# Patient Record
Sex: MALE | Race: WHITE
[De-identification: names, ages, dates, MRNs, and addresses within clinical notes are randomized per-mention and may not be internally consistent; named-entity substitution may affect disease eponyms.]

---

## 2017-01-10 ENCOUNTER — HOSPITAL ENCOUNTER (INPATIENT)
Dept: HOSPITAL 74 - JER | Age: 77
LOS: 2 days | Discharge: HOME | DRG: 392 | End: 2017-01-12
Payer: COMMERCIAL

## 2017-01-10 VITALS — BODY MASS INDEX: 28.3 KG/M2

## 2017-01-10 DIAGNOSIS — I25.10: ICD-10-CM

## 2017-01-10 DIAGNOSIS — N17.9: ICD-10-CM

## 2017-01-10 DIAGNOSIS — N40.0: ICD-10-CM

## 2017-01-10 DIAGNOSIS — Z95.1: ICD-10-CM

## 2017-01-10 DIAGNOSIS — K52.9: Primary | ICD-10-CM

## 2017-01-10 DIAGNOSIS — N18.4: ICD-10-CM

## 2017-01-10 DIAGNOSIS — I48.91: ICD-10-CM

## 2017-01-10 DIAGNOSIS — I13.0: ICD-10-CM

## 2017-01-10 DIAGNOSIS — Z95.810: ICD-10-CM

## 2017-01-10 DIAGNOSIS — I42.9: ICD-10-CM

## 2017-01-10 DIAGNOSIS — E78.5: ICD-10-CM

## 2017-01-10 DIAGNOSIS — K86.9: ICD-10-CM

## 2017-01-10 DIAGNOSIS — I50.22: ICD-10-CM

## 2017-01-10 DIAGNOSIS — I73.9: ICD-10-CM

## 2017-01-10 LAB
ALBUMIN SERPL-MCNC: 3.7 G/DL (ref 3.4–5)
ALP SERPL-CCNC: 148 U/L (ref 45–117)
ALT SERPL-CCNC: 15 U/L (ref 12–78)
ANION GAP SERPL CALC-SCNC: 7 MMOL/L (ref 8–16)
AST SERPL-CCNC: 19 U/L (ref 15–37)
BASOPHILS # BLD: 0.6 % (ref 0–2)
BILIRUB SERPL-MCNC: 0.7 MG/DL (ref 0.2–1)
CALCIUM SERPL-MCNC: 9.4 MG/DL (ref 8.5–10.1)
CO2 SERPL-SCNC: 37 MMOL/L (ref 21–32)
CREAT SERPL-MCNC: 2.6 MG/DL (ref 0.7–1.3)
DEPRECATED RDW RBC AUTO: 17.6 % (ref 11.9–15.9)
EOSINOPHIL # BLD: 1 % (ref 0–4.5)
GLUCOSE SERPL-MCNC: 123 MG/DL (ref 74–106)
INR BLD: 2.44 (ref 0.82–1.09)
MAGNESIUM SERPL-MCNC: 2.8 MG/DL (ref 1.8–2.4)
MCH RBC QN AUTO: 26.2 PG (ref 25.7–33.7)
MCHC RBC AUTO-ENTMCNC: 31.6 G/DL (ref 32–35.9)
MCV RBC: 82.9 FL (ref 80–96)
NEUTROPHILS # BLD: 76.8 % (ref 42.8–82.8)
PLATELET # BLD AUTO: 277 K/MM3 (ref 134–434)
PMV BLD: 8.8 FL (ref 7.5–11.1)
PROT SERPL-MCNC: 8.3 G/DL (ref 6.4–8.2)
PT PNL PPP: 27.3 SEC (ref 9.98–11.88)
TROPONIN I SERPL-MCNC: 0.03 NG/ML (ref 0–0.05)
WBC # BLD AUTO: 10.2 K/MM3 (ref 4–10)

## 2017-01-10 RX ADMIN — ATORVASTATIN CALCIUM SCH MG: 40 TABLET, FILM COATED ORAL at 21:51

## 2017-01-10 RX ADMIN — POTASSIUM CHLORIDE SCH MEQ: 1500 TABLET, EXTENDED RELEASE ORAL at 21:52

## 2017-01-10 RX ADMIN — DOCUSATE SODIUM SCH MG: 100 CAPSULE, LIQUID FILLED ORAL at 21:51

## 2017-01-10 RX ADMIN — MILRINONE LACTATE SCH MLS/HR: 200 INJECTION, SOLUTION INTRAVENOUS at 22:39

## 2017-01-10 RX ADMIN — CARVEDILOL SCH MG: 6.25 TABLET, FILM COATED ORAL at 21:52

## 2017-01-10 NOTE — PDOC
History of Present Illness





- General


History Source: Patient, Significant Other


Exam Limitations: No Limitations





- History of Present Illness


Initial Comments: 





01/10/17 14:59


The patient is a 76 year old male, with significant past medical history of IDDM

, HTN, COPD, Afib (on coumadin), CAD s/p CABG, PAD, end- stage CHF s/p ICD 

placement (on milrinone pump) , who presents today complaining of SOB, 

abdominal pain, nausea, and dizziness. The patient had 4 episodes of vomiting 

while having a workup in Dr. Rae office this morning. He reports abdominal 

pain that is constant. His last meal was breakfast and tea and felt normal 

until he arrived to the doctors office. The patients primary is out of office

, so his wife brought him to the emergency room. 





Denies fever, chills. 


Denies chest pain, SOB. 





Allergies: Shellfish


Surgical Hx: gallbladder, hernia


PCP- 


Cardiologist: Dr. Titus








<Ashlie Iniguez - Last Filed: 01/10/17 17:54>





- General


History Source: Patient


Exam Limitations: No Limitations





<Teodora Hammer - Last Filed: 01/11/17 12:11>





- General


Chief Complaint: Nausea/Vomiting


Stated Complaint: SOB, VOMITING (CARDIAC)


Time Seen by Provider: 01/10/17 13:24





Past History





<Ashlie Iniguez - Last Filed: 01/10/17 17:54>





- Past Medical History


Anemia: Yes


Asthma: No


Cancer: No


Cardiac Disorders: Yes (CHF, MI 2011,defibrilator placement,PACEMAKER)


CVA: No


COPD: Yes


CHF: Yes


Dementia: No


Diabetes: Yes


GI Disorders: No


 Disorders: Yes (bph)


HTN: Yes


Hypercholesterolemia: Yes


Liver Disease: No


Suicide Attempt (Hx): No


Seizures: No


Thyroid Disease: No





- Surgical History


Abdominal Surgery: Yes (HERNIA REPAIR 2003)


Appendectomy: No


Cardiac Surgery: Yes (2 OPEN HEART SURGERIES; TRACH PLACEMENT,PACEMAKER PLACED 7 /19/2013)


Cholecystectomy: Yes


Lung Surgery: No


Neurologic Surgery: No


Orthopedic Surgery: No





- Immunization History


Td Vaccination: Yes


TDAP Vaccination: Yes


Immunization Up to Date: Yes





- Psycho/Social/Smoking Cessation Hx


Anxiety: No


Suicidal Ideation: No


Smoking Status: No


Smoking History: Former smoker


Years of Tobacco Use: 40


Have you smoked in the past 12 months: No


Number of Cigarettes Smoked Daily: 40


If you are a former smoker, when did you quit?: 2011


Cigars Per Day: 0


Information on smoking cessation initiated: No


'Breaking Loose' booklet given: 06/11/12


Hx Alcohol Use: No


Drug/Substance Use Hx: No


Substance Use Type: None


Hx Substance Use Treatment: No





<HarmanTeodora - Last Filed: 01/11/17 12:11>





- Past Medical History


Allergies/Adverse Reactions: 


 Allergies











Allergy/AdvReac Type Severity Reaction Status Date / Time


 


No Known Drug Allergies Allergy   Verified 01/10/17 14:19


 


shellfish derived Allergy   Verified 01/10/17 14:19











Home Medications: 


Ambulatory Orders





Allopurinol [Zyloprim -] 100 mg PO DAILY 01/14/16 


Atorvastatin Ca [Lipitor] 40 mg PO HS 01/14/16 


Carvedilol 6.25 mg PO BID 01/14/16 


Docusate Sodium [Colace -] 100 mg PO BID 01/14/16 


Insulin NPL/Insulin Lispro [Humalog Mix 75-25 Vial] 0 unit SQ PRN 01/14/16 


Milrinone Lactate/D5w [Milrinone 0.2 mg/ml in D5w] 0 mg IV DAILY 01/14/16 


Ranitidine [Zantac -] 75 mg PO DAILY 01/14/16 


Torsemide 40 mg PO BID 01/14/16 


Albuterol 0.083% Nebulizer Sol [Ventolin 0.083% Nebulizer Soln -] 1 amp NEB Q6H 

PRN #120 amp 01/18/16 


Nebulizer [Compact Compressor Nebulizer] 1 each MC TID 08/19/16 


Acetaminophen [Tylenol .Regular Strength -] 650 mg PO Q6H PRN #0 tablet 08/27/ 16 


Ferrous Sulfate [Feosol] 325 mg PO Q48H  ud 08/27/16 


Potassium Chloride [K-Dur -] 20 meq PO TID #90 tablet.er 08/27/16 


Warfarin Na [Coumadin -] 4 mg PO DAILY@1800 01/10/17 











**Review of Systems





- Review of Systems


Able to Perform ROS?: Yes


Comments:: 





01/10/17 15:00


GENERAL/CONSTITUTIONAL: No: fever, chills, weakness, loss of appetite.


HEAD, EYES, EARS, NOSE AND THROAT: No: change in vision, ear pain, discharge, 

sore throat, throat swelling.


CARDIOVASCULAR: No: chest pain, lightheadedness, palpitations, syncope


RESPIRATORY: No: cough, shortness of breath, wheezing, hemoptysis, stridor.


GASTROINTESTINAL: Yes: nausea, vomiting, abdominal pain. No: abdominal cramping

, diarrhea, rectal bleeding, constipation. 


GENITOURINARY: No: dysuria, hematuria, frequency, urgency, flank pain.


MUSCULOSKELETAL: No: back pain, neck pain, joint pain, muscle swelling or pain


SKIN: No: lesions, pallor, rash or easy bruising.


NEUROLOGIC: No: headache, vertigo, paresthesias, weakness 


ENDOCRINE: No: unexplained weight gain or loss


HEMATOLOGIC/LYMPHATIC: No: anemia, easy bleeding, swelling nodes








<Ashlie Iniguez - Last Filed: 01/10/17 17:54>





*Physical Exam





- Vital Signs


 Last Vital Signs











Temp Pulse Resp BP Pulse Ox


 


    78   18   104/64   94 L


 


    01/10/17 13:15  01/10/17 13:15  01/10/17 13:15  01/10/17 13:15














- Physical Exam


Comments: 





01/10/17 15:00


GENERAL: Patient looks uncomfortable. Difficulty laying flat. The patient is in 

no acute distress.


HEAD: Normal with no signs of trauma.


EYES: PERRLA, EOMI, sclera anicteric, conjunctiva clear.


ENT: Ears normal, nares patent, oropharynx clear without exudates.  Moist 

mucous membranes.


NECK: Normal range of motion, supple without lymphadenopathy, JVD, or masses.


LUNGS: Breath sounds equal, clear to auscultation bilaterally.  No wheezes, and 

no crackles.


HEART:Regular rate and rhythm, normal S1 and S2 without murmur, rub or gallop.


ABDOMEN: +Multiple well healed scars on abdomen. Abdominal distention (

according to his wife). Epigastric and suprapubic tenderness on palpation. Soft

, normoactive bowel sounds.  No guarding, no rebound.


EXTREMITIES: Normal range of motion, no edema.  No clubbing or cyanosis. No 

erythema, or tenderness.


NEUROLOGICAL: Cranial nerves II through XII grossly intact.  Normal speech.  No 

focal  neurological deficits. 


MUSCULOSKELETAL:  Back nontender to palpation, no CVA tenderness


SKIN: Warm, Dry, normal turgor, no rashes or lesions noted.








<Ashlie Iniguez - Last Filed: 01/10/17 17:54>





- Vital Signs


 Last Vital Signs











Temp Pulse Resp BP Pulse Ox


 


    78   18   104/64   94 L


 


    01/10/17 13:15  01/10/17 13:15  01/10/17 13:15  01/10/17 13:15














<Teodora Hammer - Last Filed: 01/11/17 12:11>





**Heart Score/ECG Review


  ** #1


ECG reviewed & interpreted by me at: 15:09





01/10/17 15:09


V-paced at 75bpm





<Teodora Hammer - Last Filed: 01/11/17 12:11>





ED Treatment Course





- LABORATORY


CBC & Chemistry Diagram: 


 01/10/17 14:00





 01/10/17 14:00





- ADDITIONAL ORDERS


Additional order review: 


 Laboratory  Results











  01/10/17 01/10/17





  14:00 13:50


 


INR  2.44 H D 


 


Lipase   186








 











  01/10/17





  14:00


 


RBC  5.15


 


MCV  82.9


 


MCHC  31.6 L


 


RDW  17.6 H


 


MPV  8.8


 


Neutrophils %  76.8


 


Lymphocytes %  12.1


 


Monocytes %  9.5


 


Eosinophils %  1.0


 


Basophils %  0.6














- RADIOLOGY


Radiograph Interpretation: 


01/10/17 15:08


EXAM#:     TYPE/EXAM: RESULT: 


[id number] RAD/CHEST X-RAY PORTABLE* 


 


 


Chest pain. 


 Evaluate for infiltrates. 


 


 Status post cardiothoracic surgery, replacement of the cardiac valve. 


 Cardiomegaly. Uncoiled thoracic aorta. 


 The lung apices obscured by the soft tissues of the neck. 


 Left pacemaker with 2 intact  leads. Right vascular catheter in place. 


 No evidence of pneumothorax. 


 


 Vascular congestion. 


 Clinically correlate for congestive heart failure. 


 


 Impression. 


 


 


 Vascular congestion. Clinically correlate for CHF. 


 No evidence of pneumothorax. 


 


 Limited evaluation of the lung parenchyma in the left retrocardiac region.


 


 Reported By: Brannon Flores MD 


 01/10/17 9102 








EXAM#:     TYPE/EXAM: RESULT: 


[id number] CT/ABDOMEN   PELVIS CT W/O CONTR 


Epigastric and periumbilical pain 


 CT scan of the abdomen pelvis without oral and intravenous contrast 


 Coronal and sagittal reformatted images were obtained 


 Compared to prior CT scan of the chest dated 10/11/2013 


 


 There are bibasal atelectatic changes and interstitial thickening. Mild 

cardiomegaly is present. 


 A small hiatus hernia is present. The stomach is partially distended without 

thickening of its 


wall. 


 Evaluation of the liver, spleen and both adrenal glands appear unremarkable. 

Both kidneys are 


within normal limits in size with a partially exophytic cyst in midportion of 

the right kidney, 


posteriorly measuring 2.2 cm and mild deformity of the left renal lateral 

contour measuring 1.7 cm 


for which further evaluation with renal ultrasound is needed to rule out a 

cystic versus solid 


lesion. Gallbladder is not visualized. 


 The pancreas is within normal limits in size. There is a focal low-attenuation 

density in the 


pancreatic body measuring 9 mm for which further evaluation is needed. There is 

no evidence of 


small bowel obstruction or enlarged retroperitoneal lymph nodes. No free fluid 

or free air in the 


abdomen and pelvis. Normal stool burden in the colon without wall thickening 

areas moderate 


distention of the urinary bladder with a lobulated contour and suggestion of a 

large diverticulum 


on the right side measuring 11 x 7 cm and a smaller one on the left measuring 

4.3 cm. Normal size 


prostate gland with benign-appearing calcifications. Perirectal and pericecal 

fat is clear. The 


appendix is not definitely identified. Small calcified plaques in the abdominal 

aorta down through 


its bifurcation. 


 Grade 1 anterolisthesis of L5 over S1 with degenerative vacuum phenomena. 


 


 


 Impression: 


 Right renal cyst seen on prior renal ultrasound dated 3/7/2013. Deformity of 

the left renal 


lateral contour for which further evaluation with ultrasound is needed to 

assess for a cystic 


versus solid lesion. 


 9 mm low-attenuation density in the pancreatic body that was possibly 

partially included on prior 


CT scan of the chest dated 10/11/2013. Correlation with MRI of the pancreas is 

needed to assess for 


a cystic versus solid lesion. 


 Moderately distended urinary bladder with suggestion of large diverticula 


 Otherwise, no CT evidence of an acute process in the abdomen and pelvis. 


 


 Reported By: Sallie Astorga MD 


 01/10/17 4695 








- Medications


Given in the ED: 


ED Medications














Discontinued Medications














Generic Name Dose Route Start Last Admin





  Trade Name Freq  PRN Reason Stop Dose Admin


 


Aspirin  162 mg 01/10/17 13:33 01/10/17 14:27





  Asa -  PO 01/10/17 13:34  Not Given





  ONCE ONE   


 


Ondansetron HCl  4 mg 01/10/17 14:28 01/10/17 14:28





  Zofran Injection  IVPUSH 01/10/17 14:29  4 mg





  NOW ONE   Administration














<Ashlie Iniguez - Last Filed: 01/10/17 17:54>





- LABORATORY


CBC & Chemistry Diagram: 


 01/11/17 05:35





 01/11/17 05:35





- ADDITIONAL ORDERS


Additional order review: 


 











  01/10/17





  14:00


 


RBC  5.15


 


MCV  82.9


 


MCHC  31.6 L


 


RDW  17.6 H


 


MPV  8.8


 


Neutrophils %  76.8


 


Lymphocytes %  12.1


 


Monocytes %  9.5


 


Eosinophils %  1.0


 


Basophils %  0.6














- RADIOLOGY


Radiology Studies Ordered: 














 Category Date Time Status


 


 CHEST X-RAY PORTABLE* [RAD] Stat Radiology  01/10/17 13:34 Completed














- Medications


Given in the ED: 


ED Medications














Discontinued Medications














Generic Name Dose Route Start Last Admin





  Trade Name Freq  PRN Reason Stop Dose Admin


 


Aspirin  162 mg 01/10/17 13:33 01/10/17 14:27





  Asa -  PO 01/10/17 13:34  Not Given





  ONCE ONE   


 


Ondansetron HCl  4 mg 01/10/17 14:28 01/10/17 14:28





  Zofran Injection  IVPUSH 01/10/17 14:29  4 mg





  NOW ONE   Administration














<Teodora Hammer - Last Filed: 01/11/17 12:11>





Medical Decision Making





- Medical Decision Making


01/10/17 14:44


A portion of this note was documented by scribe services under my direction. I 

have reviewed the details of the note, within reason, and agree with the 

documentation with the following case summary and management plan written by me.


Nursing documentation reviewed and incorporated into medical decision making





This pt is a 77 yo M h/o IDDM, HTN, COPD, Afib (on coumadin), CAD s/p CABG, PAD

, end- stage CHF s/p ICD placement (on milrinone pump) , who presents today 

complaining of SOB, abdominal pain, nausea, and dizziness. 


Repeated episodes of vomiting and diarrhea


He reports abdominal pain that is constant, located in the middle of the 

abdomen. 


01/10/17 16:43


 Laboratory Tests











  08/29/16 08/29/16 01/10/17





  05:48 05:48 13:50


 


WBC  7.8  


 


Hgb  11.9  


 


Hct  37.3  


 


Plt Count  194  


 


INR   1.74 H 


 


Sodium   


 


Potassium   


 


Chloride   


 


Carbon Dioxide   


 


BUN   


 


Creatinine   


 


Random Glucose   


 


Creatine Kinase   


 


Troponin I   


 


Lipase    186














  01/10/17 01/10/17 01/10/17





  14:00 14:00 14:00


 


WBC  10.2 H D  


 


Hgb  13.5  D  


 


Hct  42.7  


 


Plt Count  277  D  


 


INR   2.44 H D 


 


Sodium    140


 


Potassium    3.2 L


 


Chloride    96 L


 


Carbon Dioxide    37 H


 


BUN    68 H D


 


Creatinine    2.6 H D


 


Random Glucose    123 H D


 


Creatine Kinase    57


 


Troponin I    0.03


 


Lipase   














01/10/17 16:55


Labs demonstrate renal insufficiency


CT pending


Will hold on aggressive hydration (As pt CXR demonstrates CHF)\








01/10/17 17:31


CT of the abdomen and pelvis:





01/10/17 17:34


Small hiatal hernia


Exophytic cyst in the midportion of the right kidney, mild deformity of the 

left kidney, patient is an ultrasound.


Patient is within normal limits.


Low attenuation density in the pancreatic body measuring 9 mm no evidence of 

SBO.


Normal stool burning bladder is lobulated suggestive of a large diverticulum.





Patient seen in the ER by Dr. Villafuerte.


Will admit to telemetry.








<Teodora Hammer - Last Filed: 01/11/17 12:11>





*DC/Admit/Observation/Transfer





- Attestations


Scribe Attestion: 





01/10/17 15:00





Documentation prepared by SEFERINO Tomlin, acting as medical scribe 

for Teodora Hammer MD.





<Ashlie Iniguez - Last Filed: 01/10/17 17:54>





- Discharge Dispostion


Admit: Yes





<Teodora Hammer - Last Filed: 01/11/17 12:11>


Diagnosis at time of Disposition: 


 Renal insufficiency





Vomiting


Qualifiers:


 Vomiting type: unspecified Vomiting Intractability: non-intractable Nausea 

presence: with nausea Qualified Code(s): R11.2 - Nausea with vomiting, 

unspecified





- Discharge Dispostion


Condition at time of disposition: Stable





- Referrals

## 2017-01-10 NOTE — HP
Admitting History and Physical





- Admission


Chief Complaint: nausea, vomiting





- Past Medical History


Cardiovascular: Yes: AFIB, CAD, CHF, HTN, Hyperlipdemia, MI, Pulmonary 

Hypertension, Other (AICD placed)


Pulmonary: Yes: COPD, O2 Dependent


Renal/: Yes: Renal Inusuff, BPH


Endocrine: Yes: Diabetes Mellitus





- Past Surgical History


Past Surgical History: Yes: AICD, CABG, Hernia Repair





- Smoking History


Smoking history: Former smoker


Have you smoked in the past 12 months: No


Aproximately how many cigarettes per day: 40


If you are a former smoker, when did you quit?: 2011





- Alcohol/Substance Use


Hx Alcohol Use: No





- Social History


ADL: Independent


History of Recent Travel: No





Home Medications





- Allergies


Allergies/Adverse Reactions: 


 Allergies











Allergy/AdvReac Type Severity Reaction Status Date / Time


 


No Known Drug Allergies Allergy   Verified 01/10/17 14:19


 


shellfish derived Allergy   Verified 01/10/17 14:19














- Home Medications


Home Medications: 


Ambulatory Orders





Allopurinol [Zyloprim -] 100 mg PO DAILY 01/14/16 


Atorvastatin Ca [Lipitor] 40 mg PO HS 01/14/16 


Carvedilol 6.25 mg PO BID 01/14/16 


Docusate Sodium [Colace -] 100 mg PO BID 01/14/16 


Insulin NPL/Insulin Lispro [Humalog Mix 75-25 Vial] 0 unit SQ PRN 01/14/16 


Milrinone Lactate/D5w [Milrinone 0.2 mg/ml in D5w] 0 mg IV DAILY 01/14/16 


Ranitidine [Zantac -] 75 mg PO DAILY 01/14/16 


Torsemide 40 mg PO BID 01/14/16 


Albuterol 0.083% Nebulizer Sol [Ventolin 0.083% Nebulizer Soln -] 1 amp NEB Q6H 

PRN #120 amp 01/18/16 


Nebulizer [Compact Compressor Nebulizer] 1 each MC TID 08/19/16 


Acetaminophen [Tylenol .Regular Strength -] 650 mg PO Q6H PRN #0 tablet 08/27/ 16 


Ferrous Sulfate [Feosol] 325 mg PO Q48H  ud 08/27/16 


Potassium Chloride [K-Dur -] 20 meq PO TID #90 tablet.er 08/27/16 


Warfarin Na [Coumadin -] 4 mg PO DAILY@1800 01/10/17 











Family Disease History





- Family Disease History


Family Disease History: Diabetes: Father, Heart Disease: Father





Physical Examination


Vital Signs: 


 Vital Signs











Temperature  97.9 F   01/10/17 19:17


 


Pulse Rate  75   01/10/17 19:17


 


Respiratory Rate  18   01/10/17 19:17


 


Blood Pressure  98/57   01/10/17 19:17


 


O2 Sat by Pulse Oximetry (%)  96   01/10/17 19:17

## 2017-01-10 NOTE — HP
Admitting History and Physical





- Admission


Chief Complaint: N/V, abd pain


History of Present Illness: 


77 yo male, h/o end-stage CHF, on milrinone pump, presents to hospital with 

nausea and vomiting which began earlier today.  Havign abdominal pain as well.  

Patient was feeling well this morning upon awakening.  No diarrhea recently.  

Went to routine appt at cardiologist's office, where e became ill, vomiting 

multiple times, where abd pain began.  Currently feeling a little better, no 

pain now.  Had CT scan, which did not show any acute abd process present.  no 

fevers noted.


History Source: Patient, Family Member, Medical Record


Limitations to Obtaining History: No Limitations





- Past Medical History


Cardiovascular: Yes: AFIB, CAD, CHF, HTN, Hyperlipdemia, MI, Pulmonary 

Hypertension, Other (AICD placed)


Pulmonary: Yes: COPD, O2 Dependent


Renal/: Yes: Renal Inusuff, BPH


Endocrine: Yes: Diabetes Mellitus





- Past Surgical History


Past Surgical History: Yes: AICD, CABG, Hernia Repair





- Smoking History


Smoking history: Former smoker


Have you smoked in the past 12 months: No


Aproximately how many cigarettes per day: 40


If you are a former smoker, when did you quit?: 2011





- Alcohol/Substance Use


Hx Alcohol Use: No





- Social History


ADL: Independent


History of Recent Travel: No





Home Medications





- Allergies


Allergies/Adverse Reactions: 


 Allergies











Allergy/AdvReac Type Severity Reaction Status Date / Time


 


No Known Drug Allergies Allergy   Verified 01/10/17 14:19


 


shellfish derived Allergy   Verified 01/10/17 14:19














- Home Medications


Home Medications: 


Ambulatory Orders





Allopurinol [Zyloprim -] 100 mg PO DAILY 01/14/16 


Atorvastatin Ca [Lipitor] 40 mg PO HS 01/14/16 


Carvedilol 6.25 mg PO BID 01/14/16 


Docusate Sodium [Colace -] 100 mg PO BID 01/14/16 


Insulin NPL/Insulin Lispro [Humalog Mix 75-25 Vial] 0 unit SQ PRN 01/14/16 


Milrinone Lactate/D5w [Milrinone 0.2 mg/ml in D5w] 0 mg IV DAILY 01/14/16 


Ranitidine [Zantac -] 75 mg PO DAILY 01/14/16 


Torsemide 40 mg PO BID 01/14/16 


Albuterol 0.083% Nebulizer Sol [Ventolin 0.083% Nebulizer Soln -] 1 amp NEB Q6H 

PRN #120 amp 01/18/16 


Nebulizer [Compact Compressor Nebulizer] 1 each MC TID 08/19/16 


Acetaminophen [Tylenol .Regular Strength -] 650 mg PO Q6H PRN #0 tablet 08/27/ 16 


Ferrous Sulfate [Feosol] 325 mg PO Q48H  ud 08/27/16 


Potassium Chloride [K-Dur -] 20 meq PO TID #90 tablet.er 08/27/16 


Warfarin Na [Coumadin -] 4 mg PO DAILY@1800 01/10/17 











Family Disease History





- Family Disease History


Family Disease History: Diabetes: Father, Heart Disease: Father





Review of Systems





- Review of Systems


Constitutional: denies: Fever, Loss of Appetite, Unintentional Wgt. Loss


Eyes: reports: No Symptoms


HENT: denies: Difficult Swallowing, Throat Pain


Neck: denies: Decreased ROM, Tenderness


Cardiovascular: reports: Shortness of Breath ((baseline CARTAGENA)).  denies: Chest 

Pain


Respiratory: denies: Cough, Wheezing


Gastrointestinal: denies: Constipation, Diarrhea


Genitourinary: denies: Burning, Discharge, Dysuria


Musculoskeletal: denies: Back Pain


Integumentary: reports: Other (recent cellulitis treatment of legs)





Physical Examination


Vital Signs: 


 Vital Signs











Temperature  96.6 F L  01/10/17 21:14


 


Pulse Rate  75   01/10/17 21:14


 


Respiratory Rate  20   01/10/17 21:14


 


Blood Pressure  109/59   01/10/17 21:14


 


O2 Sat by Pulse Oximetry (%)  96   01/10/17 19:17











Constitutional: Yes: Well Nourished, No Distress, Calm


Eyes: Yes: Conjunctiva Clear, EOM Intact, PERRL


HENT: Yes: Atraumatic, Normocephalic


Neck: Yes: Supple, Trachea Midline


Cardiovascular: Yes: Regular Rate and Rhythm, S1, S2


Respiratory: Yes: Regular, Diminished (at bases)


Gastrointestinal: Yes: Normal Bowel Sounds, Soft.  No: Tenderness


Edema: Yes


Edema: LLE: Trace, RLE: Trace


Neurological: Yes: Alert, Oriented


Labs: 


 Laboratory Tests











  01/10/17 01/10/17 01/10/17





  13:50 14:00 14:00


 


WBC   10.2 H D 


 


RBC   5.15 


 


Hgb   13.5  D 


 


Hct   42.7 


 


MCV   82.9 


 


MCHC   31.6 L 


 


RDW   17.6 H 


 


Plt Count   277  D 


 


MPV   8.8 


 


Neutrophils %   76.8 


 


Lymphocytes %   12.1 


 


Monocytes %   9.5 


 


Eosinophils %   1.0 


 


Basophils %   0.6 


 


INR    2.44 H D


 


Sodium   


 


Potassium   


 


Chloride   


 


Carbon Dioxide   


 


Anion Gap   


 


BUN   


 


Creatinine   


 


Creat Clearance w eGFR   


 


Random Glucose   


 


Calcium   


 


Magnesium   


 


Total Bilirubin   


 


AST   


 


ALT   


 


Alkaline Phosphatase   


 


Creatine Kinase   


 


Troponin I   


 


Total Protein   


 


Albumin   


 


Lipase  186  














  01/10/17





  14:00


 


WBC 


 


RBC 


 


Hgb 


 


Hct 


 


MCV 


 


MCHC 


 


RDW 


 


Plt Count 


 


MPV 


 


Neutrophils % 


 


Lymphocytes % 


 


Monocytes % 


 


Eosinophils % 


 


Basophils % 


 


INR 


 


Sodium  140


 


Potassium  3.2 L


 


Chloride  96 L


 


Carbon Dioxide  37 H


 


Anion Gap  7 L


 


BUN  68 H D


 


Creatinine  2.6 H D


 


Creat Clearance w eGFR  23.99


 


Random Glucose  123 H D


 


Calcium  9.4


 


Magnesium  2.8 H D


 


Total Bilirubin  0.7


 


AST  19  D


 


ALT  15


 


Alkaline Phosphatase  148 H D


 


Creatine Kinase  57


 


Troponin I  0.03


 


Total Protein  8.3 H


 


Albumin  3.7


 


Lipase 














Imaging





- Results


Chest X-ray: Report Reviewed (congestive changes)


Cat Scan: Report Reviewed (abd/pelv: no colitis or acute intestinal process 

noted -renal cysts seen, pancreatic density seen)





Problem List





- Problems


(1) Vomiting


Assessment/Plan: 


-gastoenteritis? -zofran prn, clear liquid diet (no abd pain now)


Code(s): R11.10 - VOMITING, UNSPECIFIED   Qualifiers: 


     Vomiting type: unspecified     Vomiting Intractability: non-intractable   

  Nausea presence: with nausea     Qualified Code(s): R11.2 - Nausea with 

vomiting, unspecified  





(2) Acute on chronic renal insufficiency


Assessment/Plan: 


-difficult to give IV fluids due to end-stage cardiomyopathy -will hold 

torsemide for a few doses, renal consult


Code(s): N28.9 - DISORDER OF KIDNEY AND URETER, UNSPECIFIED


N18.9 - CHRONIC KIDNEY DISEASE, UNSPECIFIED





(3) Atrial fibrillation


Assessment/Plan: 


-cont coumadin (INR therapeutic)


Code(s): I48.91 - UNSPECIFIED ATRIAL FIBRILLATION   Qualifiers: 


     Atrial fibrillation type: chronic     Qualified Code(s): I48.2 - Chronic 

atrial fibrillation  





(4) Cardiomyopathy


Assessment/Plan: 


-cont milrinone pump


Code(s): I42.9 - CARDIOMYOPATHY, UNSPECIFIED





(5) Chronic kidney disease (CKD) stage G3a/A3, moderately decreased glomerular 

filtration rate (GFR) between 45-59 mL/min/1.73 square meter and albuminuria 

creatinine ratio greater than 300 mg/g


Assessment/Plan: 


Usual Cr approx. 2.0


Code(s): N18.3 - CHRONIC KIDNEY DISEASE, STAGE 3 (MODERATE)

## 2017-01-10 NOTE — EKG
Test Reason : 

Blood Pressure : ***/*** mmHG

Vent. Rate : 075 BPM     Atrial Rate : 072 BPM

   P-R Int : 000 ms          QRS Dur : 190 ms

    QT Int : 528 ms       P-R-T Axes : [phone] degrees

   QTc Int : 589 ms

 

Ventricular-paced rhythm

ABNORMAL ECG

WHEN COMPARED WITH ECG OF 19-AUG-2016 13:51,

NO SIGNIFICANT CHANGE WAS FOUND

Confirmed by GRACIA TEAGUE MD ([id number]) on 1/10/2017 6:20:38 PM

 

Referred By:             Confirmed By:GRACIA TEAGUE MD

## 2017-01-11 LAB
ALBUMIN SERPL-MCNC: 3.4 G/DL (ref 3.4–5)
ALP SERPL-CCNC: 121 U/L (ref 45–117)
ALT SERPL-CCNC: 13 U/L (ref 12–78)
ANION GAP SERPL CALC-SCNC: 7 MMOL/L (ref 8–16)
AST SERPL-CCNC: 15 U/L (ref 15–37)
BASOPHILS # BLD: 0.5 % (ref 0–2)
BILIRUB SERPL-MCNC: 0.6 MG/DL (ref 0.2–1)
CALCIUM SERPL-MCNC: 8.9 MG/DL (ref 8.5–10.1)
CO2 SERPL-SCNC: 37 MMOL/L (ref 21–32)
CREAT SERPL-MCNC: 2.5 MG/DL (ref 0.7–1.3)
DEPRECATED RDW RBC AUTO: 17.2 % (ref 11.9–15.9)
EOSINOPHIL # BLD: 2 % (ref 0–4.5)
GLUCOSE SERPL-MCNC: 120 MG/DL (ref 74–106)
INR BLD: 2.81 (ref 0.82–1.09)
MCH RBC QN AUTO: 26.7 PG (ref 25.7–33.7)
MCHC RBC AUTO-ENTMCNC: 32.2 G/DL (ref 32–35.9)
MCV RBC: 82.9 FL (ref 80–96)
NEUTROPHILS # BLD: 67.4 % (ref 42.8–82.8)
PLATELET # BLD AUTO: 235 K/MM3 (ref 134–434)
PMV BLD: 9 FL (ref 7.5–11.1)
PROT SERPL-MCNC: 7.3 G/DL (ref 6.4–8.2)
PT PNL PPP: 31.6 SEC (ref 9.98–11.88)
WBC # BLD AUTO: 8 K/MM3 (ref 4–10)

## 2017-01-11 RX ADMIN — CARVEDILOL SCH MG: 6.25 TABLET, FILM COATED ORAL at 09:29

## 2017-01-11 RX ADMIN — DOCUSATE SODIUM SCH MG: 100 CAPSULE, LIQUID FILLED ORAL at 22:55

## 2017-01-11 RX ADMIN — CARVEDILOL SCH MG: 6.25 TABLET, FILM COATED ORAL at 22:55

## 2017-01-11 RX ADMIN — WARFARIN SCH MG: 2 TABLET ORAL at 17:12

## 2017-01-11 RX ADMIN — DOCUSATE SODIUM SCH MG: 100 CAPSULE, LIQUID FILLED ORAL at 09:29

## 2017-01-11 RX ADMIN — POTASSIUM CHLORIDE SCH MEQ: 1500 TABLET, EXTENDED RELEASE ORAL at 06:14

## 2017-01-11 RX ADMIN — MILRINONE LACTATE SCH MLS/HR: 200 INJECTION, SOLUTION INTRAVENOUS at 21:00

## 2017-01-11 RX ADMIN — ATORVASTATIN CALCIUM SCH MG: 40 TABLET, FILM COATED ORAL at 22:55

## 2017-01-11 RX ADMIN — TORSEMIDE SCH MG: 20 TABLET ORAL at 06:13

## 2017-01-11 RX ADMIN — POTASSIUM CHLORIDE SCH MEQ: 1500 TABLET, EXTENDED RELEASE ORAL at 13:59

## 2017-01-11 RX ADMIN — POTASSIUM CHLORIDE SCH MEQ: 1500 TABLET, EXTENDED RELEASE ORAL at 22:55

## 2017-01-11 RX ADMIN — TORSEMIDE SCH MG: 20 TABLET ORAL at 13:58

## 2017-01-11 RX ADMIN — MILRINONE LACTATE SCH MLS/HR: 200 INJECTION, SOLUTION INTRAVENOUS at 09:28

## 2017-01-11 RX ADMIN — RANITIDINE SCH MG: 150 TABLET ORAL at 09:29

## 2017-01-11 NOTE — PN
Progress Note, Physician


Chief Complaint: 


Mr Jerome says he is feeling better today. Nausea/vomiting and pain resolved. 

No cp or sob.





- Current Medication List


Current Medications: 


Active Medications





Acetaminophen (Tylenol -)  650 mg PO Q6H PRN


   PRN Reason: FEVER OR PAIN


Albuterol Sulfate (Ventolin 0.083% Nebulizer Soln -)  1 amp NEB Q6H PRN


   PRN Reason: SHORT OF BREATH/WHEEZING


Atorvastatin Calcium (Lipitor -)  40 mg PO HS [facility]


   Last Admin: 01/10/17 21:51 Dose:  40 mg


Carvedilol (Coreg -)  6.25 mg PO BID [facility]


   Last Admin: 01/11/17 09:29 Dose:  6.25 mg


Docusate Sodium (Colace -)  100 mg PO BID [facility]


   Last Admin: 01/11/17 09:29 Dose:  100 mg


Ferrous Sulfate (Feosol -)  325 mg PO Q48H [facility]


   Last Admin: 01/11/17 09:30 Dose:  325 mg


Milrinone Lactate/Dextrose (Milrinone 20mg/100ml Ivpb -)  100 mls @ 9.954 mls/

hr IVPB TITR JARROD; 0.35 MCG/KG/MIN


   PRN Reason: Protocol


   Last Admin: 01/11/17 09:28 Dose:  10.2 mls/hr


Ondansetron HCl (Zofran Injection)  4 mg IVPB Q6H PRN


   PRN Reason: NAUSEA


Potassium Chloride (K-Dur -)  20 meq PO TID [facility]


   Last Admin: 01/11/17 06:14 Dose:  20 meq


Ranitidine HCl (Zantac -)  75 mg PO DAILY [facility]


   Last Admin: 01/11/17 09:29 Dose:  75 mg


Torsemide (Demadex -)  40 mg PO BIDLASIX [facility]


   Last Admin: 01/11/17 06:13 Dose:  40 mg


Warfarin Sodium (Coumadin -)  4 mg PO DAILY@1800 [facility]











- Objective


Vital Signs: 


 Vital Signs











Temperature  97.0 F L  01/11/17 08:02


 


Pulse Rate  75   01/11/17 08:02


 


Respiratory Rate  20   01/11/17 08:02


 


Blood Pressure  110/58   01/11/17 08:02


 


O2 Sat by Pulse Oximetry (%)  96   01/10/17 21:00











Constitutional: Yes: Well Nourished, No Distress, Calm


Cardiovascular: Yes: Regular Rate and Rhythm.  No: Gallop, Murmur, Rub


Respiratory: Yes: Regular, CTA Bilaterally.  No: Rales, Rhonchi, Wheezes


Gastrointestinal: Yes: Normal Bowel Sounds, Soft.  No: Distention, Tenderness


Extremities: Yes: WNL


Edema: No


Labs: 


 CBC, BMP





 01/11/17 05:35 





 01/11/17 05:35 





 INR, PTT











INR  2.81  (0.82-1.09)  H  01/11/17  05:35    














Problem List





- Problems


(1) Gastroenteritis


Assessment/Plan: 


-resolved


-suspect viral or other non-specific pathology


-will advance diet and see if can tolerate


-possible discharge today or early tomorrow pending consultants recommendations


Code(s): K52.9 - NONINFECTIVE GASTROENTERITIS AND COLITIS, UNSPECIFIED





(2) CKD (chronic kidney disease)


Assessment/Plan: 


-nephrology consulted


-creatinine increased since last admission, but unsure of outpatient 

measurements


-will d/w nephrology


Code(s): N18.9 - CHRONIC KIDNEY DISEASE, UNSPECIFIED   Qualifiers: 


     Chronic kidney disease stage: unspecified stage     Qualified Code(s): 

N18.9 - Chronic kidney disease, unspecified  





(3) AICD (automatic cardioverter/defibrillator) present


Assessment/Plan: 


-not firing, no problems


Code(s): Z95.810 - PRESENCE OF AUTOMATIC (IMPLANTABLE) CARDIAC DEFIBRILLATOR





(4) CHF (congestive heart failure)


Assessment/Plan: 


-continue milrinone gtt


Code(s): I50.9 - HEART FAILURE, UNSPECIFIED   Qualifiers: 


     Congestive heart failure type: systolic     Congestive heart failure 

chronicity: chronic     Qualified Code(s): I50.22 - Chronic systolic (congestive

) heart failure  





(5) Renal mass


Assessment/Plan: 


-seen on CT scan


-will obtain renal ultrasound to further evaluate


Code(s): N28.89 - OTHER SPECIFIED DISORDERS OF KIDNEY AND URETER





(6) Pancreatic mass


Assessment/Plan: 


-seen on CT scan


-appears to been seen on previous CT scan of chest, but size not characterized


-unsure if growing


-cannot obtain MRCP secondary to AICD or CT scan with contrast


-since small, will set up outpatient GI consult for EUS


Code(s): K86.9 - DISEASE OF PANCREAS, UNSPECIFIED

## 2017-01-11 NOTE — CONSULT
Consult - text type





- Consultation


Consultation Note: 


Renal Consult for HARPREET on CKD





This is a 76 year old Gentleman with PMHx of End-stage CHF on Mirinone Gtt, CKD 

Stage 4 (baseline Cr 2-2.2) who presented with Abd pain/N/V from his 

cardiologists office and found to gastroenteritis with HARPREET with BUN/Cr of 68/

2.6. Pt states that he feels much better today. No further nausea or vomiting. 

He is followed by Dr. Titus and is on Torsemide BID at home. He currently 

denies any sob, chest pain, or LE swelling. BP stable. Reports good urine 

output at home w/o retention. No rash or recent abx use. No nsaid use. 





PMhx: As above


Allergies: NKDA


Family hx: NC


Social Hx: No T/A/D


ROS: as per HPI, all other pertinent ros negative


Home Meds:





 











 Medication  Instructions  Recorded


 


Allopurinol [Zyloprim -] 100 mg PO DAILY 01/14/16


 


Atorvastatin Ca [Lipitor] 40 mg PO HS 01/14/16


 


Carvedilol 6.25 mg PO BID 01/14/16


 


Docusate Sodium [Colace -] 100 mg PO BID 01/14/16


 


Insulin NPL/Insulin Lispro 0 unit SQ PRN 01/14/16





[Humalog Mix 75-25 Vial]  


 


Milrinone Lactate/D5w [Milrinone 0 mg IV DAILY 01/14/16





0.2 mg/ml in D5w]  


 


Ranitidine [Zantac -] 75 mg PO DAILY 01/14/16


 


Torsemide 40 mg PO BID 01/14/16


 


Albuterol 0.083% Nebulizer Sol 1 amp NEB Q6H PRN #120 amp 01/18/16





[Ventolin 0.083% Nebulizer Soln -]  


 


Nebulizer [Compact Compressor 1 each MC TID 08/19/16





Nebulizer]  


 


Acetaminophen [Tylenol .Regular 650 mg PO Q6H PRN #0 tablet 08/27/16





Strength -]  


 


Ferrous Sulfate [Feosol] 325 mg PO Q48H  ud 08/27/16


 


Potassium Chloride [K-Dur -] 20 meq PO TID #90 tablet.er 08/27/16


 


Warfarin Na [Coumadin -] 4 mg PO DAILY@1800 01/10/17








 Vital Signs











Temperature  97.0 F L  01/11/17 08:02


 


Pulse Rate  75   01/11/17 08:02


 


Respiratory Rate  20   01/11/17 08:02


 


Blood Pressure  110/58   01/11/17 08:02


 


O2 Sat by Pulse Oximetry (%)  96   01/10/17 21:00








 Intake & Output











 01/08/17 01/09/17 01/10/17 01/11/17





 23:59 23:59 23:59 23:59


 


Intake Total   240 130


 


Balance   240 130


 


Weight   208 lb 208 lb 9.6 oz








Gen: NAD awake and alert on NC


HEENT: NC/AT, Dry MM, No JVD


CVS: RRR, No M/R


Lungs: DEc BS at lung bases but no rales


Abd: soft NT/ND


Ext: no edema, clubbing or cyanosis


: No bladder distension


Neuro: No focal defects





 CBC, BMP





 01/11/17 05:35 





 01/11/17 05:35 





 Current Medications





Acetaminophen (Tylenol -)  650 mg PO Q6H PRN


   PRN Reason: FEVER OR PAIN


Albuterol Sulfate (Ventolin 0.083% Nebulizer Soln -)  1 amp NEB Q6H PRN


   PRN Reason: SHORT OF BREATH/WHEEZING


Atorvastatin Calcium (Lipitor -)  40 mg PO HS [facility]


   Last Admin: 01/10/17 21:51 Dose:  40 mg


Carvedilol (Coreg -)  6.25 mg PO BID [facility]


   Last Admin: 01/11/17 09:29 Dose:  6.25 mg


Docusate Sodium (Colace -)  100 mg PO BID [facility]


   Last Admin: 01/11/17 09:29 Dose:  100 mg


Ferrous Sulfate (Feosol -)  325 mg PO Q48H [facility]


   Last Admin: 01/11/17 09:30 Dose:  325 mg


Milrinone Lactate/Dextrose (Milrinone 20mg/100ml Ivpb -)  100 mls @ 9.954 mls/

hr IVPB TITR JARROD; 0.35 MCG/KG/MIN


   PRN Reason: Protocol


   Last Admin: 01/11/17 09:28 Dose:  10.2 mls/hr


Ondansetron HCl (Zofran Injection)  4 mg IVPB Q6H PRN


   PRN Reason: NAUSEA


Potassium Chloride (K-Dur -)  20 meq PO TID [facility]


   Last Admin: 01/11/17 06:14 Dose:  20 meq


Ranitidine HCl (Zantac -)  75 mg PO DAILY [facility]


   Last Admin: 01/11/17 09:29 Dose:  75 mg


Torsemide (Demadex -)  40 mg PO BIDLASIX [facility]


   Last Admin: 01/11/17 06:13 Dose:  40 mg


Warfarin Sodium (Coumadin -)  4 mg PO DAILY@1800 [facility]





A/P


76 year old Gentleman with PMHx of End-stage CHF on Mirinone Gtt, CKD Stage 4 (

baseline Cr 2-2.2) who presented with Abd pain/N/V from his cardiologists 

office and found to gastroenteritis with HARPREET with BUN/Cr of 68/2.6.





#Acute on Chronic Renal Insufficiency


Etiology likely mild intravascular depletion in setting of gastroenteritis and 

high dose diuretics (high BUN/cr ratio, Elevated serum Bicarb)


Renal function stable over the past 24 hours


Given history of end stage CHF would not downtitrate diuretics at this time 


Trend BUN/cr and electrolytes for now


if renal function worsens would have to consider decreasing diuretics as pt 

appears evolemic at this time





#Left Kidney Lesion seen on Ct of the Abd


Agree with US to eval cystic vs solid lesion 





#Gastroenteritis


continue supportive care





#End stage CHF


continue Milrinone and diuretics as per cardiology





Thank you 


Will follow





Michael East DO

## 2017-01-11 NOTE — CONSULT
Cardiology Consult (text)





- Consultation


Consultation Note: 


cc: n/v/abd pain





hpi:  76 year old gentleman with PMH of IDDM, COPD (O2 dependant) HTN, HLD, 

Afib (on coumadin), CAD s/p CABG 2013, end stage CHF with ICD placement (on 

milrinone pump), PVD, BPH here with n/v/abd pain.  Had been feeling well until 

yesterday when sitting in cardiologist's office for routine f/u and developed 

nausea and vomiting and abd pain.  Brought to ER, thought to have 

gastroenteritis.  Overnight sxs resolved and pt feels well now.  No cp, sob, 

palps, dizzy, loc, pnd, orthopnea, le edema.  Sees dr merchant for cardio.





Past Medical History/Surg hx: per hpi, additionally hernia repair


Soc hx: Former smoker, no etoh or illicits


fam hx: brother with pvd


ros: per hpi; no fever, cough, nasal congestion, rash, ha, vision changes, wt 

loss, gib, hematuria


meds:


 











 Medication  Instructions  Recorded


 


Allopurinol [Zyloprim -] 100 mg PO DAILY 01/14/16


 


Atorvastatin Ca [Lipitor] 40 mg PO HS 01/14/16


 


Carvedilol 6.25 mg PO BID 01/14/16


 


Docusate Sodium [Colace -] 100 mg PO BID 01/14/16


 


Insulin NPL/Insulin Lispro 0 unit SQ PRN 01/14/16





[Humalog Mix 75-25 Vial]  


 


Milrinone Lactate/D5w [Milrinone 0 mg IV DAILY 01/14/16





0.2 mg/ml in D5w]  


 


Ranitidine [Zantac -] 75 mg PO DAILY 01/14/16


 


Torsemide 40 mg PO BID 01/14/16


 


Albuterol 0.083% Nebulizer Sol 1 amp NEB Q6H PRN #120 amp 01/18/16





[Ventolin 0.083% Nebulizer Soln -]  


 


Nebulizer [Compact Compressor 1 each MC TID 08/19/16





Nebulizer]  


 


Acetaminophen [Tylenol .Regular 650 mg PO Q6H PRN #0 tablet 08/27/16





Strength -]  


 


Ferrous Sulfate [Feosol] 325 mg PO Q48H  ud 08/27/16


 


Potassium Chloride [K-Dur -] 20 meq PO TID #90 tablet.er 08/27/16


 


Warfarin Na [Coumadin -] 4 mg PO DAILY@1800 01/10/17











pe:


 Vital Signs











 Period  Temp  Pulse  Resp  BP Sys/Alonso  Pulse Ox


 


 Last 24 Hr  96 F-97.9 F  64-78  18-20  /57-64  94-96














NAD, calm 


JVD flat, neck supple


RRR nl s1, s2 no m/r/g.


cta bl, nl effort


+ bs soft nd, nt


no le e/c/c


aaox3


pos pt dp, no carotid bruits


no jaundice diaphoresis





 Laboratory Last Values











WBC  8.0 K/mm3 (4.0-10.0)   01/11/17  05:35    


 


RBC  4.67 M/mm3 (4.00-5.60)   01/11/17  05:35    


 


Hgb  12.5 GM/dL (11.7-16.9)   01/11/17  05:35    


 


Hct  38.8 % (35.4-49)   01/11/17  05:35    


 


MCV  82.9 fl (80-96)   01/11/17  05:35    


 


MCHC  32.2 g/dl (32.0-35.9)   01/11/17  05:35    


 


RDW  17.2 % (11.9-15.9)  H  01/11/17  05:35    


 


Plt Count  235 K/MM3 (134-434)   01/11/17  05:35    


 


MPV  9.0 fl (7.5-11.1)   01/11/17  05:35    


 


Neutrophils %  67.4 % (42.8-82.8)   01/11/17  05:35    


 


Lymphocytes %  19.1 % (8-40)  D 01/11/17  05:35    


 


Monocytes %  11.0 % (3.8-10.2)  H  01/11/17  05:35    


 


Eosinophils %  2.0 % (0-4.5)  D 01/11/17  05:35    


 


Basophils %  0.5 % (0-2.0)   01/11/17  05:35    


 


INR  2.81  (0.82-1.09)  H  01/11/17  05:35    


 


Sodium  140 mmol/L (136-145)   01/11/17  05:35    


 


Potassium  3.6 mmol/L (3.5-5.1)   01/11/17  05:35    


 


Chloride  96 mmol/L ()  L  01/11/17  05:35    


 


Carbon Dioxide  37 mmol/L (21-32)  H  01/11/17  05:35    


 


Anion Gap  7  (8-16)  L  01/11/17  05:35    


 


BUN  67 mg/dL (7-18)  H  01/11/17  05:35    


 


Creatinine  2.5 mg/dL (0.7-1.3)  H  01/11/17  05:35    


 


Creat Clearance w eGFR  25.10  (>60)   01/11/17  05:35    


 


Random Glucose  120 mg/dL ()  H  01/11/17  05:35    


 


Calcium  8.9 mg/dL (8.5-10.1)   01/11/17  05:35    


 


Magnesium  2.8 mg/dL (1.8-2.4)  H D 01/10/17  14:00    


 


Total Bilirubin  0.6 mg/dL (0.2-1.0)   01/11/17  05:35    


 


AST  15 U/L (15-37)  D 01/11/17  05:35    


 


ALT  13 U/L (12-78)   01/11/17  05:35    


 


Alkaline Phosphatase  121 U/L ()  H  01/11/17  05:35    


 


Creatine Kinase  57 IU/L ()   01/10/17  14:00    


 


Troponin I  0.03 ng/ml (0.00-0.05)   01/10/17  14:00    


 


Total Protein  7.3 g/dl (6.4-8.2)   01/11/17  05:35    


 


Albumin  3.4 g/dl (3.4-5.0)   01/11/17  05:35    


 


Lipase  186 U/L ()   01/10/17  13:50    











ecg 1/10/17: 





echo 10/2016: sev lve, global hk, sev dec lvef, mild rve, nl rv fcn, patty, mild-

mod mr, mv ring, tv ring, mild tr, mild pr, mild phtn, ivc mild dil





cxr: mild congestion (chronic finding)





tele: 








a/p:  76 year old gentleman with PMH of IDDM, COPD (O2 dependant) HTN, HLD, 

Afib (on coumadin), CAD s/p CABG 2013, end stage CHF with ICD placement (on 

milrinone pump), PVD, BPH here with n/v/abd pain.





n/v/abd pain:


-no signs of cardiac etiology, no signs acs


-possible viral gastroenteritis, sxs resolved now





chronic end stage systolic heart failure s/p ICD, on home milrinone infusion:


- dry wt approx 211 lbs, currently near this value


- vol status seems stable, cont home torsemide 40 po bid as doing


- cont milrinone at patient's home maintenance dose (on chronic infusion 

destination therapy)--0.35 mcg/kg/min.  


- cont home coreg


- recent office icd check with stable function





afib


- currently rate controlled on coreg


- cont coumadin dosing as doing (target INR 2-3)





PVD


- con't AC, statin.





CAD s/p CABG


- No anginal symptoms, no signs acs.  Con't home ac, atorvastatin, coreg





HTN: controlled on coreg





HL: con't statin. 





CKD


- slightly above baseline (baseline cr range here 1.9-2.2), possibly due to 

vomiting.  improved today, cont to monitor, avoid ivfs given end stage CHF.

## 2017-01-12 VITALS — SYSTOLIC BLOOD PRESSURE: 106 MMHG | DIASTOLIC BLOOD PRESSURE: 64 MMHG | HEART RATE: 73 BPM | TEMPERATURE: 98.2 F

## 2017-01-12 LAB
ANION GAP SERPL CALC-SCNC: 9 MMOL/L (ref 8–16)
BASOPHILS # BLD: 0.4 % (ref 0–2)
CALCIUM SERPL-MCNC: 8.9 MG/DL (ref 8.5–10.1)
CO2 SERPL-SCNC: 35 MMOL/L (ref 21–32)
CREAT SERPL-MCNC: 2.4 MG/DL (ref 0.7–1.3)
DEPRECATED RDW RBC AUTO: 17.7 % (ref 11.9–15.9)
EOSINOPHIL # BLD: 1.4 % (ref 0–4.5)
GLUCOSE SERPL-MCNC: 165 MG/DL (ref 74–106)
INR BLD: 3.48 (ref 0.82–1.09)
MAGNESIUM SERPL-MCNC: 2.7 MG/DL (ref 1.8–2.4)
MCH RBC QN AUTO: 26.7 PG (ref 25.7–33.7)
MCHC RBC AUTO-ENTMCNC: 32.2 G/DL (ref 32–35.9)
MCV RBC: 83 FL (ref 80–96)
NEUTROPHILS # BLD: 73 % (ref 42.8–82.8)
PHOSPHATE SERPL-MCNC: 2.9 MG/DL (ref 2.5–4.9)
PLATELET # BLD AUTO: 211 K/MM3 (ref 134–434)
PMV BLD: 8.3 FL (ref 7.5–11.1)
PT PNL PPP: 39.3 SEC (ref 9.98–11.88)
WBC # BLD AUTO: 8.3 K/MM3 (ref 4–10)

## 2017-01-12 RX ADMIN — RANITIDINE SCH MG: 150 TABLET ORAL at 10:22

## 2017-01-12 RX ADMIN — DOCUSATE SODIUM SCH MG: 100 CAPSULE, LIQUID FILLED ORAL at 21:28

## 2017-01-12 RX ADMIN — TORSEMIDE SCH MG: 20 TABLET ORAL at 05:54

## 2017-01-12 RX ADMIN — POTASSIUM CHLORIDE SCH MEQ: 1500 TABLET, EXTENDED RELEASE ORAL at 21:29

## 2017-01-12 RX ADMIN — TORSEMIDE SCH MG: 20 TABLET ORAL at 14:13

## 2017-01-12 RX ADMIN — POTASSIUM CHLORIDE SCH MEQ: 1500 TABLET, EXTENDED RELEASE ORAL at 14:13

## 2017-01-12 RX ADMIN — POTASSIUM CHLORIDE SCH MEQ: 1500 TABLET, EXTENDED RELEASE ORAL at 05:54

## 2017-01-12 RX ADMIN — DOCUSATE SODIUM SCH MG: 100 CAPSULE, LIQUID FILLED ORAL at 10:22

## 2017-01-12 RX ADMIN — MILRINONE LACTATE SCH MLS/HR: 200 INJECTION, SOLUTION INTRAVENOUS at 16:00

## 2017-01-12 RX ADMIN — ALBUTEROL SULFATE PRN AMP: 2.5 SOLUTION RESPIRATORY (INHALATION) at 10:25

## 2017-01-12 RX ADMIN — CARVEDILOL SCH MG: 6.25 TABLET, FILM COATED ORAL at 10:22

## 2017-01-12 RX ADMIN — MILRINONE LACTATE SCH: 200 INJECTION, SOLUTION INTRAVENOUS at 21:29

## 2017-01-12 RX ADMIN — WARFARIN SCH: 2 TABLET ORAL at 17:14

## 2017-01-12 RX ADMIN — CARVEDILOL SCH MG: 6.25 TABLET, FILM COATED ORAL at 21:28

## 2017-01-12 RX ADMIN — ATORVASTATIN CALCIUM SCH MG: 40 TABLET, FILM COATED ORAL at 21:28

## 2017-01-12 RX ADMIN — MILRINONE LACTATE SCH MLS/HR: 200 INJECTION, SOLUTION INTRAVENOUS at 05:55

## 2017-01-12 RX ADMIN — ALBUTEROL SULFATE PRN AMP: 2.5 SOLUTION RESPIRATORY (INHALATION) at 16:18

## 2017-01-12 NOTE — PN
Progress Note (short form)





- Note


Progress Note: 


s:  no cp sob, palps, dizzy, laying flat in bed comfortably





o:


 Vital Signs











 Period  Temp  Pulse  Resp  BP Sys/Alonso  Pulse Ox


 


 Last 24 Hr  97.5 F-98.0 F  75-76  18-22  108-122/60-71  94-95











NAD, calm 


JVD flat, neck supple


RRR nl s1, s2 no m/r/g.


cta bl, nl effort


+ bs soft nd, nt


no le e/c/c


aaox3


no jaundice diaphoresis





 


 Current Medications











Generic Name Dose Route Start Last Admin





  Trade Name Freq  PRN Reason Stop Dose Admin


 


Acetaminophen  650 mg 01/10/17 19:10  





  Tylenol -  PO   





  Q6H PRN   





  FEVER OR PAIN   


 


Albuterol Sulfate  1 amp 01/10/17 19:10  





  Ventolin 0.083% Nebulizer Soln -  NEB   





  Q6H PRN   





  SHORT OF BREATH/WHEEZING   


 


Atorvastatin Calcium  40 mg 01/10/17 22:00 01/11/17 22:55





  Lipitor -  PO   40 mg





  HS JARROD   Administration


 


Carvedilol  6.25 mg 01/10/17 22:00 01/11/17 22:55





  Coreg -  PO   6.25 mg





  BID JARROD   Administration


 


Docusate Sodium  100 mg 01/10/17 22:00 01/11/17 22:55





  Colace -  PO   100 mg





  BID JARROD   Administration


 


Ferrous Sulfate  325 mg 01/11/17 10:00 01/11/17 09:30





  Feosol -  PO   325 mg





  Q48H JARROD   Administration


 


Milrinone Lactate/Dextrose  100 mls @ 9.954 mls/hr 01/10/17 21:15 01/12/17 05:55





  Milrinone 20mg/100ml Ivpb -  IVPB   10.2 mls/hr





  TITR JARROD   Administration





  Protocol   





  0.35 MCG/KG/MIN   


 


Ondansetron HCl  4 mg 01/10/17 19:19  





  Zofran Injection  IVPB   





  Q6H PRN   





  NAUSEA   


 


Potassium Chloride  20 meq 01/10/17 22:00 01/12/17 05:54





  K-Dur -  PO   20 meq





  TID JARROD   Administration


 


Ranitidine HCl  75 mg 01/11/17 10:00 01/11/17 09:29





  Zantac -  PO   75 mg





  DAILY JARROD   Administration


 


Torsemide  40 mg 01/11/17 06:00 01/12/17 05:54





  Demadex -  PO   40 mg





  BIDLASIX JARROD   Administration


 


Warfarin Sodium  4 mg 01/11/17 18:00 01/11/17 17:12





  Coumadin -  PO   4 mg





  DAILY@1800 JARROD   Administration











 CBC, BMP





 01/12/17 05:35 





 01/12/17 05:35 








ecg 1/10/17: 





echo 10/2016: sev lve, global hk, sev dec lvef, mild rve, nl rv fcn, patty, mild-

mod mr, mv ring, tv ring, mild tr, mild pr, mild phtn, ivc mild dil





cxr: mild congestion (chronic finding)





tele: 








a/p:  76 year old gentleman with PMH of IDDM, COPD (O2 dependant) HTN, HLD, 

Afib (on coumadin), CAD s/p CABG 2013, end stage CHF with ICD placement (on 

milrinone pump), PVD, BPH here with n/v/abd pain.





n/v/abd pain:


-no signs of cardiac etiology, no signs acs


-possible viral gastroenteritis, sxs resolved now





chronic end stage systolic heart failure s/p ICD, on home milrinone infusion:


- dry wt approx 211 lbs, currently near this value


- vol status seems stable, cont home torsemide 40 po bid as doing


- cont milrinone at patient's home maintenance dose (on chronic infusion 

destination therapy)--0.35 mcg/kg/min.  


- cont home coreg


- recent office icd check with stable function





afib


- currently rate controlled on coreg


- cont coumadin dosing as doing (target INR 2-3)





PVD


- con't AC, statin.





CAD s/p CABG


- No anginal symptoms, no signs acs.  Con't home ac, atorvastatin, coreg





HTN: controlled on coreg





HL: con't statin. 





CKD


- slightly above baseline (baseline cr range here 1.9-2.2), possibly due to 

vomiting.  improved again today w/o any intervention, will likely return to 

baseline on own now that vomiting resolved.








cardiac wise remains stable/at baseline

## 2017-01-12 NOTE — PN
Progress Note (short form)





- Note


Progress Note: 


Renal Follow up





Pt seen and examined at the bedside


Pt denies any sob or chest pain


wife is concerned about JVD


on Milrinone gtt





 Vital Signs











Temperature  98.2 F   01/12/17 17:00


 


Pulse Rate  73   01/12/17 17:00


 


Respiratory Rate  18   01/12/17 17:00


 


Blood Pressure  106/64   01/12/17 17:00


 


O2 Sat by Pulse Oximetry (%)  94 L  01/12/17 09:00








 Intake & Output











 01/09/17 01/10/17 01/11/17 01/12/17





 23:59 23:59 23:59 23:59


 


Intake Total  240 590 892.4


 


Output Total   400 1151


 


Balance  240 190 -258.6


 


Weight  208 lb 208 lb 9.6 oz 213 lb








Gen: NAD awake and alert on NC


HEENT: + JVD


CVS: RRR, No M/R


Lungs: DEc BS at lung bases but no rales


Abd: soft NT/ND


Ext: no edema, clubbing or cyanosis








 


 CBC, BMP





 01/12/17 05:35 





 01/12/17 05:35 








 Current Medications





Acetaminophen (Tylenol -)  650 mg PO Q6H PRN


   PRN Reason: FEVER OR PAIN


Albuterol Sulfate (Ventolin 0.083% Nebulizer Soln -)  1 amp NEB Q6H PRN


   PRN Reason: SHORT OF BREATH/WHEEZING


   Last Admin: 01/12/17 16:18 Dose:  1 amp


Atorvastatin Calcium (Lipitor -)  40 mg PO HS [facility]


   Last Admin: 01/11/17 22:55 Dose:  40 mg


Carvedilol (Coreg -)  6.25 mg PO BID JARROD


   Last Admin: 01/12/17 10:22 Dose:  6.25 mg


Docusate Sodium (Colace -)  100 mg PO BID JARROD


   Last Admin: 01/12/17 10:22 Dose:  100 mg


Ferrous Sulfate (Feosol -)  325 mg PO Q48H JARROD


   Last Admin: 01/11/17 09:30 Dose:  325 mg


Milrinone Lactate/Dextrose (Milrinone 20mg/100ml Ivpb -)  100 mls @ 9.954 mls/

hr IVPB TITR JARROD; 0.35 MCG/KG/MIN


   PRN Reason: Protocol


   Last Admin: 01/12/17 16:00 Dose:  10.2 mls/hr


Ondansetron HCl (Zofran Injection)  4 mg IVPB Q6H PRN


   PRN Reason: NAUSEA


Potassium Chloride (K-Dur -)  20 meq PO TID [facility]


   Last Admin: 01/12/17 14:13 Dose:  20 meq


Ranitidine HCl (Zantac -)  75 mg PO DAILY [facility]


   Last Admin: 01/12/17 10:22 Dose:  75 mg


Torsemide (Demadex -)  40 mg PO BIDLASIX [facility]


   Last Admin: 01/12/17 14:13 Dose:  40 mg


Warfarin Sodium (Coumadin -)  4 mg PO DAILY@1800 [facility]


   Last Admin: 01/12/17 17:14 Dose:  Not Given











A/P


76 year old Gentleman with PMHx of End-stage CHF on Mirinone Gtt, CKD Stage 4 (

baseline Cr 2-2.2) who presented with Abd pain/N/V from his cardiologists 

office and found to gastroenteritis with HARPREET with BUN/Cr of 68/2.6.





#Acute on Chronic Renal Insufficiency


Renal function improving with diuresis


Agree with change to PO toresemide 40mg BID


Will need to monitor renal function as an outpatient





#Left Kidney Lesion seen on Ct of the Abd


Renal US showed simple B/L renal cysts





#Gastroenteritis


continue supportive care





#End stage CHF


continue Milrinone and diuretics as per cardiology








Michael East DO

## 2017-03-30 ENCOUNTER — HOSPITAL ENCOUNTER (INPATIENT)
Dept: HOSPITAL 74 - JER | Age: 77
LOS: 6 days | Discharge: HOME | DRG: 189 | End: 2017-04-05
Attending: NURSE PRACTITIONER | Admitting: INTERNAL MEDICINE
Payer: COMMERCIAL

## 2017-03-30 VITALS — BODY MASS INDEX: 28 KG/M2

## 2017-03-30 DIAGNOSIS — Z95.1: ICD-10-CM

## 2017-03-30 DIAGNOSIS — I13.0: ICD-10-CM

## 2017-03-30 DIAGNOSIS — I25.2: ICD-10-CM

## 2017-03-30 DIAGNOSIS — E11.65: ICD-10-CM

## 2017-03-30 DIAGNOSIS — J20.9: ICD-10-CM

## 2017-03-30 DIAGNOSIS — E87.1: ICD-10-CM

## 2017-03-30 DIAGNOSIS — Z79.01: ICD-10-CM

## 2017-03-30 DIAGNOSIS — I48.2: ICD-10-CM

## 2017-03-30 DIAGNOSIS — Z99.81: ICD-10-CM

## 2017-03-30 DIAGNOSIS — E11.22: ICD-10-CM

## 2017-03-30 DIAGNOSIS — J44.0: ICD-10-CM

## 2017-03-30 DIAGNOSIS — I73.9: ICD-10-CM

## 2017-03-30 DIAGNOSIS — N18.4: ICD-10-CM

## 2017-03-30 DIAGNOSIS — Z95.810: ICD-10-CM

## 2017-03-30 DIAGNOSIS — J96.20: Primary | ICD-10-CM

## 2017-03-30 DIAGNOSIS — I25.10: ICD-10-CM

## 2017-03-30 DIAGNOSIS — E87.6: ICD-10-CM

## 2017-03-30 DIAGNOSIS — I42.9: ICD-10-CM

## 2017-03-30 DIAGNOSIS — Z79.4: ICD-10-CM

## 2017-03-30 DIAGNOSIS — N40.0: ICD-10-CM

## 2017-03-30 DIAGNOSIS — I50.22: ICD-10-CM

## 2017-03-30 LAB
ALBUMIN SERPL-MCNC: 3.5 G/DL (ref 3.4–5)
ALP SERPL-CCNC: 143 U/L (ref 45–117)
ALT SERPL-CCNC: 25 U/L (ref 12–78)
ANION GAP SERPL CALC-SCNC: 7 MMOL/L (ref 8–16)
APPEARANCE UR: CLEAR
AST SERPL-CCNC: 23 U/L (ref 15–37)
BASOPHILS # BLD: 0.3 % (ref 0–2)
BILIRUB SERPL-MCNC: 0.8 MG/DL (ref 0.2–1)
BILIRUB UR STRIP.AUTO-MCNC: NEGATIVE MG/DL
CALCIUM SERPL-MCNC: 9.1 MG/DL (ref 8.5–10.1)
CO2 SERPL-SCNC: 43 MMOL/L (ref 21–32)
COLOR UR: (no result)
CREAT SERPL-MCNC: 2.4 MG/DL (ref 0.7–1.3)
DEPRECATED RDW RBC AUTO: 18.1 % (ref 11.9–15.9)
EOSINOPHIL # BLD: 1.7 % (ref 0–4.5)
GLUCOSE SERPL-MCNC: 148 MG/DL (ref 74–106)
HYALINE CASTS URNS QL MICRO: 17 /LPF
INR BLD: 2.8 (ref 0.82–1.09)
KETONES UR QL STRIP: NEGATIVE
LEUKOCYTE ESTERASE UR QL STRIP.AUTO: (no result)
MCH RBC QN AUTO: 26.2 PG (ref 25.7–33.7)
MCHC RBC AUTO-ENTMCNC: 31.8 G/DL (ref 32–35.9)
MCV RBC: 82.5 FL (ref 80–96)
NEUTROPHILS # BLD: 75.4 % (ref 42.8–82.8)
NITRITE UR QL STRIP: NEGATIVE
PH UR: 5 [PH] (ref 5–8)
PLATELET # BLD AUTO: 205 K/MM3 (ref 134–434)
PMV BLD: 8.9 FL (ref 7.5–11.1)
PROT SERPL-MCNC: 7.9 G/DL (ref 6.4–8.2)
PROT UR QL STRIP: NEGATIVE
PROT UR QL STRIP: NEGATIVE
PT PNL PPP: 31.4 SEC (ref 9.98–11.88)
RBC # BLD AUTO: 6 /HPF (ref 0–3)
RBC # UR STRIP: (no result) /UL
SP GR UR: 1.01 (ref 1–1.03)
TROPONIN I SERPL-MCNC: 0.04 NG/ML (ref 0–0.05)
UROBILINOGEN UR STRIP-MCNC: NEGATIVE E.U./DL (ref 0.2–1)
WBC # BLD AUTO: 8.6 K/MM3 (ref 4–10)
WBC # UR AUTO: 21 /HPF (ref 3–5)

## 2017-03-30 PROCEDURE — G0378 HOSPITAL OBSERVATION PER HR: HCPCS

## 2017-03-30 RX ADMIN — INSULIN ASPART SCH UNITS: 100 INJECTION, SOLUTION INTRAVENOUS; SUBCUTANEOUS at 17:30

## 2017-03-30 RX ADMIN — TORSEMIDE SCH MG: 20 TABLET ORAL at 21:02

## 2017-03-30 RX ADMIN — POTASSIUM CHLORIDE SCH: 1500 TABLET, EXTENDED RELEASE ORAL at 23:57

## 2017-03-30 RX ADMIN — IPRATROPIUM BROMIDE AND ALBUTEROL SULFATE SCH AMP: .5; 3 SOLUTION RESPIRATORY (INHALATION) at 19:30

## 2017-03-30 RX ADMIN — INSULIN ASPART SCH: 100 INJECTION, SOLUTION INTRAVENOUS; SUBCUTANEOUS at 23:57

## 2017-03-30 NOTE — PDOC
History of Present Illness





- General


History Source: Patient, Spouse


Exam Limitations: No Limitations





- History of Present Illness


Initial Comments: 


17 14:45


The patient is a 76 year old male, with significant past medical history of IDDM

, HTN, COPD( on O2 2L for 24 hours), Afib (on coumadin), CAD s/p CABG, PAD, end

- stage CHF s/p ICD placement (on milrinone pump for 3 years), and MI () 

who presents today accompanied by wife with complaint of increased cough. 

Patient states that the cough started 1 month ago, the PMD prescribed Z pack 

that provided no relief, then PMD prescribed amoxicillin and he completed last 

dose today. Patient reports bringing up yellow-whitish phlegm during cough. He 

reports no relief with Robitussin. He notes that his regular pulse ox is 92-94 

at rest and in high 80s during exercise that is bsaeline. Pt denies any fever/

chils, chest pain, new CARTAGENA (has some at [place], but not worse recently), leg 

edema, orthopnea.  No recent travel.  


He denies any leg swelling.


As per wife, she reports pneumonia 3 weeks ago.


PCP - Dr. Flower 


Cardiologist - Dr. Merchant





<Ivis Bellamy - Last Filed: 17 16:37>





<Flash Barrientos - Last Filed: 17 11:21>





- General


Chief Complaint: Shortness of Breath


Stated Complaint: COUGH, SOB


Time Seen by Provider: 17 12:59





Past History





<Ivis Bellamy - Last Filed: 17 16:37>





- Past Medical History


Anemia: Yes


Asthma: No


Cancer: No


Cardiac Disorders: Yes (CHF, MI ,defibrilator placement,PACEMAKER)


CVA: No


COPD: Yes


CHF: Yes


Dementia: No


Diabetes: Yes


GI Disorders: No


 Disorders: Yes (bph)


HTN: Yes


Hypercholesterolemia: Yes


Liver Disease: No


Suicide Attempt (Hx): No


Seizures: No


Thyroid Disease: No





- Surgical History


Abdominal Surgery: Yes (HERNIA REPAIR )


Appendectomy: No


Cardiac Surgery: Yes (2 OPEN HEART SURGERIES; TRACH PLACEMENT,PACEMAKER PLACED )


Cholecystectomy: Yes


Lung Surgery: No


Neurologic Surgery: No


Orthopedic Surgery: No





- Immunization History


Td Vaccination: Yes


TDAP Vaccination: Yes


Immunization Up to Date: Yes





- Psycho/Social/Smoking Cessation Hx


Anxiety: No


Suicidal Ideation: No


Smoking Status: No


Smoking History: Never smoked


Years of Tobacco Use: 40


Have you smoked in the past 12 months: No


Number of Cigarettes Smoked Daily: 40


If you are a former smoker, when did you quit?: 


Cigars Per Day: 0


Information on smoking cessation initiated: No


'Breaking Loose' booklet given: 12


Hx Alcohol Use: No


Drug/Substance Use Hx: No


Substance Use Type: None


Hx Substance Use Treatment: No





<Flash Barrientos - Last Filed: 17 11:21>





- Past Medical History


Allergies/Adverse Reactions: 


 Allergies











Allergy/AdvReac Type Severity Reaction Status Date / Time


 


No Known Drug Allergies Allergy   Verified 17 12:46


 


shellfish derived Allergy   Verified 17 12:46











Home Medications: 


Ambulatory Orders





Allopurinol [Zyloprim -] 100 mg PO DAILY 16 


Atorvastatin Ca [Lipitor] 40 mg PO HS 16 


Carvedilol 6.25 mg PO BID 16 


Docusate Sodium [Colace -] 100 mg PO BID 16 


Insulin NPL/Insulin Lispro [Humalog Mix 75-25 Vial] 0 unit SQ PRN 16 


Milrinone Lactate/D5w [Milrinone 0.2 mg/ml in D5w] 0 mg IV DAILY 16 


Ranitidine [Zantac -] 75 mg PO DAILY 16 


Torsemide 40 mg PO BID 16 


Albuterol 0.083% Nebulizer Sol [Ventolin 0.083% Nebulizer Soln -] 1 amp NEB Q6H 

PRN #120 amp 16 


Ferrous Sulfate [Feosol] 325 mg PO Q48H  ud 16 


Potassium Chloride [K-Dur -] 20 meq PO TID #90 tablet.er 16 


Warfarin Na [Coumadin -] 4 mg PO DAILY@1800 01/10/17 


Bupropion HCl [Wellbutrin -] 75 mg DAILY 17 


Ascorbic Acid [Vitamin C -] 1,000 mg PO DAILY 17 


Cholecalciferol (Vitamin D3) [Vitamin D3 -] 1,000 unit PO DAILY 17 


Clopidogrel Bisulfate [Plavix -] 75 mg PO DAILY 17 


Multivitamin [Poly-Vitamin] 1 each PO DAILY 17 











Respiratory Specific PMHX





- Complaint Specific PMHX


Angina: Yes


Pneumonia: Yes





<Flash Barrientos - Last Filed: 17 11:21>





**Review of Systems





- Review of Systems


Able to Perform ROS?: Yes


Comments:: 


17 14:45


CONSTITUTIONAL:


No reported: Fever, Chills, Diaphoresis, Generalized Weakness, Malaise, Loss of 

Appetite


HEENT:


No reported: Rhinorrhea, Nasal Congestion, Throat Pain, Throat Swelling, 

Difficulty Swallowing, Mouth Swelling, Ear Pain, Eye Pain, Visual Changes


CARDIOVASCULAR:


No reported: Chest Pain, Syncope, Palpitations, Irregular Heart Rate, 

Lightheadedness, Peripheral Edema


RESPIRATORY:


Reported: cough, SOB


No reported:SOB with Exertion, Orthopnea, Wheezing, Stridor, Hemoptysis


GASTROINTESTINAL:


No reported: Abdominal pain, Abdominal Distension, Nausea, Vomiting, Diarrhea, 

Constipation, Melena, Hematochezia


GENITOURINARY:


No reported: Dysuria, Frequency, Urgency, Hesitancy, Flank Pain, Genital Pain


MUSCULOSKELETAL:


No reported: Myalgia, Arthralgia, Joint Swelling, Back pain, Neck Pain


SKIN:


No reported: Rash, Itching, Pallor


HEMEATOLOGIC/IMMUNOLOGIC:


No reported: Easy Bleeding, Easy Bruising, Lymphadenopathy, Frequent infections


ENDOCRINE:


No reported: Unexplained Weight Gain, Unexplained Weight Loss, Heat Intolerance

, Cold Intolerance


NEUROLOGIC:


No reported: Headache, Focal Weakness, Paresthesias, Vertigo, Lightheadedness, 

Unsteady Gait, Seizure, Mental Status Changes, Incontinence


PSYCHIATRIC:


No reported: Anxiety, Depression 








<Ivis Bellamy - Last Filed: 17 16:37>





*Physical Exam





- Vital Signs


 Last Vital Signs











Temp Pulse Resp BP Pulse Ox


 


 97.4 F L  78   18   113/70   92 L


 


 17 12:43  17 12:43  17 12:43  17 12:43  17 12:43














- Physical Exam


Comments: 


17 14:47


GENERAL: The patient is awake, alert, and fully oriented, Nontoxic - in no 

acute distress.


HEAD: Normocephalic, atraumatic.


EYES: extraocular movements intact, sclera anicteric, conjunctiva clear.


ENT: + NC in place. Normal voice, Moist mucous membranes.


NECK: Normal range of motion, supple, no jvd


CHEST: +Port in the right chest clean, dry, intact


LUNGS: Breath sounds equal, clear to auscultation bilaterally. No wheezes, no 

rhonchi, no rales.


HEART: Regular rate and rhythm, without murmur, rub or gallop.


ABDOMEN: Soft, nontender, normoactive bowel sounds. No guarding, no rebound.No 

CVA tenderness


EXTREMITIES: Normal range of motion, no edema. No clubbing or cyanosis. No cords

, erythema, or tenderness.


NEUROLOGICAL: No facial asymmetry, Normal speech, moving all 4extremities 

spontaneously and symmetrically


PSYCH: Normal mood, normal affect.


SKIN: Warm, Dry, normal turgor,





<Ivis Bellamy - Last Filed: 17 16:37>





- Vital Signs


 Last Vital Signs











Temp Pulse Resp BP Pulse Ox


 


 97.4 F L  78   18   113/70   92 L


 


 17 12:43  17 12:43  17 12:43  17 12:43  17 12:43














<Flash Barrientos - Last Filed: 17 11:21>





**Heart Score/ECG Review





- ECG Impressions


Comment:: 





17 15:06


Twelve-lead EKG was performed and reviewed by me. 


Ventricular paced rhythm


Rate of 76


no signs of mi per sgarbossa criteria





<Flash Barrientos - Last Filed: 17 11:21>





ED Treatment Course





- LABORATORY


CBC & Chemistry Diagram: 


 17 13:52





 17 13:52





- ADDITIONAL ORDERS


Additional order review: 


 











  17





  13:52


 


RBC  4.87


 


MCV  82.5


 


MCHC  31.8 L


 


RDW  18.1 H


 


MPV  8.9


 


Neutrophils %  75.4


 


Lymphocytes %  12.4


 


Monocytes %  10.2


 


Eosinophils %  1.7


 


Basophils %  0.3














<Ivis Bellamy - Last Filed: 17 16:37>





- LABORATORY


CBC & Chemistry Diagram: 


 17 06:07





 17 06:07





<Flash Barrientos - Last Filed: 17 11:21>





Medical Decision Making





- Medical Decision Making


17 16:00


Case was discussed with Dr. Reynoso covering for Dr. Merchant. 





17 16:33


Case was discussed with Dr. Flower, who noted that [facility] admits for their 

group for the next 2 weeks. Microblog was sent to the Admitting [facility] 

doctor. Case was discussed with Dr. Joseph.











<Ivis Bellamy - Last Filed: 17 16:37>





- Medical Decision Making


17 15:39


76y M hx of severe chf on milrinone gtt, presents with persistent cough x 1 

month, trialed 2 abx without improvement, no associated with fever/chills, cxr 

was relatively clear, pts o2 noted for mild hypoxia on his baseline o2.  


differential includes viral syndrome vs chf exacerbation vs pna





xray c/w congestion


pts labs noted for eelvated bnp, bun/cr at [facility] llikely need change in meds formanagement of chf due to severe chf, and due 

to severity of sypmtoms, will observe the patient in telemetry.


will notify dr. merchant and dr. morel regardin observation








17 16:07








17 16:07


case dw dr. clark. agreed with observatio shanon telemetry


will hold diuresis for now as pt otherwise stable and in no distress








17 16:13


case alexei flower requests observation admission under hospitalist service.


 dw dr. joseph


agree with observation 





Case discussed in detail with admitting physician including history, physical 

exam and ancillary studies.


Admitting physician has assumed care for the patient, will follow all pending 

diagnostics and will complete the evaluation and treatment. 








<Flash Barrientos - Last Filed: 17 11:21>





*DC/Admit/Observation/Transfer





- Attestations


Scribe Attestion: 





17 14:48





Documentation prepared by SEFERINO Fuentes, acting as medical scribe for 

Flash Barrientos MD, MD/DO.





<Ivis Bellamy - Last Filed: 17 16:37>





- Discharge Dispostion


Admit: Yes





<Flash Barrientos - Last Filed: 17 11:21>


Diagnosis at time of Disposition: 


CHF (congestive heart failure)


Qualifiers:


 Congestive heart failure type: combined Congestive heart failure chronicity: 

unspecified congestive heart failure chronicity Qualified Code(s): I50.40 - 

Unspecified combined systolic (congestive) and diastolic (congestive) heart 

failure





- Referrals

## 2017-03-30 NOTE — EKG
Test Reason : 

Blood Pressure : ***/*** mmHG

Vent. Rate : 076 BPM     Atrial Rate : 054 BPM

   P-R Int : 000 ms          QRS Dur : 218 ms

    QT Int : 524 ms       P-R-T Axes : 000 222 045 degrees

   QTc Int : 589 ms

 

Ventricular-paced rhythm

Biventricular pacemaker detected

ABNORMAL ECG

WHEN COMPARED WITH ECG OF 10-TANYA-2017 14:12,

NO SIGNIFICANT CHANGE WAS FOUND

Confirmed by KARINA ENCARNACION MD ([id number]) on 3/30/2017 3:33:45 PM

 

Referred By:             Confirmed By:KARINA ENCARNACION MD

## 2017-03-30 NOTE — HP
PCP: Sathya Flower





CHIEF COMPLAINT: Cough





HISTORY OF PRESENT ILLNESS


This is a 76-year-old man who comes to the ER today complaining of a cough. He 

has had a cough and chest congestion for several weeks. He has been producing 

yellow sputum. He has been treated with Zithromax, followed by Amoxicillin 

without improvement. He has been taking Robitussin without relief. He denies 

fever, chills, chest pain, palpitations, leg edema. He has a history of COPD 

and end-stage CHF. He has shortness of breath with exertion at baseline. He 

uses oxygen at home at 2 LPM continuously.





PAST MEDICAL HISTORY


COPD


Chronic hypoxic respiratory failure


Chronic systolic heart failure, end-stage


Atrial fibrillation


CAD, MI


HTN


Hyperlipidemia


PAD


Type 2 diabetes mellitus


Stage 4 CKD


BPH





PAST SURGICAL HISTORY


CABG


AICD


Hernia repair


Cholecystectomy





Allergies


No Known Drug Allergies Allergy (Verified 03/30/17 12:46)


shellfish derived Allergy (Verified 03/30/17 12:46)


 CRAB ONLY 





HOME MEDICATIONS


 3





 Medication  Instructions  Recorded


 


Allopurinol [Zyloprim -] 100 mg PO DAILY 01/14/16


 


Atorvastatin Ca [Lipitor] 40 mg PO HS 01/14/16


 


Carvedilol 6.25 mg PO BID 01/14/16


 


Docusate Sodium [Colace -] 100 mg PO BID 01/14/16


 


Insulin NPL/Insulin Lispro 0 unit SQ PRN 01/14/16





[Humalog Mix 75-25 Vial]  


 


Milrinone Lactate/D5w [Milrinone 0 mg IV DAILY 01/14/16





0.2 mg/ml in D5w]  


 


Ranitidine [Zantac -] 75 mg PO DAILY 01/14/16


 


Torsemide 40 mg PO BID 01/14/16


 


Albuterol 0.083% Nebulizer Sol 1 amp NEB Q6H PRN #120 amp 01/18/16





[Ventolin 0.083% Nebulizer Soln -]  


 


Ferrous Sulfate [Feosol] 325 mg PO Q48H  ud 08/27/16


 


Potassium Chloride [K-Dur -] 20 meq PO TID #90 tablet.er 08/27/16


 


Warfarin Na [Coumadin -] 4 mg PO DAILY@1800 01/10/17


 


Bupropion HCl [Wellbutrin -] 75 mg DAILY 01/12/17


 


Ascorbic Acid [Vitamin C -] 1,000 mg PO DAILY 03/30/17


 


Cholecalciferol (Vitamin D3) 1,000 unit PO DAILY 03/30/17





[Vitamin D3 -]  


 


Clopidogrel Bisulfate [Plavix -] 75 mg PO DAILY 03/30/17


 


Multivitamin [Poly-Vitamin] 1 each PO DAILY 03/30/17








Social History:


Smoking: Quit smoking 2011


Alcohol: Denies


Drugs: Denies





Recent Travel: No





Family History: Non-contributory





REVIEW OF SYSTEMS


CONSTITUTIONAL: Absent: fever, chills, diaphoresis, generalized weakness, 

malaise, loss of appetite, weight change


HEENT: Absent:  rhinorrhea, nasal congestion, throat pain, throat swelling, 

difficulty swallowing, mouth swelling, ear pain, eye pain, visual changes


CARDIOVASCULAR: Absent: chest pain, syncope, palpitations, lightheadedness, 

peripheral edema


RESPIRATORY: Present: cough, shortness of breath, dyspnea with exertion.  Absent

: orthopnea, wheezing, stridor, hemoptysis


GASTROINTESTINAL: Absent: abdominal pain, abdominal distension, nausea, vomiting

, diarrhea, constipation, melena, hematochezia


GENITOURINARY: Absent: dysuria, frequency, urgency, hesitancy, hematuria, flank 

pain


MUSCULOSKELETAL: Absent: myalgia, arthralgia, joint swelling, back pain, neck 

pain


SKIN: Absent: rash, itching, pallor


HEMATOLOGIC/IMMUNOLOGIC: Absent: easy bleeding, easy bruising, lymphadenopathy, 

frequent infections


ENDOCRINE: Absent: unexplained weight gain, unexplained weight loss, heat 

intolerance, cold intolerance


NEUROLOGIC: Absent: headache, focal weakness, paresthesias, dizziness, unsteady 

gait, seizure, mental status changes, bladder or bowel incontinence


PSYCHIATRIC: Absent: anxiety, depression, suicidal or homicidal ideation, 

hallucinations.








PHYSICAL EXAMINATION


 Vital Signs











 Period  Temp  Pulse  Resp  BP Sys/Alonso  Pulse Ox


 


 Last 24 Hr  97.4 F-97.6 F  76-78  18-18  101-113/60-70  92-97








GENERAL: Awake, alert, and fully oriented, in no acute distress.


HEAD: Normal with no signs of trauma.


EYES: Pupils equal, round and reactive to light, extraocular movements intact, 

sclerae anicteric, conjunctivae clear.


EARS, NOSE, THROAT: Ears normal, nares patent, oropharynx clear without 

exudates. Moist mucous membranes.


NECK: Normal range of motion, supple without lymphadenopathy, JVD, or masses.


LUNGS: Breath sounds equal, clear to auscultation bilaterally. No wheezes, and 

no crackles. No accessory muscle use.


HEART: Regular rate and rhythm, normal S1 and S2 without murmur, rub or gallop.


ABDOMEN: Soft, nontender, not distended, normoactive bowel sounds, no guarding, 

no rebound, no masses. No hepatomegaly or splenomegaly. 


MUSCULOSKELETAL: Normal range of motion at all joints. No bony deformities or 

tenderness. No CVA tenderness.


UPPER EXTREMITIES: 2+ pulses, warm, well-perfused. No cyanosis. No clubbing. No 

peripheral edema.


LOWER EXTREMITIES: 2+ pulses, warm, well-perfused. No calf tenderness. Trace 

edema. 


NEUROLOGICAL: Cranial nerves II-XII intact. Normal speech. Gait not observed.


PSYCHIATRIC: Cooperative. Good eye contact. Appropriate mood and affect.


SKIN: Warm, dry, normal turgor, no rashes or lesions noted, normal capillary 

refill. 





 Laboratory Tests











  03/30/17 03/30/17 03/30/17





  13:52 13:52 15:00


 


WBC  8.6  


 


RBC  4.87  


 


Hgb  12.8  


 


Hct  40.1  


 


MCV  82.5  


 


MCHC  31.8 L  


 


RDW  18.1 H  


 


Plt Count  205  


 


MPV  8.9  


 


Neutrophils %  75.4  


 


Lymphocytes %  12.4  


 


Monocytes %  10.2  


 


Eosinophils %  1.7  


 


Basophils %  0.3  


 


Sodium   139 


 


Potassium   3.1 L D 


 


Chloride   89 L 


 


Carbon Dioxide   43 H D 


 


Anion Gap   7 L 


 


BUN   60 H 


 


Creatinine   2.4 H 


 


Creat Clearance w eGFR   26.31 


 


Random Glucose   148 H 


 


Calcium   9.1 


 


Total Bilirubin   0.8  D 


 


AST   23  D 


 


ALT   25  D 


 


Alkaline Phosphatase   143 H 


 


Creatine Kinase   58 


 


Troponin I   0.04  D 


 


B-Natriuretic Peptide   3380.17 H 


 


Total Protein   7.9 


 


Albumin   3.5 


 


Urine Color    Ltyellow


 


Urine Appearance    Clear


 


Urine pH    5.0  D


 


Ur Specific Gravity    1.009


 


Urine Protein    Negative


 


Urine Glucose (UA)    Negative


 


Urine Ketones    Negative


 


Urine Blood    1+ H


 


Urine Nitrite    Negative


 


Urine Bilirubin    Negative


 


Urine Urobilinogen    Negative


 


Ur Leukocyte Esterase    3+ H


 


Urine RBC    6


 


Urine WBC    21


 


Ur Epithelial Cells    Rare


 


Hyaline Casts    17








EKG: Ventricular paced, rate 76 





ASSESSMENT/PLAN:


This is a 76-year-old man with a history of COPD, chronic hypoxic respiratory 

failure, end-stage chronic systolic heart failure, atrial fibrillation, CAD, MI

, HTN, hyperlipidemia, PAD, type 2 DM, stage 4 CKD, BPH who presented to the ER 

complaining of a cough for several weeks that has not responded to antibiotics 

and Robitussin. He was found to have potassium 3.1, BUN 60, creatinine 2.4, BNP 

3380. 





1. Hypokalemia


   - Replete potassium





2. Cough, possibly secondary to bronchitis


   - Completed courses of Zithromax and Amoxicillin


   - Start Mucinex





3. COPD


   - Stable


   - DuoNeb as needed





4. Chronic systolic heart failure, end-stage


   - Stable


   - Continue Coreg, Torsemide, Milrinone


   - Has AICD





5. Chronic hypoxic respiratory failure


   - Continue oxygen to maintain saturation > 90%





6. Permanent atrial fibrillation


   - Continue Coreg, Coumadin





7. CAD, history of MI


   - Continue Coreg, Plavix, Lipitor





8. HTN


   - Continue Coreg





9. Hyperlipidemia


   - Continue Lipitor





10. PAD





11. Type 2 diabetes mellitus


   - Fingersticks with Novolog sliding scale





12. Stage 4 CKD


   - Stable





13. BPH

## 2017-03-31 LAB
ANION GAP SERPL CALC-SCNC: 8 MMOL/L (ref 8–16)
BASOPHILS # BLD: 0.8 % (ref 0–2)
CALCIUM SERPL-MCNC: 8.9 MG/DL (ref 8.5–10.1)
CO2 SERPL-SCNC: 37 MMOL/L (ref 21–32)
CREAT SERPL-MCNC: 2.3 MG/DL (ref 0.7–1.3)
DEPRECATED RDW RBC AUTO: 18.1 % (ref 11.9–15.9)
EOSINOPHIL # BLD: 1.9 % (ref 0–4.5)
GLUCOSE SERPL-MCNC: 136 MG/DL (ref 74–106)
INR BLD: 2.82 (ref 0.82–1.09)
MCH RBC QN AUTO: 26.4 PG (ref 25.7–33.7)
MCHC RBC AUTO-ENTMCNC: 32.2 G/DL (ref 32–35.9)
MCV RBC: 81.9 FL (ref 80–96)
NEUTROPHILS # BLD: 68.1 % (ref 42.8–82.8)
PLATELET # BLD AUTO: 190 K/MM3 (ref 134–434)
PMV BLD: 8.6 FL (ref 7.5–11.1)
PT PNL PPP: 31.7 SEC (ref 9.98–11.88)
WBC # BLD AUTO: 7.9 K/MM3 (ref 4–10)

## 2017-03-31 RX ADMIN — DOCUSATE SODIUM SCH MG: 100 CAPSULE, LIQUID FILLED ORAL at 22:04

## 2017-03-31 RX ADMIN — CHOLECALCIFEROL TAB 10 MCG (400 UNIT) SCH UNIT: 10 TAB at 11:38

## 2017-03-31 RX ADMIN — MILRINONE LACTATE SCH MLS/HR: 200 INJECTION, SOLUTION INTRAVENOUS at 18:14

## 2017-03-31 RX ADMIN — POTASSIUM CHLORIDE SCH MEQ: 1500 TABLET, EXTENDED RELEASE ORAL at 06:09

## 2017-03-31 RX ADMIN — CARVEDILOL SCH MG: 6.25 TABLET, FILM COATED ORAL at 22:04

## 2017-03-31 RX ADMIN — TORSEMIDE SCH MG: 20 TABLET ORAL at 14:21

## 2017-03-31 RX ADMIN — ALLOPURINOL SCH MG: 100 TABLET ORAL at 11:38

## 2017-03-31 RX ADMIN — TORSEMIDE SCH MG: 20 TABLET ORAL at 06:08

## 2017-03-31 RX ADMIN — TIOTROPIUM BROMIDE SCH PUFF: 18 CAPSULE ORAL; RESPIRATORY (INHALATION) at 17:49

## 2017-03-31 RX ADMIN — BUDESONIDE AND FORMOTEROL FUMARATE DIHYDRATE SCH PUFF: 80; 4.5 AEROSOL RESPIRATORY (INHALATION) at 22:08

## 2017-03-31 RX ADMIN — INSULIN ASPART SCH UNITS: 100 INJECTION, SOLUTION INTRAVENOUS; SUBCUTANEOUS at 11:47

## 2017-03-31 RX ADMIN — DOCUSATE SODIUM SCH MG: 100 CAPSULE, LIQUID FILLED ORAL at 11:37

## 2017-03-31 RX ADMIN — FERROUS SULFATE TAB EC 324 MG (65 MG FE EQUIVALENT) SCH MG: 324 (65 FE) TABLET DELAYED RESPONSE at 11:37

## 2017-03-31 RX ADMIN — CARVEDILOL SCH MG: 6.25 TABLET, FILM COATED ORAL at 11:38

## 2017-03-31 RX ADMIN — POTASSIUM CHLORIDE SCH MEQ: 1500 TABLET, EXTENDED RELEASE ORAL at 22:04

## 2017-03-31 RX ADMIN — MULTIVITAMIN TABLET SCH TAB: TABLET at 11:37

## 2017-03-31 RX ADMIN — METHYLPREDNISOLONE SODIUM SUCCINATE SCH MG: 40 INJECTION, POWDER, FOR SOLUTION INTRAMUSCULAR; INTRAVENOUS at 17:49

## 2017-03-31 RX ADMIN — INSULIN ASPART SCH: 100 INJECTION, SOLUTION INTRAVENOUS; SUBCUTANEOUS at 16:35

## 2017-03-31 RX ADMIN — POTASSIUM CHLORIDE SCH MEQ: 1500 TABLET, EXTENDED RELEASE ORAL at 14:21

## 2017-03-31 RX ADMIN — MILRINONE LACTATE SCH MLS/HR: 200 INJECTION, SOLUTION INTRAVENOUS at 08:11

## 2017-03-31 RX ADMIN — GUAIFENESIN SCH MG: 600 TABLET, EXTENDED RELEASE ORAL at 00:04

## 2017-03-31 RX ADMIN — GUAIFENESIN SCH MG: 600 TABLET, EXTENDED RELEASE ORAL at 22:04

## 2017-03-31 RX ADMIN — CARVEDILOL SCH MG: 6.25 TABLET, FILM COATED ORAL at 00:04

## 2017-03-31 RX ADMIN — ATORVASTATIN CALCIUM SCH MG: 40 TABLET, FILM COATED ORAL at 22:04

## 2017-03-31 RX ADMIN — INSULIN ASPART SCH UNITS: 100 INJECTION, SOLUTION INTRAVENOUS; SUBCUTANEOUS at 22:05

## 2017-03-31 RX ADMIN — ATORVASTATIN CALCIUM SCH MG: 40 TABLET, FILM COATED ORAL at 00:04

## 2017-03-31 RX ADMIN — GUAIFENESIN SCH MG: 600 TABLET, EXTENDED RELEASE ORAL at 11:38

## 2017-03-31 RX ADMIN — IPRATROPIUM BROMIDE AND ALBUTEROL SULFATE SCH AMP: .5; 3 SOLUTION RESPIRATORY (INHALATION) at 11:30

## 2017-03-31 RX ADMIN — METHYLPREDNISOLONE SODIUM SUCCINATE SCH MG: 40 INJECTION, POWDER, FOR SOLUTION INTRAMUSCULAR; INTRAVENOUS at 22:05

## 2017-03-31 RX ADMIN — RANITIDINE SCH MG: 150 TABLET ORAL at 11:37

## 2017-03-31 RX ADMIN — MILRINONE LACTATE SCH MLS/HR: 200 INJECTION, SOLUTION INTRAVENOUS at 00:35

## 2017-03-31 RX ADMIN — INSULIN ASPART SCH: 100 INJECTION, SOLUTION INTRAVENOUS; SUBCUTANEOUS at 06:04

## 2017-03-31 RX ADMIN — OXYCODONE HYDROCHLORIDE AND ACETAMINOPHEN SCH MG: 500 TABLET ORAL at 11:37

## 2017-03-31 RX ADMIN — CLOPIDOGREL BISULFATE SCH MG: 75 TABLET, FILM COATED ORAL at 11:37

## 2017-03-31 RX ADMIN — DOCUSATE SODIUM SCH MG: 100 CAPSULE, LIQUID FILLED ORAL at 00:04

## 2017-03-31 NOTE — PN
Progress Note (short form)





- Note


Progress Note: 


PULMONARY





CONSULTATION DICTATED 3/31/17





IMP CHRONIC HYPOXEMIC ,HYPERCAPNEIC RESPIRATORY FAILURE


      COPD END STAGE O2 DEPENDENT


      BRONCHITIS


      CHF S/P ICD ON MILRINONE PUMP


      ASHD S/P MI,CABG


      CKD


      AFIB


      PVD


      BPH








PLAN  INHALED BRONCHODILATORS


         SHORT COURSE OF STEROIDS


         NASAL O2


         DIURETICS


         STRICT I+OS


         MONITOR LYTES,RENAL FUNCTION


         F/U CHEST X-RAY


      


    DR EASTMAN





Problem List





- Problems


(1) CHF (congestive heart failure)


Code(s): I50.9 - HEART FAILURE, UNSPECIFIED   Qualifiers: 


     Congestive heart failure type: combined     Congestive heart failure 

chronicity: unspecified congestive heart failure chronicity     Qualified Code(s

): I50.40 - Unspecified combined systolic (congestive) and diastolic (congestive

) heart failure  





(2) AICD (automatic cardioverter/defibrillator) present


Code(s): Z95.810 - PRESENCE OF AUTOMATIC (IMPLANTABLE) CARDIAC DEFIBRILLATOR





(3) Acute and chronic respiratory failure (acute-on-chronic)


Code(s): J96.20 - ACUTE AND CHR RESP FAILURE, UNSP W HYPOXIA OR HYPERCAPNIA   





(4) Acute on chronic renal insufficiency


Code(s): N28.9 - DISORDER OF KIDNEY AND URETER, UNSPECIFIED


N18.9 - CHRONIC KIDNEY DISEASE, UNSPECIFIED





(5) Atrial fibrillation


Code(s): I48.91 - UNSPECIFIED ATRIAL FIBRILLATION   Qualifiers: 


     Atrial fibrillation type: chronic     Qualified Code(s): I48.2 - Chronic 

atrial fibrillation  





(6) CAD (coronary artery disease)


Code(s): I25.10 - ATHSCL HEART DISEASE OF NATIVE CORONARY ARTERY W/O ANG PCTRS 

  





(7) CKD (chronic kidney disease)


Code(s): N18.9 - CHRONIC KIDNEY DISEASE, UNSPECIFIED   Qualifiers: 


     Chronic kidney disease stage: unspecified stage     Qualified Code(s): 

N18.9 - Chronic kidney disease, unspecified  





(8) COPD (chronic obstructive pulmonary disease)


Code(s): J44.9 - CHRONIC OBSTRUCTIVE PULMONARY DISEASE, UNSPECIFIED   Qualifiers

: 


     COPD type: unspecified COPD     Qualified Code(s): J44.9 - Chronic 

obstructive pulmonary disease, unspecified  





(9) Cardiomyopathy


Code(s): I42.9 - CARDIOMYOPATHY, UNSPECIFIED   





(10) Diabetes mellitus, insulin dependent (IDDM), uncontrolled


Code(s): E10.65 - TYPE 1 DIABETES MELLITUS WITH HYPERGLYCEMIA   





(11) Dyspnea on exertion


Code(s): R06.09 - OTHER FORMS OF DYSPNEA

## 2017-03-31 NOTE — CON.CARD
Cardiology Consult (text)





- Consultation


Consultation Note: 


cc: productive cough





hpi:  76 year old gentleman with PMH of IDDM, COPD (O2 dependant) HTN, HLD, 

Afib (on coumadin), CAD s/p CABG 2013, end stage CHF with ICD placement (on 

milrinone pump), PVD, BPH here with productive cough.  Pt reports past few 

weeks has had productive cough with yellow sputum.  He finished course of abx 

as outpt this week but sxs persisted so came to ER.  No fevers.  No cp, palps, 

dizzy, loc, pnd, orthopnea, le edema.  Baseline chronic wright unchanged.  Sees dr merchant for cardio.





Past Medical History/Surg hx: per hpi, additionally hernia repair


Soc hx: Former smoker, no etoh or illicits


fam hx: brother with pvd


ros: per hpi; +nasal congestion, no rash, ha, vision changes, wt loss, gib, 

hematuria


meds:


 





 Home Medications











 Medication  Instructions  Recorded


 


Allopurinol [Zyloprim -] 100 mg PO DAILY 01/14/16


 


Atorvastatin Ca [Lipitor] 40 mg PO HS 01/14/16


 


Carvedilol 6.25 mg PO BID 01/14/16


 


Docusate Sodium [Colace -] 100 mg PO BID 01/14/16


 


Insulin NPL/Insulin Lispro 0 unit SQ PRN 01/14/16





[Humalog Mix 75-25 Vial]  


 


Milrinone Lactate/D5w [Milrinone 0 mg IV DAILY 01/14/16





0.2 mg/ml in D5w]  


 


Ranitidine [Zantac -] 75 mg PO DAILY 01/14/16


 


Torsemide 40 mg PO BID 01/14/16


 


Albuterol 0.083% Nebulizer Sol 1 amp NEB Q6H PRN #120 amp 01/18/16





[Ventolin 0.083% Nebulizer Soln -]  


 


Ferrous Sulfate [Feosol] 325 mg PO Q48H  ud 08/27/16


 


Potassium Chloride [K-Dur -] 20 meq PO TID #90 tablet.er 08/27/16


 


Warfarin Na [Coumadin -] 4 mg PO DAILY@1800 01/10/17


 


Bupropion HCl [Wellbutrin -] 75 mg DAILY 01/12/17


 


Ascorbic Acid [Vitamin C -] 1,000 mg PO DAILY 03/30/17


 


Cholecalciferol (Vitamin D3) 1,000 unit PO DAILY 03/30/17





[Vitamin D3 -]  


 


Clopidogrel Bisulfate [Plavix -] 75 mg PO DAILY 03/30/17


 


Multivitamin [Poly-Vitamin] 1 each PO DAILY 03/30/17














pe:


 


 Vital Signs











 Period  Temp  Pulse  Resp  BP Sys/Alonso  Pulse Ox


 


 Last 24 Hr  97.3 F-97.8 F  64-78  18-20  101-118/59-72  92-98








NAD, calm 


JVD flat, neck supple


RRR nl s1, s2 no m/r/g.


cta bl, nl effort


+ bs soft nd, nt


no le e/c/c


aaox3


pos pt dp, no carotid bruits


no jaundice diaphoresis





 


 Laboratory Last Values











WBC  7.9 K/mm3 (4.0-10.0)   03/31/17  06:07    


 


RBC  4.73 M/mm3 (4.00-5.60)   03/31/17  06:07    


 


Hgb  12.5 GM/dL (11.7-16.9)   03/31/17  06:07    


 


Hct  38.8 % (35.4-49)   03/31/17  06:07    


 


MCV  81.9 fl (80-96)   03/31/17  06:07    


 


MCHC  32.2 g/dl (32.0-35.9)   03/31/17  06:07    


 


RDW  18.1 % (11.9-15.9)  H  03/31/17  06:07    


 


Plt Count  190 K/MM3 (134-434)   03/31/17  06:07    


 


MPV  8.6 fl (7.5-11.1)   03/31/17  06:07    


 


Neutrophils %  68.1 % (42.8-82.8)   03/31/17  06:07    


 


Lymphocytes %  18.6 % (8-40)  D 03/31/17  06:07    


 


Monocytes %  10.6 % (3.8-10.2)  H  03/31/17  06:07    


 


Eosinophils %  1.9 % (0-4.5)   03/31/17  06:07    


 


Basophils %  0.8 % (0-2.0)   03/31/17  06:07    


 


INR  2.80  (0.82-1.09)  H  03/30/17  20:40    


 


Sodium  137 mmol/L (136-145)   03/31/17  06:07    


 


Potassium  3.0 mmol/L (3.5-5.1)  L  03/31/17  06:07    


 


Chloride  92 mmol/L ()  L  03/31/17  06:07    


 


Carbon Dioxide  37 mmol/L (21-32)  H  03/31/17  06:07    


 


Anion Gap  8  (8-16)   03/31/17  06:07    


 


BUN  64 mg/dL (7-18)  H  03/31/17  06:07    


 


Creatinine  2.3 mg/dL (0.7-1.3)  H  03/31/17  06:07    


 


Creat Clearance w eGFR  26.31  (>60)   03/30/17  13:52    


 


POC Glucometer  125 UNITS (())   03/31/17  05:30    


 


Random Glucose  136 mg/dL ()  H  03/31/17  06:07    


 


Calcium  8.9 mg/dL (8.5-10.1)   03/31/17  06:07    


 


Total Bilirubin  0.8 mg/dL (0.2-1.0)  D 03/30/17  13:52    


 


AST  23 U/L (15-37)  D 03/30/17  13:52    


 


ALT  25 U/L (12-78)  D 03/30/17  13:52    


 


Alkaline Phosphatase  143 U/L ()  H  03/30/17  13:52    


 


Creatine Kinase  58 IU/L ()   03/30/17  13:52    


 


Troponin I  0.04 ng/ml (0.00-0.05)  D 03/30/17  13:52    


 


B-Natriuretic Peptide  3380.17 pg/ml (5-450)  H  03/30/17  13:52    


 


Total Protein  7.9 g/dl (6.4-8.2)   03/30/17  13:52    


 


Albumin  3.5 g/dl (3.4-5.0)   03/30/17  13:52    


 


Urine Color  Ltyellow   03/30/17  15:00    


 


Urine Appearance  Clear   03/30/17  15:00    


 


Urine pH  5.0  (5.0-8.0)  D 03/30/17  15:00    


 


Ur Specific Gravity  1.009  (1.001-1.035)   03/30/17  15:00    


 


Urine Protein  Negative  (NEGATIVE)   03/30/17  15:00    


 


Urine Glucose (UA)  Negative  (NEGATIVE)   03/30/17  15:00    


 


Urine Ketones  Negative  (NEGATIVE)   03/30/17  15:00    


 


Urine Blood  1+  (NEGATIVE)  H  03/30/17  15:00    


 


Urine Nitrite  Negative  (NEGATIVE)   03/30/17  15:00    


 


Urine Bilirubin  Negative  (NEGATIVE)   03/30/17  15:00    


 


Urine Urobilinogen  Negative E.U./dl (0.2-1.0)   03/30/17  15:00    


 


Ur Leukocyte Esterase  3+  (NEGATIVE)  H  03/30/17  15:00    


 


Urine RBC  6 /hpf (0-3)   03/30/17  15:00    


 


Urine WBC  21 /hpf (3-5)   03/30/17  15:00    


 


Ur Epithelial Cells  Rare /hpf (FEW)   03/30/17  15:00    


 


Hyaline Casts  17 /lpf  03/30/17  15:00    














ecg 3/30/17: 





echo 10/2016: sev lve, global hk, sev dec lvef, mild rve, nl rv fcn, patty, mild-

mod mr, mv ring, tv ring, mild tr, mild pr, mild phtn, ivc mild dil





cxr: mild congestion (chronic finding)





tele: 








a/p:  76 year old gentleman with PMH of IDDM, COPD (O2 dependant) HTN, HLD, 

Afib (on coumadin), CAD s/p CABG 2013, end stage CHF with ICD placement (on 

milrinone pump), PVD, BPH here with productive cough.





cough:


-no signs vol overload, pulm edema


-cxr with chronic findings, bnp at baseline, wt below baseline


-possible due to URI, plans per pmd





chronic end stage systolic heart failure s/p ICD, on home milrinone infusion:


- dry wt approx 211 lbs, currently near this value (below)


- cxr with chronic findings, bnp at baseline


- vol status seems stable, cont home torsemide 40 po bid as doing


- cont milrinone at patient's home maintenance dose (on chronic infusion 

destination therapy)--0.35 mcg/kg/min.  


- cont home coreg


- recent office icd check with stable function





afib


- currently rate controlled on coreg


- cont coumadin per INR





PVD


- con't AC, statin.





CAD s/p CABG


- No anginal symptoms, no signs acs.  Con't home ac, atorvastatin, coreg





HTN: controlled on coreg





HLD: con't statin. 





CKD


- close to baseline








cardiac wise stable for dc

## 2017-03-31 NOTE — CONS
DATE OF CONSULTATION:  03/31/2017

 

DATE OF DICTATION:  03/31/2017

 

REFERRING PHYSICIAN:  Daren Joseph M.D. 

 

HISTORY OF PRESENT ILLNESS:  The patient is a 76-year-old  male known to me from

previous hospitalization, lost to followup.

 

PAST MEDICAL HISTORY:  Advanced COPD with chronic hypoxemic respiratory failure,

chronic congestive heart failure end stage, status post AICD on milrinone pump,

atrial fibrillation, hyperlipidemia, hypertension, insulin-dependent diabetes, 
ASHD

status post CABG in 2013, BPH, peripheral vascular disease, admitted to [facility] on March 30 with complaint of 2-week history of increasing

shortness of breath, cough productive of yellow sputum.  Patient states the past

couple weeks, he says he started developing cough productive of yellow sputum.  
Cough

is increased at night.  States he was placed on antibiotic therapy but says this

persisted, at which time he presented to the emergency room.  On admission, he 
was

noted on chest x-ray to have increasing pulmonary vascular congestion.  He was

admitted to the floor.  He was initially started on some Lasix.  He also started

inhaled bronchodilators.  Mild pulmonary congestion.  He was continued on his 
home

torsemide.  Patient states his shortness of breath has been worsening over the 
past

couple of weeks with increasing dyspnea on exertion.  He denies hemoptysis, 
denies

any chest pains or palpitations, denies any nausea, vomiting, diaphoreses, 
denies any

recent travel, denies any DVT or PE in the past.  He has a history of tobacco 
use.  

 

PAST MEDICAL HISTORY:  Past medical history again includes insulin-dependent 
diabetes

mellitus, COPD O2 dependent, hyperlipidemia, hypertension, atrial fibrillation 
on

Coumadin, ASHD status post CABG, CHF on milrinone pump, status post ICD 
placement,

BPH, peripheral vascular disease.

 

SOCIAL HISTORY:  Retired.  History of tobacco use, approximately 2 packs per 
day for

many years, quit in 2011.  No occupational exposures.  

 

CURRENT MEDICATIONS:  Include Zofran, Tylenol, Coumadin, Wellbutrin, Zyloprim,

robitussin DM, DuoNeb, Coreg, milrinone, Colace, Mucinex, Zantac, Lipitor, 
Feosol,

Demadex, Plavix, K-Dur, vitamin C and vitamin D3.

 

REVIEW OF SYSTEMS:  Positive orthopnea.  Positive dyspnea.  Positive cough.  
Positive

chest congestion.  Positive wheezing.  No chest pain.  No palpitations.  No GI

complaints.  No abdominal pain.  No  lower extremity edema.  

 

PHYSICAL EXAMINATION: 

General:  The patient is a well-developed, well-nourished male, awake, alert, 
in no

acute distress.  

Vital signs:  He is currently afebrile.  Blood pressure is 113/84, respiratory 
rate

is 18, O2 saturation is 98% on 2 L.  

HEENT:  Head is normocephalic, atraumatic.  

Neck:  Supple.  

Heart:  Irregular.  Normal S1, S2.  

Chest:  Bilateral expiratory wheezes.  

Abdomen:  Soft.  Bowel sounds positive.  

Extremities:  No cyanosis, edema. 

 

LABORATORY:  WBC 7.9, hemoglobin 12.5, hematocrit 38.8 with a platelet count of

190,000, INR is 2.80.  No blood gases.  BUN 64, creatinine 2.3, potassium 3.0.  
BNP

3380.  Chest x-ray reveals cardiomegaly, mild pulmonary vascular congestion.  

 

IMPRESSION:

Chronic hypoxemic respiratory failure secondary to multiple factors. 

1.  Probably mild decompensated chronic obstructive pulmonary disease/acute

bronchitis.   

2.  End-stage congestive heart failure, mild decompensation status post 
implantable

cardioverter defibrillator, currently on milrinone drip.

3.  Insulin-dependent diabetes mellitus.

4.  Atrial fibrillation. 

5.  Atherosclerotic heart disease  status post coronary artery bypass graft.

6.  Peripheral vascular disease and acute-on-chronic renal failure and kidney

disease.  

 

PLAN:  Short course of steroids, inhaled bronchodilators, supplemental O2, 
obtain

followup chest x-ray, continue with diuretics.  Continue to monitor blood 
sugars. 

Will follow closely, monitor renal function.  

 

 

ESTHER EASTMAN M.D.

 

RHIANNON/[id number]

DD: 03/31/2017 14:53

DT: 03/31/2017 17:14

Job #:  [id number]

MTDD

## 2017-03-31 NOTE — PN
Physical Exam: 


SUBJECTIVE: Patient seen and examined. Loose cough. 








OBJECTIVE:





 Vital Signs











 Period  Temp  Pulse  Resp  BP Sys/Alonso  Pulse Ox


 


 Last 24 Hr  97.3 F-97.8 F  64-76  20-20  107-118/59-72  94-98











GENERAL: The patient is awake, alert, and fully oriented, in no acute distress.


HEAD: Normal with no signs of trauma.


EYES: PERRL, extraocular movements intact, sclera anicteric, conjunctiva clear. 

No ptosis. 


LUNGS: Crackles FCI up on the right  


HEART: Regular rate and rhythm, S1, S2 without murmur, rub or gallop.


ABDOMEN: Soft, nontender, nondistended, normoactive bowel sounds, no guarding, 

no rebound


EXTREMITIES: 2+ pulses, warm, well-perfused, no edema. 


NEUROLOGICAL: Cranial nerves II through XII grossly intact. Normal speech, gait 

not observed.











 Laboratory Results - last 24 hr











  03/30/17 03/30/17 03/31/17





  20:40 23:55 05:30


 


WBC   


 


RBC   


 


Hgb   


 


Hct   


 


MCV   


 


MCHC   


 


RDW   


 


Plt Count   


 


MPV   


 


Neutrophils %   


 


Lymphocytes %   


 


Monocytes %   


 


Eosinophils %   


 


Basophils %   


 


INR  2.80 H  


 


Sodium   


 


Potassium   


 


Chloride   


 


Carbon Dioxide   


 


Anion Gap   


 


BUN   


 


Creatinine   


 


POC Glucometer   157  125


 


Random Glucose   


 


Calcium   














  03/31/17 03/31/17 03/31/17





  06:07 06:07 11:43


 


WBC  7.9  


 


RBC  4.73  


 


Hgb  12.5  


 


Hct  38.8  


 


MCV  81.9  


 


MCHC  32.2  


 


RDW  18.1 H  


 


Plt Count  190  


 


MPV  8.6  


 


Neutrophils %  68.1  


 


Lymphocytes %  18.6  D  


 


Monocytes %  10.6 H  


 


Eosinophils %  1.9  


 


Basophils %  0.8  


 


INR   


 


Sodium   137 


 


Potassium   3.0 L 


 


Chloride   92 L 


 


Carbon Dioxide   37 H 


 


Anion Gap   8 


 


BUN   64 H 


 


Creatinine   2.3 H 


 


POC Glucometer    168


 


Random Glucose   136 H 


 


Calcium   8.9 








                  Active Medications











Generic Name Dose Route Start Last Admin





  Trade Name Freq  PRN Reason Stop Dose Admin


 


Acetaminophen  650 mg 03/30/17 17:44  





  Tylenol -  PO   





  Q4H PRN   





  FEVER OR PAIN   


 


Albuterol/Ipratropium  1 amp 03/30/17 18:00 03/31/17 11:30





  Duoneb -  NEB   1 amp





  QIDR JARROD   Administration


 


Allopurinol  100 mg 03/31/17 10:00 03/31/17 11:38





  Zyloprim -  PO   100 mg





  DAILY JARROD   Administration


 


Ascorbic Acid  1,000 mg 03/31/17 10:00 03/31/17 11:37





  Vitamin C -  PO   1,000 mg





  DAILY JARROD   Administration


 


Atorvastatin Calcium  40 mg 03/30/17 22:00 03/31/17 00:04





  Lipitor -  PO   40 mg





  HS JARROD   Administration


 


Bupropion HCl  75 mg 03/31/17 10:00 03/31/17 11:38





  Wellbutrin -  PO   75 mg





  DAILY JARROD   Administration


 


Carvedilol  6.25 mg 03/30/17 22:00 03/31/17 11:38





  Coreg -  PO   6.25 mg





  BID JARROD   Administration


 


Cholecalciferol  1,000 unit 03/31/17 10:00 03/31/17 11:38





  Vitamin D3 -  PO   1,000 unit





  DAILY JARROD   Administration


 


Clopidogrel Bisulfate  75 mg 03/31/17 10:00 03/31/17 11:37





  Plavix -  PO   75 mg





  DAILY JARROD   Administration


 


Docusate Sodium  100 mg 03/30/17 22:00 03/31/17 11:37





  Colace -  PO   100 mg





  BID JARROD   Administration


 


Ferrous Sulfate  325 mg 03/31/17 10:00 03/31/17 11:37





  Feosol -  PO   325 mg





  Q2D JARROD   Administration


 


Guaifenesin  600 mg 03/30/17 22:00 03/31/17 11:38





  Mucinex -  PO   600 mg





  BID JARROD   Administration


 


Guaifenesin  10 ml 03/31/17 13:10  





  Robitussin Dm -  PO   





  Q6H PRN   





  COUGH   


 


Milrinone Lactate/Dextrose  100 mls @ 9.859 mls/hr 03/31/17 00:24 03/31/17 08:11





  Milrinone 20mg/100ml Ivpb -  IVPB   9.859 mls/hr





  TITR JARROD   Administration





  Protocol   





  0.35 MCG/KG/MIN   


 


Insulin Aspart  1 vial 03/30/17 22:00 03/31/17 11:47





  Novolog Vial Sliding Scale -  SQ   2 units





  ACHS JARROD   Administration





  Protocol   


 


Multivitamins/Minerals/Vitamin C  1 tab 03/31/17 10:00 03/31/17 11:37





  Tab-A-Vit -  PO   1 tab





  DAILY JARROD   Administration


 


Ondansetron HCl  4 mg 03/30/17 17:44  





  Zofran Injection  IVPB   





  Q6H PRN   





  NAUSEA   


 


Potassium Chloride  20 meq 03/30/17 22:00 03/31/17 06:09





  K-Dur -  PO   20 meq





  TID JARROD   Administration


 


Ranitidine HCl  75 mg 03/31/17 10:00 03/31/17 11:37





  Zantac -  PO   75 mg





  DAILY JARROD   Administration


 


Torsemide  40 mg 03/30/17 20:00 03/31/17 06:08





  Demadex -  PO   40 mg





  BIDLASIX JARROD   Administration


 


Warfarin Sodium  4 mg 03/31/17 18:00  





  Coumadin -  PO   





  DAILY@1800 [facility]   











ASSESSMENT/PLAN:


76-year-old man with a history of COPD, chronic hypoxic respiratory failure, end

-stage chronic systolic heart failure, atrial fibrillation, CAD, MI, HTN, 

hyperlipidemia, PAD, type 2 DM, stage 4 CKD, and BPH, admitted for cough.





1. Hypokalemia, chronic


   - Replete potassium PO 40mg x 3 doses





2. Cough, possibly secondary to bronchitis


   --no fever, no leukocytosis


   - Completed courses of Zithromax and Amoxicillin as outpatient


   - IV steroids started by pulmonary


   - Robitussin





3. COPD 


   - DuoNeb as needed





4. Chronic systolic heart failure, end-stage


   - Stable; CXR shows mild pulmonary venous congestion


   - Continue Coreg, Torsemide, Milrinone


   - Has AICD





5. Chronic hypoxic respiratory failure


   - Continue oxygen to maintain saturation > 90%





6. Permanent atrial fibrillation


   - Continue Coreg, Coumadin (INR therapeutic 2.8)





7. CAD, history of MI


   - Continue Coreg, Plavix, Lipitor





8. HTN


   - Continue Coreg





9. Hyperlipidemia


   - Continue Lipitor





10. PAD





11. Type 2 diabetes mellitus


   - Fingersticks with Novolog sliding scale





12. Stage 4 CKD


   - Stable





13. BPH








Visit type





- Emergency Visit


Emergency Visit: Yes


ED Registration Date: 03/31/17


Care time: The patient presented to the Emergency Department on the above date 

and was hospitalized for further evaluation of their emergent condition.





- New Patient


This patient is new to me today: Yes


Date on this admission: 03/31/17





- Critical Care


Critical Care patient: No

## 2017-04-01 LAB
ALBUMIN SERPL-MCNC: 3.6 G/DL (ref 3.4–5)
ALP SERPL-CCNC: 145 U/L (ref 45–117)
ALT SERPL-CCNC: 22 U/L (ref 12–78)
ANION GAP SERPL CALC-SCNC: 13 MMOL/L (ref 8–16)
AST SERPL-CCNC: 20 U/L (ref 15–37)
BASOPHILS # BLD: 0.1 % (ref 0–2)
BILIRUB SERPL-MCNC: 0.7 MG/DL (ref 0.2–1)
CALCIUM SERPL-MCNC: 8.9 MG/DL (ref 8.5–10.1)
CO2 SERPL-SCNC: 32 MMOL/L (ref 21–32)
CREAT SERPL-MCNC: 2.4 MG/DL (ref 0.7–1.3)
DEPRECATED RDW RBC AUTO: 17.7 % (ref 11.9–15.9)
EOSINOPHIL # BLD: 0 % (ref 0–4.5)
GLUCOSE SERPL-MCNC: 336 MG/DL (ref 74–106)
INR BLD: 3.12 (ref 0.82–1.09)
MAGNESIUM SERPL-MCNC: 2.8 MG/DL (ref 1.8–2.4)
MCH RBC QN AUTO: 26.6 PG (ref 25.7–33.7)
MCHC RBC AUTO-ENTMCNC: 32.7 G/DL (ref 32–35.9)
MCV RBC: 81.6 FL (ref 80–96)
NEUTROPHILS # BLD: 87.9 % (ref 42.8–82.8)
PLATELET # BLD AUTO: 188 K/MM3 (ref 134–434)
PMV BLD: 9 FL (ref 7.5–11.1)
PROT SERPL-MCNC: 8.1 G/DL (ref 6.4–8.2)
PT PNL PPP: 35.1 SEC (ref 9.98–11.88)
WBC # BLD AUTO: 4.4 K/MM3 (ref 4–10)

## 2017-04-01 RX ADMIN — TORSEMIDE SCH MG: 20 TABLET ORAL at 14:33

## 2017-04-01 RX ADMIN — TORSEMIDE SCH MG: 20 TABLET ORAL at 06:37

## 2017-04-01 RX ADMIN — CLOPIDOGREL BISULFATE SCH MG: 75 TABLET, FILM COATED ORAL at 09:00

## 2017-04-01 RX ADMIN — METHYLPREDNISOLONE SODIUM SUCCINATE SCH MG: 40 INJECTION, POWDER, FOR SOLUTION INTRAMUSCULAR; INTRAVENOUS at 03:50

## 2017-04-01 RX ADMIN — WARFARIN SCH: 2 TABLET ORAL at 18:36

## 2017-04-01 RX ADMIN — ALLOPURINOL SCH MG: 100 TABLET ORAL at 09:00

## 2017-04-01 RX ADMIN — CARVEDILOL SCH MG: 6.25 TABLET, FILM COATED ORAL at 09:01

## 2017-04-01 RX ADMIN — MILRINONE LACTATE SCH MLS/HR: 200 INJECTION, SOLUTION INTRAVENOUS at 14:39

## 2017-04-01 RX ADMIN — GUAIFENESIN SCH MG: 600 TABLET, EXTENDED RELEASE ORAL at 21:29

## 2017-04-01 RX ADMIN — INSULIN ASPART SCH UNITS: 100 INJECTION, SOLUTION INTRAVENOUS; SUBCUTANEOUS at 06:37

## 2017-04-01 RX ADMIN — GUAIFENESIN SCH MG: 600 TABLET, EXTENDED RELEASE ORAL at 09:01

## 2017-04-01 RX ADMIN — RANITIDINE SCH MG: 150 TABLET ORAL at 09:01

## 2017-04-01 RX ADMIN — POTASSIUM CHLORIDE SCH MEQ: 1500 TABLET, EXTENDED RELEASE ORAL at 03:51

## 2017-04-01 RX ADMIN — BUDESONIDE AND FORMOTEROL FUMARATE DIHYDRATE SCH PUFF: 80; 4.5 AEROSOL RESPIRATORY (INHALATION) at 09:01

## 2017-04-01 RX ADMIN — INSULIN ASPART SCH UNITS: 100 INJECTION, SOLUTION INTRAVENOUS; SUBCUTANEOUS at 16:42

## 2017-04-01 RX ADMIN — CARVEDILOL SCH MG: 6.25 TABLET, FILM COATED ORAL at 21:29

## 2017-04-01 RX ADMIN — DOCUSATE SODIUM SCH MG: 100 CAPSULE, LIQUID FILLED ORAL at 09:01

## 2017-04-01 RX ADMIN — ATORVASTATIN CALCIUM SCH MG: 40 TABLET, FILM COATED ORAL at 21:29

## 2017-04-01 RX ADMIN — DOCUSATE SODIUM SCH MG: 100 CAPSULE, LIQUID FILLED ORAL at 21:29

## 2017-04-01 RX ADMIN — MILRINONE LACTATE SCH MLS/HR: 200 INJECTION, SOLUTION INTRAVENOUS at 03:51

## 2017-04-01 RX ADMIN — INSULIN ASPART SCH UNITS: 100 INJECTION, SOLUTION INTRAVENOUS; SUBCUTANEOUS at 11:20

## 2017-04-01 RX ADMIN — OXYCODONE HYDROCHLORIDE AND ACETAMINOPHEN SCH MG: 500 TABLET ORAL at 09:01

## 2017-04-01 RX ADMIN — TIOTROPIUM BROMIDE SCH PUFF: 18 CAPSULE ORAL; RESPIRATORY (INHALATION) at 09:01

## 2017-04-01 RX ADMIN — INSULIN ASPART SCH UNITS: 100 INJECTION, SOLUTION INTRAVENOUS; SUBCUTANEOUS at 22:23

## 2017-04-01 RX ADMIN — GUAIFENESIN AND DEXTROMETHORPHAN PRN ML: 100; 10 SYRUP ORAL at 22:23

## 2017-04-01 RX ADMIN — METHYLPREDNISOLONE SODIUM SUCCINATE SCH MG: 40 INJECTION, POWDER, FOR SOLUTION INTRAMUSCULAR; INTRAVENOUS at 21:28

## 2017-04-01 RX ADMIN — POTASSIUM CHLORIDE SCH MEQ: 1500 TABLET, EXTENDED RELEASE ORAL at 06:37

## 2017-04-01 RX ADMIN — MULTIVITAMIN TABLET SCH TAB: TABLET at 09:00

## 2017-04-01 RX ADMIN — CHOLECALCIFEROL TAB 10 MCG (400 UNIT) SCH UNIT: 10 TAB at 11:19

## 2017-04-01 RX ADMIN — METHYLPREDNISOLONE SODIUM SUCCINATE SCH MG: 40 INJECTION, POWDER, FOR SOLUTION INTRAMUSCULAR; INTRAVENOUS at 14:33

## 2017-04-01 RX ADMIN — BUDESONIDE AND FORMOTEROL FUMARATE DIHYDRATE SCH PUFF: 80; 4.5 AEROSOL RESPIRATORY (INHALATION) at 22:23

## 2017-04-01 RX ADMIN — METHYLPREDNISOLONE SODIUM SUCCINATE SCH MG: 40 INJECTION, POWDER, FOR SOLUTION INTRAMUSCULAR; INTRAVENOUS at 09:01

## 2017-04-01 NOTE — PN
Progress Note (short form)





- Note


Progress Note: 


s: feeling better, less cough/sob, no cp palps dizzy le edema





o:


 


 Vital Signs











 Period  Temp  Pulse  Resp  BP Sys/Alonso  Pulse Ox


 


 Last 24 Hr  97.3 F-98.5 F  74-76  18-20  103-129/55-84  95-96








NAD, calm 


JVD flat, neck supple


RRR nl s1, s2 no m/r/g.


cta bl, nl effort


no le e/c/c


aaox3


no jaundice diaphoresis





 


 


 Current Medications











Generic Name Dose Route Start Last Admin





  Trade Name Freq  PRN Reason Stop Dose Admin


 


Acetaminophen  650 mg 03/30/17 17:44  





  Tylenol -  PO   





  Q4H PRN   





  FEVER OR PAIN   


 


Albuterol Sulfate  1 amp 03/31/17 15:06  





  Ventolin 0.083% Nebulizer Soln -  NEB   





  Q4H PRN   





  SHORT OF BREATH/WHEEZING   


 


Allopurinol  100 mg 03/31/17 10:00 04/01/17 09:00





  Zyloprim -  PO   100 mg





  DAILY JARROD   Administration


 


Ascorbic Acid  1,000 mg 03/31/17 10:00 04/01/17 09:01





  Vitamin C -  PO   1,000 mg





  DAILY JARROD   Administration


 


Atorvastatin Calcium  40 mg 03/30/17 22:00 03/31/17 22:04





  Lipitor -  PO   40 mg





  HS JARROD   Administration


 


Budesonide/Formoterol Fumarate  2 puff 03/31/17 22:00 04/01/17 09:01





  Symbicort 80/4.5mcg -  IH   2 puff





  BID JARROD   Administration


 


Bupropion HCl  75 mg 03/31/17 10:00 04/01/17 09:01





  Wellbutrin -  PO   75 mg





  DAILY JARROD   Administration


 


Carvedilol  6.25 mg 03/30/17 22:00 04/01/17 09:01





  Coreg -  PO   6.25 mg





  BID JARROD   Administration


 


Cholecalciferol  1,000 unit 03/31/17 10:00 03/31/17 11:38





  Vitamin D3 -  PO   1,000 unit





  DAILY JARROD   Administration


 


Clopidogrel Bisulfate  75 mg 03/31/17 10:00 04/01/17 09:00





  Plavix -  PO   75 mg





  DAILY JARROD   Administration


 


Docusate Sodium  100 mg 03/30/17 22:00 04/01/17 09:01





  Colace -  PO   100 mg





  BID JARROD   Administration


 


Ferrous Sulfate  325 mg 03/31/17 10:00 03/31/17 11:37





  Feosol -  PO   325 mg





  Q2D JARROD   Administration


 


Guaifenesin  600 mg 03/30/17 22:00 04/01/17 09:01





  Mucinex -  PO   600 mg





  BID JARROD   Administration


 


Guaifenesin  10 ml 03/31/17 13:10  





  Robitussin Dm -  PO   





  Q6H PRN   





  COUGH   


 


Milrinone Lactate/Dextrose  100 mls @ 9.859 mls/hr 03/31/17 00:24 04/01/17 03:51





  Milrinone 20mg/100ml Ivpb -  IVPB   9.859 mls/hr





  TITR JARROD   Administration





  Protocol   





  0.35 MCG/KG/MIN   


 


Insulin Aspart  1 vial 03/30/17 22:00 04/01/17 06:37





  Novolog Vial Sliding Scale -  SQ   8 units





  ACHS JARROD   Administration





  Protocol   


 


Methylprednisolone Sodium Succinate  40 mg 03/31/17 15:15 04/01/17 09:01





  Solu-Medrol -  IVPB   40 mg





  Q6H-IV JARROD   Administration


 


Multivitamins/Minerals/Vitamin C  1 tab 03/31/17 10:00 04/01/17 09:00





  Tab-A-Vit -  PO   1 tab





  DAILY JARROD   Administration


 


Ondansetron HCl  4 mg 03/30/17 17:44  





  Zofran Injection  IVPB   





  Q6H PRN   





  NAUSEA   


 


Ranitidine HCl  75 mg 03/31/17 10:00 04/01/17 09:01





  Zantac -  PO   75 mg





  DAILY JARROD   Administration


 


Tiotropium Bromide  1 puff 03/31/17 15:15 04/01/17 09:01





  Spiriva -  IH   1 puff





  DAILY JARROD   Administration


 


Torsemide  40 mg 03/30/17 20:00 04/01/17 06:37





  Demadex -  PO   40 mg





  BIDLASIX JARROD   Administration


 


Warfarin Sodium  4 mg 03/31/17 18:00 03/31/17 22:04





  Coumadin -  PO   4 mg





  DAILY@1800 JARROD   Administration











 CBC, BMP





 04/01/17 06:07 





 04/01/17 06:07 











ecg 3/30/17: 





echo 10/2016: sev lve, global hk, sev dec lvef, mild rve, nl rv fcn, patty, mild-

mod mr, mv ring, tv ring, mild tr, mild pr, mild phtn, ivc mild dil





cxr: mild congestion (chronic finding)





tele: 








a/p:  76 year old gentleman with PMH of IDDM, COPD (O2 dependant) HTN, HLD, 

Afib (on coumadin), CAD s/p CABG 2013, end stage CHF with ICD placement (on 

milrinone pump), PVD, BPH here with productive cough.





cough:


-no signs vol overload, pulm edema


-cxr with chronic findings, bnp at baseline, wt at baseline


-likely due to URI/copd, sxs improving with resp treatment, plans per pmd/pulm





chronic end stage systolic heart failure s/p ICD, on home milrinone infusion:


- dry wt approx 211 lbs, currently near this value (below)


- cxr with chronic findings, bnp at baseline


- vol status seems stable, cont home torsemide 40 po bid as doing


- cont milrinone at patient's home maintenance dose (on chronic infusion 

destination therapy)--0.35 mcg/kg/min.  


- cont home coreg


- recent office icd check with stable function





afib


- currently rate controlled on coreg


- cont coumadin per INR





PVD


- con't AC, statin.





CAD s/p CABG


- No anginal symptoms, no signs acs.  Con't home ac, atorvastatin, coreg





HTN: controlled on coreg





HLD: con't statin. 





CKD


- close to baseline

## 2017-04-01 NOTE — PN
Progress Note (short form)





- Note


Progress Note: 


Patient with a past history of end stage systolic heart failure onMilrinone IV, 

CABG, HTN, Hyperlipidemia, Renal failure, DM on insulin and renal failure is 

sitting up for the first time and feels better.On coumadin; INR noted. 





No chest pain


SOB on movement 


Improved pedal edema





On Exam:


 Vital Signs











 Period  Temp  Pulse  Resp  BP Sys/Alonso  Pulse Ox


 


 Last 24 Hr  97.3 F-98.5 F  75-76  18-20  103-129/55-74  95-96











Alert


Chest: some rhonchi and decreased breath sounds both bases


cor:Bradycardic


Abd; distended; non tender


Ext: 1+ edema





IMP:


End Stage Systolic Heart Failure


Renal Failure


ASHD S/P CABG


Hypertension


DM on Insulin not controlled





Plan:


Followup lab


encourage OOB


Continue IV Milrinone

## 2017-04-01 NOTE — PN
Progress Note (short form)





- Note


Progress Note: 


OOB to chair. 


No acute events overnight. 


Dry cough.  No CP. 





 Intake & Output











 03/29/17 03/30/17 03/31/17 04/01/17





 23:59 23:59 23:59 23:59


 


Intake Total   60 200


 


Output Total   850 450


 


Balance   -790 -250


 


Weight  207 lb 205 lb 12.8 oz 208 lb 6.4 oz








 Last Vital Signs











Temp Pulse Resp BP Pulse Ox


 


 97.7 F   76   18   116/67   96 


 


 04/01/17 14:36  04/01/17 14:36  04/01/17 14:36  04/01/17 14:36  04/01/17 10:00








Active Medications





Acetaminophen (Tylenol -)  650 mg PO Q4H PRN


   PRN Reason: FEVER OR PAIN


Albuterol Sulfate (Ventolin 0.083% Nebulizer Soln -)  1 amp NEB Q4H PRN


   PRN Reason: SHORT OF BREATH/WHEEZING


Allopurinol (Zyloprim -)  100 mg PO DAILY [facility]


   Last Admin: 04/01/17 09:00 Dose:  100 mg


Ascorbic Acid (Vitamin C -)  1,000 mg PO DAILY [facility]


   Last Admin: 04/01/17 09:01 Dose:  1,000 mg


Atorvastatin Calcium (Lipitor -)  40 mg PO HS [facility]


   Last Admin: 03/31/17 22:04 Dose:  40 mg


Budesonide/Formoterol Fumarate (Symbicort 80/4.5mcg -)  2 puff IH BID [facility]


   Last Admin: 04/01/17 09:01 Dose:  2 puff


Bupropion HCl (Wellbutrin -)  75 mg PO DAILY [facility]


   Last Admin: 04/01/17 09:01 Dose:  75 mg


Carvedilol (Coreg -)  6.25 mg PO BID [facility]


   Last Admin: 04/01/17 09:01 Dose:  6.25 mg


Cholecalciferol (Vitamin D3 -)  1,000 unit PO DAILY [facility]


   Last Admin: 04/01/17 11:19 Dose:  1,000 unit


Clopidogrel Bisulfate (Plavix -)  75 mg PO DAILY [facility]


   Last Admin: 04/01/17 09:00 Dose:  75 mg


Docusate Sodium (Colace -)  100 mg PO BID [facility]


   Last Admin: 04/01/17 09:01 Dose:  100 mg


Ferrous Sulfate (Feosol -)  325 mg PO Q2D [facility]


   Last Admin: 03/31/17 11:37 Dose:  325 mg


Guaifenesin (Mucinex -)  600 mg PO BID [facility]


   Last Admin: 04/01/17 09:01 Dose:  600 mg


Guaifenesin (Robitussin Dm -)  10 ml PO Q6H PRN


   PRN Reason: COUGH


Milrinone Lactate/Dextrose (Milrinone 20mg/100ml Ivpb -)  100 mls @ 9.859 mls/

hr IVPB TITR [facility]; 0.35 MCG/KG/MIN


   PRN Reason: Protocol


   Last Admin: 04/01/17 03:51 Dose:  9.859 mls/hr


Insulin Aspart (Novolog Vial Sliding Scale -)  1 vial SQ ACHS [facility]


   PRN Reason: Protocol


   Last Admin: 04/01/17 11:20 Dose:  8 units


Methylprednisolone Sodium Succinate (Solu-Medrol -)  40 mg IVPB Q6H-IV [facility]


   Last Admin: 04/01/17 14:33 Dose:  40 mg


Multivitamins/Minerals/Vitamin C (Tab-A-Vit -)  1 tab PO DAILY [facility]


   Last Admin: 04/01/17 09:00 Dose:  1 tab


Ondansetron HCl (Zofran Injection)  4 mg IVPB Q6H PRN


   PRN Reason: NAUSEA


Ranitidine HCl (Zantac -)  75 mg PO DAILY [facility]


   Last Admin: 04/01/17 09:01 Dose:  75 mg


Tiotropium Bromide (Spiriva -)  1 puff IH DAILY [facility]


   Last Admin: 04/01/17 09:01 Dose:  1 puff


Torsemide (Demadex -)  40 mg PO BIDLASIX [facility]


   Last Admin: 04/01/17 14:33 Dose:  40 mg


Warfarin Sodium (Coumadin -)  4 mg PO DAILY@1800 [facility]


   Last Admin: 03/31/17 22:04 Dose:  4 mg











GENERAL: Awake, NAD 


HEAD: Normal with no signs of trauma.


EYES: sclera anicteric, conjunctiva clear.  


LUNGS: Bibasilar rales R > L  


HEART: Regular rate and rhythm, S1, S2 without murmur, rub or gallop.


ABDOMEN: Soft, nontender, nondistended, normoactive bowel sounds, no guarding, 

no rebound


EXTREMITIES: 2+ pulses, warm, well-perfused, no edema. 


NEUROLOGICAL: Non-focal 


 Laboratory Results - last 24 hr











  03/31/17 03/31/17 03/31/17





  15:42 18:05 22:03


 


WBC   


 


RBC   


 


Hgb   


 


Hct   


 


MCV   


 


MCHC   


 


RDW   


 


Plt Count   


 


MPV   


 


Neutrophils %   


 


Lymphocytes %   


 


Monocytes %   


 


Eosinophils %   


 


Basophils %   


 


INR   2.82 H 


 


Sodium   


 


Potassium   


 


Chloride   


 


Carbon Dioxide   


 


Anion Gap   


 


BUN   


 


Creatinine   


 


Creat Clearance w eGFR   


 


POC Glucometer  90   327


 


Random Glucose   


 


Calcium   


 


Magnesium   


 


Total Bilirubin   


 


AST   


 


ALT   


 


Alkaline Phosphatase   


 


Total Protein   


 


Albumin   














  04/01/17 04/01/17 04/01/17





  05:29 06:07 06:07


 


WBC   4.4  D 


 


RBC   4.73 


 


Hgb   12.6 


 


Hct   38.6 


 


MCV   81.6 


 


MCHC   32.7 


 


RDW   17.7 H 


 


Plt Count   188 


 


MPV   9.0 


 


Neutrophils %   87.9 H D 


 


Lymphocytes %   11.0  D 


 


Monocytes %   1.0 L D 


 


Eosinophils %   0.0  D 


 


Basophils %   0.1 


 


INR   


 


Sodium    134 L


 


Potassium    4.0  D


 


Chloride    89 L


 


Carbon Dioxide    32


 


Anion Gap    13


 


BUN    72 H


 


Creatinine    2.4 H


 


Creat Clearance w eGFR    26.31


 


POC Glucometer  317  


 


Random Glucose    336 H* D


 


Calcium    8.9


 


Magnesium    2.8 H


 


Total Bilirubin    0.7


 


AST    20


 


ALT    22


 


Alkaline Phosphatase    145 H


 


Total Protein    8.1


 


Albumin    3.6














  04/01/17 04/01/17





  10:00 11:18


 


WBC  


 


RBC  


 


Hgb  


 


Hct  


 


MCV  


 


MCHC  


 


RDW  


 


Plt Count  


 


MPV  


 


Neutrophils %  


 


Lymphocytes %  


 


Monocytes %  


 


Eosinophils %  


 


Basophils %  


 


INR  3.12 H 


 


Sodium  


 


Potassium  


 


Chloride  


 


Carbon Dioxide  


 


Anion Gap  


 


BUN  


 


Creatinine  


 


Creat Clearance w eGFR  


 


POC Glucometer   332


 


Random Glucose  


 


Calcium  


 


Magnesium  


 


Total Bilirubin  


 


AST  


 


ALT  


 


Alkaline Phosphatase  


 


Total Protein  


 


Albumin  











 Problem List 





- Problems


(1) CHF (congestive heart failure)


Code(s): I50.9 - HEART FAILURE, UNSPECIFIED   Qualifiers: 


     Congestive heart failure type: combined     Congestive heart failure 

chronicity: unspecified congestive heart failure chronicity     Qualified Code(s

): I50.40 - Unspecified combined systolic (congestive) and diastolic (congestive

) heart failure  





(2) AICD (automatic cardioverter/defibrillator) present


Code(s): Z95.810 - PRESENCE OF AUTOMATIC (IMPLANTABLE) CARDIAC DEFIBRILLATOR





(3) Acute and chronic respiratory failure (acute-on-chronic)


Code(s): J96.20 - ACUTE AND CHR RESP FAILURE, UNSP W HYPOXIA OR HYPERCAPNIA   





(4) Acute on chronic renal insufficiency


Code(s): N28.9 - DISORDER OF KIDNEY AND URETER, UNSPECIFIED


N18.9 - CHRONIC KIDNEY DISEASE, UNSPECIFIED





(5) Atrial fibrillation


Code(s): I48.91 - UNSPECIFIED ATRIAL FIBRILLATION   Qualifiers: 


     Atrial fibrillation type: chronic     Qualified Code(s): I48.2 - Chronic 

atrial fibrillation  





(6) CAD (coronary artery disease)


Code(s): I25.10 - ATHSCL HEART DISEASE OF NATIVE CORONARY ARTERY W/O ANG PCTRS 

  





(7) CKD (chronic kidney disease)


Code(s): N18.9 - CHRONIC KIDNEY DISEASE, UNSPECIFIED   Qualifiers: 


     Chronic kidney disease stage: unspecified stage     Qualified Code(s): 

N18.9 - Chronic kidney disease, unspecified  





(8) COPD (chronic obstructive pulmonary disease)


Code(s): J44.9 - CHRONIC OBSTRUCTIVE PULMONARY DISEASE, UNSPECIFIED   Qualifiers

: 


     COPD type: unspecified COPD     Qualified Code(s): J44.9 - Chronic 

obstructive pulmonary disease, unspecified  





(9) Cardiomyopathy


Code(s): I42.9 - CARDIOMYOPATHY, UNSPECIFIED   





(10) Diabetes mellitus, insulin dependent (IDDM), uncontrolled


Code(s): E10.65 - TYPE 1 DIABETES MELLITUS WITH HYPERGLYCEMIA   





(11) Dyspnea on exertion


Code(s): R06.09 - OTHER FORMS OF DYSPNEA








IMP CHRONIC HYPOXEMIC ,HYPERCAPNEIC RESPIRATORY FAILURE


      COPD END STAGE O2 DEPENDENT


      BRONCHITIS


      CHF S/P ICD ON MILRINONE PUMP


      ASHD S/P MI,CABG


      CKD


      AFIB


      PVD


      BPH








PLAN   BD TX 


         SHORT COURSE OF STEROIDS


         NASAL O2


         DIURETICS


         STRICT I+OS


         MONITOR LYTES,RENAL FUNCTION


         


Dr Sheppard

## 2017-04-02 LAB
ANION GAP SERPL CALC-SCNC: 11 MMOL/L (ref 8–16)
CALCIUM SERPL-MCNC: 8.5 MG/DL (ref 8.5–10.1)
CO2 SERPL-SCNC: 31 MMOL/L (ref 21–32)
CREAT SERPL-MCNC: 2.3 MG/DL (ref 0.7–1.3)
GLUCOSE SERPL-MCNC: 356 MG/DL (ref 74–106)
INR BLD: 3.99 (ref 0.82–1.09)
MAGNESIUM SERPL-MCNC: 2.7 MG/DL (ref 1.8–2.4)
PT PNL PPP: 45.1 SEC (ref 9.98–11.88)

## 2017-04-02 RX ADMIN — CARVEDILOL SCH MG: 6.25 TABLET, FILM COATED ORAL at 10:44

## 2017-04-02 RX ADMIN — GUAIFENESIN AND DEXTROMETHORPHAN PRN ML: 100; 10 SYRUP ORAL at 21:41

## 2017-04-02 RX ADMIN — WARFARIN SCH: 2 TABLET ORAL at 17:36

## 2017-04-02 RX ADMIN — INSULIN ASPART SCH UNITS: 100 INJECTION, SOLUTION INTRAVENOUS; SUBCUTANEOUS at 12:09

## 2017-04-02 RX ADMIN — ALBUTEROL SULFATE PRN AMP: 2.5 SOLUTION RESPIRATORY (INHALATION) at 23:30

## 2017-04-02 RX ADMIN — TIOTROPIUM BROMIDE SCH PUFF: 18 CAPSULE ORAL; RESPIRATORY (INHALATION) at 10:45

## 2017-04-02 RX ADMIN — CLOPIDOGREL BISULFATE SCH MG: 75 TABLET, FILM COATED ORAL at 10:44

## 2017-04-02 RX ADMIN — METHYLPREDNISOLONE SODIUM SUCCINATE SCH MG: 40 INJECTION, POWDER, FOR SOLUTION INTRAMUSCULAR; INTRAVENOUS at 16:44

## 2017-04-02 RX ADMIN — METHYLPREDNISOLONE SODIUM SUCCINATE SCH MG: 40 INJECTION, POWDER, FOR SOLUTION INTRAMUSCULAR; INTRAVENOUS at 21:40

## 2017-04-02 RX ADMIN — TORSEMIDE SCH MG: 20 TABLET ORAL at 06:26

## 2017-04-02 RX ADMIN — BUDESONIDE AND FORMOTEROL FUMARATE DIHYDRATE SCH PUFF: 80; 4.5 AEROSOL RESPIRATORY (INHALATION) at 10:46

## 2017-04-02 RX ADMIN — GUAIFENESIN SCH MG: 600 TABLET, EXTENDED RELEASE ORAL at 10:44

## 2017-04-02 RX ADMIN — ATORVASTATIN CALCIUM SCH MG: 40 TABLET, FILM COATED ORAL at 21:40

## 2017-04-02 RX ADMIN — CARVEDILOL SCH MG: 6.25 TABLET, FILM COATED ORAL at 21:40

## 2017-04-02 RX ADMIN — FERROUS SULFATE TAB EC 324 MG (65 MG FE EQUIVALENT) SCH MG: 324 (65 FE) TABLET DELAYED RESPONSE at 10:44

## 2017-04-02 RX ADMIN — INSULIN ASPART SCH UNITS: 100 INJECTION, SOLUTION INTRAVENOUS; SUBCUTANEOUS at 21:40

## 2017-04-02 RX ADMIN — GUAIFENESIN SCH MG: 600 TABLET, EXTENDED RELEASE ORAL at 21:41

## 2017-04-02 RX ADMIN — ALLOPURINOL SCH MG: 100 TABLET ORAL at 10:44

## 2017-04-02 RX ADMIN — DOCUSATE SODIUM SCH MG: 100 CAPSULE, LIQUID FILLED ORAL at 10:45

## 2017-04-02 RX ADMIN — MULTIVITAMIN TABLET SCH TAB: TABLET at 10:44

## 2017-04-02 RX ADMIN — INSULIN ASPART SCH UNITS: 100 INJECTION, SOLUTION INTRAVENOUS; SUBCUTANEOUS at 17:35

## 2017-04-02 RX ADMIN — CHOLECALCIFEROL TAB 10 MCG (400 UNIT) SCH UNIT: 10 TAB at 10:46

## 2017-04-02 RX ADMIN — MILRINONE LACTATE SCH MLS/HR: 200 INJECTION, SOLUTION INTRAVENOUS at 21:41

## 2017-04-02 RX ADMIN — MILRINONE LACTATE SCH MLS/HR: 200 INJECTION, SOLUTION INTRAVENOUS at 00:20

## 2017-04-02 RX ADMIN — METHYLPREDNISOLONE SODIUM SUCCINATE SCH MG: 40 INJECTION, POWDER, FOR SOLUTION INTRAMUSCULAR; INTRAVENOUS at 02:50

## 2017-04-02 RX ADMIN — INSULIN ASPART SCH UNITS: 100 INJECTION, SOLUTION INTRAVENOUS; SUBCUTANEOUS at 06:26

## 2017-04-02 RX ADMIN — BUDESONIDE AND FORMOTEROL FUMARATE DIHYDRATE SCH PUFF: 80; 4.5 AEROSOL RESPIRATORY (INHALATION) at 21:41

## 2017-04-02 RX ADMIN — OXYCODONE HYDROCHLORIDE AND ACETAMINOPHEN SCH MG: 500 TABLET ORAL at 10:44

## 2017-04-02 RX ADMIN — RANITIDINE SCH MG: 150 TABLET ORAL at 11:16

## 2017-04-02 RX ADMIN — METHYLPREDNISOLONE SODIUM SUCCINATE SCH MG: 40 INJECTION, POWDER, FOR SOLUTION INTRAMUSCULAR; INTRAVENOUS at 09:45

## 2017-04-02 RX ADMIN — DOCUSATE SODIUM SCH MG: 100 CAPSULE, LIQUID FILLED ORAL at 21:40

## 2017-04-02 NOTE — PN
Progress Note (short form)





- Note


Progress Note: 


CC: sob





s:   less cough/sob,  but now with weight gain, abdominal distension, worsened 

LE edema.  no cp, palps, dizziness. 





o:


 


  


 Current Medications





Acetaminophen (Tylenol -)  650 mg PO Q4H PRN


   PRN Reason: FEVER OR PAIN


Albuterol Sulfate (Ventolin 0.083% Nebulizer Soln -)  1 amp NEB Q4H PRN


   PRN Reason: SHORT OF BREATH/WHEEZING


Allopurinol (Zyloprim -)  100 mg PO DAILY [facility]


   Last Admin: 04/02/17 10:44 Dose:  100 mg


Ascorbic Acid (Vitamin C -)  1,000 mg PO DAILY [facility]


   Last Admin: 04/02/17 10:44 Dose:  1,000 mg


Atorvastatin Calcium (Lipitor -)  40 mg PO HS [facility]


   Last Admin: 04/01/17 21:29 Dose:  40 mg


Budesonide/Formoterol Fumarate (Symbicort 80/4.5mcg -)  2 puff IH BID [facility]


   Last Admin: 04/02/17 10:46 Dose:  2 puff


Bupropion HCl (Wellbutrin -)  75 mg PO DAILY [facility]


   Last Admin: 04/02/17 10:44 Dose:  75 mg


Carvedilol (Coreg -)  6.25 mg PO BID [facility]


   Last Admin: 04/02/17 10:44 Dose:  6.25 mg


Cholecalciferol (Vitamin D3 -)  1,000 unit PO DAILY [facility]


   Last Admin: 04/02/17 10:46 Dose:  1,000 unit


Clopidogrel Bisulfate (Plavix -)  75 mg PO DAILY [facility]


   Last Admin: 04/02/17 10:44 Dose:  75 mg


Docusate Sodium (Colace -)  100 mg PO BID [facility]


   Last Admin: 04/02/17 10:45 Dose:  100 mg


Ferrous Sulfate (Feosol -)  325 mg PO Q2D [facility]


   Last Admin: 04/02/17 10:44 Dose:  325 mg


Guaifenesin (Mucinex -)  600 mg PO BID [facility]


   Last Admin: 04/02/17 10:44 Dose:  600 mg


Guaifenesin (Robitussin Dm -)  10 ml PO Q6H PRN


   PRN Reason: COUGH


   Last Admin: 04/01/17 22:23 Dose:  10 ml


Milrinone Lactate/Dextrose (Milrinone 20mg/100ml Ivpb -)  100 mls @ 9.859 mls/

hr IVPB TITR [facility]; 0.35 MCG/KG/MIN


   PRN Reason: Protocol


   Last Admin: 04/02/17 00:20 Dose:  9.859 mls/hr


Insulin Aspart (Novolog Vial Sliding Scale -)  1 vial SQ ACHS [facility]


   PRN Reason: Protocol


   Last Admin: 04/02/17 12:09 Dose:  15 units


Methylprednisolone Sodium Succinate (Solu-Medrol -)  40 mg IVPB Q6H-IV [facility]


   Last Admin: 04/02/17 09:45 Dose:  40 mg


Multivitamins/Minerals/Vitamin C (Tab-A-Vit -)  1 tab PO DAILY [facility]


   Last Admin: 04/02/17 10:44 Dose:  1 tab


Ondansetron HCl (Zofran Injection)  4 mg IVPB Q6H PRN


   PRN Reason: NAUSEA


Ranitidine HCl (Zantac -)  75 mg PO DAILY [facility]


   Last Admin: 04/02/17 11:16 Dose:  75 mg


Tiotropium Bromide (Spiriva -)  1 puff IH DAILY [facility]


   Last Admin: 04/02/17 10:45 Dose:  1 puff


Torsemide (Demadex -)  40 mg PO BIDLASIX [facility]


   Last Admin: 04/02/17 06:26 Dose:  40 mg


Warfarin Sodium (Coumadin -)  4 mg PO DAILY@1800 [facility]


   Last Admin: 04/01/17 18:36 Dose:  Not Given





 Vital Signs - 24 hr











  04/01/17 04/01/17 04/01/17





  14:36 17:00 21:00


 


Temperature 97.7 F 96.5 F L 


 


Pulse Rate 76 77 


 


Respiratory 18 18 20





Rate   


 


Blood Pressure 116/67 109/67 


 


O2 Sat by Pulse   96





Oximetry (%)   














  04/01/17 04/02/17 04/02/17





  22:00 02:12 06:00


 


Temperature 97.0 F L 98.6 F 97.2 F L


 


Pulse Rate 76 74 75


 


Respiratory 20 20 20





Rate   


 


Blood Pressure 115/70 113/69 113/70


 


O2 Sat by Pulse   





Oximetry (%)   








 Intake & Output











 03/31/17 04/01/17 04/02/17 04/03/17





 07:59 07:59 07:59 07:59


 


Intake Total 60 120 690 


 


Output Total 250 1050 800 


 


Balance -190 -930 -110 


 


Weight 205 lb 12.8 oz 208 lb 6.4 oz 212 lb 9.6 oz 











NAD, calm 


JVD up, neck supple


RRR nl s1, s2 no m/r/g.


cta bl, nl effort


1+ LE edema, no cyanosis or clubbing. 


aaox3


no jaundice diaphoresis





 


 


  


 CBC, BMP





 04/01/17 06:07 





 04/02/17 06:02 














ecg 3/30/17: 





echo 10/2016: sev lve, global hk, sev dec lvef, mild rve, nl rv fcn, patty, mild-

mod mr, mv ring, tv ring, mild tr, mild pr, mild phtn, ivc mild dil





cxr: mild congestion (chronic finding)





tele: 








a/p:  76 year old gentleman with PMH of IDDM, COPD (O2 dependant) HTN, HLD, 

Afib (on coumadin), CAD s/p CABG 2013, end stage CHF with ICD placement (on 

milrinone pump), PVD, BPH here with productive cough.





cough:


-no signs vol overload, pulm edema


-cxr with chronic findings, bnp at baseline, wt at baseline


-likely due to URI/copd, sxs improving with resp treatment, plans per pmd/pulm





chronic end stage systolic heart failure s/p ICD, on home milrinone infusion:


- dry wt approx 211 lbs, currently near this value (below)


- cxr with chronic findings, bnp at baseline


- cont milrinone at patient's home maintenance dose (on chronic infusion 

destination therapy)--0.35 mcg/kg/min.  


- cont home coreg


- recent office icd check with stable function


- 4/2: + weight gain with clinical signs/sx's of volume overload and worsened 

hyponatremia.  Lasix 80 mg IV x 1 now, monitor uop response.  Previously 

diuresed with 80 mg IV bid and intermittent metolazone.  Monitor renal 

function.  Daily weights, bmp, potassium repletion.  





afib


- currently rate controlled on coreg


- cont coumadin per INR





PVD


- con't AC, plavix statin.





CAD s/p CABG


- No anginal symptoms, no signs acs.  Con't home ac, plavix, atorvastatin, coreg





HTN: controlled on coreg





HLD: con't statin. 





CKD


- as above

## 2017-04-02 NOTE — PN
Progress Note (short form)





- Note


Progress Note: 


Reports increasing LE edema and weight gain. 


SOB a bit better. 


Some dry cough.  


No CP. 





 Intake & Output











 03/30/17 03/31/17 04/01/17 04/02/17





 23:59 23:59 23:59 23:59


 


Intake Total  60 680 130


 


Output Total  850 1250 


 


Balance  -790 -570 130


 


Weight 207 lb 205 lb 12.8 oz 208 lb 6.4 oz 212 lb 9.6 oz








 Last Vital Signs











Temp Pulse Resp BP Pulse Ox


 


 98.2 F   75   20   99/56   96 


 


 04/02/17 10:00  04/02/17 10:00  04/02/17 10:00  04/02/17 10:00  04/02/17 09:00








Active Medications





Acetaminophen (Tylenol -)  650 mg PO Q4H PRN


   PRN Reason: FEVER OR PAIN


Albuterol Sulfate (Ventolin 0.083% Nebulizer Soln -)  1 amp NEB Q4H PRN


   PRN Reason: SHORT OF BREATH/WHEEZING


Allopurinol (Zyloprim -)  100 mg PO DAILY [facility]


   Last Admin: 04/02/17 10:44 Dose:  100 mg


Ascorbic Acid (Vitamin C -)  1,000 mg PO DAILY [facility]


   Last Admin: 04/02/17 10:44 Dose:  1,000 mg


Atorvastatin Calcium (Lipitor -)  40 mg PO HS [facility]


   Last Admin: 04/01/17 21:29 Dose:  40 mg


Budesonide/Formoterol Fumarate (Symbicort 80/4.5mcg -)  2 puff IH BID [facility]


   Last Admin: 04/02/17 10:46 Dose:  2 puff


Bupropion HCl (Wellbutrin -)  75 mg PO DAILY [facility]


   Last Admin: 04/02/17 10:44 Dose:  75 mg


Carvedilol (Coreg -)  6.25 mg PO BID [facility]


   Last Admin: 04/02/17 10:44 Dose:  6.25 mg


Cholecalciferol (Vitamin D3 -)  1,000 unit PO DAILY [facility]


   Last Admin: 04/02/17 10:46 Dose:  1,000 unit


Clopidogrel Bisulfate (Plavix -)  75 mg PO DAILY [facility]


   Last Admin: 04/02/17 10:44 Dose:  75 mg


Docusate Sodium (Colace -)  100 mg PO BID [facility]


   Last Admin: 04/02/17 10:45 Dose:  100 mg


Ferrous Sulfate (Feosol -)  325 mg PO Q2D [facility]


   Last Admin: 04/02/17 10:44 Dose:  325 mg


Guaifenesin (Mucinex -)  600 mg PO BID [facility]


   Last Admin: 04/02/17 10:44 Dose:  600 mg


Guaifenesin (Robitussin Dm -)  10 ml PO Q6H PRN


   PRN Reason: COUGH


   Last Admin: 04/01/17 22:23 Dose:  10 ml


Milrinone Lactate/Dextrose (Milrinone 20mg/100ml Ivpb -)  100 mls @ 9.859 mls/

hr IVPB TITR [facility]; 0.35 MCG/KG/MIN


   PRN Reason: Protocol


   Last Admin: 04/02/17 00:20 Dose:  9.859 mls/hr


Insulin Aspart (Novolog Vial Sliding Scale -)  1 vial SQ ACHS [facility]


   PRN Reason: Protocol


   Last Admin: 04/02/17 12:09 Dose:  15 units


Methylprednisolone Sodium Succinate (Solu-Medrol -)  40 mg IVPB Q6H-IV [facility]


   Last Admin: 04/02/17 09:45 Dose:  40 mg


Multivitamins/Minerals/Vitamin C (Tab-A-Vit -)  1 tab PO DAILY [facility]


   Last Admin: 04/02/17 10:44 Dose:  1 tab


Ondansetron HCl (Zofran Injection)  4 mg IVPB Q6H PRN


   PRN Reason: NAUSEA


Ranitidine HCl (Zantac -)  75 mg PO DAILY [facility]


   Last Admin: 04/02/17 11:16 Dose:  75 mg


Tiotropium Bromide (Spiriva -)  1 puff IH DAILY [facility]


   Last Admin: 04/02/17 10:45 Dose:  1 puff


Warfarin Sodium (Coumadin -)  4 mg PO DAILY@1800 [facility]


   Last Admin: 04/01/17 18:36 Dose:  Not Given














GENERAL: Awake, NAD 


HEAD: Normal with no signs of trauma.


EYES: sclera anicteric, conjunctiva clear.  


LUNGS: Bibasilar rales R > L  


HEART: Regular rate and rhythm, S1, S2 without murmur, rub or gallop.


ABDOMEN: Soft, nontender, nondistended, normoactive bowel sounds, no guarding, 

no rebound


EXTREMITIES: 2+ pulses, warm, well-perfused, no edema. 


NEUROLOGICAL: Non-focal 


 


 Laboratory Results - last 24 hr











  04/01/17 04/02/17 04/02/17





  16:39 05:54 06:02


 


INR    3.99 H


 


Sodium   


 


Potassium   


 


Chloride   


 


Carbon Dioxide   


 


Anion Gap   


 


BUN   


 


Creatinine   


 


POC Glucometer  342  372 


 


Random Glucose   


 


Calcium   


 


Magnesium   














  04/02/17





  06:02


 


INR 


 


Sodium  131 L


 


Potassium  4.4


 


Chloride  89 L


 


Carbon Dioxide  31


 


Anion Gap  11


 


BUN  86 H


 


Creatinine  2.3 H


 


POC Glucometer 


 


Random Glucose  356 H*


 


Calcium  8.5


 


Magnesium  2.7 H














 Problem List 





- Problems


(1) CHF (congestive heart failure)


Code(s): I50.9 - HEART FAILURE, UNSPECIFIED   Qualifiers: 


     Congestive heart failure type: combined     Congestive heart failure 

chronicity: unspecified congestive heart failure chronicity     Qualified Code(s

): I50.40 - Unspecified combined systolic (congestive) and diastolic (congestive

) heart failure  





(2) AICD (automatic cardioverter/defibrillator) present


Code(s): Z95.810 - PRESENCE OF AUTOMATIC (IMPLANTABLE) CARDIAC DEFIBRILLATOR





(3) Acute and chronic respiratory failure (acute-on-chronic)


Code(s): J96.20 - ACUTE AND CHR RESP FAILURE, UNSP W HYPOXIA OR HYPERCAPNIA   





(4) Acute on chronic renal insufficiency


Code(s): N28.9 - DISORDER OF KIDNEY AND URETER, UNSPECIFIED


N18.9 - CHRONIC KIDNEY DISEASE, UNSPECIFIED





(5) Atrial fibrillation


Code(s): I48.91 - UNSPECIFIED ATRIAL FIBRILLATION   Qualifiers: 


     Atrial fibrillation type: chronic     Qualified Code(s): I48.2 - Chronic 

atrial fibrillation  





(6) CAD (coronary artery disease)


Code(s): I25.10 - ATHSCL HEART DISEASE OF NATIVE CORONARY ARTERY W/O ANG PCTRS 

  





(7) CKD (chronic kidney disease)


Code(s): N18.9 - CHRONIC KIDNEY DISEASE, UNSPECIFIED   Qualifiers: 


     Chronic kidney disease stage: unspecified stage     Qualified Code(s): 

N18.9 - Chronic kidney disease, unspecified  





(8) COPD (chronic obstructive pulmonary disease)


Code(s): J44.9 - CHRONIC OBSTRUCTIVE PULMONARY DISEASE, UNSPECIFIED   Qualifiers

: 


     COPD type: unspecified COPD     Qualified Code(s): J44.9 - Chronic 

obstructive pulmonary disease, unspecified  





(9) Cardiomyopathy


Code(s): I42.9 - CARDIOMYOPATHY, UNSPECIFIED   





(10) Diabetes mellitus, insulin dependent (IDDM), uncontrolled


Code(s): E10.65 - TYPE 1 DIABETES MELLITUS WITH HYPERGLYCEMIA   





(11) Dyspnea on exertion


Code(s): R06.09 - OTHER FORMS OF DYSPNEA








IMP CHRONIC HYPOXEMIC ,HYPERCAPNEIC RESPIRATORY FAILURE


      COPD END STAGE O2 DEPENDENT


      BRONCHITIS


      CHF S/P ICD ON MILRINONE PUMP


      ASHD S/P MI,CABG


      CKD


      AFIB


      PVD


      BPH








PLAN   DIURESIS AS TOLERATED 


   BD TX 


         SHORT COURSE OF STEROIDS


         NASAL O2


         I+OS


         MONITOR LYTES,RENAL FUNCTION


         





DR AUGUSTIN

## 2017-04-02 NOTE — PN
Progress Note, Physician


Chief Complaint: 


Insomnia; Not as SOB


History of Present Illness: 


Patient with CHF on IV Milrinone, COPD, Renal Failure and Atrial Fibrillation 

feels less SOB probably due to IV diuretics, IV steroid and inhalers. Not 

sleeping after 3:30Am each gaetano.


Tolerating diet.   Cardiology D/Rene IV Diuretics and F/U chemistry lab ordered.





- Current Medication List


Current Medications: 


Active Medications





Acetaminophen (Tylenol -)  650 mg PO Q4H PRN


   PRN Reason: FEVER OR PAIN


Albuterol Sulfate (Ventolin 0.083% Nebulizer Soln -)  1 amp NEB Q4H PRN


   PRN Reason: SHORT OF BREATH/WHEEZING


Allopurinol (Zyloprim -)  100 mg PO DAILY [facility]


   Last Admin: 04/02/17 10:44 Dose:  100 mg


Ascorbic Acid (Vitamin C -)  1,000 mg PO DAILY [facility]


   Last Admin: 04/02/17 10:44 Dose:  1,000 mg


Atorvastatin Calcium (Lipitor -)  40 mg PO HS [facility]


   Last Admin: 04/01/17 21:29 Dose:  40 mg


Budesonide/Formoterol Fumarate (Symbicort 80/4.5mcg -)  2 puff IH BID [facility]


   Last Admin: 04/02/17 10:46 Dose:  2 puff


Bupropion HCl (Wellbutrin -)  75 mg PO DAILY [facility]


   Last Admin: 04/02/17 10:44 Dose:  75 mg


Carvedilol (Coreg -)  6.25 mg PO BID [facility]


   Last Admin: 04/02/17 10:44 Dose:  6.25 mg


Cholecalciferol (Vitamin D3 -)  1,000 unit PO DAILY [facility]


   Last Admin: 04/02/17 10:46 Dose:  1,000 unit


Clopidogrel Bisulfate (Plavix -)  75 mg PO DAILY [facility]


   Last Admin: 04/02/17 10:44 Dose:  75 mg


Docusate Sodium (Colace -)  100 mg PO BID [facility]


   Last Admin: 04/02/17 10:45 Dose:  100 mg


Ferrous Sulfate (Feosol -)  325 mg PO Q2D [facility]


   Last Admin: 04/02/17 10:44 Dose:  325 mg


Guaifenesin (Mucinex -)  600 mg PO BID [facility]


   Last Admin: 04/02/17 10:44 Dose:  600 mg


Guaifenesin (Robitussin Dm -)  10 ml PO Q6H PRN


   PRN Reason: COUGH


   Last Admin: 04/01/17 22:23 Dose:  10 ml


Milrinone Lactate/Dextrose (Milrinone 20mg/100ml Ivpb -)  100 mls @ 9.859 mls/

hr IVPB TITR JARROD; 0.35 MCG/KG/MIN


   PRN Reason: Protocol


   Last Admin: 04/02/17 00:20 Dose:  9.859 mls/hr


Insulin Aspart (Novolog Vial Sliding Scale -)  1 vial SQ ACHS J[facility]


   PRN Reason: Protocol


   Last Admin: 04/02/17 12:09 Dose:  15 units


Methylprednisolone Sodium Succinate (Solu-Medrol -)  40 mg IVPB Q6H-IV [facility]


   Last Admin: 04/02/17 09:45 Dose:  40 mg


Multivitamins/Minerals/Vitamin C (Tab-A-Vit -)  1 tab PO DAILY [facility]


   Last Admin: 04/02/17 10:44 Dose:  1 tab


Ondansetron HCl (Zofran Injection)  4 mg IVPB Q6H PRN


   PRN Reason: NAUSEA


Ranitidine HCl (Zantac -)  75 mg PO DAILY [facility]


   Last Admin: 04/02/17 11:16 Dose:  75 mg


Tiotropium Bromide (Spiriva -)  1 puff IH DAILY [facility]


   Last Admin: 04/02/17 10:45 Dose:  1 puff


Warfarin Sodium (Coumadin -)  4 mg PO DAILY@1800 [facility]


   Last Admin: 04/01/17 18:36 Dose:  Not Given











- Objective


Vital Signs: 


 Vital Signs











Temperature  98.2 F   04/02/17 10:00


 


Pulse Rate  75   04/02/17 10:00


 


Respiratory Rate  20   04/02/17 10:00


 


Blood Pressure  99/56   04/02/17 10:00


 


O2 Sat by Pulse Oximetry (%)  96   04/02/17 09:00











Constitutional: Yes: Calm


Cardiovascular: Yes: Pulse Irregular


Respiratory: Yes: Diminished, Rhonchi (few at bases)


Gastrointestinal: Yes: Soft


Edema: No


Neurological: Yes: Alert, Oriented


Labs: 


 CBC, BMP





 04/01/17 06:07 





 04/02/17 06:02 





 INR, PTT











INR  3.99  (0.82-1.09)  H  04/02/17  06:02    














Problem List





- Problems


(1) CHF (congestive heart failure)


Assessment/Plan: 


OFF IV Diuretics but on IV Milrinone.


Code(s): I50.9 - HEART FAILURE, UNSPECIFIED   Qualifiers: 


     Congestive heart failure type: combined     Congestive heart failure 

chronicity: unspecified congestive heart failure chronicity     Qualified Code(s

): I50.40 - Unspecified combined systolic (congestive) and diastolic (congestive

) heart failure  





(2) AICD (automatic cardioverter/defibrillator) present


Assessment/Plan: 


In Place due to Cardiomyopathy


Code(s): Z95.810 - PRESENCE OF AUTOMATIC (IMPLANTABLE) CARDIAC DEFIBRILLATOR





(3) Acute and chronic respiratory failure (acute-on-chronic)


Assessment/Plan: 


On IV steroids and inhaler Rx.


Followed by Pulmonary MD


Code(s): J96.20 - ACUTE AND CHR RESP FAILURE, UNSP W HYPOXIA OR HYPERCAPNIA   





(4) Acute on chronic renal insufficiency


Assessment/Plan: 


Still markedly elevated; will follow now that Lasix D/Rene.


Code(s): N28.9 - DISORDER OF KIDNEY AND URETER, UNSPECIFIED


N18.9 - CHRONIC KIDNEY DISEASE, UNSPECIFIED





(5) Atrial fibrillation


Assessment/Plan: 


INR High again today; Coumadin being held.


Code(s): I48.91 - UNSPECIFIED ATRIAL FIBRILLATION   Qualifiers: 


     Atrial fibrillation type: chronic     Qualified Code(s): I48.2 - Chronic 

atrial fibrillation

## 2017-04-03 LAB
ANION GAP SERPL CALC-SCNC: 12 MMOL/L (ref 8–16)
CALCIUM SERPL-MCNC: 8.3 MG/DL (ref 8.5–10.1)
CO2 SERPL-SCNC: 30 MMOL/L (ref 21–32)
CREAT SERPL-MCNC: 2.2 MG/DL (ref 0.7–1.3)
GLUCOSE SERPL-MCNC: 360 MG/DL (ref 74–106)
INR BLD: 3.73 (ref 0.82–1.09)
PT PNL PPP: 42.1 SEC (ref 9.98–11.88)

## 2017-04-03 RX ADMIN — MILRINONE LACTATE SCH MLS/HR: 200 INJECTION, SOLUTION INTRAVENOUS at 22:20

## 2017-04-03 RX ADMIN — RANITIDINE SCH MG: 150 TABLET ORAL at 09:48

## 2017-04-03 RX ADMIN — INSULIN ASPART SCH UNITS: 100 INJECTION, SOLUTION INTRAVENOUS; SUBCUTANEOUS at 22:00

## 2017-04-03 RX ADMIN — METHYLPREDNISOLONE SODIUM SUCCINATE SCH MG: 40 INJECTION, POWDER, FOR SOLUTION INTRAMUSCULAR; INTRAVENOUS at 08:32

## 2017-04-03 RX ADMIN — INSULIN ASPART SCH UNITS: 100 INJECTION, SOLUTION INTRAVENOUS; SUBCUTANEOUS at 11:56

## 2017-04-03 RX ADMIN — BUDESONIDE AND FORMOTEROL FUMARATE DIHYDRATE SCH PUFF: 80; 4.5 AEROSOL RESPIRATORY (INHALATION) at 22:18

## 2017-04-03 RX ADMIN — VITAMIN D, TAB 1000IU (100/BT) SCH UNIT: 25 TAB at 09:47

## 2017-04-03 RX ADMIN — MILRINONE LACTATE SCH: 200 INJECTION, SOLUTION INTRAVENOUS at 06:20

## 2017-04-03 RX ADMIN — INSULIN ASPART SCH UNITS: 100 INJECTION, SOLUTION INTRAVENOUS; SUBCUTANEOUS at 06:27

## 2017-04-03 RX ADMIN — CARVEDILOL SCH MG: 6.25 TABLET, FILM COATED ORAL at 09:47

## 2017-04-03 RX ADMIN — OXYCODONE HYDROCHLORIDE AND ACETAMINOPHEN SCH MG: 500 TABLET ORAL at 09:46

## 2017-04-03 RX ADMIN — DOCUSATE SODIUM SCH MG: 100 CAPSULE, LIQUID FILLED ORAL at 09:46

## 2017-04-03 RX ADMIN — DOCUSATE SODIUM SCH MG: 100 CAPSULE, LIQUID FILLED ORAL at 22:10

## 2017-04-03 RX ADMIN — ATORVASTATIN CALCIUM SCH MG: 40 TABLET, FILM COATED ORAL at 22:10

## 2017-04-03 RX ADMIN — ALLOPURINOL SCH MG: 100 TABLET ORAL at 09:47

## 2017-04-03 RX ADMIN — GUAIFENESIN SCH MG: 600 TABLET, EXTENDED RELEASE ORAL at 22:10

## 2017-04-03 RX ADMIN — CARVEDILOL SCH MG: 6.25 TABLET, FILM COATED ORAL at 22:10

## 2017-04-03 RX ADMIN — BUDESONIDE AND FORMOTEROL FUMARATE DIHYDRATE SCH PUFF: 80; 4.5 AEROSOL RESPIRATORY (INHALATION) at 09:48

## 2017-04-03 RX ADMIN — MULTIVITAMIN TABLET SCH TAB: TABLET at 09:46

## 2017-04-03 RX ADMIN — CLOPIDOGREL BISULFATE SCH MG: 75 TABLET, FILM COATED ORAL at 09:47

## 2017-04-03 RX ADMIN — TIOTROPIUM BROMIDE SCH PUFF: 18 CAPSULE ORAL; RESPIRATORY (INHALATION) at 09:48

## 2017-04-03 RX ADMIN — WARFARIN SCH: 2 TABLET ORAL at 17:16

## 2017-04-03 RX ADMIN — METHYLPREDNISOLONE SODIUM SUCCINATE SCH MG: 40 INJECTION, POWDER, FOR SOLUTION INTRAMUSCULAR; INTRAVENOUS at 02:40

## 2017-04-03 RX ADMIN — INSULIN ASPART SCH UNITS: 100 INJECTION, SOLUTION INTRAVENOUS; SUBCUTANEOUS at 17:15

## 2017-04-03 RX ADMIN — METHYLPREDNISOLONE SODIUM SUCCINATE SCH MG: 40 INJECTION, POWDER, FOR SOLUTION INTRAMUSCULAR; INTRAVENOUS at 22:17

## 2017-04-03 RX ADMIN — FUROSEMIDE SCH MG: 10 INJECTION, SOLUTION INTRAVENOUS at 13:50

## 2017-04-03 RX ADMIN — GUAIFENESIN SCH MG: 600 TABLET, EXTENDED RELEASE ORAL at 09:47

## 2017-04-03 NOTE — PN
Physical Exam: 


SUBJECTIVE: Patient seen and examined sitting on edge of bed eating salad. Wife 

present. Feels well. No complaints. No SOB. Points out mild swelling BLE.








OBJECTIVE:





 Vital Signs











 Period  Temp  Pulse  Resp  BP Sys/Alonso  Pulse Ox


 


 Last 24 Hr  96.5 F-97.6 F  75-77  18-20  /61-73  93-95











GENERAL: The patient is awake, alert, and fully oriented, in no acute distress.


HEAD: Normal with no signs of trauma.


EYES: PERRL, extraocular movements intact, sclera anicteric, conjunctiva clear. 

No ptosis. 


LUNGS: Right basilar crackles.


HEART: Regular rate and rhythm, S1, S2 without murmur, rub or gallop.


ABDOMEN: Soft, nontender, nondistended, normoactive bowel sounds, no guarding, 

no rebound


EXTREMITIES: 2+ pulses, warm, well-perfused, 1+ edema bilaterally 


NEUROLOGICAL: Cranial nerves II through XII grossly intact. Normal speech, gait 

not observed.








 


 CBCD











WBC  4.4 K/mm3 (4.0-10.0)  D 04/01/17  06:07    


 


RBC  4.73 M/mm3 (4.00-5.60)   04/01/17  06:07    


 


Hgb  12.6 GM/dL (11.7-16.9)   04/01/17  06:07    


 


Hct  38.6 % (35.4-49)   04/01/17  06:07    


 


MCV  81.6 fl (80-96)   04/01/17  06:07    


 


MCHC  32.7 g/dl (32.0-35.9)   04/01/17  06:07    


 


RDW  17.7 % (11.9-15.9)  H  04/01/17  06:07    


 


Plt Count  188 K/MM3 (134-434)   04/01/17  06:07    


 


MPV  9.0 fl (7.5-11.1)   04/01/17  06:07    








 CMP











Sodium  133 mmol/L (136-145)  L  04/03/17  06:00    


 


Potassium  4.1 mmol/L (3.5-5.1)   04/03/17  06:00    


 


Chloride  91 mmol/L ()  L  04/03/17  06:00    


 


Carbon Dioxide  30 mmol/L (21-32)   04/03/17  06:00    


 


Anion Gap  12  (8-16)   04/03/17  06:00    


 


BUN  91 mg/dL (7-18)  H  04/03/17  06:00    


 


Creatinine  2.2 mg/dL (0.7-1.3)  H  04/03/17  06:00    


 


Creat Clearance w eGFR  26.31  (>60)   04/01/17  06:07    


 


Calcium  8.3 mg/dL (8.5-10.1)  L  04/03/17  06:00    


 


Total Bilirubin  0.7 mg/dL (0.2-1.0)   04/01/17  06:07    


 


AST  20 U/L (15-37)   04/01/17  06:07    


 


ALT  22 U/L (12-78)   04/01/17  06:07    


 


Alkaline Phosphatase  145 U/L ()  H  04/01/17  06:07    


 


Total Protein  8.1 g/dl (6.4-8.2)   04/01/17  06:07    


 


Albumin  3.6 g/dl (3.4-5.0)   04/01/17  06:07    











                  Active Medications











Generic Name Dose Route Start Last Admin





  Trade Name Freq  PRN Reason Stop Dose Admin


 


Acetaminophen  650 mg 03/30/17 17:44  





  Tylenol -  PO   





  Q4H PRN   





  FEVER OR PAIN   


 


Albuterol Sulfate  1 amp 03/31/17 15:06 04/02/17 23:30





  Ventolin 0.083% Nebulizer Soln -  NEB   1 amp





  Q4H PRN   Administration





  SHORT OF BREATH/WHEEZING   


 


Allopurinol  100 mg 03/31/17 10:00 04/03/17 09:47





  Zyloprim -  PO   100 mg





  DAILY JARROD   Administration


 


Ascorbic Acid  1,000 mg 03/31/17 10:00 04/03/17 09:46





  Vitamin C -  PO   1,000 mg





  DAILY JARROD   Administration


 


Atorvastatin Calcium  40 mg 03/30/17 22:00 04/02/17 21:40





  Lipitor -  PO   40 mg





  HS JARROD   Administration


 


Budesonide/Formoterol Fumarate  2 puff 03/31/17 22:00 04/03/17 09:48





  Symbicort 80/4.5mcg -  IH   2 puff





  BID JARROD   Administration


 


Bupropion HCl  75 mg 03/31/17 10:00 04/03/17 09:47





  Wellbutrin -  PO   75 mg





  DAILY JARROD   Administration


 


Carvedilol  6.25 mg 03/30/17 22:00 04/03/17 09:47





  Coreg -  PO   6.25 mg





  BID [facility]   Administration


 


Cholecalciferol  1,000 unit 04/02/17 22:06 04/03/17 09:47





  Vitamin D3 -  PO   1,000 unit





  DAILY JARROD   Administration


 


Clopidogrel Bisulfate  75 mg 03/31/17 10:00 04/03/17 09:47





  Plavix -  PO   75 mg





  DAILY [facility]   Administration


 


Docusate Sodium  100 mg 03/30/17 22:00 04/03/17 09:46





  Colace -  PO   100 mg





  BID [facility]   Administration


 


Ferrous Sulfate  325 mg 03/31/17 10:00 04/02/17 10:44





  Feosol -  PO   325 mg





  Q2D JARROD   Administration


 


Furosemide  80 mg 04/03/17 14:00 04/03/17 13:50





  Lasix Injection -  IVPUSH   80 mg





  BID@0600,1400 [facility]   Administration


 


Guaifenesin  600 mg 03/30/17 22:00 04/03/17 09:47





  Mucinex -  PO   600 mg





  BID [facility]   Administration


 


Guaifenesin  10 ml 03/31/17 13:10 04/02/17 21:41





  Robitussin Dm -  PO   10 ml





  Q6H PRN   Administration





  COUGH   


 


Milrinone Lactate/Dextrose  100 mls @ 9.859 mls/hr 03/31/17 00:24 04/03/17 06:20





  Milrinone 20mg/100ml Ivpb -  IVPB   Not Given





  TITR [facility]   





  Protocol   





  0.35 MCG/KG/MIN   


 


Insulin Aspart  1 vial 03/30/17 22:00 04/03/17 11:56





  Novolog Vial Sliding Scale -  SQ   10 units





  [facility]   Administration





  Protocol   


 


Methylprednisolone Sodium Succinate  20 mg 04/03/17 22:00  





  Solu-Medrol -  IVPB   





  BID [facility]   


 


Multivitamins/Minerals/Vitamin C  1 tab 03/31/17 10:00 04/03/17 09:46





  Tab-A-Vit -  PO   1 tab





  DAILY [facility]   Administration


 


Ondansetron HCl  4 mg 03/30/17 17:44  





  Zofran Injection  IVPB   





  Q6H PRN   





  NAUSEA   


 


Ranitidine HCl  75 mg 03/31/17 10:00 04/03/17 09:48





  Zantac -  PO   75 mg





  DAILY JARROD   Administration


 


Tiotropium Bromide  1 puff 03/31/17 15:15 04/03/17 09:48





  Spiriva -  IH   1 puff





  DAILY [facility]   Administration


 


Warfarin Sodium  4 mg 04/01/17 18:00 04/02/17 17:36





  Coumadin -  PO   Not Given





  DAILY@1800 [facility]   











ASSESSMENT/PLAN:


76-year-old man with a history of COPD, chronic hypoxic respiratory failure, end

-stage chronic systolic heart failure on home Milrinone, atrial fibrillation, 

CAD, MI, HTN, hyperlipidemia, PAD, type 2 DM, stage 4 CKD, and BPH, admitted 

for cough.





1. Hypokalemia, chronic


   - Replete potassium PO 40mg x 3 doses





2. Bronchitis


   --no fever, no leukocytosis


   - tapering IV steroids


   - convert to PO tomorrow for discharge planning


   


3. COPD 


   - Duonebs as needed





4. Chronic systolic heart failure, end-stage


   --signs of volume overload: mildly hyponatremic, b/l LE edema, weight up 3kg 

since admission 


   --continue Lasix IV 80mg BID


   --metazolone x 1 dose today


   --continue carvedilol, milrinone drip


      


5. Chronic hypoxic respiratory failure


   - Continue oxygen to maintain saturation > 90%





6. Permanent atrial fibrillation


   --rate controlled, continue carvedilol


   - INR supratherapeutic 3.73, hold coumadin





7. CAD, history of MI


   - Continue Coreg, Plavix, Lipitor





8. HTN


   - Continue Coreg





9. Hyperlipidemia


   - Continue Lipitor





10. PAD





11. Type 2 diabetes mellitus


   - Fingersticks with Novolog sliding scale





12. Stage 4 CKD


   - Cr 2.2, was 2.4 on admission, baseline 2.0





F/E/N


   Fluids: PO intake adequate


   Electrolytes: replete as indicated


   Nutrition: diabetic, low sodium





DVT prophylaxis: on coumadin, oob, ambulation





Dispo: ordered PT evaluation and pre/post testing for discharge planning. Full 

Code.





Visit type





- Emergency Visit


Emergency Visit: Yes


ED Registration Date: 03/31/17


Care time: The patient presented to the Emergency Department on the above date 

and was hospitalized for further evaluation of their emergent condition.





- New Patient


This patient is new to me today: No





- Critical Care


Critical Care patient: No

## 2017-04-03 NOTE — PN
Progress Note, Physician


Chief Complaint: 


chf


History of Present Illness: 


no sob;


cough resolved;


no cp


legs not swollen





ex cigs





- Current Medication List


Current Medications: 


Active Medications





Acetaminophen (Tylenol -)  650 mg PO Q4H PRN


   PRN Reason: FEVER OR PAIN


Albuterol Sulfate (Ventolin 0.083% Nebulizer Soln -)  1 amp NEB Q4H PRN


   PRN Reason: SHORT OF BREATH/WHEEZING


   Last Admin: 04/02/17 23:30 Dose:  1 amp


Allopurinol (Zyloprim -)  100 mg PO DAILY [facility]


   Last Admin: 04/02/17 10:44 Dose:  100 mg


Ascorbic Acid (Vitamin C -)  1,000 mg PO DAILY [facility]


   Last Admin: 04/02/17 10:44 Dose:  1,000 mg


Atorvastatin Calcium (Lipitor -)  40 mg PO HS [facility]


   Last Admin: 04/02/17 21:40 Dose:  40 mg


Budesonide/Formoterol Fumarate (Symbicort 80/4.5mcg -)  2 puff IH BID [facility]


   Last Admin: 04/02/17 21:41 Dose:  2 puff


Bupropion HCl (Wellbutrin -)  75 mg PO DAILY [facility]


   Last Admin: 04/02/17 10:44 Dose:  75 mg


Carvedilol (Coreg -)  6.25 mg PO BID [facility]


   Last Admin: 04/02/17 21:40 Dose:  6.25 mg


Cholecalciferol (Vitamin D3 -)  1,000 unit PO DAILY [facility]


Clopidogrel Bisulfate (Plavix -)  75 mg PO DAILY [facility]


   Last Admin: 04/02/17 10:44 Dose:  75 mg


Docusate Sodium (Colace -)  100 mg PO BID [facility]


   Last Admin: 04/02/17 21:40 Dose:  100 mg


Ferrous Sulfate (Feosol -)  325 mg PO Q2D [facility]


   Last Admin: 04/02/17 10:44 Dose:  325 mg


Guaifenesin (Mucinex -)  600 mg PO BID [facility]


   Last Admin: 04/02/17 21:41 Dose:  600 mg


Guaifenesin (Robitussin Dm -)  10 ml PO Q6H PRN


   PRN Reason: COUGH


   Last Admin: 04/02/17 21:41 Dose:  10 ml


Milrinone Lactate/Dextrose (Milrinone 20mg/100ml Ivpb -)  100 mls @ 9.859 mls/

hr IVPB TITR JARROD; 0.35 MCG/KG/MIN


   PRN Reason: Protocol


   Last Admin: 04/03/17 06:20 Dose:  Not Given


Insulin Aspart (Novolog Vial Sliding Scale -)  1 vial SQ ACHS JARROD


   PRN Reason: Protocol


   Last Admin: 04/03/17 06:27 Dose:  10 units


Methylprednisolone Sodium Succinate (Solu-Medrol -)  40 mg IVPB Q6H-IV [facility]


   Last Admin: 04/03/17 08:32 Dose:  40 mg


Multivitamins/Minerals/Vitamin C (Tab-A-Vit -)  1 tab PO DAILY [facility]


   Last Admin: 04/02/17 10:44 Dose:  1 tab


Ondansetron HCl (Zofran Injection)  4 mg IVPB Q6H PRN


   PRN Reason: NAUSEA


Ranitidine HCl (Zantac -)  75 mg PO DAILY [facility]


   Last Admin: 04/02/17 11:16 Dose:  75 mg


Tiotropium Bromide (Spiriva -)  1 puff IH DAILY [facility]


   Last Admin: 04/02/17 10:45 Dose:  1 puff


Warfarin Sodium (Coumadin -)  4 mg PO DAILY@1800 [facility]


   Last Admin: 04/02/17 17:36 Dose:  Not Given











- Objective


Vital Signs: 


 Vital Signs











Temperature  97.2 F L  04/03/17 02:36


 


Pulse Rate  77   04/03/17 02:36


 


Respiratory Rate  20   04/03/17 02:36


 


Blood Pressure  96/65   04/03/17 02:36


 


O2 Sat by Pulse Oximetry (%)  95   04/02/17 21:00











Constitutional: Yes: No Distress, Calm


Eyes: No: Sclera Icterus


HENT: No: Nasal Congestion


Cardiovascular: Yes: Regular Rate and Rhythm, S1, S2, Other (PMI non diplaced).

  No: Gallop, Murmur


Respiratory: Yes: CTA Bilaterally.  No: Accessory Muscle Use, Rales, Rhonchi, 

Wheezes


Gastrointestinal: Yes: Normal Bowel Sounds, Soft.  No: Tenderness


Musculoskeletal: Yes: Other (No kyphosis)


Extremities: No: Cold


Edema: No


Integumentary: No: Jaundice


Neurological: Yes: Alert, Oriented (x3)


Psychiatric: No: Agitated


Labs: 


 CBC, BMP





 04/01/17 06:07 





 04/03/17 06:00 





 INR, PTT











INR  3.73  (0.82-1.09)  H  04/03/17  06:00    














- ....Imaging


EKG: Other (tele: v-paced (? AF underlying); 11b NSVT)





Assessment/Plan


echo 10/2016: sev lve, global hk, sev dec lvef, mild rve, nl rv fcn, patty, mild-

mod mr, mv ring, tv ring, mild tr, mild pr, mild phtn, ivc mild dil











a/p:  76 year old gentleman with PMH of IDDM, COPD (O2 dependant) HTN, HLD, 

Afib (on coumadin), CAD s/p CABG 2013, end stage CHF with ICD placement (on 

milrinone pump), PVD, BPH here with productive cough.





cough:


-no signs vol overload, pulm edema


-cxr with chronic findings, bnp at baseline, wt at baseline


-likely due to URI/copd, sxs improving with resp treatment, plans per pmd/pulm





chronic end stage systolic heart failure s/p ICD, on home milrinone infusion:


- pt home dry weight is very well-defined, with stable range for years on 

milrinone, monitored closely by wife--dry wt approx 211 lbs


- on torsemide 40 bid at home, responds well to prn metolazone for weight 

increase


- wt stable here on admit, 205


- cxr with chronic findings, bnp at baseline


- 4/3: was not in chf when came in, now becoming slightly volume overloaded 

likely due to IV steroids


- received lasix 80 iv x 1 on 4/2 for incr wt, hyponatremia, worwsening renal 

fxn


- today's labs with Na improved, creat slightly improved (bun likely steroid 

effect)--start lasix 80 iv bid while pt on steroids


- low threshold to add prn metolazone if wt not responding (works well at home)


- d/w'd dr degroot--rapid steroid taper to start today


-daily wts; should go back home on prior torsemide 40 bid


- cont milrinone at patient's home maintenance dose (on chronic infusion 

destination therapy)--0.35 mcg/kg/min.  


- cont home coreg


- recent office icd check with stable function


- 4/2: + weight gain with clinical signs/sx's of volume overload and worsened 

hyponatremia.  Lasix 80 mg IV x 1 now, monitor uop response.  Previously 

diuresed with 80 mg IV bid and intermittent metolazone.  Monitor renal 

function.  Daily weights, bmp, potassium repletion.  





VTach:


-NSVT (11b) on tele


-known prior finding in pt, has ICD and has not had issues with inappr shocks


-K/mag good--replete prn (per usual targets)


-cont carvedilol as doing (low bp tendencies/dizzy limit outpt dose i believe)





afib


- currently rate controlled on coreg


- cont coumadin per INR





PVD


- con't AC, plavix statin.





CAD s/p CABG


- No anginal symptoms, no signs acs.  Con't home ac, plavix, atorvastatin, coreg





HTN: controlled on coreg





HLD: con't statin. 





CKD


- stable

## 2017-04-03 NOTE — PN
Progress Note, Physician


History of Present Illness: 


pulmonary





doing well ,oob-chair,-sob,-cough.+ wt gain





- Current Medication List


Current Medications: 


Active Medications





Acetaminophen (Tylenol -)  650 mg PO Q4H PRN


   PRN Reason: FEVER OR PAIN


Albuterol Sulfate (Ventolin 0.083% Nebulizer Soln -)  1 amp NEB Q4H PRN


   PRN Reason: SHORT OF BREATH/WHEEZING


   Last Admin: 04/02/17 23:30 Dose:  1 amp


Allopurinol (Zyloprim -)  100 mg PO DAILY [facility]


   Last Admin: 04/03/17 09:47 Dose:  100 mg


Ascorbic Acid (Vitamin C -)  1,000 mg PO DAILY [facility]


   Last Admin: 04/03/17 09:46 Dose:  1,000 mg


Atorvastatin Calcium (Lipitor -)  40 mg PO HS [facility]


   Last Admin: 04/02/17 21:40 Dose:  40 mg


Budesonide/Formoterol Fumarate (Symbicort 80/4.5mcg -)  2 puff IH BID [facility]


   Last Admin: 04/03/17 09:48 Dose:  2 puff


Bupropion HCl (Wellbutrin -)  75 mg PO DAILY [facility]


   Last Admin: 04/03/17 09:47 Dose:  75 mg


Carvedilol (Coreg -)  6.25 mg PO BID [facility]


   Last Admin: 04/03/17 09:47 Dose:  6.25 mg


Cholecalciferol (Vitamin D3 -)  1,000 unit PO DAILY [facility]


   Last Admin: 04/03/17 09:47 Dose:  1,000 unit


Clopidogrel Bisulfate (Plavix -)  75 mg PO DAILY [facility]


   Last Admin: 04/03/17 09:47 Dose:  75 mg


Docusate Sodium (Colace -)  100 mg PO BID [facility]


   Last Admin: 04/03/17 09:46 Dose:  100 mg


Ferrous Sulfate (Feosol -)  325 mg PO Q2D [facility]


   Last Admin: 04/02/17 10:44 Dose:  325 mg


Furosemide (Lasix Injection -)  40 mg IVPUSH BID@0600,1400 [facility]


Guaifenesin (Mucinex -)  600 mg PO BID [facility]


   Last Admin: 04/03/17 09:47 Dose:  600 mg


Guaifenesin (Robitussin Dm -)  10 ml PO Q6H PRN


   PRN Reason: COUGH


   Last Admin: 04/02/17 21:41 Dose:  10 ml


Milrinone Lactate/Dextrose (Milrinone 20mg/100ml Ivpb -)  100 mls @ 9.859 mls/

hr IVPB TITR JARROD; 0.35 MCG/KG/MIN


   PRN Reason: Protocol


   Last Admin: 04/03/17 06:20 Dose:  Not Given


Insulin Aspart (Novolog Vial Sliding Scale -)  1 vial SQ ACHS JARROD


   PRN Reason: Protocol


   Last Admin: 04/03/17 06:27 Dose:  10 units


Methylprednisolone Sodium Succinate (Solu-Medrol -)  40 mg IVPB Q6H-IV JARROD


   Last Admin: 04/03/17 08:32 Dose:  40 mg


Multivitamins/Minerals/Vitamin C (Tab-A-Vit -)  1 tab PO DAILY [facility]


   Last Admin: 04/03/17 09:46 Dose:  1 tab


Ondansetron HCl (Zofran Injection)  4 mg IVPB Q6H PRN


   PRN Reason: NAUSEA


Ranitidine HCl (Zantac -)  75 mg PO DAILY [facility]


   Last Admin: 04/03/17 09:48 Dose:  75 mg


Tiotropium Bromide (Spiriva -)  1 puff IH DAILY [facility]


   Last Admin: 04/03/17 09:48 Dose:  1 puff


Warfarin Sodium (Coumadin -)  4 mg PO DAILY@1800 [facility]


   Last Admin: 04/02/17 17:36 Dose:  Not Given











- Objective


Vital Signs: 


 Vital Signs











Temperature  97.6 F   04/03/17 09:00


 


Pulse Rate  75   04/03/17 09:00


 


Respiratory Rate  20   04/03/17 09:00


 


Blood Pressure  106/61   04/03/17 09:00


 


O2 Sat by Pulse Oximetry (%)  93 L  04/03/17 09:00











Constitutional: Yes: Well Nourished, Calm


Eyes: Yes: WNL


HENT: Yes: WNL


Neck: Yes: WNL


Cardiovascular: Yes: Pulse Irregular, S1, S2


Respiratory: Yes: Rales (few basilar crackles,-wheezes,-rhonchi)


Gastrointestinal: Yes: Normal Bowel Sounds, Soft


Extremities: Yes: WNL


Edema: Yes


Labs: 


 CBC, BMP





 04/01/17 06:07 





 04/03/17 06:00 





 INR, PTT











INR  3.73  (0.82-1.09)  H  04/03/17  06:00    














Problem List





- Problems


(1) CHF (congestive heart failure)


Code(s): I50.9 - HEART FAILURE, UNSPECIFIED   Qualifiers: 


     Congestive heart failure type: combined     Congestive heart failure 

chronicity: unspecified congestive heart failure chronicity     Qualified Code(s

): I50.40 - Unspecified combined systolic (congestive) and diastolic (congestive

) heart failure  





(2) AICD (automatic cardioverter/defibrillator) present


Code(s): Z95.810 - PRESENCE OF AUTOMATIC (IMPLANTABLE) CARDIAC DEFIBRILLATOR





(3) Acute and chronic respiratory failure (acute-on-chronic)


Code(s): J96.20 - ACUTE AND CHR RESP FAILURE, UNSP W HYPOXIA OR HYPERCAPNIA   





(4) Acute on chronic renal insufficiency


Code(s): N28.9 - DISORDER OF KIDNEY AND URETER, UNSPECIFIED


N18.9 - CHRONIC KIDNEY DISEASE, UNSPECIFIED





(5) Atrial fibrillation


Code(s): I48.91 - UNSPECIFIED ATRIAL FIBRILLATION   Qualifiers: 


     Atrial fibrillation type: chronic     Qualified Code(s): I48.2 - Chronic 

atrial fibrillation  





(6) CAD (coronary artery disease)


Code(s): I25.10 - ATHSCL HEART DISEASE OF NATIVE CORONARY ARTERY W/O ANG PCTRS 

  





(7) CKD (chronic kidney disease)


Code(s): N18.9 - CHRONIC KIDNEY DISEASE, UNSPECIFIED   Qualifiers: 


     Chronic kidney disease stage: unspecified stage     Qualified Code(s): 

N18.9 - Chronic kidney disease, unspecified  





(8) COPD (chronic obstructive pulmonary disease)


Code(s): J44.9 - CHRONIC OBSTRUCTIVE PULMONARY DISEASE, UNSPECIFIED   Qualifiers

: 


     COPD type: unspecified COPD     Qualified Code(s): J44.9 - Chronic 

obstructive pulmonary disease, unspecified  





(9) Cardiomyopathy


Code(s): I42.9 - CARDIOMYOPATHY, UNSPECIFIED   





(10) Diabetes mellitus, insulin dependent (IDDM), uncontrolled


Code(s): E10.65 - TYPE 1 DIABETES MELLITUS WITH HYPERGLYCEMIA   





(11) Dyspnea on exertion


Code(s): R06.09 - OTHER FORMS OF DYSPNEA








Assessment/Plan


IMP CHRONIC HYPOXEMIC ,HYPERCAPNEIC RESPIRATORY FAILURE improving


      COPD END STAGE O2 DEPENDENT


      BRONCHITIS


      CHF S/P ICD ON MILRINONE PUMP


      ASHD S/P MI,CABG


      CKD


      AFIB


      PVD


      BPH








PLAN  INHALED BRONCHODILATORS


         TAPER STEROIDS


         NASAL O2


         DIURETICS


         STRICT I+OS


         MONITOR LYTES,RENAL FUNCTION


         F/U CHEST X-RAY TODAY


      


    DR EASTMAN





 Problem List 





- Problems


(1) CHF (congestive heart failure)


Code(s): I50.9 - HEART FAILURE, UNSPECIFIED   Qualifiers: 


     Congestive heart failure type: combined     Congestive heart failure 

chronicity: unspecified congestive heart failure chronicity     Qualified Code(s

): I50.40 - Unspecified combined systolic (congestive) and diastolic (congestive

) heart failure  





(2) AICD (automatic cardioverter/defibrillator) present


Code(s): Z95.810 - PRESENCE OF AUTOMATIC (IMPLANTABLE) CARDIAC DEFIBRILLATOR





(3) Acute and chronic respiratory failure (acute-on-chronic)


Code(s): J96.20 - ACUTE AND CHR RESP FAILURE, UNSP W HYPOXIA OR HYPERCAPNIA   





(4) Acute on chronic renal insufficiency


Code(s): N28.9 - DISORDER OF KIDNEY AND URETER, UNSPECIFIED


N18.9 - CHRONIC KIDNEY DISEASE, UNSPECIFIED





(5) Atrial fibrillation


Code(s): I48.91 - UNSPECIFIED ATRIAL FIBRILLATION   Qualifiers: 


     Atrial fibrillation type: chronic     Qualified Code(s): I48.2 - Chronic 

atrial fibrillation  





(6) CAD (coronary artery disease)


Code(s): I25.10 - ATHSCL HEART DISEASE OF NATIVE CORONARY ARTERY W/O ANG PCTRS 

  





(7) CKD (chronic kidney disease)


Code(s): N18.9 - CHRONIC KIDNEY DISEASE, UNSPECIFIED   Qualifiers: 


     Chronic kidney disease stage: unspecified stage     Qualified Code(s): 

N18.9 - Chronic kidney disease, unspecified  





(8) COPD (chronic obstructive pulmonary disease)


Code(s): J44.9 - CHRONIC OBSTRUCTIVE PULMONARY DISEASE, UNSPECIFIED   Qualifiers

: 


     COPD type: unspecified COPD     Qualified Code(s): J44.9 - Chronic 

obstructive pulmonary disease, unspecified  





(9) Cardiomyopathy


Code(s): I42.9 - CARDIOMYOPATHY, UNSPECIFIED   





(10) Diabetes mellitus, insulin dependent (IDDM), uncontrolled


Code(s): E10.65 - TYPE 1 DIABETES MELLITUS WITH HYPERGLYCEMIA   





(11) Dyspnea on exertion


Code(s): R06.09 - OTHER FORMS OF DYSPNEA

## 2017-04-04 LAB
INR BLD: 2.7 (ref 0.82–1.09)
PT PNL PPP: 30.3 SEC (ref 9.98–11.88)

## 2017-04-04 RX ADMIN — DOCUSATE SODIUM SCH MG: 100 CAPSULE, LIQUID FILLED ORAL at 09:39

## 2017-04-04 RX ADMIN — ALBUTEROL SULFATE PRN AMP: 2.5 SOLUTION RESPIRATORY (INHALATION) at 22:10

## 2017-04-04 RX ADMIN — INSULIN ASPART SCH UNITS: 100 INJECTION, SOLUTION INTRAVENOUS; SUBCUTANEOUS at 11:10

## 2017-04-04 RX ADMIN — MILRINONE LACTATE SCH MLS/HR: 200 INJECTION, SOLUTION INTRAVENOUS at 17:32

## 2017-04-04 RX ADMIN — POTASSIUM CHLORIDE SCH MEQ: 1500 TABLET, EXTENDED RELEASE ORAL at 21:32

## 2017-04-04 RX ADMIN — BUDESONIDE AND FORMOTEROL FUMARATE DIHYDRATE SCH PUFF: 80; 4.5 AEROSOL RESPIRATORY (INHALATION) at 22:00

## 2017-04-04 RX ADMIN — GUAIFENESIN SCH MG: 600 TABLET, EXTENDED RELEASE ORAL at 09:39

## 2017-04-04 RX ADMIN — FUROSEMIDE SCH MG: 10 INJECTION, SOLUTION INTRAVENOUS at 06:20

## 2017-04-04 RX ADMIN — INSULIN ASPART SCH UNITS: 100 INJECTION, SOLUTION INTRAVENOUS; SUBCUTANEOUS at 06:17

## 2017-04-04 RX ADMIN — FERROUS SULFATE TAB EC 324 MG (65 MG FE EQUIVALENT) SCH MG: 324 (65 FE) TABLET DELAYED RESPONSE at 09:38

## 2017-04-04 RX ADMIN — RANITIDINE SCH MG: 150 TABLET ORAL at 09:39

## 2017-04-04 RX ADMIN — ATORVASTATIN CALCIUM SCH MG: 40 TABLET, FILM COATED ORAL at 21:32

## 2017-04-04 RX ADMIN — CARVEDILOL SCH MG: 6.25 TABLET, FILM COATED ORAL at 09:39

## 2017-04-04 RX ADMIN — OXYCODONE HYDROCHLORIDE AND ACETAMINOPHEN SCH MG: 500 TABLET ORAL at 09:39

## 2017-04-04 RX ADMIN — METHYLPREDNISOLONE SODIUM SUCCINATE SCH MG: 40 INJECTION, POWDER, FOR SOLUTION INTRAMUSCULAR; INTRAVENOUS at 21:34

## 2017-04-04 RX ADMIN — VITAMIN D, TAB 1000IU (100/BT) SCH UNIT: 25 TAB at 09:39

## 2017-04-04 RX ADMIN — ALLOPURINOL SCH MG: 100 TABLET ORAL at 09:39

## 2017-04-04 RX ADMIN — INSULIN ASPART SCH UNITS: 100 INJECTION, SOLUTION INTRAVENOUS; SUBCUTANEOUS at 22:04

## 2017-04-04 RX ADMIN — INSULIN ASPART SCH UNITS: 100 INJECTION, SOLUTION INTRAVENOUS; SUBCUTANEOUS at 17:24

## 2017-04-04 RX ADMIN — METHYLPREDNISOLONE SODIUM SUCCINATE SCH MG: 40 INJECTION, POWDER, FOR SOLUTION INTRAMUSCULAR; INTRAVENOUS at 09:40

## 2017-04-04 RX ADMIN — MULTIVITAMIN TABLET SCH TAB: TABLET at 09:39

## 2017-04-04 RX ADMIN — DOCUSATE SODIUM SCH MG: 100 CAPSULE, LIQUID FILLED ORAL at 21:32

## 2017-04-04 RX ADMIN — GUAIFENESIN SCH MG: 600 TABLET, EXTENDED RELEASE ORAL at 21:33

## 2017-04-04 RX ADMIN — POTASSIUM CHLORIDE SCH MEQ: 1500 TABLET, EXTENDED RELEASE ORAL at 10:50

## 2017-04-04 RX ADMIN — CLOPIDOGREL BISULFATE SCH MG: 75 TABLET, FILM COATED ORAL at 09:38

## 2017-04-04 RX ADMIN — FUROSEMIDE SCH MG: 10 INJECTION, SOLUTION INTRAVENOUS at 13:23

## 2017-04-04 RX ADMIN — CARVEDILOL SCH MG: 6.25 TABLET, FILM COATED ORAL at 21:32

## 2017-04-04 RX ADMIN — GUAIFENESIN AND DEXTROMETHORPHAN PRN ML: 100; 10 SYRUP ORAL at 01:59

## 2017-04-04 RX ADMIN — BUDESONIDE AND FORMOTEROL FUMARATE DIHYDRATE SCH PUFF: 80; 4.5 AEROSOL RESPIRATORY (INHALATION) at 09:40

## 2017-04-04 RX ADMIN — ALBUTEROL SULFATE PRN AMP: 2.5 SOLUTION RESPIRATORY (INHALATION) at 06:31

## 2017-04-04 RX ADMIN — MILRINONE LACTATE SCH MLS/HR: 200 INJECTION, SOLUTION INTRAVENOUS at 06:20

## 2017-04-04 RX ADMIN — ALBUTEROL SULFATE PRN AMP: 2.5 SOLUTION RESPIRATORY (INHALATION) at 10:45

## 2017-04-04 RX ADMIN — TIOTROPIUM BROMIDE SCH PUFF: 18 CAPSULE ORAL; RESPIRATORY (INHALATION) at 09:38

## 2017-04-04 NOTE — PN
Progress Note (short form)





- Note


Progress Note: 


Chief Complaint: 


chf


History of Present Illness: 


no sob;


cough improving


no cp


leg swelling improved.  minimal abdominal swelling. 





ex cigs











 Current Medications





Acetaminophen (Tylenol -)  650 mg PO Q4H PRN


   PRN Reason: FEVER OR PAIN


Albuterol Sulfate (Ventolin 0.083% Nebulizer Soln -)  1 amp NEB Q4H PRN


   PRN Reason: SHORT OF BREATH/WHEEZING


   Last Admin: 04/04/17 10:45 Dose:  1 amp


Allopurinol (Zyloprim -)  100 mg PO DAILY [facility]


   Last Admin: 04/04/17 09:39 Dose:  100 mg


Ascorbic Acid (Vitamin C -)  1,000 mg PO DAILY [facility]


   Last Admin: 04/04/17 09:39 Dose:  1,000 mg


Atorvastatin Calcium (Lipitor -)  40 mg PO HS [facility]


   Last Admin: 04/03/17 22:10 Dose:  40 mg


Budesonide/Formoterol Fumarate (Symbicort 80/4.5mcg -)  2 puff IH BID [facility]


   Last Admin: 04/04/17 09:40 Dose:  2 puff


Bupropion HCl (Wellbutrin -)  75 mg PO DAILY [facility]


   Last Admin: 04/04/17 09:39 Dose:  75 mg


Carvedilol (Coreg -)  6.25 mg PO BID [facility]


   Last Admin: 04/04/17 09:39 Dose:  6.25 mg


Cholecalciferol (Vitamin D3 -)  1,000 unit PO DAILY [facility]


   Last Admin: 04/04/17 09:39 Dose:  1,000 unit


Clopidogrel Bisulfate (Plavix -)  75 mg PO DAILY [facility]


   Last Admin: 04/04/17 09:38 Dose:  75 mg


Docusate Sodium (Colace -)  100 mg PO BID [facility]


   Last Admin: 04/04/17 09:39 Dose:  100 mg


Ferrous Sulfate (Feosol -)  325 mg PO Q2D [facility]


   Last Admin: 04/04/17 09:38 Dose:  325 mg


Furosemide (Lasix Injection -)  80 mg IVPUSH BID@0600,1400 [facility]


   Last Admin: 04/04/17 06:20 Dose:  80 mg


Guaifenesin (Mucinex -)  600 mg PO BID [facility]


   Last Admin: 04/04/17 09:39 Dose:  600 mg


Guaifenesin (Robitussin Dm -)  10 ml PO Q6H PRN


   PRN Reason: COUGH


   Last Admin: 04/04/17 01:59 Dose:  10 ml


Milrinone Lactate/Dextrose (Milrinone 20mg/100ml Ivpb -)  100 mls @ 9.859 mls/

hr IVPB TITR JARROD; 0.35 MCG/KG/MIN


   PRN Reason: Protocol


   Last Admin: 04/04/17 06:20 Dose:  9.859 mls/hr


Insulin Aspart (Novolog Vial Sliding Scale -)  1 vial SQ ACHS JARROD


   PRN Reason: Protocol


   Last Admin: 04/04/17 11:10 Dose:  10 units


Methylprednisolone Sodium Succinate (Solu-Medrol -)  20 mg IVPB BID [facility]


   Stop: 04/04/17 22:00


   Last Admin: 04/04/17 09:40 Dose:  20 mg


Metolazone (Zaroxolyn -)  5 mg PO ONCE ONE


   Stop: 04/04/17 13:31


   Last Admin: 04/04/17 12:56 Dose:  5 mg


Multivitamins/Minerals/Vitamin C (Tab-A-Vit -)  1 tab PO DAILY [facility]


   Last Admin: 04/04/17 09:39 Dose:  1 tab


Ondansetron HCl (Zofran Injection)  4 mg IVPB Q6H PRN


   PRN Reason: NAUSEA


Potassium Chloride (K-Dur -)  40 meq PO BID [facility]


   Last Admin: 04/04/17 10:50 Dose:  40 meq


Prednisone (Deltasone -)  30 mg PO DAILY [facility]


Ranitidine HCl (Zantac -)  75 mg PO DAILY [facility]


   Last Admin: 04/04/17 09:39 Dose:  75 mg


Tiotropium Bromide (Spiriva -)  1 puff IH DAILY [facility]


   Last Admin: 04/04/17 09:38 Dose:  1 puff


Warfarin Sodium (Coumadin -)  3 mg PO DAILY@1800 [facility]





 Vital Signs - 24 hr











  04/03/17 04/03/17 04/03/17





  13:48 18:00 20:44


 


Temperature 97.5 F L 97.5 F L 


 


Pulse Rate 76 76 


 


Respiratory 20 20 





Rate   


 


Blood Pressure 119/73 126/78 


 


O2 Sat by Pulse   96





Oximetry (%)   














  04/03/17 04/03/17 04/04/17





  22:00 23:00 06:00


 


Temperature 97.8 F  


 


Pulse Rate 75 75 75


 


Respiratory 20  20





Rate   


 


Blood Pressure 124/75  116/69


 


O2 Sat by Pulse  90 L 





Oximetry (%)   














  04/04/17 04/04/17 04/04/17





  09:00 10:00 10:45


 


Temperature  97.5 F L 


 


Pulse Rate  76 76


 


Respiratory  22 





Rate   


 


Blood Pressure  108/62 


 


O2 Sat by Pulse 90 L  95





Oximetry (%)   








 Intake & Output











 04/02/17 04/03/17 04/04/17 04/05/17





 07:59 07:59 07:59 07:59


 


Intake Total  


 


Output Total 1300 2050 2900 450


 


Balance -610 -1290 -1520 -450


 


Weight 212 lb 9.6 oz 213 lb 8 oz 213 lb 2 oz 











Constitutional: Yes: No Distress, Calm


Eyes: No: Sclera Icterus


HENT: No: Nasal Congestion


Cardiovascular: Yes: Regular Rate and Rhythm, S1, S2, Other (PMI non diplaced).

  No: Gallop, Murmur


Respiratory: Yes: CTA Bilaterally.  No: Accessory Muscle Use, Rales, Rhonchi, 

Wheezes


Gastrointestinal: Yes: Normal Bowel Sounds, Soft.  No: Tenderness


Musculoskeletal: Yes: Other (No kyphosis)


Extremities: No: Cold


Edema: trace


Integumentary: No: Jaundice


Neurological: Yes: Alert, Oriented (x3)


Psychiatric: No: Agitated


Labs: 


 


 no CBC, BMP today





  











- ....Imaging


EKG: Other (tele: v-paced (? AF underlying);  NSVT)





Assessment/Plan


echo 10/2016: sev lve, global hk, sev dec lvef, mild rve, nl rv fcn, patty, mild-

mod mr, mv ring, tv ring, mild tr, mild pr, mild phtn, ivc mild dil











a/p:  76 year old gentleman with PMH of IDDM, COPD (O2 dependant) HTN, HLD, 

Afib (on coumadin), CAD s/p CABG 2013, end stage CHF with ICD placement (on 

milrinone pump), PVD, BPH here with productive cough.





cough:


-no signs vol overload, pulm edema


-cxr with chronic findings, bnp at baseline, wt at baseline


-likely due to URI/copd, sxs improving with resp treatment, plans per pmd/pulm





chronic end stage systolic heart failure s/p ICD, on home milrinone infusion:


- pt home dry weight is very well-defined, with stable range for years on 

milrinone, monitored closely by wife--dry wt approx 211 lbs


- on torsemide 40 bid at home, responds well to prn metolazone for weight 

increase


- wt stable here on admit, 205


- cxr with chronic findings, bnp at baseline


- received lasix 80 iv x 1 on 4/2 for incr wt, hyponatremia, worwsening renal 

fxn


- today's labs with Na improved, creat slightly improved (bun likely steroid 

effect)--start lasix 80 iv bid while pt on steroids


- low threshold to add prn metolazone if wt not responding (works well at home)


- d/w'd dr degroot--rapid steroid taper to start today


-daily wts; should go back home on prior torsemide 40 bid


- cont milrinone at patient's home maintenance dose (on chronic infusion 

destination therapy)--0.35 mcg/kg/min.  


- cont home coreg


- recent office icd check with stable function


- 4/2: + weight gain with clinical signs/sx's of volume overload and worsened 

hyponatremia.  Lasix 80 mg IV x 1 now, monitor uop response.  Previously 

diuresed with 80 mg IV bid and intermittent metolazone.  


- 4/3: was not in chf when came in, now becoming slightly volume overloaded 

likely due to IV steroids


- 4/4: minimal change in weight, but symptomatically improving.  con't lasix 80 

mg IV BID.  will give one trial dose of metolazone today.  Monitor renal 

function/K on diuretics and standing potassium (bmp ordered for tomorrow)  

Daily weights, bmp, electrolyte repletion prn.  





VTach:


-NSVT (11b) on tele


-known prior finding in pt, has ICD and has not had issues with inappr shocks


-K/mag good--replete prn (per usual targets)


-cont carvedilol as doing (low bp tendencies/dizzy limit outpt dose i believe)





afib


- currently rate controlled on coreg


- cont coumadin per INR





PVD


- con't AC, plavix statin.





CAD s/p CABG


- No anginal symptoms, no signs acs.  Con't home ac, plavix, atorvastatin, coreg





HTN: controlled on coreg





HLD: con't statin. 





CKD


- stable

## 2017-04-04 NOTE — PN
Physical Exam: 


SUBJECTIVE: Patient seen and examined


 Pt reports feeling better, less SOB, intermittent productive cough with clear 

sputum, no other c/o.





OBJECTIVE:





 Vital Signs











 Period  Temp  Pulse  Resp  BP Sys/Alonso  Pulse Ox


 


 Last 24 Hr  97.5 F-97.8 F  75-76  20-20  116-126/69-78  90-96











GENERAL: The patient is awake, alert, and fully oriented, in no acute distress.


HEAD: Normal with no signs of trauma.


EYES: PERRL, extraocular movements intact, sclera anicteric, conjunctiva clear. 

No ptosis. 


ENT: Ears normal, nares patent, oropharynx clear without exudates, moist mucous 


membranes.


NECK: Trachea midline, full range of motion, supple. 


LUNGS: Breath sounds equal, clear to auscultation bilaterally, no wheezes, 

crackles - Rt lower lobe, no 


accessory muscle use. 


HEART: Regular rate and rhythm, S1, S2 without murmur, rub or gallop


ABDOMEN: Soft, nontender, nondistended, normoactive bowel sounds, no guarding, 

no 


rebound, no hepatosplenomegaly, no masses.


EXTREMITIES: 2+ pulses, warm, well-perfused, 1+ edema 


NEUROLOGICAL: Cranial nerves II through XII grossly intact. Normal speech, gait 

not 


observed.


PSYCH: Normal mood, normal affect.


SKIN: Warm, dry, normal turgor, no rashes or lesions noted














 Laboratory Results - last 24 hr











  04/01/17 04/02/17 04/02/17





  21:34 12:06 17:10


 


INR   


 


POC Glucometer  457  491  417














  04/03/17 04/03/17 04/03/17





  11:51 15:42 22:12


 


INR   


 


POC Glucometer  454  416  [id number]














  04/04/17 04/04/17





  05:35 05:44


 


INR  2.70 H 


 


POC Glucometer   250








Active Medications











Generic Name Dose Route Start Last Admin





  Trade Name Freq  PRN Reason Stop Dose Admin


 


Acetaminophen  650 mg 03/30/17 17:44  





  Tylenol -  PO   





  Q4H PRN   





  FEVER OR PAIN   


 


Albuterol Sulfate  1 amp 03/31/17 15:06 04/04/17 06:31





  Ventolin 0.083% Nebulizer Soln -  NEB   1 amp





  Q4H PRN   Administration





  SHORT OF BREATH/WHEEZING   


 


Allopurinol  100 mg 03/31/17 10:00 04/03/17 09:47





  Zyloprim -  PO   100 mg





  DAILY JARROD   Administration


 


Ascorbic Acid  1,000 mg 03/31/17 10:00 04/03/17 09:46





  Vitamin C -  PO   1,000 mg





  DAILY JARROD   Administration


 


Atorvastatin Calcium  40 mg 03/30/17 22:00 04/03/17 22:10





  Lipitor -  PO   40 mg





  HS JARROD   Administration


 


Budesonide/Formoterol Fumarate  2 puff 03/31/17 22:00 04/03/17 22:18





  Symbicort 80/4.5mcg -  IH   2 puff





  BID JARROD   Administration


 


Bupropion HCl  75 mg 03/31/17 10:00 04/03/17 09:47





  Wellbutrin -  PO   75 mg





  DAILY JARROD   Administration


 


Carvedilol  6.25 mg 03/30/17 22:00 04/03/17 22:10





  Coreg -  PO   6.25 mg





  BID JARROD   Administration


 


Cholecalciferol  1,000 unit 04/02/17 22:06 04/03/17 09:47





  Vitamin D3 -  PO   1,000 unit





  DAILY JARROD   Administration


 


Clopidogrel Bisulfate  75 mg 03/31/17 10:00 04/03/17 09:47





  Plavix -  PO   75 mg





  DAILY JARROD   Administration


 


Docusate Sodium  100 mg 03/30/17 22:00 04/03/17 22:10





  Colace -  PO   100 mg





  BID JARROD   Administration


 


Ferrous Sulfate  325 mg 03/31/17 10:00 04/02/17 10:44





  Feosol -  PO   325 mg





  Q2D JARROD   Administration


 


Furosemide  80 mg 04/03/17 14:00 04/04/17 06:20





  Lasix Injection -  IVPUSH   80 mg





  BID@0600,1400 JARROD   Administration


 


Guaifenesin  600 mg 03/30/17 22:00 04/03/17 22:10





  Mucinex -  PO   600 mg





  BID JARROD   Administration


 


Guaifenesin  10 ml 03/31/17 13:10 04/04/17 01:59





  Robitussin Dm -  PO   10 ml





  Q6H PRN   Administration





  COUGH   


 


Milrinone Lactate/Dextrose  100 mls @ 9.859 mls/hr 03/31/17 00:24 04/04/17 06:20





  Milrinone 20mg/100ml Ivpb -  IVPB   9.859 mls/hr





  TITR JARROD   Administration





  Protocol   





  0.35 MCG/KG/MIN   


 


Insulin Aspart  1 vial 03/30/17 22:00 04/04/17 06:17





  Novolog Vial Sliding Scale -  SQ   4 units





  [facility] JARROD   Administration





  Protocol   


 


Methylprednisolone Sodium Succinate  20 mg 04/03/17 22:00 04/03/17 22:17





  Solu-Medrol -  IVPB   20 mg





  BID JARROD   Administration


 


Multivitamins/Minerals/Vitamin C  1 tab 03/31/17 10:00 04/03/17 09:46





  Tab-A-Vit -  PO   1 tab





  DAILY JARROD   Administration


 


Ondansetron HCl  4 mg 03/30/17 17:44  





  Zofran Injection  IVPB   





  Q6H PRN   





  NAUSEA   


 


Ranitidine HCl  75 mg 03/31/17 10:00 04/03/17 09:48





  Zantac -  PO   75 mg





  DAILY JARROD   Administration


 


Tiotropium Bromide  1 puff 03/31/17 15:15 04/03/17 09:48





  Spiriva -  IH   1 puff





  DAILY JARROD   Administration


 


Warfarin Sodium  3 mg 04/04/17 18:00  





  Coumadin -  PO   





  DAILY@1800 [facility]   











ASSESSMENT/PLAN:


 This is a 76-year-old man with a history of COPD( O2 dependent at home), 

chronic hypoxic respiratory failure, end-stage chronic systolic heart failure 

on home Milrinone, atrial fibrillation, CAD, MI, HTN, hyperlipidemia, PAD, type 

2 DM, stage 4 CKD, and BPH, admitted for cough.





1. Hypokalemia, chronic


- Replete potassium- K4.1 today 





2. Bronchitis- improving 


-no fever, no leukocytosis


-tapering  off  IV steroids


-  starting Prednisone in am 


   


3. COPD - improving 


-tapering  off  IV steroids


-  starting Prednisone in am 


- Duonebs as needed


- continue on O2 to keep O2 sat >90%


- pulmonary following 





4. Chronic systolic heart failure, end-stage-


- will continue on IV lasix 80mg BID 


-continue carvedilol, milrinone drip


-signs of volume overload: mildly hyponatremic, b/l LE edema, weight up 3kg 

since admission 


- will monitor daily weight and I&O's 


- cardiology following


      


5. Chronic hypoxic respiratory failure- improving 


- Continue oxygen to maintain saturation > 90%





6. Permanent atrial fibrillation- HR remains controlled 


-continue carvedilol


- INR therapeutic, will restart on Coumadin at 3mg instead of 4mg 


- will f/u on INR in am 





7. CAD, history of MI


- Continue Coreg, Plavix, Lipitor





8. HTN- BP controlled 


- Continue Coreg





9. Hyperlipidemia


- Continue Lipitor





10. Type 2 diabetes mellitus- BS uncontrolled - likely due to steroid use 


- Fingersticks with Novolog sliding scale


- consistent carb diet 





11. Stage 4 CKD


   - Cr 2.2, was 2.4 on admission, baseline 2.0





F/E/N


   Fluids: PO intake adequate


   Electrolytes: replete as indicated


   Nutrition: diabetic, low sodium





DVT prophylaxis: on Coumadin, oob, ambulation





Dispo: PT evaluation pending ,and pre/post testing for discharge planning. O2 

sat 88% on RA 


Full Code.








Visit type





- Emergency Visit


Emergency Visit: No





- New Patient


This patient is new to me today: Yes


Date on this admission: 04/04/17





- Critical Care


Critical Care patient: No

## 2017-04-04 NOTE — PN
Progress Note, Physician


History of Present Illness: 


pulmonary





alert,oob-chair,-resp distress





- Current Medication List


Current Medications: 


Active Medications





Acetaminophen (Tylenol -)  650 mg PO Q4H PRN


   PRN Reason: FEVER OR PAIN


Albuterol Sulfate (Ventolin 0.083% Nebulizer Soln -)  1 amp NEB Q4H PRN


   PRN Reason: SHORT OF BREATH/WHEEZING


   Last Admin: 04/04/17 06:31 Dose:  1 amp


Allopurinol (Zyloprim -)  100 mg PO DAILY [facility]


   Last Admin: 04/04/17 09:39 Dose:  100 mg


Ascorbic Acid (Vitamin C -)  1,000 mg PO DAILY [facility]


   Last Admin: 04/04/17 09:39 Dose:  1,000 mg


Atorvastatin Calcium (Lipitor -)  40 mg PO HS [facility]


   Last Admin: 04/03/17 22:10 Dose:  40 mg


Budesonide/Formoterol Fumarate (Symbicort 80/4.5mcg -)  2 puff IH BID [facility]


   Last Admin: 04/04/17 09:40 Dose:  2 puff


Bupropion HCl (Wellbutrin -)  75 mg PO DAILY [facility]


   Last Admin: 04/04/17 09:39 Dose:  75 mg


Carvedilol (Coreg -)  6.25 mg PO BID [facility]


   Last Admin: 04/04/17 09:39 Dose:  6.25 mg


Cholecalciferol (Vitamin D3 -)  1,000 unit PO DAILY [facility]


   Last Admin: 04/04/17 09:39 Dose:  1,000 unit


Clopidogrel Bisulfate (Plavix -)  75 mg PO DAILY [facility]


   Last Admin: 04/04/17 09:38 Dose:  75 mg


Docusate Sodium (Colace -)  100 mg PO BID [facility]


   Last Admin: 04/04/17 09:39 Dose:  100 mg


Ferrous Sulfate (Feosol -)  325 mg PO Q2D [facility]


   Last Admin: 04/04/17 09:38 Dose:  325 mg


Furosemide (Lasix Injection -)  80 mg IVPUSH BID@0600,1400 [facility]


   Last Admin: 04/04/17 06:20 Dose:  80 mg


Guaifenesin (Mucinex -)  600 mg PO BID [facility]


   Last Admin: 04/04/17 09:39 Dose:  600 mg


Guaifenesin (Robitussin Dm -)  10 ml PO Q6H PRN


   PRN Reason: COUGH


   Last Admin: 04/04/17 01:59 Dose:  10 ml


Milrinone Lactate/Dextrose (Milrinone 20mg/100ml Ivpb -)  100 mls @ 9.859 mls/

hr IVPB TITR JARROD; 0.35 MCG/KG/MIN


   PRN Reason: Protocol


   Last Admin: 04/04/17 06:20 Dose:  9.859 mls/hr


Insulin Aspart (Novolog Vial Sliding Scale -)  1 vial SQ ACHS JARROD


   PRN Reason: Protocol


   Last Admin: 04/04/17 06:17 Dose:  4 units


Methylprednisolone Sodium Succinate (Solu-Medrol -)  20 mg IVPB BID [facility]


   Last Admin: 04/04/17 09:40 Dose:  20 mg


Metolazone (Zaroxolyn -)  5 mg PO ONCE ONE


   Stop: 04/04/17 13:31


Multivitamins/Minerals/Vitamin C (Tab-A-Vit -)  1 tab PO DAILY [facility]


   Last Admin: 04/04/17 09:39 Dose:  1 tab


Ondansetron HCl (Zofran Injection)  4 mg IVPB Q6H PRN


   PRN Reason: NAUSEA


Potassium Chloride (K-Dur -)  40 meq PO BID [facility]


   Last Admin: 04/04/17 10:50 Dose:  40 meq


Ranitidine HCl (Zantac -)  75 mg PO DAILY [facility]


   Last Admin: 04/04/17 09:39 Dose:  75 mg


Tiotropium Bromide (Spiriva -)  1 puff IH DAILY [facility]


   Last Admin: 04/04/17 09:38 Dose:  1 puff


Warfarin Sodium (Coumadin -)  3 mg PO DAILY@1800 [facility]











- Objective


Vital Signs: 


 Vital Signs











Temperature  97.8 F   04/03/17 22:00


 


Pulse Rate  75   04/04/17 06:00


 


Respiratory Rate  20   04/04/17 06:00


 


Blood Pressure  116/69   04/04/17 06:00


 


O2 Sat by Pulse Oximetry (%)  90 L  04/03/17 23:00











Constitutional: Yes: Well Nourished, Calm


Eyes: Yes: WNL


HENT: Yes: WNL


Neck: Yes: Supple


Cardiovascular: Yes: Pulse Irregular, S1, S2


Respiratory: Yes: Diminished, Wheezes (few crackles r base)


Gastrointestinal: Yes: Normal Bowel Sounds, Soft


Extremities: Yes: WNL


Edema: Yes


Labs: 


 CBC, BMP





 04/01/17 06:07 





 04/03/17 06:00 





 INR, PTT











INR  2.70  (0.82-1.09)  H  04/04/17  05:35    














- ....Imaging


Chest X-ray: Report Reviewed, Image Reviewed





Problem List





- Problems


(1) CHF (congestive heart failure)


Code(s): I50.9 - HEART FAILURE, UNSPECIFIED   Qualifiers: 


     Congestive heart failure type: combined     Congestive heart failure 

chronicity: unspecified congestive heart failure chronicity     Qualified Code(s

): I50.40 - Unspecified combined systolic (congestive) and diastolic (congestive

) heart failure  





(2) AICD (automatic cardioverter/defibrillator) present


Code(s): Z95.810 - PRESENCE OF AUTOMATIC (IMPLANTABLE) CARDIAC DEFIBRILLATOR





(3) Acute and chronic respiratory failure (acute-on-chronic)


Code(s): J96.20 - ACUTE AND CHR RESP FAILURE, UNSP W HYPOXIA OR HYPERCAPNIA   





(4) Acute on chronic renal insufficiency


Code(s): N28.9 - DISORDER OF KIDNEY AND URETER, UNSPECIFIED


N18.9 - CHRONIC KIDNEY DISEASE, UNSPECIFIED





(5) Atrial fibrillation


Code(s): I48.91 - UNSPECIFIED ATRIAL FIBRILLATION   Qualifiers: 


     Atrial fibrillation type: chronic     Qualified Code(s): I48.2 - Chronic 

atrial fibrillation  





(6) CAD (coronary artery disease)


Code(s): I25.10 - ATHSCL HEART DISEASE OF NATIVE CORONARY ARTERY W/O ANG PCTRS 

  





(7) CKD (chronic kidney disease)


Code(s): N18.9 - CHRONIC KIDNEY DISEASE, UNSPECIFIED   Qualifiers: 


     Chronic kidney disease stage: unspecified stage     Qualified Code(s): 

N18.9 - Chronic kidney disease, unspecified  





(8) COPD (chronic obstructive pulmonary disease)


Code(s): J44.9 - CHRONIC OBSTRUCTIVE PULMONARY DISEASE, UNSPECIFIED   Qualifiers

: 


     COPD type: unspecified COPD     Qualified Code(s): J44.9 - Chronic 

obstructive pulmonary disease, unspecified  





(9) Cardiomyopathy


Code(s): I42.9 - CARDIOMYOPATHY, UNSPECIFIED   





(10) Diabetes mellitus, insulin dependent (IDDM), uncontrolled


Code(s): E10.65 - TYPE 1 DIABETES MELLITUS WITH HYPERGLYCEMIA   





(11) Dyspnea on exertion


Code(s): R06.09 - OTHER FORMS OF DYSPNEA








Assessment/Plan


IMP CHRONIC HYPOXEMIC ,HYPERCAPNEIC RESPIRATORY FAILURE improving


      COPD END STAGE O2 DEPENDENT


      BRONCHITIS


      CHF S/P ICD ON MILRINONE PUMP


      ASHD S/P MI,CABG


      CKD


      AFIB


      PVD


      BPH








PLAN  INHALED BRONCHODILATORS


         STEROID TAPER


         NASAL O2


         DIURETICS


         STRICT I+OS


         MONITOR LYTES,RENAL FUNCTION


        


      


    DR EASTMAN





 Problem List 





- Problems


(1) CHF (congestive heart failure)


Code(s): I50.9 - HEART FAILURE, UNSPECIFIED   Qualifiers: 


     Congestive heart failure type: combined     Congestive heart failure 

chronicity: unspecified congestive heart failure chronicity     Qualified Code(s

): I50.40 - Unspecified combined systolic (congestive) and diastolic (congestive

) heart failure  





(2) AICD (automatic cardioverter/defibrillator) present


Code(s): Z95.810 - PRESENCE OF AUTOMATIC (IMPLANTABLE) CARDIAC DEFIBRILLATOR





(3) Acute and chronic respiratory failure (acute-on-chronic)


Code(s): J96.20 - ACUTE AND CHR RESP FAILURE, UNSP W HYPOXIA OR HYPERCAPNIA   





(4) Acute on chronic renal insufficiency


Code(s): N28.9 - DISORDER OF KIDNEY AND URETER, UNSPECIFIED


N18.9 - CHRONIC KIDNEY DISEASE, UNSPECIFIED





(5) Atrial fibrillation


Code(s): I48.91 - UNSPECIFIED ATRIAL FIBRILLATION   Qualifiers: 


     Atrial fibrillation type: chronic     Qualified Code(s): I48.2 - Chronic 

atrial fibrillation  





(6) CAD (coronary artery disease)


Code(s): I25.10 - ATHSCL HEART DISEASE OF NATIVE CORONARY ARTERY W/O ANG PCTRS 

  





(7) CKD (chronic kidney disease)


Code(s): N18.9 - CHRONIC KIDNEY DISEASE, UNSPECIFIED   Qualifiers: 


     Chronic kidney disease stage: unspecified stage     Qualified Code(s): 

N18.9 - Chronic kidney disease, unspecified  





(8) COPD (chronic obstructive pulmonary disease)


Code(s): J44.9 - CHRONIC OBSTRUCTIVE PULMONARY DISEASE, UNSPECIFIED   Qualifiers

: 


     COPD type: unspecified COPD     Qualified Code(s): J44.9 - Chronic 

obstructive pulmonary disease, unspecified  





(9) Cardiomyopathy


Code(s): I42.9 - CARDIOMYOPATHY, UNSPECIFIED   





(10) Diabetes mellitus, insulin dependent (IDDM), uncontrolled


Code(s): E10.65 - TYPE 1 DIABETES MELLITUS WITH HYPERGLYCEMIA   





(11) Dyspnea on exertion


Code(s): R06.09 - OTHER FORMS OF DYSPNEA

## 2017-04-05 VITALS — DIASTOLIC BLOOD PRESSURE: 62 MMHG | TEMPERATURE: 98 F | SYSTOLIC BLOOD PRESSURE: 108 MMHG

## 2017-04-05 VITALS — HEART RATE: 79 BPM

## 2017-04-05 LAB
ANION GAP SERPL CALC-SCNC: 12 MMOL/L (ref 8–16)
BASOPHILS # BLD: 0.1 % (ref 0–2)
CALCIUM SERPL-MCNC: 8.5 MG/DL (ref 8.5–10.1)
CO2 SERPL-SCNC: 34 MMOL/L (ref 21–32)
CREAT SERPL-MCNC: 2.1 MG/DL (ref 0.7–1.3)
DEPRECATED RDW RBC AUTO: 18.2 % (ref 11.9–15.9)
EOSINOPHIL # BLD: 0 % (ref 0–4.5)
GLUCOSE SERPL-MCNC: 349 MG/DL (ref 74–106)
INR BLD: 1.99 (ref 0.82–1.09)
MCH RBC QN AUTO: 26 PG (ref 25.7–33.7)
MCHC RBC AUTO-ENTMCNC: 32 G/DL (ref 32–35.9)
MCV RBC: 81.1 FL (ref 80–96)
NEUTROPHILS # BLD: 84.7 % (ref 42.8–82.8)
PLATELET # BLD AUTO: 185 K/MM3 (ref 134–434)
PMV BLD: 9 FL (ref 7.5–11.1)
PT PNL PPP: 22.2 SEC (ref 9.98–11.88)
WBC # BLD AUTO: 11.9 K/MM3 (ref 4–10)

## 2017-04-05 RX ADMIN — MILRINONE LACTATE SCH: 200 INJECTION, SOLUTION INTRAVENOUS at 00:30

## 2017-04-05 RX ADMIN — GUAIFENESIN SCH MG: 600 TABLET, EXTENDED RELEASE ORAL at 09:16

## 2017-04-05 RX ADMIN — ALLOPURINOL SCH MG: 100 TABLET ORAL at 09:17

## 2017-04-05 RX ADMIN — VITAMIN D, TAB 1000IU (100/BT) SCH UNIT: 25 TAB at 09:16

## 2017-04-05 RX ADMIN — CARVEDILOL SCH MG: 6.25 TABLET, FILM COATED ORAL at 09:16

## 2017-04-05 RX ADMIN — OXYCODONE HYDROCHLORIDE AND ACETAMINOPHEN SCH MG: 500 TABLET ORAL at 09:17

## 2017-04-05 RX ADMIN — CLOPIDOGREL BISULFATE SCH MG: 75 TABLET, FILM COATED ORAL at 09:16

## 2017-04-05 RX ADMIN — MULTIVITAMIN TABLET SCH TAB: TABLET at 09:16

## 2017-04-05 RX ADMIN — INSULIN ASPART SCH UNITS: 100 INJECTION, SOLUTION INTRAVENOUS; SUBCUTANEOUS at 06:35

## 2017-04-05 RX ADMIN — MILRINONE LACTATE SCH MLS/HR: 200 INJECTION, SOLUTION INTRAVENOUS at 09:15

## 2017-04-05 RX ADMIN — INSULIN ASPART SCH UNITS: 100 INJECTION, SOLUTION INTRAVENOUS; SUBCUTANEOUS at 11:50

## 2017-04-05 RX ADMIN — RANITIDINE SCH MG: 150 TABLET ORAL at 09:16

## 2017-04-05 RX ADMIN — POTASSIUM CHLORIDE SCH MEQ: 1500 TABLET, EXTENDED RELEASE ORAL at 09:17

## 2017-04-05 RX ADMIN — BUDESONIDE AND FORMOTEROL FUMARATE DIHYDRATE SCH PUFF: 80; 4.5 AEROSOL RESPIRATORY (INHALATION) at 09:20

## 2017-04-05 RX ADMIN — FUROSEMIDE SCH MG: 10 INJECTION, SOLUTION INTRAVENOUS at 06:34

## 2017-04-05 RX ADMIN — DOCUSATE SODIUM SCH MG: 100 CAPSULE, LIQUID FILLED ORAL at 09:21

## 2017-04-05 RX ADMIN — TIOTROPIUM BROMIDE SCH PUFF: 18 CAPSULE ORAL; RESPIRATORY (INHALATION) at 09:20

## 2017-04-05 NOTE — PN
Progress Note (short form)





- Note


Progress Note: 


s: feeling better today, says he is at his baseline and wants to go home now.  

no cp palps dizzy; le edema better; feels well walking in room/hallway.





o:


 


 Vital Signs











 Period  Temp  Pulse  Resp  BP Sys/Alonso  Pulse Ox


 


 Last 24 Hr  97.2 F-97.5 F  75-77  20-20  113-129/70-81  96











NAD, calm 


JVD flat, neck supple


RRR nl s1, s2 no m/r/g.


cta bl, nl effort


trace le edema bl, no c/c


aaox3


no jaundice diaphoresis





 


 


 


 Current Medications











Generic Name Dose Route Start Last Admin





  Trade Name Freq  PRN Reason Stop Dose Admin


 


Acetaminophen  650 mg 03/30/17 17:44 04/05/17 00:31





  Tylenol -  PO   650 mg





  Q4H PRN   Administration





  FEVER OR PAIN   


 


Albuterol Sulfate  1 amp 03/31/17 15:06 04/04/17 22:10





  Ventolin 0.083% Nebulizer Soln -  NEB   1 amp





  Q4H PRN   Administration





  SHORT OF BREATH/WHEEZING   


 


Allopurinol  100 mg 03/31/17 10:00 04/05/17 09:17





  Zyloprim -  PO   100 mg





  DAILY JARROD   Administration


 


Ascorbic Acid  1,000 mg 03/31/17 10:00 04/05/17 09:17





  Vitamin C -  PO   1,000 mg





  DAILY JARROD   Administration


 


Atorvastatin Calcium  40 mg 03/30/17 22:00 04/04/17 21:32





  Lipitor -  PO   40 mg





  HS JARROD   Administration


 


Budesonide/Formoterol Fumarate  2 puff 03/31/17 22:00 04/05/17 09:20





  Symbicort 80/4.5mcg -  IH   2 puff





  BID JARROD   Administration


 


Bupropion HCl  75 mg 03/31/17 10:00 04/05/17 09:17





  Wellbutrin -  PO   75 mg





  DAILY JARROD   Administration


 


Carvedilol  6.25 mg 03/30/17 22:00 04/05/17 09:16





  Coreg -  PO   6.25 mg





  BID JARROD   Administration


 


Cholecalciferol  1,000 unit 04/02/17 22:06 04/05/17 09:16





  Vitamin D3 -  PO   1,000 unit





  DAILY JARROD   Administration


 


Clopidogrel Bisulfate  75 mg 03/31/17 10:00 04/05/17 09:16





  Plavix -  PO   75 mg





  DAILY JARROD   Administration


 


Docusate Sodium  100 mg 03/30/17 22:00 04/05/17 09:21





  Colace -  PO   100 mg





  BID JARROD   Administration


 


Ferrous Sulfate  325 mg 03/31/17 10:00 04/04/17 09:38





  Feosol -  PO   325 mg





  Q2D JARROD   Administration


 


Guaifenesin  600 mg 03/30/17 22:00 04/05/17 09:16





  Mucinex -  PO   600 mg





  BID JARROD   Administration


 


Guaifenesin  10 ml 03/31/17 13:10 04/04/17 01:59





  Robitussin Dm -  PO   10 ml





  Q6H PRN   Administration





  COUGH   


 


Milrinone Lactate/Dextrose  100 mls @ 9.859 mls/hr 03/31/17 00:24 04/05/17 09:15





  Milrinone 20mg/100ml Ivpb -  IVPB   10 mls/hr





  TITR JARROD   Administration





  Protocol   





  0.35 MCG/KG/MIN   


 


Insulin Aspart  1 vial 03/30/17 22:00 04/05/17 06:35





  Novolog Vial Sliding Scale -  SQ   6 units





  ACHS JARROD   Administration





  Protocol   


 


Multivitamins/Minerals/Vitamin C  1 tab 03/31/17 10:00 04/05/17 09:16





  Tab-A-Vit -  PO   1 tab





  DAILY JARROD   Administration


 


Ondansetron HCl  4 mg 03/30/17 17:44  





  Zofran Injection  IVPB   





  Q6H PRN   





  NAUSEA   


 


Potassium Chloride  40 meq 04/04/17 10:00 04/05/17 09:17





  K-Dur -  PO   40 meq





  BID JARROD   Administration


 


Prednisone  30 mg 04/05/17 10:00 04/05/17 09:16





  Deltasone -  PO   30 mg





  DAILY JARROD   Administration


 


Ranitidine HCl  75 mg 03/31/17 10:00 04/05/17 09:16





  Zantac -  PO   75 mg





  DAILY JARROD   Administration


 


Tiotropium Bromide  1 puff 03/31/17 15:15 04/05/17 09:20





  Spiriva -  IH   1 puff





  DAILY JARROD   Administration


 


Torsemide  40 mg 04/05/17 14:00  





  Demadex -  PO   





  BIDLASIX JARROD   


 


Warfarin Sodium  3 mg 04/04/17 18:00 04/04/17 17:24





  Coumadin -  PO   3 mg





  DAILY@1800 JARROD   Administration











 CBC, BMP





 04/05/17 05:35 





 04/05/17 05:35 














ecg 3/30/17: 





echo 10/2016: sev lve, global hk, sev dec lvef, mild rve, nl rv fcn, patty, mild-

mod mr, mv ring, tv ring, mild tr, mild pr, mild phtn, ivc mild dil





cxr: mild congestion (chronic finding)





tele: 











a/p:  76 year old gentleman with PMH of IDDM, COPD (O2 dependant) HTN, HLD, 

Afib (on coumadin), CAD s/p CABG 2013, end stage CHF with ICD placement (on 

milrinone pump), PVD, BPH here with productive cough.





cough:


-no signs pulm edema


-cxr with chronic findings


-likely due to URI/copd, sxs improving with resp treatment, now on po steroids





chronic end stage systolic heart failure s/p ICD, on home milrinone infusion:


- pt home dry weight is very well-defined, with stable range for years on 

milrinone, monitored closely by wife--dry wt approx 211 lbs


- on torsemide 40 bid at home, responds well to prn metolazone for weight 

increase


- wt stable here on admit, 205


- cxr with chronic findings, bnp at baseline


- required iv steroids here and this led to some vol overload so got a few 

doses of iv lasix with metolazone and now vol status improved, wt at baseline, 

pt feeling well, asking to go home


- ok to change back to home torsemide 40 bid


- cont milrinone at patient's home maintenance dose (on chronic infusion 

destination therapy)--0.35 mcg/kg/min.  


- cont home coreg


- recent office icd check with stable function





VTach:


-NSVT (11b) on tele


-known prior finding in pt, has ICD and has not had issues with inappr shocks


-K/mag good--replete prn (per usual targets)


-cont carvedilol as doing (low bp tendencies/dizzy limit outpt dose i believe)





afib


- currently rate controlled on coreg


- cont coumadin per INR





PVD


- con't AC, plavix statin.





CAD s/p CABG


- No anginal symptoms, no signs acs.  Con't home ac, plavix, atorvastatin, coreg





HTN: controlled on coreg





HLD: con't statin. 





CKD


- stable





cardiac wise stable for dc

## 2017-05-02 NOTE — DS
Physical Exam: 


SUBJECTIVE: Patient seen and examined








OBJECTIVE:





PHYSICAL EXAM





GENERAL: The patient is awake, alert, and fully oriented, in no acute distress.


HEAD: Normal with no signs of trauma.


EYES: PERRL, extraocular movements intact, sclera anicteric, conjunctiva clear. 


ENT: Ears normal, nares patent, oropharynx clear without exudates, moist mucous 

membranes.


NECK: Trachea midline, full range of motion, supple. 


LUNGS: Breath sounds equal, clear to auscultation bilaterally, no wheezes, no 

crackles, no accessory muscle use. 


HEART: Regular rate and rhythm, S1, S2 without murmur, rub or gallop.


ABDOMEN: Soft, nontender, nondistended, normoactive bowel sounds, no guarding, 

no rebound, no hepatosplenomegaly, no masses.


EXTREMITIES: 2+ pulses, warm, well-perfused, no edema. 


NEUROLOGICAL: Cranial nerves II through XII grossly intact. Normal speech, gait 

not observed.


PSYCH: Normal mood, normal affect.


SKIN: Warm, dry, normal turgor, no rashes or lesions noted.





LABS





HOSPITAL COURSE:





Date of Admission:03/31/17





Date of Discharge: 05/02/17











Minutes to complete discharge: 35





Discharge Summary


Reason For Visit: CONGESTIVE HEART FAILURE


Current Active Problems





CHF (congestive heart failure) (Acute) 








Condition: Improved





- Instructions


Diet, Activity, Other Instructions: 


A prescription has been sent to [facility] for prednisone. Take this medication 

exactly as directed and be sure to finish all the medication.





Arrangements are being made to refill your prescription for milrinone.





Return to the emergency department for any new or worsening symptoms.


Referrals: 


Sathya Flower MD [Primary Care Provider] - 


Daniel Nayak MD [Staff Physician] - 


Disposition: HOME





- Home Medications


Comprehensive Discharge Medication List: 


Ambulatory Orders





Allopurinol [Zyloprim -] 100 mg PO DAILY 01/14/16 


Atorvastatin Ca [Lipitor] 40 mg PO HS 01/14/16 


Carvedilol 6.25 mg PO BID 01/14/16 


Docusate Sodium [Colace -] 100 mg PO BID 01/14/16 


Insulin NPL/Insulin Lispro [Humalog Mix 75-25 Vial] 0 unit SQ PRN 01/14/16 


Milrinone Lactate/D5w [Milrinone 0.2 mg/ml in D5w] 0 mg IV DAILY 01/14/16 


Ranitidine [Zantac -] 75 mg PO DAILY 01/14/16 


Torsemide 40 mg PO BID 01/14/16 


Albuterol 0.083% Nebulizer Sol [Ventolin 0.083% Nebulizer Soln -] 1 amp NEB Q6H 

PRN #120 amp 01/18/16 


Ferrous Sulfate [Feosol] 325 mg PO Q48H  ud 08/27/16 


Potassium Chloride [K-Dur -] 20 meq PO TID #90 tablet.er 08/27/16 


Warfarin Na [Coumadin -] 4 mg PO DAILY@1800 01/10/17 


Bupropion HCl [Wellbutrin -] 75 mg DAILY 01/12/17 


Ascorbic Acid [Vitamin C -] 1,000 mg PO DAILY 03/30/17 


Cholecalciferol (Vitamin D3) [Vitamin D -] 1,000 unit PO DAILY 03/30/17 


Clopidogrel Bisulfate [Plavix -] 75 mg PO DAILY 03/30/17 


Multivitamin [Poly-Vitamin] 1 each PO DAILY 03/30/17 


Prednisone 5 mg PO ASDIR #10 tablet 04/05/17 








This patient is new to me today: No


Emergency Visit: Yes


ED Registration Date: 03/31/17


Care time: The patient presented to the Emergency Department on the above date 

and was hospitalized for further evaluation of their emergent condition.


Critical Care patient: No





- Discharge Referral


Referred to [facility] No

## 2017-05-02 NOTE — DS
Physical Exam: 


SUBJECTIVE: Patient seen and examined at bedside.








OBJECTIVE:


 Vital Signs











Temperature  98 F   04/05/17 10:00


 


Pulse Rate  79   04/05/17 12:09


 


Respiratory Rate  20   04/05/17 10:00


 


Blood Pressure  108/62   04/05/17 10:00


 


O2 Sat by Pulse Oximetry (%)  97   04/05/17 12:09











PHYSICAL EXAM





GENERAL: The patient is awake, alert, and fully oriented, in no acute distress.


HEAD: Normal with no signs of trauma.


EYES: PERRL, extraocular movements intact, sclera anicteric, conjunctiva clear. 

No ptosis. 


LUNGS: Right basilar crackles.


HEART: Regular rate and rhythm, S1, S2 without murmur, rub or gallop.


ABDOMEN: Soft, nontender, nondistended, normoactive bowel sounds, no guarding, 

no rebound


EXTREMITIES: 2+ pulses, warm, well-perfused, 1+ edema bilaterally 


NEUROLOGICAL: Cranial nerves II through XII grossly intact. Normal speech, gait 

not observed.





LABS





 CBCD











WBC  11.9 K/mm3 (4.0-10.0)  H D 04/05/17  05:35    


 


RBC  4.69 M/mm3 (4.00-5.60)   04/05/17  05:35    


 


Hgb  12.2 GM/dL (11.7-16.9)   04/05/17  05:35    


 


Hct  38.0 % (35.4-49)   04/05/17  05:35    


 


MCV  81.1 fl (80-96)   04/05/17  05:35    


 


MCHC  32.0 g/dl (32.0-35.9)   04/05/17  05:35    


 


RDW  18.2 % (11.9-15.9)  H  04/05/17  05:35    


 


Plt Count  185 K/MM3 (134-434)   04/05/17  05:35    


 


MPV  9.0 fl (7.5-11.1)   04/05/17  05:35    








 CMP











Sodium  134 mmol/L (136-145)  L  04/05/17  05:35    


 


Potassium  3.8 mmol/L (3.5-5.1)   04/05/17  05:35    


 


Chloride  88 mmol/L ()  L  04/05/17  05:35    


 


Carbon Dioxide  34 mmol/L (21-32)  H  04/05/17  05:35    


 


Anion Gap  12  (8-16)   04/05/17  05:35    


 


BUN  99 mg/dL (7-18)  H  04/05/17  05:35    


 


Creatinine  2.1 mg/dL (0.7-1.3)  H  04/05/17  05:35    


 


Creat Clearance w eGFR  26.31  (>60)   04/01/17  06:07    


 


Calcium  8.5 mg/dL (8.5-10.1)   04/05/17  05:35    


 


Total Bilirubin  0.7 mg/dL (0.2-1.0)   04/01/17  06:07    


 


AST  20 U/L (15-37)   04/01/17  06:07    


 


ALT  22 U/L (12-78)   04/01/17  06:07    


 


Alkaline Phosphatase  145 U/L ()  H  04/01/17  06:07    


 


Total Protein  8.1 g/dl (6.4-8.2)   04/01/17  06:07    


 


Albumin  3.6 g/dl (3.4-5.0)   04/01/17  06:07    











HOSPITAL COURSE:





Date of Admission:03/31/17





Date of Discharge: 4/5/17








76-year-old man with a history of COPD, chronic hypoxic respiratory failure, end

-stage chronic systolic heart failure on home milrinone, atrial fibrillation, 

CAD, MI, HTN, hyperlipidemia, PAD, type 2 DM, stage 4 CKD, and BPH, admitted 

for cough.





1. Hypokalemia, chronic


   - Repleted as needed 





2. Bronchitis


   --no fever, no leukocytosis


   - tapering IV steroids


   - converted to PO upon discharge


   


3. COPD 


   - Duonebs as needed





4. Chronic systolic heart failure, end-stage


   --signs of volume overload: mildly hyponatremic, b/l LE edema, weight up 3kg 

since admission 


   --continued Lasix IV 80mg BID


   --continued carvedilol, milrinone drip


      


5. Chronic hypoxic respiratory failure


   - Continued oxygen to maintain saturation > 90%





6. Permanent atrial fibrillation


   --rate controlled, continued carvedilol


   - INR supratherapeutic 3.73, held coumadin





7. CAD, history of MI


   - Continued Coreg, Plavix, Lipitor





8. HTN


   - Continued Coreg





9. Hyperlipidemia


   - Continued Lipitor





10. PAD





11. Type 2 diabetes mellitus


   - Fingersticks with Novolog sliding scale





12. Stage 4 CKD


   - Cr 2.1, was 2.4 on admission, baseline 2.0

















Minutes to complete discharge: 35





Discharge Summary


Reason For Visit: CONGESTIVE HEART FAILURE


Current Active Problems





CHF (congestive heart failure) (Acute) 








Condition: Improved





- Instructions


Diet, Activity, Other Instructions: 


A prescription has been sent to [facility] for prednisone. Take this medication 

exactly as directed and be sure to finish all the medication.





Arrangements are being made to refill your prescription for milrinone.





Return to the emergency department for any new or worsening symptoms.


Referrals: 


Sathya Flower MD [Primary Care Provider] - 


Daniel Nayak MD [Staff Physician] - 


Disposition: HOME





- Home Medications


Comprehensive Discharge Medication List: 


Ambulatory Orders





Allopurinol [Zyloprim -] 100 mg PO DAILY 01/14/16 


Atorvastatin Ca [Lipitor] 40 mg PO HS 01/14/16 


Carvedilol 6.25 mg PO BID 01/14/16 


Docusate Sodium [Colace -] 100 mg PO BID 01/14/16 


Insulin NPL/Insulin Lispro [Humalog Mix 75-25 Vial] 0 unit SQ PRN 01/14/16 


Milrinone Lactate/D5w [Milrinone 0.2 mg/ml in D5w] 0 mg IV DAILY 01/14/16 


Ranitidine [Zantac -] 75 mg PO DAILY 01/14/16 


Torsemide 40 mg PO BID 01/14/16 


Albuterol 0.083% Nebulizer Sol [Ventolin 0.083% Nebulizer Soln -] 1 amp NEB Q6H 

PRN #120 amp 01/18/16 


Ferrous Sulfate [Feosol] 325 mg PO Q48H  ud 08/27/16 


Potassium Chloride [K-Dur -] 20 meq PO TID #90 tablet.er 08/27/16 


Warfarin Na [Coumadin -] 4 mg PO DAILY@1800 01/10/17 


Bupropion HCl [Wellbutrin -] 75 mg DAILY 01/12/17 


Ascorbic Acid [Vitamin C -] 1,000 mg PO DAILY 03/30/17 


Cholecalciferol (Vitamin D3) [Vitamin D -] 1,000 unit PO DAILY 03/30/17 


Clopidogrel Bisulfate [Plavix -] 75 mg PO DAILY 03/30/17 


Multivitamin [Poly-Vitamin] 1 each PO DAILY 03/30/17 


Prednisone 5 mg PO ASDIR #10 tablet 04/05/17 








This patient is new to me today: No


Emergency Visit: Yes


ED Registration Date: 03/31/17


Care time: The patient presented to the Emergency Department on the above date 

and was hospitalized for further evaluation of their emergent condition.


Critical Care patient: No





- Discharge Referral


Referred to [facility] No

## 2017-05-19 ENCOUNTER — HOSPITAL ENCOUNTER (INPATIENT)
Dept: HOSPITAL 74 - JER | Age: 77
LOS: 6 days | Discharge: HOME HEALTH | DRG: 291 | End: 2017-05-25
Attending: INTERNAL MEDICINE | Admitting: INTERNAL MEDICINE
Payer: COMMERCIAL

## 2017-05-19 VITALS — BODY MASS INDEX: 26.5 KG/M2

## 2017-05-19 DIAGNOSIS — N40.0: ICD-10-CM

## 2017-05-19 DIAGNOSIS — Z95.2: ICD-10-CM

## 2017-05-19 DIAGNOSIS — J44.1: ICD-10-CM

## 2017-05-19 DIAGNOSIS — I13.0: Primary | ICD-10-CM

## 2017-05-19 DIAGNOSIS — Z79.4: ICD-10-CM

## 2017-05-19 DIAGNOSIS — Z95.1: ICD-10-CM

## 2017-05-19 DIAGNOSIS — D64.9: ICD-10-CM

## 2017-05-19 DIAGNOSIS — J96.21: ICD-10-CM

## 2017-05-19 DIAGNOSIS — Z98.61: ICD-10-CM

## 2017-05-19 DIAGNOSIS — Z99.81: ICD-10-CM

## 2017-05-19 DIAGNOSIS — E11.22: ICD-10-CM

## 2017-05-19 DIAGNOSIS — N18.4: ICD-10-CM

## 2017-05-19 DIAGNOSIS — E11.65: ICD-10-CM

## 2017-05-19 DIAGNOSIS — I50.23: ICD-10-CM

## 2017-05-19 DIAGNOSIS — E78.00: ICD-10-CM

## 2017-05-19 DIAGNOSIS — I25.2: ICD-10-CM

## 2017-05-19 DIAGNOSIS — Z95.0: ICD-10-CM

## 2017-05-19 DIAGNOSIS — I25.10: ICD-10-CM

## 2017-05-19 DIAGNOSIS — I73.9: ICD-10-CM

## 2017-05-19 DIAGNOSIS — I27.2: ICD-10-CM

## 2017-05-19 LAB
ALBUMIN SERPL-MCNC: 3.3 G/DL (ref 3.4–5)
ALP SERPL-CCNC: 151 U/L (ref 45–117)
ALT SERPL-CCNC: 18 U/L (ref 12–78)
ANION GAP SERPL CALC-SCNC: 10 MMOL/L (ref 8–16)
APPEARANCE UR: CLEAR
AST SERPL-CCNC: 17 U/L (ref 15–37)
BASOPHILS # BLD: 0.5 % (ref 0–2)
BILIRUB SERPL-MCNC: 0.4 MG/DL (ref 0.2–1)
BILIRUB UR STRIP.AUTO-MCNC: NEGATIVE MG/DL
CALCIUM SERPL-MCNC: 8.7 MG/DL (ref 8.5–10.1)
CO2 SERPL-SCNC: 38 MMOL/L (ref 21–32)
COCKROFT - GAULT: 31.4
COLOR UR: (no result)
CREAT SERPL-MCNC: 2.5 MG/DL (ref 0.7–1.3)
DEPRECATED RDW RBC AUTO: 18.4 % (ref 11.9–15.9)
EOSINOPHIL # BLD: 1.6 % (ref 0–4.5)
GLUCOSE SERPL-MCNC: 56 MG/DL (ref 74–106)
HYALINE CASTS URNS QL MICRO: 9 /LPF
INR BLD: 2.44 (ref 0.82–1.09)
KETONES UR QL STRIP: NEGATIVE
LEUKOCYTE ESTERASE UR QL STRIP.AUTO: (no result)
MCH RBC QN AUTO: 25.6 PG (ref 25.7–33.7)
MCHC RBC AUTO-ENTMCNC: 31.4 G/DL (ref 32–35.9)
MCV RBC: 81.4 FL (ref 80–96)
MUCOUS THREADS URNS QL MICRO: (no result)
NEUTROPHILS # BLD: 71.8 % (ref 42.8–82.8)
NITRITE UR QL STRIP: NEGATIVE
PH UR: 6 [PH] (ref 5–8)
PLATELET # BLD AUTO: 206 K/MM3 (ref 134–434)
PMV BLD: 8.5 FL (ref 7.5–11.1)
PROT SERPL-MCNC: 7.3 G/DL (ref 6.4–8.2)
PROT UR QL STRIP: NEGATIVE
PROT UR QL STRIP: NEGATIVE
PT PNL PPP: 27.3 SEC (ref 9.98–11.88)
RBC # BLD AUTO: <1 /HPF (ref 0–3)
RBC # UR STRIP: NEGATIVE /UL
SP GR UR: <= 1.005 (ref 1–1.02)
TROPONIN I SERPL-MCNC: 0.04 NG/ML (ref 0–0.05)
UROBILINOGEN UR STRIP-MCNC: NEGATIVE E.U./DL (ref 0.2–1)
WBC # BLD AUTO: 10 K/MM3 (ref 4–10)
WBC # UR AUTO: 7 /HPF (ref 3–5)

## 2017-05-19 RX ADMIN — CARVEDILOL SCH MG: 6.25 TABLET, FILM COATED ORAL at 21:06

## 2017-05-19 RX ADMIN — TORSEMIDE SCH MG: 20 TABLET ORAL at 13:43

## 2017-05-19 RX ADMIN — FERROUS SULFATE TAB EC 324 MG (65 MG FE EQUIVALENT) SCH MG: 324 (65 FE) TABLET DELAYED RESPONSE at 12:50

## 2017-05-19 RX ADMIN — INSULIN ASPART SCH UNITS: 100 INJECTION, SOLUTION INTRAVENOUS; SUBCUTANEOUS at 12:50

## 2017-05-19 RX ADMIN — IPRATROPIUM BROMIDE AND ALBUTEROL SULFATE SCH AMP: .5; 3 SOLUTION RESPIRATORY (INHALATION) at 22:56

## 2017-05-19 RX ADMIN — INSULIN ASPART SCH UNITS: 100 INJECTION, SOLUTION INTRAVENOUS; SUBCUTANEOUS at 21:06

## 2017-05-19 RX ADMIN — ATORVASTATIN CALCIUM SCH MG: 40 TABLET, FILM COATED ORAL at 21:06

## 2017-05-19 RX ADMIN — INSULIN ASPART SCH: 100 INJECTION, SOLUTION INTRAVENOUS; SUBCUTANEOUS at 16:35

## 2017-05-19 RX ADMIN — AZITHROMYCIN DIHYDRATE SCH MLS/HR: 500 INJECTION, POWDER, LYOPHILIZED, FOR SOLUTION INTRAVENOUS at 17:17

## 2017-05-19 RX ADMIN — POTASSIUM CHLORIDE SCH MEQ: 1500 TABLET, EXTENDED RELEASE ORAL at 21:06

## 2017-05-19 RX ADMIN — POTASSIUM CHLORIDE SCH MEQ: 1500 TABLET, EXTENDED RELEASE ORAL at 13:43

## 2017-05-19 RX ADMIN — WARFARIN SCH MG: 2 TABLET ORAL at 17:18

## 2017-05-19 RX ADMIN — MILRINONE LACTATE SCH MLS/HR: 200 INJECTION, SOLUTION INTRAVENOUS at 16:35

## 2017-05-19 RX ADMIN — DOCUSATE SODIUM SCH MG: 100 CAPSULE, LIQUID FILLED ORAL at 21:08

## 2017-05-19 NOTE — EKG
Test Reason : 

Blood Pressure : ***/*** mmHG

Vent. Rate : 076 BPM     Atrial Rate : 076 BPM

   P-R Int : 000 ms          QRS Dur : 212 ms

    QT Int : 514 ms       P-R-T Axes : 000 228 031 degrees

   QTc Int : 578 ms

 

Ventricular-paced rhythm WITH OCCASIONAL PREMATURE VENTRICULAR

COMPLEXES

Biventricular pacemaker detected

ABNORMAL ECG

WHEN COMPARED WITH ECG OF 30-MAR-2017 14:36,

PREMATURE VENTRICULAR COMPLEXES ARE NOW PRESENT

Confirmed by BEREKET LANDON, RICCARDO ([id number]) on 5/19/2017 4:14:43 PM

 

Referred By:             Confirmed By:RICCARDO CUBA MD

## 2017-05-19 NOTE — CON.CARD
Cardiology Consult (text)





- Consultation


Consultation Note: 


cc: sob, cp, HA





hpi:  76 year old man with PMH of IDDM, COPD (O2 dependant) HTN, HLD, Afib (on 

coumadin), CAD s/p CABG 2013, end stage CHF with ICD placement (on milrinone 

pump), PVD, BPH here with sob, cp, HA.  Had been feeling well until last night 

when he developed HA, sob, and sharp central cp while relaxing.  His glucose 

was low at the time and he drank some juice and felt a little better buts sxs 

still present so came to ER.  This AM feeling better.  No fevers.  No palps, 

dizzy, loc, pnd, orthopnea, le edema.  Baseline chronic wright unchanged.  Sees dr merchant for cardio.





Past Medical History/Surg hx: per hpi, additionally hernia repair


Soc hx: Former smoker, no etoh or illicits


fam hx: brother with pvd


ros: per hpi; no nasal congestion, no rash, vision changes, wt loss, gib, 

hematuria, muscle pains


meds:


 





 


 Home Medications











 Medication  Instructions  Recorded


 


Allopurinol [Zyloprim -] 100 mg PO DAILY 01/14/16


 


Atorvastatin Ca [Lipitor] 40 mg PO HS 01/14/16


 


Carvedilol 6.25 mg PO BID 01/14/16


 


Docusate Sodium [Colace -] 100 mg PO BID 01/14/16


 


Insulin NPL/Insulin Lispro 0 unit SQ PRN 01/14/16





[Humalog Mix 75-25 Vial]  


 


Milrinone Lactate/D5w [Milrinone 0 mg IV DAILY 01/14/16





0.2 mg/ml in D5w]  


 


Ranitidine [Zantac -] 75 mg PO DAILY 01/14/16


 


Torsemide 40 mg PO BID 01/14/16


 


Albuterol 0.083% Nebulizer Sol 1 amp NEB Q6H PRN #120 amp 01/18/16





[Ventolin 0.083% Nebulizer Soln -]  


 


Ferrous Sulfate [Feosol] 325 mg PO Q48H  ud 08/27/16


 


Potassium Chloride [K-Dur -] 20 meq PO TID #90 tablet.er 08/27/16


 


Warfarin Na [Coumadin -] 4 mg PO DAILY@1800 01/10/17


 


Bupropion HCl [Wellbutrin -] 75 mg DAILY 01/12/17


 


Ascorbic Acid [Vitamin C -] 1,000 mg PO DAILY 03/30/17


 


Cholecalciferol (Vitamin D3) 1,000 unit PO DAILY 03/30/17





[Vitamin D -]  


 


Clopidogrel Bisulfate [Plavix -] 75 mg PO DAILY 03/30/17


 


Multivitamin [Poly-Vitamin] 1 each PO DAILY 03/30/17


 


Prednisone 5 mg PO ASDIR #10 tablet 04/05/17














pe:


 


 


 Vital Signs











 Period  Temp  Pulse  Resp  BP Sys/Alonso  Pulse Ox


 


 Last 24 Hr  97.7 F-98.9 F  74-80  16-26  114-124/69-83  











NAD, calm 


JVD flat, neck supple


RRR nl s1, s2 no m/r/g.


cta bl, nl effort


+ bs soft nd, nt


no le e/c/c


aaox3


pos pt dp, no carotid bruits


no jaundice diaphoresis





 


 


 Laboratory Last Values











WBC  10.0 K/mm3 (4.0-10.0)   05/19/17  01:36    


 


RBC  4.84 M/mm3 (4.00-5.60)   05/19/17  01:36    


 


Hgb  12.4 GM/dL (11.7-16.9)   05/19/17  01:36    


 


Hct  39.4 % (35.4-49)   05/19/17  01:36    


 


MCV  81.4 fl (80-96)   05/19/17  01:36    


 


MCHC  31.4 g/dl (32.0-35.9)  L  05/19/17  01:36    


 


RDW  18.4 % (11.9-15.9)  H  05/19/17  01:36    


 


Plt Count  206 K/MM3 (134-434)   05/19/17  01:36    


 


MPV  8.5 fl (7.5-11.1)   05/19/17  01:36    


 


Neutrophils %  71.8 % (42.8-82.8)   05/19/17  01:36    


 


Lymphocytes %  15.2 % (8-40)  D 05/19/17  01:36    


 


Monocytes %  10.9 % (3.8-10.2)  H  05/19/17  01:36    


 


Eosinophils %  1.6 % (0-4.5)  D 05/19/17  01:36    


 


Basophils %  0.5 % (0-2.0)  D 05/19/17  01:36    


 


INR  2.44  (0.82-1.09)  H  05/19/17  01:36    


 


Sodium  142 mmol/L (136-145)   05/19/17  01:36    


 


Potassium  3.4 mmol/L (3.5-5.1)  L  05/19/17  01:36    


 


Chloride  94 mmol/L ()  L  05/19/17  01:36    


 


Carbon Dioxide  38 mmol/L (21-32)  H  05/19/17  01:36    


 


Anion Gap  10  (8-16)   05/19/17  01:36    


 


BUN  63 mg/dL (7-18)  H D 05/19/17  01:36    


 


Creatinine  2.5 mg/dL (0.7-1.3)  H  05/19/17  01:36    


 


Creat Clearance w eGFR  25.10  (>60)   05/19/17  01:36    


 


POC Glucometer  134.08382 UNITS (())   05/19/17  08:21    


 


Random Glucose  56 mg/dL ()  L D 05/19/17  01:36    


 


Calcium  8.7 mg/dL (8.5-10.1)   05/19/17  01:36    


 


Total Bilirubin  0.4 mg/dL (0.2-1.0)  D 05/19/17  01:36    


 


AST  17 U/L (15-37)   05/19/17  01:36    


 


ALT  18 U/L (12-78)   05/19/17  01:36    


 


Alkaline Phosphatase  151 U/L ()  H  05/19/17  01:36    


 


Creatine Kinase  39 IU/L ()   05/19/17  01:36    


 


Troponin I  0.04 ng/ml (0.00-0.05)   05/19/17  01:36    


 


B-Natriuretic Peptide  2012.19 pg/ml (5-450)  H  05/19/17  01:36    


 


Total Protein  7.3 g/dl (6.4-8.2)   05/19/17  01:36    


 


Albumin  3.3 g/dl (3.4-5.0)  L  05/19/17  01:36    








ecg 5/19/17: 





echo 10/2016: sev lve, global hk, sev dec lvef, mild rve, nl rv fcn, patty, mild-

mod mr, mv ring, tv ring, mild tr, mild pr, mild phtn, ivc mild dil





cxr: no sig chf





tele: 








a/p:  76 year old man with PMH of IDDM, COPD (O2 dependant) HTN, HLD, Afib (on 

coumadin), CAD s/p CABG 2013, end stage CHF with ICD placement (on milrinone 

pump), PVD, BPH here with sob, cp, HA.





sob:


-no signs vol overload, pulm edema


-cxr with chronic findings, bnp at baseline, wt below baseline


-does not seem cardiac related at present





cp:


-improved, atypical cp, no signs of acs


-ce's neg x1, cont to margarita, monitor on tele





chronic end stage systolic heart failure s/p ICD, on home milrinone infusion:


- dry wt in low 200 lbs, currently at his baseline


- cxr with chronic findings, bnp at baseline


- vol status seems stable, cont home torsemide 40 po bid as doing


- cont milrinone at patient's home maintenance dose (on chronic infusion 

destination therapy)--0.35 mcg/kg/min.  


- cont home coreg


- recent office icd check with stable function





afib


- currently rate controlled on coreg


- cont coumadin per INR





PVD


- con't AC, statin.





CAD s/p CABG


- No anginal symptoms, no signs acs.  Con't home ac, atorvastatin, coreg





HTN: controlled on coreg





HLD: con't statin. 





CKD


- close to baseline

## 2017-05-19 NOTE — HP
Admitting History and Physical





- Primary Care Physician


PCP: Sathya Flower





- Admission


Chief Complaint: I had trouble breathing


History of Present Illness: 


Mr Moss is a pleasant 76 year old male who comes in with trouble breathing. 

He says he was doing well until 11pm last night when he had sudden onset of 

difficulty breathing. He says it was made worse by lying flat. Nothing relieved 

it. He had a cough that was productive of thick white sputum. He had chest 

tightness that was located retrosternally and radiated up to his head. He had 

nausea but no vomiting. He denies lightheadedness, dizziness, passing out, 

fevers, chills, sick contacts. abdominal pain, diarrhea, constipation, 

difficulty or pain on urination. He has chronic swelling but notes that it is 

worse today.


History Source: Patient


Limitations to Obtaining History: No Limitations





- Past Medical History


Cardiovascular: Yes: AFIB, CAD, CHF, HTN, Hyperlipdemia, MI, Pulmonary 

Hypertension, Other (AICD placed)


Pulmonary: Yes: COPD, O2 Dependent


Renal/: Yes: Renal Inusuff, BPH


Endocrine: Yes: Diabetes Mellitus





- Past Surgical History


Past Surgical History: Yes: AICD, CABG, Hernia Repair





- Smoking History


Smoking history: Unknown if ever smoked


Have you smoked in the past 12 months: No


Aproximately how many cigarettes per day: 40


If you are a former smoker, when did you quit?: 2011





- Alcohol/Substance Use


Hx Alcohol Use: No





- Social History


Usual Living Arrangement: Yes: With Spouse


ADL: Independent


History of Recent Travel: No





Home Medications





- Allergies


Allergies/Adverse Reactions: 


 Allergies











Allergy/AdvReac Type Severity Reaction Status Date / Time


 


No Known Drug Allergies Allergy   Verified 05/19/17 01:25


 


shellfish derived Allergy   Verified 05/19/17 01:25














- Home Medications


Home Medications: 


Ambulatory Orders





Allopurinol [Zyloprim -] 100 mg PO DAILY 01/14/16 


Atorvastatin Ca [Lipitor] 40 mg PO HS 01/14/16 


Carvedilol 6.25 mg PO BID 01/14/16 


Docusate Sodium [Colace -] 100 mg PO BID 01/14/16 


Insulin NPL/Insulin Lispro [Humalog Mix 75-25 Vial] 0 unit SQ PRN 01/14/16 


Milrinone Lactate/D5w [Milrinone 0.2 mg/ml in D5w] 0 mg IV DAILY 01/14/16 


Ranitidine [Zantac -] 75 mg PO DAILY 01/14/16 


Torsemide 40 mg PO BID 01/14/16 


Albuterol 0.083% Nebulizer Sol [Ventolin 0.083% Nebulizer Soln -] 1 amp NEB Q6H 

PRN #120 amp 01/18/16 


Ferrous Sulfate [Feosol] 325 mg PO Q48H  ud 08/27/16 


Potassium Chloride [K-Dur -] 20 meq PO TID #90 tablet.er 08/27/16 


Warfarin Na [Coumadin -] 4 mg PO DAILY@1800 01/10/17 


Bupropion HCl [Wellbutrin -] 75 mg DAILY 01/12/17 


Ascorbic Acid [Vitamin C -] 1,000 mg PO DAILY 03/30/17 


Cholecalciferol (Vitamin D3) [Vitamin D -] 1,000 unit PO DAILY 03/30/17 


Clopidogrel Bisulfate [Plavix -] 75 mg PO DAILY 03/30/17 


Multivitamin [Poly-Vitamin] 1 each PO DAILY 03/30/17 


Prednisone 5 mg PO ASDIR #10 tablet 04/05/17 











Family Disease History





- Family Disease History


Family Disease History: Diabetes: Father, Heart Disease: Father





Review of Systems


Findings/Remarks: 


Full review of systems obtained, as per HPI and otherwise negative.





Physical Examination


Vital Signs: 


 Vital Signs











Temperature  97.7 F   05/19/17 07:47


 


Pulse Rate  75   05/19/17 07:47


 


Respiratory Rate  16   05/19/17 07:47


 


Blood Pressure  114/80   05/19/17 07:47


 


O2 Sat by Pulse Oximetry (%)  100   05/19/17 07:47











Constitutional: Yes: Well Nourished, No Distress, Calm


Eyes: Yes: Conjunctiva Clear, EOM Intact, PERRL


HENT: Yes: Atraumatic, Normocephalic


Cardiovascular: Yes: Regular Rate and Rhythm.  No: Gallop, Murmur, Rub


Respiratory: Yes: Regular, On Nasal O2, Rhonchi.  No: Rales, Wheezes


Gastrointestinal: Yes: Normal Bowel Sounds, Soft.  No: Distention, Tenderness


Extremities: Yes: WNL


Edema: Yes


Edema: LLE: 1+, RLE: 1+


Labs: 


 Laboratory Results - last 24 hr











  05/19/17 05/19/17 05/19/17





  01:36 01:36 01:36


 


WBC  10.0  


 


RBC  4.84  


 


Hgb  12.4  


 


Hct  39.4  


 


MCV  81.4  


 


MCHC  31.4 L  


 


RDW  18.4 H  


 


Plt Count  206  


 


MPV  8.5  


 


Neutrophils %  71.8  


 


Lymphocytes %  15.2  D  


 


Monocytes %  10.9 H  


 


Eosinophils %  1.6  D  


 


Basophils %  0.5  D  


 


INR   2.44 H 


 


Sodium    142


 


Potassium    3.4 L


 


Chloride    94 L


 


Carbon Dioxide    38 H


 


Anion Gap    10


 


BUN    63 H D


 


Creatinine    2.5 H


 


Creat Clearance w eGFR    25.10


 


POC Glucometer   


 


Random Glucose    56 L D


 


Calcium    8.7


 


Total Bilirubin    0.4  D


 


AST    17


 


ALT    18


 


Alkaline Phosphatase    151 H


 


Creatine Kinase    39


 


Troponin I    0.04


 


B-Natriuretic Peptide    2012.19 H


 


Total Protein    7.3


 


Albumin    3.3 L














  05/19/17 05/19/17





  08:21 11:09


 


WBC  


 


RBC  


 


Hgb  


 


Hct  


 


MCV  


 


MCHC  


 


RDW  


 


Plt Count  


 


MPV  


 


Neutrophils %  


 


Lymphocytes %  


 


Monocytes %  


 


Eosinophils %  


 


Basophils %  


 


INR  


 


Sodium  


 


Potassium  


 


Chloride  


 


Carbon Dioxide  


 


Anion Gap  


 


BUN  


 


Creatinine  


 


Creat Clearance w eGFR  


 


POC Glucometer  134.88204  263


 


Random Glucose  


 


Calcium  


 


Total Bilirubin  


 


AST  


 


ALT  


 


Alkaline Phosphatase  


 


Creatine Kinase  


 


Troponin I  


 


B-Natriuretic Peptide  


 


Total Protein  


 


Albumin  














Imaging





- Results


Chest X-ray: Report Reviewed, Image Reviewed





Problem List





- Problems


(1) Acute and chronic respiratory failure (acute-on-chronic)


Assessment/Plan: 


-patient presents with acute shortness of breath


-does not appear significantly fluid overloaded


-cardiology consulted


-feeling improved currently


-chest x-ray not showing edema


-therapeutic on coumadin, low suspicion for PE


-? COPD exacerbation


-will schedule duonebs


-will try to hold on steroids secondary to diabetes, but start if needed


-zithromax for inflammation


-if does not improve, consult pulmonary


Code(s): J96.20 - ACUTE AND CHR RESP FAILURE, UNSP W HYPOXIA OR HYPERCAPNIA   

Qualifiers: 


     Respiratory failure complication: hypoxia        Qualified Code(s): J96.21 

- Acute and chronic respiratory failure with hypoxia  





(2) Chest pain


Assessment/Plan: 


-atypical


-cardiology to see


-continue telemetry currently


Code(s): R07.9 - CHEST PAIN, UNSPECIFIED   





(3) CHF (congestive heart failure)


Assessment/Plan: 


-continue milrinone gtt, on at home


-continue torsemide


-cardiology to see


Code(s): I50.9 - HEART FAILURE, UNSPECIFIED   Qualifiers: 


     Congestive heart failure type: systolic





(4) CAD (coronary artery disease)


Assessment/Plan: 


-stable


-continue home regimen


Code(s): I25.10 - ATHSCL HEART DISEASE OF NATIVE CORONARY ARTERY W/O ANG PCTRS 

  





(5) CKD (chronic kidney disease)


Assessment/Plan: 


-at baseline


-continue to monitor


Code(s): N18.9 - CHRONIC KIDNEY DISEASE, UNSPECIFIED   Qualifiers: 


     Chronic kidney disease stage: stage 4 (severe)        Qualified Code(s): 

N18.4 - Chronic kidney disease, stage 4 (severe)  





(6) COPD (chronic obstructive pulmonary disease)


Assessment/Plan: 


-as above


Code(s): J44.9 - CHRONIC OBSTRUCTIVE PULMONARY DISEASE, UNSPECIFIED   Qualifiers

: 


     COPD type: COPD with acute exacerbation        Qualified Code(s): J44.1 - 

Chronic obstructive pulmonary disease with (acute) exacerbation  





(7) Diabetes


Assessment/Plan: 


-diabetic diet


-FSBS and SSI


Code(s): E11.9 - TYPE 2 DIABETES MELLITUS WITHOUT COMPLICATIONS   Qualifiers: 


     Diabetes mellitus type: type 2     Diabetes mellitus complication status: 

with kidney complications     Diabetes mellitus complication detail: with 

chronic kidney disease     Diabetes mellitus long term insulin use: with long 

term use     Chronic kidney disease stage: stage 4 (severe)        Qualified 

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

; N18.4 - Chronic kidney disease, stage 4 (severe); Z79.4 - Long term (current) 

use of insulin

## 2017-05-19 NOTE — PDOC
History of Present Illness





- General


History Source: Patient, Family (Wife)


Exam Limitations: No Limitations





- History of Present Illness


Initial Comments: 





05/19/17 02:31


The patient is a 76 year old male with a significant past medical history of 

CHF (on the portal milrinone pump), A fib, CAD (with pacemaker, 2 stents, heart 

valve replacement; past episode of MI in 2011), COPD, and IDDM, who presents to 

the ER with shortness of breath, chest pain, and headache for 3 hours. Patient 

states he was in his usual state of health today before he had a sudden onset 

of shortness of breath, chest pain, and headache. Patient localizes the chest 

pain to the middle and his headache to the frontal area. He describes the pain 

as achy. He rates the chest pain and headache at 7/10. Patient reports that the 

pain persists but has slightly improved since the onset. On interview, patient 

says he has slight arm weakness.





As per patients wife, patient had a blood sugar of 64 during the onset of the 

symptoms today and was given an orange to elevate his blood sugar. Patients 

baseline is at 150-200





Pulmonologist: Dr. Gama





Denies any alleviating or exacerbating factors 


Denies abdominal pain


Denies vomiting, diarrhea


Denies fever, chills


Denies leg pain or swelling


  


PCP: Dr. Flower


Cardiologist: Dr. Ron





<Марина Dawkins - Last Filed: 05/19/17 02:30>





- General


History Source: Patient, Spouse





<AniaFarrukh - Last Filed: 05/19/17 05:46>





- General


Chief Complaint: Chest Pain


Stated Complaint: CHEST PAIN


Time Seen by Provider: 05/19/17 01:36





Past History





<Марина Dawkins - Last Filed: 05/19/17 02:30>





- Past Medical History


Anemia: Yes


Asthma: No


Cancer: No


Cardiac Disorders: Yes (CHF, MI 2011,defibrilator placement,PACEMAKER)


CVA: No


COPD: Yes


CHF: Yes


Dementia: No


Diabetes: Yes


GI Disorders: No


 Disorders: Yes (bph)


HTN: Yes


Hypercholesterolemia: Yes


Liver Disease: No


Suicide Attempt (Hx): No


Seizures: No


Thyroid Disease: No





- Surgical History


Abdominal Surgery: Yes (HERNIA REPAIR 2003)


Appendectomy: No


Cardiac Surgery: Yes (2 OPEN HEART SURGERIES; TRACH PLACEMENT,PACEMAKER PLACED 7 /19/2013)


Cholecystectomy: Yes


Lung Surgery: No


Neurologic Surgery: No


Orthopedic Surgery: No





- Immunization History


Td Vaccination: Yes


TDAP Vaccination: Yes


Immunization Up to Date: Yes





- Psycho/Social/Smoking Cessation Hx


Anxiety: No


Suicidal Ideation: No


Smoking Status: No


Smoking History: Unknown if ever smoked


Years of Tobacco Use: 40


Have you smoked in the past 12 months: No


Number of Cigarettes Smoked Daily: 40


If you are a former smoker, when did you quit?: 2011


Cigars Per Day: 0


Information on smoking cessation initiated: No


'Breaking Loose' booklet given: 06/11/12


Hx Alcohol Use: No


Drug/Substance Use Hx: No


Substance Use Type: None


Hx Substance Use Treatment: No





<Farrukh Jorge - Last Filed: 05/19/17 05:46>





- Past Medical History


Allergies/Adverse Reactions: 


 Allergies











Allergy/AdvReac Type Severity Reaction Status Date / Time


 


No Known Drug Allergies Allergy   Verified 05/19/17 01:25


 


shellfish derived Allergy   Verified 05/19/17 01:25











Home Medications: 


Ambulatory Orders





Allopurinol [Zyloprim -] 100 mg PO DAILY 01/14/16 


Atorvastatin Ca [Lipitor] 40 mg PO HS 01/14/16 


Carvedilol 6.25 mg PO BID 01/14/16 


Docusate Sodium [Colace -] 100 mg PO BID 01/14/16 


Insulin NPL/Insulin Lispro [Humalog Mix 75-25 Vial] 0 unit SQ PRN 01/14/16 


Milrinone Lactate/D5w [Milrinone 0.2 mg/ml in D5w] 0 mg IV DAILY 01/14/16 


Ranitidine [Zantac -] 75 mg PO DAILY 01/14/16 


Torsemide 40 mg PO BID 01/14/16 


Albuterol 0.083% Nebulizer Sol [Ventolin 0.083% Nebulizer Soln -] 1 amp NEB Q6H 

PRN #120 amp 01/18/16 


Ferrous Sulfate [Feosol] 325 mg PO Q48H  ud 08/27/16 


Potassium Chloride [K-Dur -] 20 meq PO TID #90 tablet.er 08/27/16 


Warfarin Na [Coumadin -] 4 mg PO DAILY@1800 01/10/17 


Bupropion HCl [Wellbutrin -] 75 mg DAILY 01/12/17 


Ascorbic Acid [Vitamin C -] 1,000 mg PO DAILY 03/30/17 


Cholecalciferol (Vitamin D3) [Vitamin D -] 1,000 unit PO DAILY 03/30/17 


Clopidogrel Bisulfate [Plavix -] 75 mg PO DAILY 03/30/17 


Multivitamin [Poly-Vitamin] 1 each PO DAILY 03/30/17 


Prednisone 5 mg PO ASDIR #10 tablet 04/05/17 











**Review of Systems





- Review of Systems


Able to Perform ROS?: Yes


Comments:: 





05/19/17 02:31


CONSTITUTIONAL: 


Absent: fever, chills, diaphoresis, generalized weakness, malaise, loss of 

appetite


HEENT: 


Absent: rhinorrhea, nasal congestion, throat pain, throat swelling, difficulty 

swallowing, mouth swelling, ear pain, eye pain, visual Changes


CARDIOVASCULAR: 


Present: (+) chest pain


Absent:  syncope, palpitations, irregular heart rate, lightheadedness, 

peripheral edema


RESPIRATORY: 


Present: (+) shortness of breath (+) cough


Absent:  dyspnea with exertion, orthopnea, wheezing, stridor, hemoptysis


GASTROINTESTINAL:


Absent: abdominal pain, abdominal distension, nausea, vomiting, diarrhea, 

constipation, melena, hematochezia


GENITOURINARY: 


Absent: dysuria, frequency, urgency, hesitancy, hematuria, flank pain, genital 

pain


MUSCULOSKELETAL: 


Present: (+) arm weakness


Absent: myalgia, arthralgia, joint swelling


SKIN: 


Absent: rash, itching, pallor


HEMATOLOGIC/IMMUNOLOGIC: 


Absent: easy bleeding, easy bruising, lymphadenopathy, frequent infections


ENDOCRINE:


Absent: unexplained weight gain, unexplained weight loss, heat intolerance, 

cold intolerance


NEUROLOGIC: 


Present: (+) headache


Absent: focal weakness or paresthesias, dizziness, unsteady gait, seizure, 

mental status changes, bladder or bowel incontinence


PSYCHIATRIC: 


Absent: anxiety, depression, suicidal or homicidal ideation, hallucinations.








<Uts,Марина - Last Filed: 05/19/17 02:30>





*Physical Exam





- Vital Signs


 Last Vital Signs











Temp Pulse Resp BP Pulse Ox


 


 98.9 F   76   22   116/83   94 L


 


 05/19/17 01:25  05/19/17 02:09  05/19/17 01:25  05/19/17 01:25  05/19/17 02:09














- Physical Exam


Comments: 





05/19/17 02:34


GENERAL:


Well developed, well nourished. Awake and alert. No acute distress.


HEENT:


Normocephalic, atraumatic. PERRLA, EOMI. No conjunctival pallor. Sclera are non-

icteric. Moist mucous membranes. Oropharynx is clear.


NECK: 


Supple. Full ROM. No JVD. Carotid pulses 2+ and symmetric, without bruits. No 

thyromegaly. No lymphadenopathy.


CARDIOVASCULAR:


Regular rate and rhythm on pacemaker.  No murmurs, rubs, or gallops. Distal 

pulses are 2+ and symmetric. 


PULMONARY: 


Decreased breath sounds bilaterally. Crackles throughout. No wheezing, rales.


ABDOMINAL:


Soft. Non-tender. Non-distended. No rebound or guarding. No organomegaly. 

Normoactive bowel sounds. 


MUSCULOSKELETAL 


Normal range of motion at all joints. No bony deformities or tenderness. No CVA 

tenderness.


EXTREMITIES: 


No calf pain. No swelling. No cyanosis. No clubbing. No edema. No calf 

tenderness.


SKIN: 


Warm and dry. Normal capillary refill. No rashes. No jaundice. 


NEUROLOGICAL: 


Alert, awake, appropriate. Cranial nerves 2-12 intact. No deficits to light 

touch and temperature in face, upper extremities and lower extremities. No 

motor deficits in the in face, upper extremities and lower extremities. 

Normoreflexic in the upper and lower extremities. Normal speech. Toes are down-

going bilaterally. Gait is normal without ataxia.


PSYCHIATRIC: 


Cooperative. Good eye contact. Appropriate mood and affect.











<JuanchoМарина - Last Filed: 05/19/17 02:30>





- Vital Signs


 Last Vital Signs











Temp Pulse Resp BP Pulse Ox


 


 98.9 F   74   22   116/83   92 L


 


 05/19/17 01:25  05/19/17 01:25  05/19/17 01:25  05/19/17 01:25  05/19/17 01:25














<Farrukh Jorge - Last Filed: 05/19/17 05:46>





ED Treatment Course





- LABORATORY


CBC & Chemistry Diagram: 


 05/19/17 01:36





 05/19/17 01:36





- ADDITIONAL ORDERS


Additional order review: 


 Laboratory  Results











  05/19/17





  01:36


 


INR  2.44 H








 











  05/19/17





  01:36


 


RBC  4.84


 


MCV  81.4


 


MCHC  31.4 L


 


RDW  18.4 H


 


MPV  8.5


 


Neutrophils %  71.8


 


Lymphocytes %  15.2  D


 


Monocytes %  10.9 H


 


Eosinophils %  1.6  D


 


Basophils %  0.5  D














<Марина Dawkins - Last Filed: 05/19/17 02:30>





- LABORATORY


CBC & Chemistry Diagram: 


 05/19/17 01:36





 05/19/17 01:36





- RADIOLOGY


Radiology Studies Ordered: 














 Category Date Time Status


 


 CHEST X-RAY PORTABLE* [RAD] Stat Radiology  05/19/17 01:25 Taken














<Farrukh Jorge - Last Filed: 05/19/17 05:46>





*DC/Admit/Observation/Transfer





- Attestations


Scribe Attestion: 





05/19/17 02:36





Documentation prepared by Марина Dawkins, acting as medical scribe for Farrukh Jorge DO.





<Марина Dawkins - Last Filed: 05/19/17 02:30>





- Discharge Dispostion


Admit: Yes





<Farrukh Jorge - Last Filed: 05/19/17 05:46>


Diagnosis at time of Disposition: 


 CHF (congestive heart failure), Chest pain





- Discharge Dispostion


Condition at time of disposition: Stable





- Referrals


Referrals: 


Sathya Flower MD [Primary Care Provider] -

## 2017-05-20 LAB
ANION GAP SERPL CALC-SCNC: 10 MMOL/L (ref 8–16)
BASOPHILS # BLD: 0.4 % (ref 0–2)
CALCIUM SERPL-MCNC: 8.9 MG/DL (ref 8.5–10.1)
CO2 SERPL-SCNC: 39 MMOL/L (ref 21–32)
COCKROFT - GAULT: 36.5
CREAT SERPL-MCNC: 2.2 MG/DL (ref 0.7–1.3)
DEPRECATED RDW RBC AUTO: 18 % (ref 11.9–15.9)
EOSINOPHIL # BLD: 3 % (ref 0–4.5)
GLUCOSE SERPL-MCNC: 130 MG/DL (ref 74–106)
INR BLD: 2.45 (ref 0.82–1.09)
MAGNESIUM SERPL-MCNC: 2.7 MG/DL (ref 1.8–2.4)
MCH RBC QN AUTO: 26 PG (ref 25.7–33.7)
MCHC RBC AUTO-ENTMCNC: 32 G/DL (ref 32–35.9)
MCV RBC: 81.2 FL (ref 80–96)
NEUTROPHILS # BLD: 68.2 % (ref 42.8–82.8)
PHOSPHATE SERPL-MCNC: 3.5 MG/DL (ref 2.5–4.9)
PLATELET # BLD AUTO: 187 K/MM3 (ref 134–434)
PMV BLD: 8.6 FL (ref 7.5–11.1)
PT PNL PPP: 27.5 SEC (ref 9.98–11.88)
WBC # BLD AUTO: 7.4 K/MM3 (ref 4–10)

## 2017-05-20 RX ADMIN — AZITHROMYCIN DIHYDRATE SCH MLS/HR: 500 INJECTION, POWDER, LYOPHILIZED, FOR SOLUTION INTRAVENOUS at 10:59

## 2017-05-20 RX ADMIN — TORSEMIDE SCH MG: 20 TABLET ORAL at 05:58

## 2017-05-20 RX ADMIN — ATORVASTATIN CALCIUM SCH MG: 40 TABLET, FILM COATED ORAL at 21:06

## 2017-05-20 RX ADMIN — POTASSIUM CHLORIDE SCH MEQ: 1500 TABLET, EXTENDED RELEASE ORAL at 21:06

## 2017-05-20 RX ADMIN — IPRATROPIUM BROMIDE AND ALBUTEROL SULFATE SCH: .5; 3 SOLUTION RESPIRATORY (INHALATION) at 22:49

## 2017-05-20 RX ADMIN — CARVEDILOL SCH MG: 6.25 TABLET, FILM COATED ORAL at 21:06

## 2017-05-20 RX ADMIN — INSULIN ASPART SCH: 100 INJECTION, SOLUTION INTRAVENOUS; SUBCUTANEOUS at 05:59

## 2017-05-20 RX ADMIN — OXYCODONE HYDROCHLORIDE AND ACETAMINOPHEN SCH MG: 500 TABLET ORAL at 10:58

## 2017-05-20 RX ADMIN — WARFARIN SCH MG: 2 TABLET ORAL at 17:26

## 2017-05-20 RX ADMIN — CLOPIDOGREL BISULFATE SCH MG: 75 TABLET, FILM COATED ORAL at 10:59

## 2017-05-20 RX ADMIN — DOCUSATE SODIUM SCH MG: 100 CAPSULE, LIQUID FILLED ORAL at 21:06

## 2017-05-20 RX ADMIN — INSULIN ASPART SCH UNITS: 100 INJECTION, SOLUTION INTRAVENOUS; SUBCUTANEOUS at 12:08

## 2017-05-20 RX ADMIN — DOCUSATE SODIUM SCH MG: 100 CAPSULE, LIQUID FILLED ORAL at 10:59

## 2017-05-20 RX ADMIN — INSULIN ASPART SCH UNITS: 100 INJECTION, SOLUTION INTRAVENOUS; SUBCUTANEOUS at 21:06

## 2017-05-20 RX ADMIN — TORSEMIDE SCH MG: 20 TABLET ORAL at 13:34

## 2017-05-20 RX ADMIN — POTASSIUM CHLORIDE SCH MEQ: 1500 TABLET, EXTENDED RELEASE ORAL at 13:34

## 2017-05-20 RX ADMIN — ALLOPURINOL SCH MG: 100 TABLET ORAL at 10:59

## 2017-05-20 RX ADMIN — IPRATROPIUM BROMIDE AND ALBUTEROL SULFATE SCH AMP: .5; 3 SOLUTION RESPIRATORY (INHALATION) at 06:20

## 2017-05-20 RX ADMIN — IPRATROPIUM BROMIDE AND ALBUTEROL SULFATE SCH AMP: .5; 3 SOLUTION RESPIRATORY (INHALATION) at 14:07

## 2017-05-20 RX ADMIN — MILRINONE LACTATE SCH MLS/HR: 200 INJECTION, SOLUTION INTRAVENOUS at 16:07

## 2017-05-20 RX ADMIN — INSULIN ASPART SCH UNITS: 100 INJECTION, SOLUTION INTRAVENOUS; SUBCUTANEOUS at 17:25

## 2017-05-20 RX ADMIN — RANITIDINE SCH MG: 150 TABLET ORAL at 10:58

## 2017-05-20 RX ADMIN — CARVEDILOL SCH MG: 6.25 TABLET, FILM COATED ORAL at 10:59

## 2017-05-20 RX ADMIN — POTASSIUM CHLORIDE SCH MEQ: 1500 TABLET, EXTENDED RELEASE ORAL at 05:58

## 2017-05-20 RX ADMIN — CHOLECALCIFEROL TAB 10 MCG (400 UNIT) SCH UNIT: 10 TAB at 10:59

## 2017-05-20 NOTE — PN
Progress Note, Physician


History of Present Illness: 


Has been feeling a little better with decreased shortness of breath.  No 

further HA or nausea.





- Current Medication List


Current Medications: 


Active Medications





Acetaminophen (Tylenol -)  650 mg PO Q4H PRN


   PRN Reason: FEVER OR PAIN


Albuterol Sulfate (Ventolin 0.083% Nebulizer Soln -)  1 amp NEB Q6H PRN


   PRN Reason: SHORT OF BREATH/WHEEZING


Albuterol/Ipratropium (Duoneb -)  1 amp NEB TIDR [facility]


   Last Admin: 05/20/17 06:20 Dose:  1 amp


Allopurinol (Zyloprim -)  100 mg PO DAILY [facility]


Ascorbic Acid (Vitamin C -)  1,000 mg PO DAILY [facility]


Atorvastatin Calcium (Lipitor -)  40 mg PO HS [facility]


   Last Admin: 05/19/17 21:06 Dose:  40 mg


Bupropion HCl (Wellbutrin -)  75 mg PO DAILY [facility]


Carvedilol (Coreg -)  6.25 mg PO BID [facility]


   Last Admin: 05/19/17 21:06 Dose:  6.25 mg


Cholecalciferol (Vitamin D3 -)  1,000 unit PO DAILY [facility]


Clopidogrel Bisulfate (Plavix -)  75 mg PO DAILY [facility]


Docusate Sodium (Colace -)  100 mg PO BID [facility]


   Last Admin: 05/19/17 21:08 Dose:  100 mg


Ferrous Sulfate (Feosol -)  325 mg PO Q2D [facility]


   Last Admin: 05/19/17 12:50 Dose:  325 mg


Milrinone Lactate/Dextrose (Milrinone 20mg/100ml Ivpb -)  100 mls @ 9.573 mls/

hr IVPB TITR [facility]


   PRN Reason: 0.35 MCG/KG/MIN


   Last Admin: 05/19/17 16:35 Dose:  9.573 mls/hr


Azithromycin (Zithromax 500mg Ivpb (Pre-Docked))  250 mls @ 250 mls/hr IVPB 

DAILY [facility]


   Last Admin: 05/19/17 17:17 Dose:  250 mls/hr


Insulin Aspart (Novolog Vial Sliding Scale -)  1 vial SQ ACHS [facility]


   PRN Reason: Protocol


   Last Admin: 05/20/17 05:59 Dose:  Not Given


Non-Formulary Medication (Multivitamin [Poly-Vitamin])  1 each PO DAILY [facility]


Potassium Chloride (K-Dur -)  20 meq PO TID [facility]


   Last Admin: 05/20/17 05:58 Dose:  20 meq


Ranitidine HCl (Zantac -)  75 mg PO DAILY [facility]


Torsemide (Demadex -)  40 mg PO BID@0600,1400 [facility]


   Last Admin: 05/20/17 05:58 Dose:  40 mg


Warfarin Sodium (Coumadin -)  4 mg PO DAILY@1800 [facility]


   Last Admin: 05/19/17 17:18 Dose:  4 mg











- Objective


Vital Signs: 


 Vital Signs











Temperature  97.5 F L  05/20/17 06:00


 


Pulse Rate  75   05/20/17 06:00


 


Respiratory Rate  20   05/20/17 06:00


 


Blood Pressure  115/71   05/20/17 06:00


 


O2 Sat by Pulse Oximetry (%)  100   05/19/17 21:00











Constitutional: Yes: No Distress, Calm


Eyes: Yes: Conjunctiva Clear, EOM Intact, PERRL


HENT: Yes: Atraumatic, Normocephalic


Neck: Yes: Supple, Trachea Midline


Cardiovascular: Yes: S1, S2.  No: Murmur


Respiratory: Yes: Regular, Diminished (bilateral lungs)


Gastrointestinal: Yes: Normal Bowel Sounds, Soft.  No: Distention, Tenderness


Edema: Yes


Edema: LLE: 2+, RLE: 2+


Neurological: Yes: Alert


Labs: 


 CBC, BMP





 05/20/17 06:00 





 05/20/17 06:00 





 INR, PTT











INR  2.45  (0.82-1.09)  H  05/20/17  06:00    














Assessment/Plan


All Active Problems





Acute kidney injury (Acute) 


CHF (congestive heart failure) (Acute) 


Chest pain (Acute) 


Diabetes (Acute) 


AICD (automatic cardioverter/defibrillator) present (Acute) 


Abscess (Acute) 


Acute and chronic respiratory failure (acute-on-chronic) (Acute) 


Acute bronchitis and bronchiolitis (Acute) 


Acute on chronic renal insufficiency (Acute) 


Atrial fibrillation (Acute) 


CAD (coronary artery disease) (Acute) 


CKD (chronic kidney disease) (Acute) 


COPD (chronic obstructive pulmonary disease) (Acute) 


Cardiomyopathy (Acute) 


Cellulitis (Acute) 


Cellulitis and abscess of leg (Acute) 


Chronic kidney disease (CKD) stage G3a/A3, moderately decreased glomerular 

filtration rate (GFR) between 45-59 mL/min/1.73 square meter and albuminuria 

creatinine ratio greater than 300 mg/g (Acute) 


Cough (Acute) 


Diabetes mellitus, insulin dependent (IDDM), uncontrolled (Acute) 


Dyspnea on exertion (Acute) 


Electrolyte abnormality (Acute) 


Gastroenteritis (Acute) 


Hematoma (Acute) 


Hypokalemia (Acute) 


Pancreatic mass (Acute) 


Presence of permanent cardiac pacemaker (Acute) 


Renal insufficiency (Acute) 


Renal mass (Acute) 


Superficial bruising of foot (Acute) 


Urinary tract infection (Acute) 


Vascular catheter dysfunction (Acute) 


Vomiting (Acute) 





-likely current shortness of breath from bronchial infection/ bronchitis -cont 

nebs/ abx


-continuing on home CHF regimen/ milrinone pump

## 2017-05-20 NOTE — PN
Progress Note, Physician


History of Present Illness: 


Tele A-paced 80s


No CV complaints





- Current Medication List


Current Medications: 


Active Medications





Acetaminophen (Tylenol -)  650 mg PO Q4H PRN


   PRN Reason: FEVER OR PAIN


Albuterol Sulfate (Ventolin 0.083% Nebulizer Soln -)  1 amp NEB Q6H PRN


   PRN Reason: SHORT OF BREATH/WHEEZING


Albuterol/Ipratropium (Duoneb -)  1 amp NEB TIDR [facility]


   Last Admin: 05/20/17 06:20 Dose:  1 amp


Allopurinol (Zyloprim -)  100 mg PO DAILY [facility]


Ascorbic Acid (Vitamin C -)  1,000 mg PO DAILY [facility]


Atorvastatin Calcium (Lipitor -)  40 mg PO HS [facility]


   Last Admin: 05/19/17 21:06 Dose:  40 mg


Bupropion HCl (Wellbutrin -)  75 mg PO DAILY [facility]


Carvedilol (Coreg -)  6.25 mg PO BID [facility]


   Last Admin: 05/19/17 21:06 Dose:  6.25 mg


Cholecalciferol (Vitamin D3 -)  1,000 unit PO DAILY [facility]


Clopidogrel Bisulfate (Plavix -)  75 mg PO DAILY [facility]


Docusate Sodium (Colace -)  100 mg PO BID [facility]


   Last Admin: 05/19/17 21:08 Dose:  100 mg


Ferrous Sulfate (Feosol -)  325 mg PO Q2D [facility]


   Last Admin: 05/19/17 12:50 Dose:  325 mg


Milrinone Lactate/Dextrose (Milrinone 20mg/100ml Ivpb -)  100 mls @ 9.573 mls/

hr IVPB TITR [facility]


   PRN Reason: 0.35 MCG/KG/MIN


   Last Admin: 05/19/17 16:35 Dose:  9.573 mls/hr


Azithromycin (Zithromax 500mg Ivpb (Pre-Docked))  250 mls @ 250 mls/hr IVPB 

DAILY [facility]


   Last Admin: 05/19/17 17:17 Dose:  250 mls/hr


Insulin Aspart (Novolog Vial Sliding Scale -)  1 vial SQ ACHS [facility]


   PRN Reason: Protocol


   Last Admin: 05/20/17 05:59 Dose:  Not Given


Non-Formulary Medication (Multivitamin [Poly-Vitamin])  1 each PO DAILY [facility]


Potassium Chloride (K-Dur -)  20 meq PO TID [facility]


   Last Admin: 05/20/17 05:58 Dose:  20 meq


Ranitidine HCl (Zantac -)  75 mg PO DAILY [facility]


Torsemide (Demadex -)  40 mg PO BID@0600,1400 [facility]


   Last Admin: 05/20/17 05:58 Dose:  40 mg


Warfarin Sodium (Coumadin -)  4 mg PO DAILY@1800 [facility]


   Last Admin: 05/19/17 17:18 Dose:  4 mg











- Objective


Vital Signs: 


 Vital Signs











Temperature  97.5 F L  05/20/17 06:00


 


Pulse Rate  75   05/20/17 06:00


 


Respiratory Rate  20   05/20/17 06:00


 


Blood Pressure  115/71   05/20/17 06:00


 


O2 Sat by Pulse Oximetry (%)  100   05/19/17 21:00











Constitutional: Yes: No Distress


Eyes: Yes: WNL


HENT: Yes: WNL


Neck: Yes: WNL


Cardiovascular: Yes: Regular Rate and Rhythm


Respiratory: Yes: CTA Bilaterally


Edema: Yes


Labs: 


 CBC, BMP





 05/20/17 06:00 





 05/20/17 06:00 





 INR, PTT











INR  2.45  (0.82-1.09)  H  05/20/17  06:00    














Assessment/Plan


a/p:  76 year old man with PMH of IDDM, COPD (O2 dependant) HTN, HLD, Afib (on 

coumadin), CAD s/p CABG 2013, end stage CHF with ICD placement (on milrinone 

pump), PVD, BPH here with sob, cp, HA.





sob:


-no signs vol overload, pulm edema


-cxr with chronic findings, bnp at baseline, wt below baseline


-does not seem cardiac related at present





cp:


-improved, atypical cp, no signs of acs


-ce's neg x1, cont to margarita, monitor on tele





chronic end stage systolic heart failure s/p ICD, on home milrinone infusion:


- vol status seems stable, cont home torsemide 40 po bid as doing


- cont milrinone at patient's home maintenance dose (on chronic infusion 

destination therapy)--0.35 mcg/kg/min.  


- cont home coreg


- recent office icd check with stable function





afib


- currently rate controlled on coreg


- cont coumadin per INR





PVD


- con't AC, statin.





CAD s/p CABG


- No anginal symptoms, no signs acs.  Con't home ac, atorvastatin, coreg





HTN: controlled on coreg





HLD: con't statin. 





CKD


- close to baseline

## 2017-05-21 LAB
ALBUMIN SERPL-MCNC: 3.2 G/DL (ref 3.4–5)
ALP SERPL-CCNC: 149 U/L (ref 45–117)
ALT SERPL-CCNC: 17 U/L (ref 12–78)
ANION GAP SERPL CALC-SCNC: 10 MMOL/L (ref 8–16)
AST SERPL-CCNC: 19 U/L (ref 15–37)
BASOPHILS # BLD: 0.5 % (ref 0–2)
BILIRUB SERPL-MCNC: 0.7 MG/DL (ref 0.2–1)
CALCIUM SERPL-MCNC: 8.7 MG/DL (ref 8.5–10.1)
CO2 SERPL-SCNC: 35 MMOL/L (ref 21–32)
COCKROFT - GAULT: 40.25
CREAT SERPL-MCNC: 2 MG/DL (ref 0.7–1.3)
DEPRECATED RDW RBC AUTO: 18.3 % (ref 11.9–15.9)
EOSINOPHIL # BLD: 1.5 % (ref 0–4.5)
GLUCOSE SERPL-MCNC: 175 MG/DL (ref 74–106)
MCH RBC QN AUTO: 26.2 PG (ref 25.7–33.7)
MCHC RBC AUTO-ENTMCNC: 32.1 G/DL (ref 32–35.9)
MCV RBC: 81.5 FL (ref 80–96)
NEUTROPHILS # BLD: 76.3 % (ref 42.8–82.8)
PLATELET # BLD AUTO: 175 K/MM3 (ref 134–434)
PLATELET # BLD EST: ADEQUATE 10*3/UL
PMV BLD: 8.7 FL (ref 7.5–11.1)
PROT SERPL-MCNC: 7 G/DL (ref 6.4–8.2)
WBC # BLD AUTO: 10.3 K/MM3 (ref 4–10)

## 2017-05-21 RX ADMIN — TORSEMIDE SCH MG: 20 TABLET ORAL at 06:24

## 2017-05-21 RX ADMIN — ALLOPURINOL SCH MG: 100 TABLET ORAL at 10:13

## 2017-05-21 RX ADMIN — CARVEDILOL SCH MG: 6.25 TABLET, FILM COATED ORAL at 21:47

## 2017-05-21 RX ADMIN — WARFARIN SCH MG: 2 TABLET ORAL at 17:07

## 2017-05-21 RX ADMIN — MULTIVITAMIN TABLET SCH TAB: TABLET at 10:13

## 2017-05-21 RX ADMIN — AZITHROMYCIN SCH MG: 250 TABLET, FILM COATED ORAL at 12:10

## 2017-05-21 RX ADMIN — FERROUS SULFATE TAB EC 324 MG (65 MG FE EQUIVALENT) SCH MG: 324 (65 FE) TABLET DELAYED RESPONSE at 10:13

## 2017-05-21 RX ADMIN — ATORVASTATIN CALCIUM SCH MG: 40 TABLET, FILM COATED ORAL at 21:48

## 2017-05-21 RX ADMIN — INSULIN ASPART SCH: 100 INJECTION, SOLUTION INTRAVENOUS; SUBCUTANEOUS at 21:47

## 2017-05-21 RX ADMIN — POTASSIUM CHLORIDE SCH MEQ: 1500 TABLET, EXTENDED RELEASE ORAL at 21:48

## 2017-05-21 RX ADMIN — CHOLECALCIFEROL TAB 10 MCG (400 UNIT) SCH UNIT: 10 TAB at 10:12

## 2017-05-21 RX ADMIN — IPRATROPIUM BROMIDE AND ALBUTEROL SULFATE SCH AMP: .5; 3 SOLUTION RESPIRATORY (INHALATION) at 07:11

## 2017-05-21 RX ADMIN — TORSEMIDE SCH MG: 20 TABLET ORAL at 14:20

## 2017-05-21 RX ADMIN — IPRATROPIUM BROMIDE AND ALBUTEROL SULFATE SCH AMP: .5; 3 SOLUTION RESPIRATORY (INHALATION) at 13:32

## 2017-05-21 RX ADMIN — AZITHROMYCIN DIHYDRATE SCH MLS/HR: 500 INJECTION, POWDER, LYOPHILIZED, FOR SOLUTION INTRAVENOUS at 10:13

## 2017-05-21 RX ADMIN — INSULIN ASPART SCH UNITS: 100 INJECTION, SOLUTION INTRAVENOUS; SUBCUTANEOUS at 17:07

## 2017-05-21 RX ADMIN — OXYCODONE HYDROCHLORIDE AND ACETAMINOPHEN SCH MG: 500 TABLET ORAL at 10:13

## 2017-05-21 RX ADMIN — MILRINONE LACTATE SCH: 200 INJECTION, SOLUTION INTRAVENOUS at 17:04

## 2017-05-21 RX ADMIN — CLOPIDOGREL BISULFATE SCH MG: 75 TABLET, FILM COATED ORAL at 10:12

## 2017-05-21 RX ADMIN — DOCUSATE SODIUM SCH MG: 100 CAPSULE, LIQUID FILLED ORAL at 21:48

## 2017-05-21 RX ADMIN — POTASSIUM CHLORIDE SCH MEQ: 1500 TABLET, EXTENDED RELEASE ORAL at 06:25

## 2017-05-21 RX ADMIN — DOCUSATE SODIUM SCH MG: 100 CAPSULE, LIQUID FILLED ORAL at 10:13

## 2017-05-21 RX ADMIN — RANITIDINE SCH MG: 150 TABLET ORAL at 10:13

## 2017-05-21 RX ADMIN — IPRATROPIUM BROMIDE AND ALBUTEROL SULFATE SCH AMP: .5; 3 SOLUTION RESPIRATORY (INHALATION) at 22:38

## 2017-05-21 RX ADMIN — INSULIN ASPART SCH UNITS: 100 INJECTION, SOLUTION INTRAVENOUS; SUBCUTANEOUS at 12:09

## 2017-05-21 RX ADMIN — INSULIN ASPART SCH UNITS: 100 INJECTION, SOLUTION INTRAVENOUS; SUBCUTANEOUS at 06:27

## 2017-05-21 RX ADMIN — CARVEDILOL SCH MG: 6.25 TABLET, FILM COATED ORAL at 10:13

## 2017-05-21 RX ADMIN — POTASSIUM CHLORIDE SCH MEQ: 1500 TABLET, EXTENDED RELEASE ORAL at 14:20

## 2017-05-21 RX ADMIN — AZITHROMYCIN DIHYDRATE SCH: 500 INJECTION, POWDER, LYOPHILIZED, FOR SOLUTION INTRAVENOUS at 10:24

## 2017-05-21 NOTE — PN
Progress Note, Physician


History of Present Illness: 


Patient feeling a little better, but notes going to the bathroom often (stool 

as well as urine).  No further HA or chest pain.





- Current Medication List


Current Medications: 


Active Medications





Acetaminophen (Tylenol -)  650 mg PO Q4H PRN


   PRN Reason: FEVER OR PAIN


Albuterol Sulfate (Ventolin 0.083% Nebulizer Soln -)  1 amp NEB Q6H PRN


   PRN Reason: SHORT OF BREATH/WHEEZING


Albuterol/Ipratropium (Duoneb -)  1 amp [facility]B [facility]


   Last Admin: 05/21/17 07:11 Dose:  1 amp


Allopurinol (Zyloprim -)  100 mg PO DAILY [facility]


   Last Admin: 05/20/17 10:59 Dose:  100 mg


Ascorbic Acid (Vitamin C -)  1,000 mg PO DAILY [facility]


   Last Admin: 05/20/17 10:58 Dose:  1,000 mg


Atorvastatin Calcium (Lipitor -)  40 mg PO HS [facility]


   Last Admin: 05/20/17 21:06 Dose:  40 mg


Bupropion HCl (Wellbutrin -)  75 mg PO DAILY [facility]


Carvedilol (Coreg -)  6.25 mg PO BID [facility]


   Last Admin: 05/20/17 21:06 Dose:  6.25 mg


Cholecalciferol (Vitamin D3 -)  1,000 unit PO DAILY [facility]


   Last Admin: 05/20/17 10:59 Dose:  1,000 unit


Clopidogrel Bisulfate (Plavix -)  75 mg PO DAILY [facility]


   Last Admin: 05/20/17 10:59 Dose:  75 mg


Docusate Sodium (Colace -)  100 mg PO BID [facility]


   Last Admin: 05/20/17 21:06 Dose:  100 mg


Ferrous Sulfate (Feosol -)  325 mg PO Q2D [facility]


   Last Admin: 05/19/17 12:50 Dose:  325 mg


Milrinone Lactate/Dextrose (Milrinone 20mg/100ml Ivpb -)  100 mls @ 9.573 mls/

hr IVPB TITR [facility]


   PRN Reason: 0.35 MCG/KG/MIN


   Last Titration: 05/20/17 23:54 Dose:  0.34 mcg/kg/min


Azithromycin (Zithromax 500mg Ivpb (Pre-Docked))  250 mls @ 250 mls/hr IVPB 

[facility]ILY [facility]


   Last Admin: 05/20/17 10:59 Dose:  250 mls/hr


Insulin Aspart (Novolog Vial Sliding Scale -)  1 vial SQ ACHS [facility]


   PRN Reason: Protocol


   Last Admin: 05/21/17 06:27 Dose:  1 units


Multivitamins/Minerals/Vitamin C (Tab-A-Vit -)  1 tab PO DAILY [facility]


Potassium Chloride (K-Dur -)  20 meq PO TID [facility]


   Last Admin: 05/21/17 06:25 Dose:  20 meq


Ranitidine HCl (Zantac -)  75 mg PO DAILY [facility]


   Last Admin: 05/20/17 10:58 Dose:  75 mg


Torsemide (Demadex -)  40 mg PO BID@0600,1400 [facility]


   Last Admin: 05/21/17 06:24 Dose:  40 mg


Warfarin Sodium (Coumadin -)  4 mg PO DAILY@1800 [facility]


   Last Admin: 05/20/17 17:26 Dose:  4 mg











- Objective


Vital Signs: 


 Vital Signs











Temperature  97.7 F   05/21/17 06:00


 


Pulse Rate  79   05/21/17 06:00


 


Respiratory Rate  20   05/21/17 06:00


 


Blood Pressure  128/67   05/21/17 06:00


 


O2 Sat by Pulse Oximetry (%)  95   05/20/17 21:00











Constitutional: Yes: No Distress, Calm


Neck: Yes: Supple, Trachea Midline


Cardiovascular: Yes: Pulse Irregular, Murmur, S1, S2


Respiratory: Yes: Regular, CTA Bilaterally.  No: Rales, Rhonchi, Wheezes


Gastrointestinal: Yes: Normal Bowel Sounds, Soft.  No: Distention, Tenderness


Edema: Yes


Edema: LLE: Trace, RLE: Trace


Neurological: Yes: Alert, Oriented


Labs: 


 CBC, BMP





 05/21/17 06:00 





 05/21/17 06:00 





 INR, PTT











INR  2.45  (0.82-1.09)  H  05/20/17  06:00    














Assessment/Plan


All Active Problems





Acute kidney injury (Acute) 


CHF (congestive heart failure) (Acute) 


Chest pain (Acute) 


Diabetes (Acute) 


AICD (automatic cardioverter/defibrillator) present (Acute) 


Abscess (Acute) 


Acute and chronic respiratory failure (acute-on-chronic) (Acute) 


Acute bronchitis and bronchiolitis (Acute) 


Acute on chronic renal insufficiency (Acute) 


Atrial fibrillation (Acute) 


CAD (coronary artery disease) (Acute) 


CKD (chronic kidney disease) (Acute) 


COPD (chronic obstructive pulmonary disease) (Acute) 


Cardiomyopathy (Acute) 


Cellulitis (Acute) 


Cellulitis and abscess of leg (Acute) 


Chronic kidney disease (CKD) stage G3a/A3, moderately decreased glomerular 

filtration rate (GFR) between 45-59 mL/min/1.73 square meter and albuminuria 

creatinine ratio greater than 300 mg/g (Acute) 


Cough (Acute) 


Diabetes mellitus, insulin dependent (IDDM), uncontrolled (Acute) 


Dyspnea on exertion (Acute) 


Electrolyte abnormality (Acute) 


Gastroenteritis (Acute) 


Hematoma (Acute) 


Hypokalemia (Acute) 


Pancreatic mass (Acute) 


Presence of permanent cardiac pacemaker (Acute) 


Renal insufficiency (Acute) 


Renal mass (Acute) 


Superficial bruising of foot (Acute) 


Urinary tract infection (Acute) 


Vascular catheter dysfunction (Acute) 


Vomiting (Acute) 





-cont current treatment -if remains stable in to tomorrow, consider discharge

## 2017-05-21 NOTE — PN
Progress Note, Physician


History of Present Illness: 


No CV complaints


Tolerating IV Milrinone


Tele: V-paced, occasional PVC, no sustatained arrhythmias





- Current Medication List


Current Medications: 


Active Medications





Acetaminophen (Tylenol -)  650 mg PO Q4H PRN


   PRN Reason: FEVER OR PAIN


Albuterol Sulfate (Ventolin 0.083% Nebulizer Soln -)  1 amp NEB Q6H PRN


   PRN Reason: SHORT OF BREATH/WHEEZING


Albuterol/Ipratropium (Duoneb -)  1 amp NEB TIDR [facility]


   Last Admin: 05/21/17 07:11 Dose:  1 amp


Allopurinol (Zyloprim -)  100 mg PO DAILY [facility]


   Last Admin: 05/21/17 10:13 Dose:  100 mg


Ascorbic Acid (Vitamin C -)  1,000 mg PO DAILY [facility]


   Last Admin: 05/21/17 10:13 Dose:  1,000 mg


Atorvastatin Calcium (Lipitor -)  40 mg PO HS [facility]


   Last Admin: 05/20/17 21:06 Dose:  40 mg


Azithromycin (Zithromax -)  250 mg PO DAILY [facility]


Bupropion HCl (Wellbutrin -)  75 mg PO DAILY [facility]


   Last Admin: 05/21/17 10:13 Dose:  75 mg


Carvedilol (Coreg -)  6.25 mg PO BID [facility]


   Last Admin: 05/21/17 10:13 Dose:  6.25 mg


Cholecalciferol (Vitamin D3 -)  1,000 unit PO DAILY [facility]


   Last Admin: 05/21/17 10:12 Dose:  1,000 unit


Clopidogrel Bisulfate (Plavix -)  75 mg PO DAILY [facility]


   Last Admin: 05/21/17 10:12 Dose:  75 mg


Docusate Sodium (Colace -)  100 mg PO BID [facility]


   Last Admin: 05/21/17 10:13 Dose:  100 mg


Ferrous Sulfate (Feosol -)  325 mg PO Q2D [facility]


   Last Admin: 05/21/17 10:13 Dose:  325 mg


Milrinone Lactate/Dextrose (Milrinone 20mg/100ml Ivpb -)  100 mls @ 9.573 mls/

hr IVPB TITR [facility]


   PRN Reason: 0.35 MCG/KG/MIN


   Last Titration: 05/20/17 23:54 Dose:  0.34 mcg/kg/min


Insulin Aspart (Novolog Vial Sliding Scale -)  1 vial SQ ACHS [facility]


   PRN Reason: Protocol


   Last Admin: 05/21/17 06:27 Dose:  1 units


Multivitamins/Minerals/Vitamin C (Tab-A-Vit -)  1 tab PO DAILY [facility]


   Last Admin: 05/21/17 10:13 Dose:  1 tab


Potassium Chloride (K-Dur -)  20 meq PO TID [facility]


   Last Admin: 05/21/17 06:25 Dose:  20 meq


Ranitidine HCl (Zantac -)  75 mg PO DAILY [facility]


   Last Admin: 05/21/17 10:13 Dose:  75 mg


Torsemide (Demadex -)  40 mg PO BID@0600,1400 [facility]


   Last Admin: 05/21/17 06:24 Dose:  40 mg


Warfarin Sodium (Coumadin -)  4 mg PO DAILY@1800 [facility]


   Last Admin: 05/20/17 17:26 Dose:  4 mg











- Objective


Vital Signs: 


 Vital Signs











Temperature  97.9 F   05/21/17 10:00


 


Pulse Rate  76   05/21/17 10:00


 


Respiratory Rate  20   05/21/17 10:00


 


Blood Pressure  116/74   05/21/17 10:00


 


O2 Sat by Pulse Oximetry (%)  96   05/21/17 10:00











Constitutional: Yes: No Distress


Eyes: Yes: WNL


HENT: Yes: WNL


Neck: Yes: WNL


Cardiovascular: Yes: Regular Rate and Rhythm


Respiratory: Yes: CTA Bilaterally


Gastrointestinal: Yes: WNL


Edema: No (LE warm)


Labs: 


 CBC, BMP





 05/21/17 06:00 





 05/21/17 06:00 





 INR, PTT











INR  2.45  (0.82-1.09)  H  05/20/17  06:00    














Assessment/Plan


a/p:  76 year old man with PMH of IDDM, COPD (O2 dependant) HTN, HLD, Afib (on 

coumadin), CAD s/p CABG 2013, end stage CHF with ICD placement (on milrinone 

pump), PVD, BPH here with sob, cp, HA.





sob:


-no signs vol overload, pulm edema


-cxr with chronic findings, bnp at baseline, wt below baseline


-does not seem cardiac related at present





cp:


-improved, atypical cp, no signs of acs


-ce's neg x1, cont to margarita, monitor on tele





chronic end stage systolic heart failure s/p ICD, on home milrinone infusion:


- vol status seems stable, cont home torsemide 40 po bid as doing


- cont milrinone at patient's home maintenance dose (on chronic infusion 

destination therapy)--0.35 mcg/kg/min.  


- cont home coreg


- recent office icd check with stable function





afib


- currently rate controlled on coreg


- cont coumadin per INR





PVD


- con't AC, statin.





CAD s/p CABG


- No anginal symptoms, no signs acs.  Con't home ac, atorvastatin, coreg





HTN: controlled on coreg





HLD: con't statin. 





CKD


- close to baseline

## 2017-05-22 LAB
ALBUMIN SERPL-MCNC: 3.3 G/DL (ref 3.4–5)
ALP SERPL-CCNC: 164 U/L (ref 45–117)
ALT SERPL-CCNC: 18 U/L (ref 12–78)
ANION GAP SERPL CALC-SCNC: 12 MMOL/L (ref 8–16)
AST SERPL-CCNC: 21 U/L (ref 15–37)
BASOPHILS # BLD: 0.4 % (ref 0–2)
BILIRUB SERPL-MCNC: 1.2 MG/DL (ref 0.2–1)
CALCIUM SERPL-MCNC: 9 MG/DL (ref 8.5–10.1)
CO2 SERPL-SCNC: 33 MMOL/L (ref 21–32)
COCKROFT - GAULT: 40.42
CREAT SERPL-MCNC: 2 MG/DL (ref 0.7–1.3)
DEPRECATED RDW RBC AUTO: 18 % (ref 11.9–15.9)
EOSINOPHIL # BLD: 0.4 % (ref 0–4.5)
GLUCOSE SERPL-MCNC: 219 MG/DL (ref 74–106)
INR BLD: 3.14 (ref 0.82–1.09)
MCH RBC QN AUTO: 26 PG (ref 25.7–33.7)
MCHC RBC AUTO-ENTMCNC: 31.7 G/DL (ref 32–35.9)
MCV RBC: 81.9 FL (ref 80–96)
NEUTROPHILS # BLD: 82.6 % (ref 42.8–82.8)
PLATELET # BLD AUTO: 172 K/MM3 (ref 134–434)
PMV BLD: 8.9 FL (ref 7.5–11.1)
PROT SERPL-MCNC: 7 G/DL (ref 6.4–8.2)
PT PNL PPP: 35.4 SEC (ref 9.98–11.88)
TROPONIN I SERPL-MCNC: 0.03 NG/ML (ref 0–0.05)
WBC # BLD AUTO: 11.6 K/MM3 (ref 4–10)

## 2017-05-22 RX ADMIN — DOCUSATE SODIUM SCH MG: 100 CAPSULE, LIQUID FILLED ORAL at 09:56

## 2017-05-22 RX ADMIN — IPRATROPIUM BROMIDE AND ALBUTEROL SULFATE SCH AMP: .5; 3 SOLUTION RESPIRATORY (INHALATION) at 06:38

## 2017-05-22 RX ADMIN — IPRATROPIUM BROMIDE AND ALBUTEROL SULFATE SCH AMP: .5; 3 SOLUTION RESPIRATORY (INHALATION) at 22:00

## 2017-05-22 RX ADMIN — CHOLECALCIFEROL TAB 10 MCG (400 UNIT) SCH UNIT: 10 TAB at 09:58

## 2017-05-22 RX ADMIN — CARVEDILOL SCH MG: 6.25 TABLET, FILM COATED ORAL at 23:12

## 2017-05-22 RX ADMIN — INSULIN ASPART SCH UNITS: 100 INJECTION, SOLUTION INTRAVENOUS; SUBCUTANEOUS at 06:00

## 2017-05-22 RX ADMIN — MILRINONE LACTATE SCH: 200 INJECTION, SOLUTION INTRAVENOUS at 16:43

## 2017-05-22 RX ADMIN — IPRATROPIUM BROMIDE AND ALBUTEROL SULFATE SCH AMP: .5; 3 SOLUTION RESPIRATORY (INHALATION) at 14:23

## 2017-05-22 RX ADMIN — POTASSIUM CHLORIDE SCH MEQ: 1500 TABLET, EXTENDED RELEASE ORAL at 14:28

## 2017-05-22 RX ADMIN — ATORVASTATIN CALCIUM SCH MG: 40 TABLET, FILM COATED ORAL at 23:12

## 2017-05-22 RX ADMIN — CARVEDILOL SCH MG: 6.25 TABLET, FILM COATED ORAL at 09:58

## 2017-05-22 RX ADMIN — FUROSEMIDE SCH MG: 10 INJECTION, SOLUTION INTRAVENOUS at 16:43

## 2017-05-22 RX ADMIN — ALLOPURINOL SCH MG: 100 TABLET ORAL at 09:59

## 2017-05-22 RX ADMIN — DOCUSATE SODIUM SCH MG: 100 CAPSULE, LIQUID FILLED ORAL at 23:12

## 2017-05-22 RX ADMIN — MILRINONE LACTATE SCH MLS/HR: 200 INJECTION, SOLUTION INTRAVENOUS at 17:41

## 2017-05-22 RX ADMIN — MULTIVITAMIN TABLET SCH TAB: TABLET at 09:56

## 2017-05-22 RX ADMIN — RANITIDINE SCH MG: 150 TABLET ORAL at 09:57

## 2017-05-22 RX ADMIN — TORSEMIDE SCH MG: 20 TABLET ORAL at 05:59

## 2017-05-22 RX ADMIN — POTASSIUM CHLORIDE SCH MEQ: 1500 TABLET, EXTENDED RELEASE ORAL at 23:12

## 2017-05-22 RX ADMIN — AZITHROMYCIN SCH MG: 250 TABLET, FILM COATED ORAL at 09:59

## 2017-05-22 RX ADMIN — MILRINONE LACTATE SCH MLS/HR: 200 INJECTION, SOLUTION INTRAVENOUS at 07:00

## 2017-05-22 RX ADMIN — CLOPIDOGREL BISULFATE SCH MG: 75 TABLET, FILM COATED ORAL at 09:57

## 2017-05-22 RX ADMIN — INSULIN ASPART SCH: 100 INJECTION, SOLUTION INTRAVENOUS; SUBCUTANEOUS at 17:10

## 2017-05-22 RX ADMIN — INSULIN ASPART SCH UNITS: 100 INJECTION, SOLUTION INTRAVENOUS; SUBCUTANEOUS at 23:20

## 2017-05-22 RX ADMIN — INSULIN ASPART SCH UNITS: 100 INJECTION, SOLUTION INTRAVENOUS; SUBCUTANEOUS at 11:59

## 2017-05-22 RX ADMIN — OXYCODONE HYDROCHLORIDE AND ACETAMINOPHEN SCH MG: 500 TABLET ORAL at 09:56

## 2017-05-22 RX ADMIN — POTASSIUM CHLORIDE SCH MEQ: 1500 TABLET, EXTENDED RELEASE ORAL at 05:59

## 2017-05-22 NOTE — CONS
DATE OF CONSULTATION:  05/22/2017

 

PULMONARY CONSULTATION 

REFERRING PHYSICIAN:  Yimi Lagunas M.D.

 

HISTORY OF PRESENT ILLNESS:  The patient is a 76-year-old  male known to me

from previous hospitalization and lost to followup with past medical history of

hypertension, CHF, cardiomyopathy, ASHD, atrial fibrillation status post MI,

pulmonary hypertension status post AICD, COPD on O2, chronic kidney disease, BPH,

diabetes, admitted to [facility] on May 19 with complaint of

increasing shortness of breath.  Patient apparently was doing well until 11 o'clock

the evening prior to admission when he started feeling increasing shortness of

breath.  He had increasing orthopnea, denied any complaint of chest pain.  He also

had a cough which was productive of thick white sputum.  Denied any fevers or chills.

 He did complain of chest tightness, retrosternally radiating to the head.  He states

he had nausea, but no vomiting.  He denied any syncopal episodes.  Also noticed to

have increasing lower extremity edema. On admission, he was felt to have possible

mild COPD, CHF.  He was continued on _____ drip and continued on inhaled

bronchodilators as well as diuretics.  On hospitalization, the patient continued to

improve with the above measures.  He has history of tobacco use, quit years ago. 

There is no history of occupational exposure to chemicals or fumes.  

 

PAST MEDICAL HISTORY:  Again includes ASHD status post MI, cardiomyopathy status post

ICD, history of atrial fibrillation, hypertension, pulmonary hypertension,

hyperlipidemia, CHF, COPD O2 dependent, chronic kidney disease, BPH and diabetes.

 

PAST SURGICAL HISTORY:  AICD and CABG and hernia repair.  

 

REVIEW OF SYSTEMS:  Dyspnea on exertion.  No chest pain.  No palpitations.  No cough.

 No hemoptysis.  No wheezing.  No abdominal pain.  Positive mild lower extremity

edema.  

 

CURRENT MEDICATIONS:  Include Tylenol, Zithromax, Coumadin, Wellbutrin, Zyloprim,

albuterol, DuoNeb, Coreg, _____, Colace, Zantac, Lipitor, torsemide b.i.d., vitamin

C, Plavix, K-Dur, vitamin D3.  

 

PHYSICAL EXAMINATION: 

General:  The patient is a well-developed, well-nourished male, awake, alert, in no

acute distress.  

Vital signs:  He is currently afebrile.  Blood pressure 105/57, respiratory rate 20,

and O2 saturation is 94% on 3 L, heart rate is 76.  

HEENT:  Head is normocephalic, atraumatic.  

Neck:  Supple.  

Heart:  Irregularly irregular.  Normal S1, S2.  

Chest:  Clear.  

Abdomen:  Soft.  Bowel sounds positive.  

Extremities:  Trace bilateral lower extremity edema.  

 

LABORATORY:  WBC:  BUN is 58, creatinine 2.0.  BNP was 2012 on admission. INR is

3.14.  WBC 11.6, hemoglobin 11.0, hematocrit 37.6, platelet count 172,000.  Chest

x-ray shows cardiomegaly, mild pulmonary vascular congestion.  

 

IMPRESSION:  Acute-on-chronic hypoxemic respiratory failure likely secondary to

chronic obstructive pulmonary disease exacerbation. 

2. Cardiomyopathy with congestive heart failure, currently stable.

3. Atrial fibrillation.

4. Atherosclerotic heart disease  status post myocardial infarction, status post

implantable cardioverter defibrillator, status post coronary artery bypass graft.

5.  Hypertension.  

6.  Pulmonary hypertension.  Chronic obstructive pulmonary disease, oxygen dependent.

 

 

PLAN:  Continue inhaled bronchodilators, supplemental O2, diuretics.  Chest x-ray as

outpatient.  Daily weights.  Monitor weight closely.  Thank you, will follow closely

with you.  

 

 

ESTHER EASTMAN M.D.

 

[id number]

DD: 05/22/2017 14:05

DT: 05/22/2017 19:44

Job #:  [id number]

## 2017-05-22 NOTE — PN
Progress Note (short form)





- Note


Progress Note: 


PULMONARY








CONSULTATION DICTATED 5/17/17








IMP  ACUTE ON CHRONIC HYPOXEMIC RESPIRATORY FAILURE STABLE


       COPD O2 DEPENDENT


       CHF S/P ICD


       ASHD S/P CABG


       AFIB


       HTN


       PULMONARY HTN


       DM


       CKD





PLAN CONTINUE INHALED BRONCHODILATORS


        NASAL O2 TO MAINTAIN O2 SAT 90%


        DIURETICS AS PER CARDIOLOGY


        MONITOR WTS








DR EASTMAN


 


        


       





Problem List





- Problems


(1) CHF (congestive heart failure)


Code(s): I50.9 - HEART FAILURE, UNSPECIFIED   Qualifiers: 


     Congestive heart failure type: systolic





(2) Chest pain


Code(s): R07.9 - CHEST PAIN, UNSPECIFIED   





(3) AICD (automatic cardioverter/defibrillator) present


Code(s): Z95.810 - PRESENCE OF AUTOMATIC (IMPLANTABLE) CARDIAC DEFIBRILLATOR





(4) Acute and chronic respiratory failure (acute-on-chronic)


Code(s): J96.20 - ACUTE AND CHR RESP FAILURE, UNSP W HYPOXIA OR HYPERCAPNIA   

Qualifiers: 


     Respiratory failure complication: hypoxia        Qualified Code(s): J96.21 

- Acute and chronic respiratory failure with hypoxia  





(5) Acute on chronic renal insufficiency


Code(s): N28.9 - DISORDER OF KIDNEY AND URETER, UNSPECIFIED


N18.9 - CHRONIC KIDNEY DISEASE, UNSPECIFIED





(6) Atrial fibrillation


Code(s): I48.91 - UNSPECIFIED ATRIAL FIBRILLATION   Qualifiers: 


     Atrial fibrillation type: chronic     Qualified Code(s): I48.2 - Chronic 

atrial fibrillation  





(7) CAD (coronary artery disease)


Code(s): I25.10 - ATHSCL HEART DISEASE OF NATIVE CORONARY ARTERY W/O ANG PCTRS 

  





(8) COPD (chronic obstructive pulmonary disease)


Code(s): J44.9 - CHRONIC OBSTRUCTIVE PULMONARY DISEASE, UNSPECIFIED   Qualifiers

: 


     COPD type: COPD with acute exacerbation        Qualified Code(s): J44.1 - 

Chronic obstructive pulmonary disease with (acute) exacerbation  





(9) Cardiomyopathy


Code(s): I42.9 - CARDIOMYOPATHY, UNSPECIFIED   





(10) Diabetes mellitus, insulin dependent (IDDM), uncontrolled


Code(s): E10.65 - TYPE 1 DIABETES MELLITUS WITH HYPERGLYCEMIA

## 2017-05-22 NOTE — DS
Physical Examination


Vital Signs: 


 Vital Signs











Temperature  97.3 F L  05/22/17 10:00


 


Pulse Rate  76   05/22/17 10:00


 


Respiratory Rate  20   05/22/17 10:00


 


Blood Pressure  105/57   05/22/17 10:00


 


O2 Sat by Pulse Oximetry (%)  94 L  05/22/17 09:00











Constitutional: Yes: Well Nourished, No Distress, Calm


Cardiovascular: Yes: Regular Rate and Rhythm.  No: Gallop, Murmur, Rub


Respiratory: Yes: Regular, CTA Bilaterally, On Nasal O2.  No: Rales, Rhonchi, 

Wheezes


Gastrointestinal: Yes: Normal Bowel Sounds, Soft.  No: Distention, Tenderness


Extremities: Yes: WNL


Edema: Yes


Edema: LLE: Trace, RLE: Trace


Labs: 


 CBC, BMP





 05/22/17 05:40 





 05/22/17 05:40 











Discharge Summary


Reason For Visit: CHF, CHEST PAIN, ACUTE KIDNEY INJURY


Current Active Problems





Acute kidney injury (Acute) 


CHF (congestive heart failure) (Acute) 


Chest pain (Acute) 


Diabetes (Acute) 








Hospital Course: 


(1) Acute and chronic respiratory failure (acute-on-chronic)


Code(s): J96.20 - ACUTE AND CHR RESP FAILURE, UNSP W HYPOXIA OR HYPERCAPNIA   

Qualifiers: 


     Respiratory failure complication: hypoxia        Qualified Code(s): J96.21 

- Acute and chronic respiratory failure with hypoxia  





(2) Chest pain


Code(s): R07.9 - CHEST PAIN, UNSPECIFIED   





(3) CHF (congestive heart failure), acute on chronic end stage 


Code(s): I50.9 - HEART FAILURE, UNSPECIFIED   Qualifiers: 


     Congestive heart failure type: systolic





(4) CAD (coronary artery disease)


Code(s): I25.10 - ATHSCL HEART DISEASE OF NATIVE CORONARY ARTERY W/O ANG PCTRS 

  





(5) CKD (chronic kidney disease)


Code(s): N18.9 - CHRONIC KIDNEY DISEASE, UNSPECIFIED   Qualifiers: 


     Chronic kidney disease stage: stage 4 (severe)        Qualified Code(s): 

N18.4 - Chronic kidney disease, stage 4 (severe)  





(6) COPD (chronic obstructive pulmonary disease)


Code(s): J44.9 - CHRONIC OBSTRUCTIVE PULMONARY DISEASE, UNSPECIFIED   Qualifiers

: 


     COPD type: COPD with acute exacerbation        Qualified Code(s): J44.1 - 

Chronic obstructive pulmonary disease with (acute) exacerbation  





(7) Diabetes


Code(s): E11.9 - TYPE 2 DIABETES MELLITUS WITHOUT COMPLICATIONS   Qualifiers: 


     Diabetes mellitus type: type 2     Diabetes mellitus complication status: 

with kidney complications     Diabetes mellitus complication detail: with 

chronic kidney disease     Diabetes mellitus long term insulin use: with long 

term use     Chronic kidney disease stage: stage 4 (severe)        Qualified 

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

; N18.4 - Chronic kidney disease, stage 4 (severe); Z79.4 - Long term (current) 

use of insulin  





Mr Jerome is a pleasant 76 year old male who came in with SOB secondary to 

both CHF and COPD. He was admitted to the hospital. He was seen by cardiology 

and his torsemide was increased. He diuresed well. He was treated for COPD 

exacerbation, he did not require steroids. He was continued on his milrinone 

gtt while here. Currently he is improved and safe for discharge home. 





31 minutes spent in preparation of this discharge


Condition: Good





- Instructions


Diet, Activity, Other Instructions: 


resume previous diet and activity. BMP on Thursday. Follow up with Dr Titus in 

2 weeks.


Referrals: 


Radhames Gama MD [Staff Physician] - 


Sathya Flower MD [Primary Care Provider] - 


Deuce Titus MD [Staff Physician] - 


Disposition: HOME





- Home Medications


Comprehensive Discharge Medication List: 


Ambulatory Orders





Allopurinol [Zyloprim -] 100 mg PO DAILY 01/14/16 


Atorvastatin Ca [Lipitor] 40 mg PO HS 01/14/16 


Carvedilol 6.25 mg PO BID 01/14/16 


Docusate Sodium [Colace -] 100 mg PO BID 01/14/16 


Insulin NPL/Insulin Lispro [Humalog Mix 75-25 Vial] 0 unit SQ PRN 01/14/16 


Milrinone Lactate/D5w [Milrinone 0.2 mg/ml in D5w] 0 mg IV DAILY 01/14/16 


Ranitidine [Zantac -] 75 mg PO DAILY 01/14/16 


Albuterol 0.083% Nebulizer Sol [Ventolin 0.083% Nebulizer Soln -] 1 amp NEB Q6H 

PRN #120 amp 01/18/16 


Ferrous Sulfate [Feosol] 325 mg PO Q48H  ud 08/27/16 


Potassium Chloride [K-Dur -] 20 meq PO TID #90 tablet.er 08/27/16 


Warfarin Na [Coumadin -] 4 mg PO DAILY@1800 01/10/17 


Bupropion HCl [Wellbutrin -] 75 mg DAILY 01/12/17 


Ascorbic Acid [Vitamin C -] 1,000 mg PO DAILY 03/30/17 


Cholecalciferol (Vitamin D3) [Vitamin D -] 1,000 unit PO DAILY 03/30/17 


Clopidogrel Bisulfate [Plavix -] 75 mg PO DAILY 03/30/17 


Multivitamin [Poly-Vitamin] 1 each PO DAILY 03/30/17 


Torsemide [Demadex -] 40 mg PO HS #30 tablet 05/22/17 


Torsemide [Demadex -] 80 mg PO DAILY@0600 #30 tablet 05/22/17

## 2017-05-22 NOTE — PN
Progress Note, Physician


Chief Complaint: 


sob


History of Present Illness: 


no more sob, incl walking to bathroom with walker;


no orthopnea


no leg swelling





no more cp


no palpit





ex cigs





- Current Medication List


Current Medications: 


Active Medications





Acetaminophen (Tylenol -)  650 mg PO Q4H PRN


   PRN Reason: FEVER OR PAIN


   Last Admin: 05/22/17 01:58 Dose:  650 mg


Albuterol Sulfate (Ventolin 0.083% Nebulizer Soln -)  1 amp NEB Q6H PRN


   PRN Reason: SHORT OF BREATH/WHEEZING


Albuterol/Ipratropium (Duoneb -)  1 amp NEB TIDR [facility]


   Last Admin: 05/22/17 06:38 Dose:  1 amp


Allopurinol (Zyloprim -)  100 mg PO DAILY [facility]


   Last Admin: 05/21/17 10:13 Dose:  100 mg


Ascorbic Acid (Vitamin C -)  1,000 mg PO DAILY [facility]


   Last Admin: 05/21/17 10:13 Dose:  1,000 mg


Atorvastatin Calcium (Lipitor -)  40 mg PO HS [facility]


   Last Admin: 05/21/17 21:48 Dose:  40 mg


Azithromycin (Zithromax -)  250 mg PO DAILY [facility]


   Last Admin: 05/21/17 12:10 Dose:  250 mg


Bupropion HCl (Wellbutrin -)  75 mg PO DAILY [facility]


   Last Admin: 05/21/17 10:13 Dose:  75 mg


Carvedilol (Coreg -)  6.25 mg PO BID [facility]


   Last Admin: 05/21/17 21:47 Dose:  6.25 mg


Cholecalciferol (Vitamin D3 -)  1,000 unit PO DAILY [facility]


   Last Admin: 05/21/17 10:12 Dose:  1,000 unit


Clopidogrel Bisulfate (Plavix -)  75 mg PO DAILY [facility]


   Last Admin: 05/21/17 10:12 Dose:  75 mg


Docusate Sodium (Colace -)  100 mg PO BID [facility]


   Last Admin: 05/21/17 21:48 Dose:  100 mg


Ferrous Sulfate (Feosol -)  325 mg PO Q2D [facility]


   Last Admin: 05/21/17 10:13 Dose:  325 mg


Milrinone Lactate/Dextrose (Milrinone 20mg/100ml Ivpb -)  100 mls @ 9.573 mls/

hr IVPB TITR [facility]


   PRN Reason: 0.35 MCG/KG/MIN


   Last Admin: 05/21/17 17:04 Dose:  Not Given


Insulin Aspart (Novolog Vial Sliding Scale -)  1 vial SQ ACHS [facility]


   PRN Reason: Protocol


   Last Admin: 05/22/17 06:00 Dose:  1 units


Multivitamins/Minerals/Vitamin C (Tab-A-Vit -)  1 tab PO DAILY [facility]


   Last Admin: 05/21/17 10:13 Dose:  1 tab


Potassium Chloride (K-Dur -)  20 meq PO TID [facility]


   Last Admin: 05/22/17 05:59 Dose:  20 meq


Ranitidine HCl (Zantac -)  75 mg PO DAILY [facility]


   Last Admin: 05/21/17 10:13 Dose:  75 mg


Torsemide (Demadex -)  40 mg PO BID@0600,1400 [facility]


   Last Admin: 05/22/17 05:59 Dose:  40 mg


Warfarin Sodium (Coumadin -)  4 mg PO DAILY@1800 [facility]


   Last Admin: 05/21/17 17:07 Dose:  4 mg











- Objective


Vital Signs: 


 Vital Signs











Temperature  98.5 F   05/22/17 05:38


 


Pulse Rate  86   05/22/17 05:38


 


Respiratory Rate  210 H  05/22/17 05:38


 


Blood Pressure  117/67   05/22/17 05:38


 


O2 Sat by Pulse Oximetry (%)  96   05/21/17 21:00











Constitutional: Yes: No Distress, Calm


Eyes: No: Sclera Icterus


HENT: No: Nasal Congestion


Cardiovascular: Yes: Regular Rate and Rhythm, JVD (++), S1, S2, Other (PMI non 

diplaced).  No: Gallop, Murmur


Respiratory: Yes: Wheezes.  No: Accessory Muscle Use, Rales


Gastrointestinal: Yes: Normal Bowel Sounds, Soft.  No: Tenderness


Musculoskeletal: Yes: Other (No kyphosis)


Extremities: No: Cold


Edema: No


Integumentary: No: Jaundice


Neurological: Yes: Alert, Oriented (x3)


Psychiatric: No: Agitated


Labs: 


 CBC, BMP





 05/22/17 05:40 





 05/22/17 05:40 





 INR, PTT











INR  3.14  (0.82-1.09)  H  05/22/17  05:40    














- ....Imaging


EKG: Other (tele: NSR, V-paced)





Assessment/Plan


a/p:  76 year old man with PMH of IDDM, COPD (O2 dependant) HTN, HLD, Afib (on 

coumadin), CAD s/p PCIs, end stage CHF with ICD placement (on milrinone pump), 

PVD, BPH here with sob, cp, HA.





sob, copd:


-here about 1 mo ago with pulm process


-recently as outpt ongoing cough/sob over his baseline, despite at his 

longstanding dry wt and appeared clinically euvolemic--saw dr degroot who incr'd 

home O2 flow rate (for hypoxia noted in office) and tried change of inhalers


-plan was empiric incr in torsemide dose if no improvement


-now back with incr sob--severe episode on 5/19 in pm; associated atypical 

chest discomfort as below


-? bronchitis


-has had no signs vol overload, cxr with chronic findings, bnp at baseline (2K-

5K), wt at dry wt


-will plan empiric trial of incr po torsemide on discharge and outpt f/u with me





cp:


-atypical features ("like a swirl" sensation in center of chest, no radiation)


-resolved and no recurrence here


-trop WNL x 1 on DOA--rpt now ordered


-ECG BiV-paced


-if trop negative, he does not require inpatient evaluation--will consider 

outpt stress testing if his chronic sx (incr'd sob over baseline) does not 

respond to changes in inhalers for copd or incr diuretics





chronic end stage systolic heart failure s/p ICD, on home milrinone infusion:


- vol status seems stable (as above), home regimen = torsemide 40 po bid


- worsened sob over baseline = progressive copd vs chf


- will give 2 wks empiric trial of incr'd torsemide at home: 80mg am, 40mg pm


- should have repeat bun/creat later this week (3-5 days) on this regimen


- should see me 2 wks after discharge


- cont milrinone at patient's home maintenance dose (on chronic infusion 

destination therapy)--0.35 mcg/kg/min.  


- cont home coreg (doubt causing bronchospasm--will d/w dr degroot ? rpt pft's)


- recent office icd check with stable function





afib


- currently rate controlled on coreg


- cont coumadin per INR





PVD


- con't AC, statin.





CAD s/p PCIs


- No anginal symptoms, no signs acs.  Con't home ac, atorvastatin, coreg





HTN: controlled on coreg





HLD: con't statin. 





CKD


- at baseline renal fxn





OK FOR DISCHARGE FROM CV P.O.V. IF TROPONIN COMES BACK NEGATIVE (ORDERED);


PULM TO SEE LATER TODAY--IF PT TO STAY IN HOSP, NO NEED FOR ONGOING TELEMETRY 

MONITORING

## 2017-05-22 NOTE — PN
Progress Note, Physician


Chief Complaint: 


Mr Jerome said he was no longer short of breath, but wife noted he was short 

of breath with minimal exertion. Evaluated patient after walking and was short 

of breath.





- Current Medication List


Current Medications: 


Active Medications





Acetaminophen (Tylenol -)  650 mg PO Q4H PRN


   PRN Reason: FEVER OR PAIN


   Last Admin: 05/22/17 01:58 Dose:  650 mg


Albuterol Sulfate (Ventolin 0.083% Nebulizer Soln -)  1 amp NEB Q6H PRN


   PRN Reason: SHORT OF BREATH/WHEEZING


Albuterol/Ipratropium (Duoneb -)  1 amp NEB TIDR [facility]


   Last Admin: 05/22/17 14:23 Dose:  1 amp


Allopurinol (Zyloprim -)  100 mg PO DAILY [facility]


   Last Admin: 05/22/17 09:59 Dose:  100 mg


Ascorbic Acid (Vitamin C -)  1,000 mg PO DAILY [facility]


   Last Admin: 05/22/17 09:56 Dose:  1,000 mg


Atorvastatin Calcium (Lipitor -)  40 mg PO HS [facility]


   Last Admin: 05/21/17 21:48 Dose:  40 mg


Azithromycin (Zithromax -)  250 mg PO DAILY [facility]


   Last Admin: 05/22/17 09:59 Dose:  250 mg


Bupropion HCl (Wellbutrin -)  75 mg PO DAILY [facility]


   Last Admin: 05/22/17 10:02 Dose:  75 mg


Carvedilol (Coreg -)  6.25 mg PO BID [facility]


   Last Admin: 05/22/17 09:58 Dose:  6.25 mg


Cholecalciferol (Vitamin D3 -)  1,000 unit PO DAILY [facility]


   Last Admin: 05/22/17 09:58 Dose:  1,000 unit


Clopidogrel Bisulfate (Plavix -)  75 mg PO DAILY [facility]


   Last Admin: 05/22/17 09:57 Dose:  75 mg


Docusate Sodium (Colace -)  100 mg PO BID [facility]


   Last Admin: 05/22/17 09:56 Dose:  100 mg


Ferrous Sulfate (Feosol -)  325 mg PO Q2D [facility]


   Last Admin: 05/21/17 10:13 Dose:  325 mg


Furosemide (Lasix Injection -)  80 mg IVPB BID@0600,1400 [facility]


Milrinone Lactate/Dextrose (Milrinone 20mg/100ml Ivpb -)  100 mls @ 9.573 mls/

hr IVPB TITR [facility]


   PRN Reason: 0.35 MCG/KG/MIN


   Last Admin: 05/22/17 07:00 Dose:  9.577 mls/hr


Insulin Aspart (Novolog Vial Sliding Scale -)  1 vial SQ ACHS [facility]


   PRN Reason: Protocol


   Last Admin: 05/22/17 11:59 Dose:  5 units


Multivitamins/Minerals/Vitamin C (Tab-A-Vit -)  1 tab PO DAILY [facility]


   Last Admin: 05/22/17 09:56 Dose:  1 tab


Potassium Chloride (K-Dur -)  20 meq PO TID [facility]


   Last Admin: 05/22/17 14:28 Dose:  20 meq


Ranitidine HCl (Zantac -)  75 mg PO DAILY [facility]


   Last Admin: 05/22/17 09:57 Dose:  75 mg


Warfarin Sodium (Coumadin -)  4 mg PO DAILY@1800 [facility]


   Last Admin: 05/21/17 17:07 Dose:  4 mg











- Objective


Vital Signs: 


 Vital Signs











Temperature  97.9 F   05/22/17 14:00


 


Pulse Rate  75   05/22/17 14:00


 


Respiratory Rate  20   05/22/17 14:00


 


Blood Pressure  106/61   05/22/17 14:00


 


O2 Sat by Pulse Oximetry (%)  94 L  05/22/17 09:00











Constitutional: Yes: Well Nourished, No Distress, Calm


Cardiovascular: Yes: Regular Rate and Rhythm.  No: Gallop, Murmur, Rub


Respiratory: Yes: On Nasal O2, Rhonchi, Tachypnea (slight).  No: Rales, Wheezes


Gastrointestinal: Yes: Normal Bowel Sounds, Soft.  No: Distention, Tenderness


Extremities: Yes: WNL


Edema: Yes


Edema: LLE: Trace, RLE: Trace


Labs: 


 CBC, BMP





 05/22/17 05:40 





 05/22/17 05:40 





 INR, PTT











INR  3.14  (0.82-1.09)  H  05/22/17  05:40    














Problem List





- Problems


(1) CHF (congestive heart failure)


Code(s): I50.9 - HEART FAILURE, UNSPECIFIED   Qualifiers: 


     Congestive heart failure type: systolic     Congestive heart failure 

chronicity: acute on chronic        Qualified Code(s): I50.23 - Acute on 

chronic systolic (congestive) heart failure  





(2) Acute and chronic respiratory failure (acute-on-chronic)


Code(s): J96.20 - ACUTE AND CHR RESP FAILURE, UNSP W HYPOXIA OR HYPERCAPNIA   

Qualifiers: 


     Respiratory failure complication: hypoxia        Qualified Code(s): J96.21 

- Acute and chronic respiratory failure with hypoxia  





(3) Chest pain


Code(s): R07.9 - CHEST PAIN, UNSPECIFIED   





(4) CAD (coronary artery disease)


Code(s): I25.10 - ATHSCL HEART DISEASE OF NATIVE CORONARY ARTERY W/O ANG PCTRS 

  





(5) CKD (chronic kidney disease)


Code(s): N18.9 - CHRONIC KIDNEY DISEASE, UNSPECIFIED   Qualifiers: 


     Chronic kidney disease stage: stage 4 (severe)        Qualified Code(s): 

N18.4 - Chronic kidney disease, stage 4 (severe)  





(6) COPD (chronic obstructive pulmonary disease)


Code(s): J44.9 - CHRONIC OBSTRUCTIVE PULMONARY DISEASE, UNSPECIFIED   Qualifiers

: 


     COPD type: COPD with acute exacerbation        Qualified Code(s): J44.1 - 

Chronic obstructive pulmonary disease with (acute) exacerbation  





(7) Diabetes


Code(s): E11.9 - TYPE 2 DIABETES MELLITUS WITHOUT COMPLICATIONS   Qualifiers: 


     Diabetes mellitus type: type 2     Diabetes mellitus complication status: 

with kidney complications     Diabetes mellitus complication detail: with 

chronic kidney disease     Diabetes mellitus long term insulin use: with long 

term use     Chronic kidney disease stage: stage 4 (severe)        Qualified 

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

; N18.1 - Chronic kidney disease, stage 1; Z79.4 - Long term (current) use of 

insulin  








Assessment/Plan


(1) Acute and chronic respiratory failure (acute-on-chronic)


Assessment/Plan: 


-now with dyspnea on exertion


-appears more fluid overloaded


-case d/w Dr Gama and Dr Titus


-diuresis with IV lasix and daily weights


Code(s): J96.20 - ACUTE AND CHR RESP FAILURE, UNSP W HYPOXIA OR HYPERCAPNIA   

Qualifiers: 


     Respiratory failure complication: hypoxia        Qualified Code(s): J96.21 

- Acute and chronic respiratory failure with hypoxia  





(2) Chest pain


Assessment/Plan: 


-resolved


Code(s): R07.9 - CHEST PAIN, UNSPECIFIED   





(3) CHF (congestive heart failure)


Assessment/Plan: 


-continue milrinone gtt


-diuresis as above


Code(s): I50.9 - HEART FAILURE, UNSPECIFIED   Qualifiers: 


     Congestive heart failure type: systolic





(4) CAD (coronary artery disease)


Assessment/Plan: 


-stable


-continue home regimen


Code(s): I25.10 - ATHSCL HEART DISEASE OF NATIVE CORONARY ARTERY W/O ANG PCTRS 

  





(5) CKD (chronic kidney disease)


Assessment/Plan: 


-at baseline


-continue to monitor


Code(s): N18.9 - CHRONIC KIDNEY DISEASE, UNSPECIFIED   Qualifiers: 


     Chronic kidney disease stage: stage 4 (severe)        Qualified Code(s): 

N18.4 - Chronic kidney disease, stage 4 (severe)  





(6) COPD (chronic obstructive pulmonary disease)


Assessment/Plan: 


-as above


Code(s): J44.9 - CHRONIC OBSTRUCTIVE PULMONARY DISEASE, UNSPECIFIED   Qualifiers

: 


     COPD type: COPD with acute exacerbation        Qualified Code(s): J44.1 - 

Chronic obstructive pulmonary disease with (acute) exacerbation  





(7) Diabetes


Assessment/Plan: 


-diabetic diet


-FSBS and SSI


Code(s): E11.9 - TYPE 2 DIABETES MELLITUS WITHOUT COMPLICATIONS   Qualifiers: 


     Diabetes mellitus type: type 2     Diabetes mellitus complication status: 

with kidney complications     Diabetes mellitus complication detail: with 

chronic kidney disease     Diabetes mellitus long term insulin use: with long 

term use     Chronic kidney disease stage: stage 4 (severe)        Qualified 

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

; N18.4 - Chronic kidney disease, stage 4 (severe); Z79.4 - Long term (current) 

use of insulin  





Planned for discharge today, but cancelled secondary to [place]

## 2017-05-23 LAB
ANION GAP SERPL CALC-SCNC: 10 MMOL/L (ref 8–16)
BASOPHILS # BLD: 0.4 % (ref 0–2)
CALCIUM SERPL-MCNC: 8.5 MG/DL (ref 8.5–10.1)
CO2 SERPL-SCNC: 34 MMOL/L (ref 21–32)
COCKROFT - GAULT: 45.21
CREAT SERPL-MCNC: 1.8 MG/DL (ref 0.7–1.3)
DEPRECATED RDW RBC AUTO: 18.6 % (ref 11.9–15.9)
EOSINOPHIL # BLD: 2.3 % (ref 0–4.5)
GLUCOSE SERPL-MCNC: 129 MG/DL (ref 74–106)
INR BLD: 2.87 (ref 0.82–1.09)
MAGNESIUM SERPL-MCNC: 2.7 MG/DL (ref 1.8–2.4)
MCH RBC QN AUTO: 26.2 PG (ref 25.7–33.7)
MCHC RBC AUTO-ENTMCNC: 32 G/DL (ref 32–35.9)
MCV RBC: 81.8 FL (ref 80–96)
NEUTROPHILS # BLD: 71.1 % (ref 42.8–82.8)
PHOSPHATE SERPL-MCNC: 3.1 MG/DL (ref 2.5–4.9)
PLATELET # BLD AUTO: 163 K/MM3 (ref 134–434)
PMV BLD: 8.8 FL (ref 7.5–11.1)
PT PNL PPP: 32.3 SEC (ref 9.98–11.88)
WBC # BLD AUTO: 8.5 K/MM3 (ref 4–10)

## 2017-05-23 RX ADMIN — INSULIN ASPART SCH UNITS: 100 INJECTION, SOLUTION INTRAVENOUS; SUBCUTANEOUS at 17:42

## 2017-05-23 RX ADMIN — RANITIDINE SCH MG: 150 TABLET ORAL at 10:28

## 2017-05-23 RX ADMIN — CARVEDILOL SCH MG: 6.25 TABLET, FILM COATED ORAL at 10:27

## 2017-05-23 RX ADMIN — INSULIN ASPART SCH UNITS: 100 INJECTION, SOLUTION INTRAVENOUS; SUBCUTANEOUS at 06:01

## 2017-05-23 RX ADMIN — MILRINONE LACTATE SCH MLS/HR: 200 INJECTION, SOLUTION INTRAVENOUS at 06:09

## 2017-05-23 RX ADMIN — FERROUS SULFATE TAB EC 324 MG (65 MG FE EQUIVALENT) SCH MG: 324 (65 FE) TABLET DELAYED RESPONSE at 10:30

## 2017-05-23 RX ADMIN — MILRINONE LACTATE SCH MLS/HR: 200 INJECTION, SOLUTION INTRAVENOUS at 14:23

## 2017-05-23 RX ADMIN — IPRATROPIUM BROMIDE AND ALBUTEROL SULFATE SCH AMP: .5; 3 SOLUTION RESPIRATORY (INHALATION) at 13:30

## 2017-05-23 RX ADMIN — AZITHROMYCIN SCH MG: 250 TABLET, FILM COATED ORAL at 10:28

## 2017-05-23 RX ADMIN — DOCUSATE SODIUM SCH MG: 100 CAPSULE, LIQUID FILLED ORAL at 10:29

## 2017-05-23 RX ADMIN — DOCUSATE SODIUM SCH MG: 100 CAPSULE, LIQUID FILLED ORAL at 21:03

## 2017-05-23 RX ADMIN — ALLOPURINOL SCH MG: 100 TABLET ORAL at 10:30

## 2017-05-23 RX ADMIN — MULTIVITAMIN TABLET SCH TAB: TABLET at 10:27

## 2017-05-23 RX ADMIN — CARVEDILOL SCH MG: 6.25 TABLET, FILM COATED ORAL at 21:03

## 2017-05-23 RX ADMIN — POTASSIUM CHLORIDE SCH MEQ: 1500 TABLET, EXTENDED RELEASE ORAL at 21:03

## 2017-05-23 RX ADMIN — CLOPIDOGREL BISULFATE SCH MG: 75 TABLET, FILM COATED ORAL at 10:27

## 2017-05-23 RX ADMIN — WARFARIN SCH MG: 2 TABLET ORAL at 18:00

## 2017-05-23 RX ADMIN — INSULIN ASPART SCH UNITS: 100 INJECTION, SOLUTION INTRAVENOUS; SUBCUTANEOUS at 12:05

## 2017-05-23 RX ADMIN — ATORVASTATIN CALCIUM SCH MG: 40 TABLET, FILM COATED ORAL at 21:03

## 2017-05-23 RX ADMIN — POTASSIUM CHLORIDE SCH MEQ: 1500 TABLET, EXTENDED RELEASE ORAL at 13:15

## 2017-05-23 RX ADMIN — OXYCODONE HYDROCHLORIDE AND ACETAMINOPHEN SCH MG: 500 TABLET ORAL at 10:29

## 2017-05-23 RX ADMIN — POTASSIUM CHLORIDE SCH MEQ: 1500 TABLET, EXTENDED RELEASE ORAL at 06:01

## 2017-05-23 RX ADMIN — CHOLECALCIFEROL TAB 10 MCG (400 UNIT) SCH UNIT: 10 TAB at 10:30

## 2017-05-23 RX ADMIN — FUROSEMIDE SCH MG: 10 INJECTION, SOLUTION INTRAVENOUS at 06:00

## 2017-05-23 RX ADMIN — IPRATROPIUM BROMIDE AND ALBUTEROL SULFATE SCH AMP: .5; 3 SOLUTION RESPIRATORY (INHALATION) at 06:30

## 2017-05-23 RX ADMIN — IPRATROPIUM BROMIDE AND ALBUTEROL SULFATE SCH AMP: .5; 3 SOLUTION RESPIRATORY (INHALATION) at 22:45

## 2017-05-23 RX ADMIN — MILRINONE LACTATE SCH: 200 INJECTION, SOLUTION INTRAVENOUS at 17:42

## 2017-05-23 RX ADMIN — FUROSEMIDE SCH MG: 10 INJECTION, SOLUTION INTRAVENOUS at 13:15

## 2017-05-23 RX ADMIN — INSULIN ASPART SCH UNITS: 100 INJECTION, SOLUTION INTRAVENOUS; SUBCUTANEOUS at 21:03

## 2017-05-23 NOTE — PN
Progress Note, Physician


Chief Complaint: 


Mr Jerome said he is feeling fine. Says his breathing is improved and he is 

not short of breath on exertion. However RN notes that he is still short of 

breath with minimal exertion.





- Current Medication List


Current Medications: 


Active Medications





Acetaminophen (Tylenol -)  650 mg PO Q4H PRN


   PRN Reason: FEVER OR PAIN


   Last Admin: 05/22/17 01:58 Dose:  650 mg


Albuterol Sulfate (Ventolin 0.083% Nebulizer Soln -)  1 amp NEB Q6H PRN


   PRN Reason: SHORT OF BREATH/WHEEZING


   Last Admin: 05/23/17 02:20 Dose:  1 amp


Albuterol/Ipratropium (Duoneb -)  1 amp NEB TIDR [facility]


   Last Admin: 05/23/17 06:30 Dose:  1 amp


Allopurinol (Zyloprim -)  100 mg PO DAILY [facility]


   Last Admin: 05/23/17 10:30 Dose:  100 mg


Ascorbic Acid (Vitamin C -)  1,000 mg PO DAILY [facility]


   Last Admin: 05/23/17 10:29 Dose:  1,000 mg


Atorvastatin Calcium (Lipitor -)  40 mg PO HS [facility]


   Last Admin: 05/22/17 23:12 Dose:  40 mg


Azithromycin (Zithromax -)  250 mg PO DAILY [facility]


   Last Admin: 05/23/17 10:28 Dose:  250 mg


Bupropion HCl (Wellbutrin -)  75 mg PO DAILY [facility]


   Last Admin: 05/23/17 10:31 Dose:  75 mg


Carvedilol (Coreg -)  6.25 mg PO BID [facility]


   Last Admin: 05/23/17 10:27 Dose:  6.25 mg


Cholecalciferol (Vitamin D3 -)  1,000 unit PO DAILY [facility]


   Last Admin: 05/23/17 10:30 Dose:  1,000 unit


Clopidogrel Bisulfate (Plavix -)  75 mg PO DAILY [facility]


   Last Admin: 05/23/17 10:27 Dose:  75 mg


Docusate Sodium (Colace -)  100 mg PO BID [facility]


   Last Admin: 05/23/17 10:29 Dose:  100 mg


Ferrous Sulfate (Feosol -)  325 mg PO Q2D [facility]


   Last Admin: 05/23/17 10:30 Dose:  325 mg


Furosemide (Lasix Injection -)  80 mg IVPB BID@0600,1400 [facility]


   Last Admin: 05/23/17 06:00 Dose:  80 mg


Milrinone Lactate/Dextrose (Milrinone 20mg/100ml Ivpb -)  100 mls @ 9.573 mls/

hr IVPB TITR [facility]


   PRN Reason: 0.35 MCG/KG/MIN


   Last Admin: 05/23/17 06:09 Dose:  9.5 mls/hr


Insulin Aspart (Novolog Vial Sliding Scale -)  1 vial SQ ACHS [facility]


   PRN Reason: Protocol


   Last Admin: 05/23/17 06:01 Dose:  1 units


Multivitamins/Minerals/Vitamin C (Tab-A-Vit -)  1 tab PO DAILY [facility]


   Last Admin: 05/23/17 10:27 Dose:  1 tab


Potassium Chloride (K-Dur -)  20 meq PO TID [facility]


   Last Admin: 05/23/17 06:01 Dose:  20 meq


Ranitidine HCl (Zantac -)  75 mg PO DAILY [facility]


   Last Admin: 05/23/17 10:28 Dose:  75 mg


Warfarin Sodium (Coumadin -)  4 mg PO DAILY@1800 [facility]


   Last Admin: 05/21/17 17:07 Dose:  4 mg











- Objective


Vital Signs: 


 Vital Signs











Temperature  97.5 F L  05/23/17 10:00


 


Pulse Rate  75   05/23/17 10:00


 


Respiratory Rate  20   05/23/17 10:00


 


Blood Pressure  115/65   05/23/17 10:00


 


O2 Sat by Pulse Oximetry (%)  92 L  05/22/17 22:00











Constitutional: Yes: No Distress, Calm


Cardiovascular: Yes: Regular Rate and Rhythm.  No: Gallop, Murmur, Rub


Respiratory: Yes: Regular, On Nasal O2, Rhonchi.  No: Rales, Wheezes


Gastrointestinal: Yes: Normal Bowel Sounds, Soft.  No: Distention, Tenderness


Extremities: Yes: WNL


Edema: Yes


Edema: LLE: 1+, RLE: 1+


Labs: 


 CBC, BMP





 05/23/17 05:35 





 05/23/17 05:35 





 INR, PTT











INR  2.87  (0.82-1.09)  H  05/23/17  05:35    














Problem List





- Problems


(1) CHF (congestive heart failure)


Code(s): I50.9 - HEART FAILURE, UNSPECIFIED   Qualifiers: 


     Congestive heart failure type: systolic     Congestive heart failure 

chronicity: acute on chronic        Qualified Code(s): I50.23 - Acute on 

chronic systolic (congestive) heart failure  





(2) Acute and chronic respiratory failure (acute-on-chronic)


Code(s): J96.20 - ACUTE AND CHR RESP FAILURE, UNSP W HYPOXIA OR HYPERCAPNIA   

Qualifiers: 


     Respiratory failure complication: hypoxia        Qualified Code(s): J96.21 

- Acute and chronic respiratory failure with hypoxia  





(3) Chest pain


Code(s): R07.9 - CHEST PAIN, UNSPECIFIED   





(4) CAD (coronary artery disease)


Code(s): I25.10 - ATHSCL HEART DISEASE OF NATIVE CORONARY ARTERY W/O ANG PCTRS 

  





(5) CKD (chronic kidney disease)


Code(s): N18.9 - CHRONIC KIDNEY DISEASE, UNSPECIFIED   Qualifiers: 


     Chronic kidney disease stage: stage 4 (severe)        Qualified Code(s): 

N18.4 - Chronic kidney disease, stage 4 (severe)  





(6) COPD (chronic obstructive pulmonary disease)


Code(s): J44.9 - CHRONIC OBSTRUCTIVE PULMONARY DISEASE, UNSPECIFIED   Qualifiers

: 


     COPD type: COPD with acute exacerbation        Qualified Code(s): J44.1 - 

Chronic obstructive pulmonary disease with (acute) exacerbation  





(7) Diabetes


Code(s): E11.9 - TYPE 2 DIABETES MELLITUS WITHOUT COMPLICATIONS   Qualifiers: 


     Diabetes mellitus type: type 2     Diabetes mellitus complication status: 

with kidney complications     Diabetes mellitus complication detail: with 

chronic kidney disease     Diabetes mellitus long term insulin use: with long 

term use     Chronic kidney disease stage: stage 4 (severe)        Qualified 

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

; N18.1 - Chronic kidney disease, stage 1; Z79.4 - Long term (current) use of 

insulin  








Assessment/Plan


(1) Acute and chronic respiratory failure (acute-on-chronic)


Assessment/Plan: 


-patient says feeling better, but still with CARTAGENA


-continue IV lasix per cardiology


Code(s): J96.20 - ACUTE AND CHR RESP FAILURE, UNSP W HYPOXIA OR HYPERCAPNIA   

Qualifiers: 


     Respiratory failure complication: hypoxia        Qualified Code(s): J96.21 

- Acute and chronic respiratory failure with hypoxia  





(2) Chest pain


Assessment/Plan: 


-resolved


Code(s): R07.9 - CHEST PAIN, UNSPECIFIED   





(3) CHF (congestive heart failure)


Assessment/Plan: 


-continue milrinone gtt


-diuresing with lasix


-cardiology to adjust as needed


Code(s): I50.9 - HEART FAILURE, UNSPECIFIED   Qualifiers: 


     Congestive heart failure type: systolic





(4) CAD (coronary artery disease)


Assessment/Plan: 


-stable


-continue home regimen


Code(s): I25.10 - ATHSCL HEART DISEASE OF NATIVE CORONARY ARTERY W/O ANG PCTRS 

  





(5) CKD (chronic kidney disease)


Assessment/Plan: 


-at baseline


-continue to monitor


Code(s): N18.9 - CHRONIC KIDNEY DISEASE, UNSPECIFIED   Qualifiers: 


     Chronic kidney disease stage: stage 4 (severe)        Qualified Code(s): 

N18.4 - Chronic kidney disease, stage 4 (severe)  





(6) COPD (chronic obstructive pulmonary disease)


Assessment/Plan: 


-continue current management


-suspect SOB now secondary to CHF


-pulmonary following


Code(s): J44.9 - CHRONIC OBSTRUCTIVE PULMONARY DISEASE, UNSPECIFIED   Qualifiers

: 


     COPD type: COPD with acute exacerbation        Qualified Code(s): J44.1 - 

Chronic obstructive pulmonary disease with (acute) exacerbation  





(7) Diabetes


Assessment/Plan: 


-diabetic diet


-FSBS and SSI


Code(s): E11.9 - TYPE 2 DIABETES MELLITUS WITHOUT COMPLICATIONS   Qualifiers: 


     Diabetes mellitus type: type 2     Diabetes mellitus complication status: 

with kidney complications     Diabetes mellitus complication detail: with 

chronic kidney disease     Diabetes mellitus long term insulin use: with long 

term use     Chronic kidney disease stage: stage 4 (severe)        Qualified 

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

; N18.4 - Chronic kidney disease, stage 4 (severe); Z79.4 - Long term (current) 

use of insulin

## 2017-05-23 NOTE — PN
Progress Note (short form)





- Note


Progress Note: 


PULMONARY





States breathing is improving. On milrinone gtt and IV lasix boluses. Denies 

chest pain. No cough or wheezing.





 Last Vital Signs











Temp Pulse Resp BP Pulse Ox


 


 97.5 F L  75   20   115/65   91 L


 


 05/23/17 10:00  05/23/17 10:00  05/23/17 10:00  05/23/17 10:00  05/23/17 09:00








 Intake & Output











 05/20/17 05/21/17 05/22/17 05/23/17





 23:59 23:59 23:59 23:59


 


Intake Total 624.5 306.5 928 264


 


Output Total  


 


Balance 624.5 31.5 728 -886


 


Weight 206 lb 206 lb 2 oz 207 lb 208 lb 6 oz








Gen:  NAD in chair


Heart: RRR


Lung: left base rhonchi


Abd: soft, nontender


Ext: no edema





 CBC, BMP





 05/23/17 05:35 





 05/23/17 05:35 





Active Medications





Acetaminophen (Tylenol -)  650 mg PO Q4H PRN


   PRN Reason: FEVER OR PAIN


   Last Admin: 05/22/17 01:58 Dose:  650 mg


Albuterol Sulfate (Ventolin 0.083% Nebulizer Soln -)  1 amp NEB Q6H PRN


   PRN Reason: SHORT OF BREATH/WHEEZING


   Last Admin: 05/23/17 02:20 Dose:  1 amp


Albuterol/Ipratropium (Duoneb -)  1 amp NEB [facility]R [facility]


   Last Admin: 05/23/17 06:30 Dose:  1 amp


Allopurinol (Zyloprim -)  100 mg PO DAILY [facility]


   Last Admin: 05/23/17 10:30 Dose:  100 mg


Ascorbic Acid (Vitamin C -)  1,000 mg PO DAILY [facility]


   Last Admin: 05/23/17 10:29 Dose:  1,000 mg


Atorvastatin Calcium (Lipitor -)  40 mg PO HS [facility]


   Last Admin: 05/22/17 23:12 Dose:  40 mg


Azithromycin (Zithromax -)  250 mg PO DAILY [facility]


   Last Admin: 05/23/17 10:28 Dose:  250 mg


Bupropion HCl (Wellbutrin -)  75 mg PO DAILY [facility]


   Last Admin: 05/23/17 10:31 Dose:  75 mg


Carvedilol (Coreg -)  6.25 mg PO BID [facility]


   Last Admin: 05/23/17 10:27 Dose:  6.25 mg


Cholecalciferol (Vitamin D3 -)  1,000 unit PO DAILY [facility]


   Last Admin: 05/23/17 10:30 Dose:  1,000 unit


Clopidogrel Bisulfate (Plavix -)  75 mg PO DAILY [facility]


   Last Admin: 05/23/17 10:27 Dose:  75 mg


Docusate Sodium (Colace -)  100 mg PO BID [facility]


   Last Admin: 05/23/17 10:29 Dose:  100 mg


Ferrous Sulfate (Feosol -)  325 mg PO Q2D [facility]


   Last Admin: 05/23/17 10:30 Dose:  325 mg


Furosemide (Lasix Injection -)  80 mg IVPB BID@0600,1400 [facility]


   Last Admin: 05/23/17 06:00 Dose:  80 mg


Milrinone Lactate/Dextrose (Milrinone 20mg/100ml Ivpb -)  100 mls @ 9.573 mls/

hr IVPB TITR [facility]


   PRN Reason: 0.35 MCG/KG/MIN


   Last Admin: 05/23/17 06:09 Dose:  9.5 mls/hr


Insulin Aspart (Novolog Vial Sliding Scale -)  1 vial SQ ACHS [facility]


   PRN Reason: Protocol


   Last Admin: 05/23/17 06:01 Dose:  1 units


Multivitamins/Minerals/Vitamin C (Tab-A-Vit -)  1 tab PO DAILY [facility]


   Last Admin: 05/23/17 10:27 Dose:  1 tab


Potassium Chloride (K-Dur -)  20 meq PO TID [facility]


   Last Admin: 05/23/17 06:01 Dose:  20 meq


Ranitidine HCl (Zantac -)  75 mg PO DAILY [facility]


   Last Admin: 05/23/17 10:28 Dose:  75 mg


Warfarin Sodium (Coumadin -)  4 mg PO DAILY@1800 [facility]


   Last Admin: 05/21/17 17:07 Dose:  4 mg





A/P


Acute on Chronic Systolic Heart Failure


s/p ICD


COPD


Chronic Hypoxic Respiratory Failure


CAD s/p CABG


Atrial Fibrillation


Pulmonary HTN


HTN


DM


CKD





-  continue lasix with milrinone gtt


-  monitor urine output, creatinine


-  daily weights, I/Os


-  keep net negative balance


-  rate controlled


-  continue anticoagulation

## 2017-05-23 NOTE — PN
Progress Note (short form)





- Note


Progress Note: 


Chief Complaint: 


sob


History of Present Illness: 


d/c delayed yesterday due to persistent wright.  restarted on IV lasix, states 

improvement in abd swelling and sob. 


no orthopnea


no leg swelling





no more cp


no palpit





ex cigs





 


 Current Medications





Acetaminophen (Tylenol -)  650 mg PO Q4H PRN


   PRN Reason: FEVER OR PAIN


   Last Admin: 05/22/17 01:58 Dose:  650 mg


Albuterol Sulfate (Ventolin 0.083% Nebulizer Soln -)  1 amp NEB Q6H PRN


   PRN Reason: SHORT OF BREATH/WHEEZING


   Last Admin: 05/23/17 02:20 Dose:  1 amp


Albuterol/Ipratropium (Duoneb -)  1 amp NEB TIDR [facility]


   Last Admin: 05/23/17 06:30 Dose:  1 amp


Allopurinol (Zyloprim -)  100 mg PO DAILY [facility]


   Last Admin: 05/23/17 10:30 Dose:  100 mg


Ascorbic Acid (Vitamin C -)  1,000 mg PO DAILY [facility]


   Last Admin: 05/23/17 10:29 Dose:  1,000 mg


Atorvastatin Calcium (Lipitor -)  40 mg PO HS [facility]


   Last Admin: 05/22/17 23:12 Dose:  40 mg


Azithromycin (Zithromax -)  250 mg PO DAILY [facility]


   Last Admin: 05/23/17 10:28 Dose:  250 mg


Bupropion HCl (Wellbutrin -)  75 mg PO DAILY [facility]


   Last Admin: 05/23/17 10:31 Dose:  75 mg


Carvedilol (Coreg -)  6.25 mg PO BID [facility]


   Last Admin: 05/23/17 10:27 Dose:  6.25 mg


Cholecalciferol (Vitamin D3 -)  1,000 unit PO DAILY [facility]


   Last Admin: 05/23/17 10:30 Dose:  1,000 unit


Clopidogrel Bisulfate (Plavix -)  75 mg PO DAILY [facility]


   Last Admin: 05/23/17 10:27 Dose:  75 mg


Docusate Sodium (Colace -)  100 mg PO BID [facility]


   Last Admin: 05/23/17 10:29 Dose:  100 mg


Ferrous Sulfate (Feosol -)  325 mg PO Q2D [facility]


   Last Admin: 05/23/17 10:30 Dose:  325 mg


Furosemide (Lasix Injection -)  80 mg IVPB BID@0600,1400 [facility]


   Last Admin: 05/23/17 06:00 Dose:  80 mg


Milrinone Lactate/Dextrose (Milrinone 20mg/100ml Ivpb -)  100 mls @ 9.573 mls/

hr IVPB TITR [facility]


   PRN Reason: 0.35 MCG/KG/MIN


   Last Admin: 05/23/17 06:09 Dose:  9.5 mls/hr


Insulin Aspart (Novolog Vial Sliding Scale -)  1 vial SQ ACHS [facility]


   PRN Reason: Protocol


   Last Admin: 05/23/17 06:01 Dose:  1 units


Multivitamins/Minerals/Vitamin C (Tab-A-Vit -)  1 tab PO DAILY [facility]


   Last Admin: 05/23/17 10:27 Dose:  1 tab


Potassium Chloride (K-Dur -)  20 meq PO TID [facility]


   Last Admin: 05/23/17 06:01 Dose:  20 meq


Ranitidine HCl (Zantac -)  75 mg PO DAILY [facility]


   Last Admin: 05/23/17 10:28 Dose:  75 mg


Warfarin Sodium (Coumadin -)  4 mg PO DAILY@1800 [facility]


   Last Admin: 05/21/17 17:07 Dose:  4 mg








 Vital Signs - 24 hr











  05/22/17 05/22/17 05/22/17





  14:00 17:53 22:00


 


Temperature 97.9 F 97.6 F 97.6 F


 


Pulse Rate 75 75 76


 


Respiratory 20 18 20





Rate   


 


Blood Pressure 106/61 113/65 112/59


 


O2 Sat by Pulse   92 L





Oximetry (%)   














  05/23/17 05/23/17 05/23/17





  01:00 06:00 10:00


 


Temperature 97.6 F 97.8 F 97.5 F L


 


Pulse Rate 74 76 75


 


Respiratory 18 18 20





Rate   


 


Blood Pressure 113/65 109/70 115/65


 


O2 Sat by Pulse   





Oximetry (%)   











 Intake & Output











 05/21/17 05/22/17 05/23/17 05/24/17





 07:59 07:59 07:59 07:59


 


Intake Total 581.0 354 1078 


 


Output Total  475 850 300


 


Balance 581.0 -121 228 -300


 


Weight 206 lb 2 oz 207 lb 208 lb 6 oz 














Constitutional: Yes: No Distress, Calm


Eyes: No: Sclera Icterus


HENT: No: Nasal Congestion


Cardiovascular: Yes: Regular Rate and Rhythm, JVD (++), S1, S2, Other (PMI non 

diplaced).  No: Gallop, Murmur


Respiratory: Yes: Wheezes.  No: Accessory Muscle Use, Rales


Gastrointestinal: Yes: Normal Bowel Sounds, Soft.  No: Tenderness


Musculoskeletal: Yes: Other (No kyphosis)


Extremities: No: Cold


Edema: No


Integumentary: No: Jaundice


Neurological: Yes: Alert, Oriented (x3)


Psychiatric: No: Agitated


Labs: 


  


 CBC, BMP





 05/23/17 05:35 





 05/23/17 05:35 








 Laboratory Tests











  04/01/17 04/02/17 04/04/17





  06:07 06:02 05:35


 


INR   3.99 H  2.70 H


 


BUN  72 H  


 


Creatinine  2.4 H  


 


Magnesium   


 


Total Bilirubin   


 


AST   


 


ALT   


 


Alkaline Phosphatase   


 


Creatine Kinase   


 


Troponin I   


 


Albumin   














  05/22/17 05/22/17 05/23/17





  05:40 11:13 05:35


 


INR    2.87 H


 


BUN   


 


Creatinine   


 


Magnesium   


 


Total Bilirubin  1.2 H D  


 


AST  21  


 


ALT  18  


 


Alkaline Phosphatase  164 H  


 


Creatine Kinase   51 


 


Troponin I   0.03 


 


Albumin  3.3 L  














  05/23/17





  05:35


 


INR 


 


BUN 


 


Creatinine 


 


Magnesium  2.7 H


 


Total Bilirubin 


 


AST 


 


ALT 


 


Alkaline Phosphatase 


 


Creatine Kinase 


 


Troponin I 


 


Albumin 

















- ....Imaging


EKG: Other (tele: NSR, V-paced)





cxr 5/22: inc bridgette changes. 





Assessment/Plan


a/p:  76 year old man with PMH of IDDM, COPD (O2 dependant) HTN, HLD, Afib (on 

coumadin), CAD s/p PCIs, end stage CHF with ICD placement (on milrinone pump), 

PVD, BPH here with sob, cp, HA.





sob, copd:


-here about 1 mo ago with pulm process


-recently as outpt ongoing cough/sob over his baseline, despite at his 

longstanding dry wt and appeared clinically euvolemic--saw dr degroot who incr'd 

home O2 flow rate (for hypoxia noted in office) and tried change of inhalers


-plan was empiric incr in torsemide dose if no improvement


-now back with incr sob--severe episode on 5/19 in pm; associated atypical 

chest discomfort as below


-? bronchitis


-has had no signs vol overload, cxr with chronic findings, bnp at baseline (2K-

5K), wt at dry wt


-had plan empiric trial of incr po torsemide on discharge and outpt f/u with 

Dr. Titus


- 5/22 persistent wright, cxr with increased bridgette changes.  weight slightly up.  

started on lasix 80 mg IV bid. 


- 5/23: BMP stable, con't same. 





cp:


-atypical features ("like a swirl" sensation in center of chest, no radiation)


-resolved and no recurrence here


-trop WNL x 1 on DOA--rpt 5/22 ordered negative


-ECG BiV-paced


-5/22 trop negative, he does not require inpatient evaluation--will consider 

outpt stress testing if his chronic sx (incr'd sob over baseline) does not 

respond to changes in inhalers for copd or incr diuretics





chronic end stage systolic heart failure s/p ICD, on home milrinone infusion:


- vol status seems stable (as above), home regimen = torsemide 40 po bid


- worsened sob over baseline = progressive copd vs chf


- will give 2 wks empiric trial of incr'd torsemide at home: 80mg am, 40mg pm


- should have repeat bun/creat later this week (3-5 days) on this regimen


- should see me 2 wks after discharge


- cont milrinone at patient's home maintenance dose (on chronic infusion 

destination therapy)--0.35 mcg/kg/min.  


- cont home coreg (doubt causing bronchospasm--will d/w dr degroot ? rpt pft's)


- recent office icd check with stable function


- 5/22: discharge delayed due to persistent wright.   CXR  noted to have increased 

bridgette changes.  weight slightly up.  started on lasix 80 mg IV bid.


- 5/23: con't milrinone, bid IV lasix with kcl supplements, milrinone, coreg. 

Not on arb, defer to Dr. Titus. 





afib


- currently rate controlled on coreg


- cont coumadin per INR





PVD


- con't AC, plavix, statin.





CAD s/p PCIs


- No anginal symptoms, no signs acs.  Con't home ac, plavix, atorvastatin, coreg





HTN: controlled on coreg





HLD: con't statin. 





CKD


- at baseline renal fxn

## 2017-05-24 LAB
ALBUMIN SERPL-MCNC: 3.2 G/DL (ref 3.4–5)
ALP SERPL-CCNC: 156 U/L (ref 45–117)
ALT SERPL-CCNC: 21 U/L (ref 12–78)
ANION GAP SERPL CALC-SCNC: 10 MMOL/L (ref 8–16)
AST SERPL-CCNC: 21 U/L (ref 15–37)
BILIRUB SERPL-MCNC: 0.7 MG/DL (ref 0.2–1)
CALCIUM SERPL-MCNC: 8.5 MG/DL (ref 8.5–10.1)
CO2 SERPL-SCNC: 30 MMOL/L (ref 21–32)
COCKROFT - GAULT: 45.59
CREAT SERPL-MCNC: 1.8 MG/DL (ref 0.7–1.3)
GLUCOSE SERPL-MCNC: 236 MG/DL (ref 74–106)
INR BLD: 2.39 (ref 0.82–1.09)
MAGNESIUM SERPL-MCNC: 2.8 MG/DL (ref 1.8–2.4)
PROT SERPL-MCNC: 7.1 G/DL (ref 6.4–8.2)
PT PNL PPP: 26.8 SEC (ref 9.98–11.88)

## 2017-05-24 RX ADMIN — FUROSEMIDE SCH MG: 10 INJECTION, SOLUTION INTRAVENOUS at 06:09

## 2017-05-24 RX ADMIN — ALLOPURINOL SCH MG: 100 TABLET ORAL at 09:54

## 2017-05-24 RX ADMIN — INSULIN ASPART SCH UNITS: 100 INJECTION, SOLUTION INTRAVENOUS; SUBCUTANEOUS at 12:10

## 2017-05-24 RX ADMIN — CHOLECALCIFEROL TAB 10 MCG (400 UNIT) SCH UNIT: 10 TAB at 09:55

## 2017-05-24 RX ADMIN — RANITIDINE SCH MG: 150 TABLET ORAL at 09:54

## 2017-05-24 RX ADMIN — WARFARIN SCH MG: 2 TABLET ORAL at 17:46

## 2017-05-24 RX ADMIN — FUROSEMIDE SCH MG: 10 INJECTION, SOLUTION INTRAVENOUS at 13:43

## 2017-05-24 RX ADMIN — POTASSIUM CHLORIDE SCH MEQ: 1500 TABLET, EXTENDED RELEASE ORAL at 06:09

## 2017-05-24 RX ADMIN — CARVEDILOL SCH MG: 6.25 TABLET, FILM COATED ORAL at 09:54

## 2017-05-24 RX ADMIN — MILRINONE LACTATE SCH MLS/HR: 200 INJECTION, SOLUTION INTRAVENOUS at 00:56

## 2017-05-24 RX ADMIN — ATORVASTATIN CALCIUM SCH MG: 40 TABLET, FILM COATED ORAL at 22:10

## 2017-05-24 RX ADMIN — POTASSIUM CHLORIDE SCH MEQ: 1500 TABLET, EXTENDED RELEASE ORAL at 13:44

## 2017-05-24 RX ADMIN — CLOPIDOGREL BISULFATE SCH MG: 75 TABLET, FILM COATED ORAL at 09:53

## 2017-05-24 RX ADMIN — CARVEDILOL SCH MG: 6.25 TABLET, FILM COATED ORAL at 22:10

## 2017-05-24 RX ADMIN — INSULIN ASPART SCH UNITS: 100 INJECTION, SOLUTION INTRAVENOUS; SUBCUTANEOUS at 06:10

## 2017-05-24 RX ADMIN — POTASSIUM CHLORIDE SCH MEQ: 1500 TABLET, EXTENDED RELEASE ORAL at 22:10

## 2017-05-24 RX ADMIN — MILRINONE LACTATE SCH MLS/HR: 200 INJECTION, SOLUTION INTRAVENOUS at 23:40

## 2017-05-24 RX ADMIN — IPRATROPIUM BROMIDE AND ALBUTEROL SULFATE SCH AMP: .5; 3 SOLUTION RESPIRATORY (INHALATION) at 22:20

## 2017-05-24 RX ADMIN — MULTIVITAMIN TABLET SCH TAB: TABLET at 09:54

## 2017-05-24 RX ADMIN — MILRINONE LACTATE SCH MLS/HR: 200 INJECTION, SOLUTION INTRAVENOUS at 13:44

## 2017-05-24 RX ADMIN — INSULIN ASPART SCH UNITS: 100 INJECTION, SOLUTION INTRAVENOUS; SUBCUTANEOUS at 22:10

## 2017-05-24 RX ADMIN — AZITHROMYCIN SCH MG: 250 TABLET, FILM COATED ORAL at 09:53

## 2017-05-24 RX ADMIN — MILRINONE LACTATE SCH: 200 INJECTION, SOLUTION INTRAVENOUS at 15:50

## 2017-05-24 RX ADMIN — OXYCODONE HYDROCHLORIDE AND ACETAMINOPHEN SCH MG: 500 TABLET ORAL at 09:53

## 2017-05-24 RX ADMIN — DOCUSATE SODIUM SCH MG: 100 CAPSULE, LIQUID FILLED ORAL at 09:53

## 2017-05-24 RX ADMIN — IPRATROPIUM BROMIDE AND ALBUTEROL SULFATE SCH AMP: .5; 3 SOLUTION RESPIRATORY (INHALATION) at 14:14

## 2017-05-24 RX ADMIN — IPRATROPIUM BROMIDE AND ALBUTEROL SULFATE SCH AMP: .5; 3 SOLUTION RESPIRATORY (INHALATION) at 06:28

## 2017-05-24 RX ADMIN — DOCUSATE SODIUM SCH MG: 100 CAPSULE, LIQUID FILLED ORAL at 22:10

## 2017-05-24 RX ADMIN — INSULIN ASPART SCH UNITS: 100 INJECTION, SOLUTION INTRAVENOUS; SUBCUTANEOUS at 17:45

## 2017-05-24 NOTE — PN
Progress Note (short form)





- Note


Progress Note: 


PULMONARY





SUBJECTIVE IMPROVEMENT


WANTS TO GO HOME


FAMILY PRESENT


LESS SOB/NO CP





VSS/AFEBRILE


NECK SUPPLE


DISTANT B/L BREATH SOUNDS


S1S2


BS+


LESS LOWER EXT EDEMA





LABS/MEDS/NOTES/IMAGING/MEDS/MICRO REVIEWED








IMP  ACUTE ON CHRONIC HYPOXEMIC RESPIRATORY FAILURE STABLE


       COPD O2 DEPENDENT


       CHF S/P ICD


       ASHD S/P CABG


       AFIB


       HTN


       PULMONARY HTN


       DM


       CKD





PLAN CONTINUE INHALED BRONCHODILATORS


        NASAL O2 TO MAINTAIN O2 SAT 90%


        DIURETICS AS PER CARDIOLOGY


        MONITOR WTS/ANTICOAGULATION


        JAMESRINJOCY SAEZ MD

## 2017-05-24 NOTE — PN
Progress Note, Physician


Chief Complaint: 


Mr Jerome says he is still getting short of breath but is insisting he is 

feeling fine. Still with dyspnea with minimal exertion. No cp or n/v.





- Current Medication List


Current Medications: 


Active Medications





Acetaminophen (Tylenol -)  650 mg PO Q4H PRN


   PRN Reason: FEVER OR PAIN


   Last Admin: 05/22/17 01:58 Dose:  650 mg


Albuterol Sulfate (Ventolin 0.083% Nebulizer Soln -)  1 amp NEB Q6H PRN


   PRN Reason: SHORT OF BREATH/WHEEZING


   Last Admin: 05/23/17 02:20 Dose:  1 amp


Albuterol/Ipratropium (Duoneb -)  1 amp NEB TIDR [facility]


   Last Admin: 05/24/17 14:14 Dose:  1 amp


Allopurinol (Zyloprim -)  100 mg PO DAILY [facility]


   Last Admin: 05/24/17 09:54 Dose:  100 mg


Ascorbic Acid (Vitamin C -)  1,000 mg PO DAILY [facility]


   Last Admin: 05/24/17 09:53 Dose:  1,000 mg


Atorvastatin Calcium (Lipitor -)  40 mg PO HS [facility]


   Last Admin: 05/23/17 21:03 Dose:  40 mg


Azithromycin (Zithromax -)  250 mg PO DAILY [facility]


   Last Admin: 05/24/17 09:53 Dose:  250 mg


Bupropion HCl (Wellbutrin -)  75 mg PO DAILY [facility]


   Last Admin: 05/24/17 09:52 Dose:  75 mg


Carvedilol (Coreg -)  6.25 mg PO BID [facility]


   Last Admin: 05/24/17 09:54 Dose:  6.25 mg


Cholecalciferol (Vitamin D3 -)  1,000 unit PO DAILY [facility]


   Last Admin: 05/24/17 09:55 Dose:  1,000 unit


Clopidogrel Bisulfate (Plavix -)  75 mg PO DAILY [facility]


   Last Admin: 05/24/17 09:53 Dose:  75 mg


Docusate Sodium (Colace -)  100 mg PO BID [facility]


   Last Admin: 05/24/17 09:53 Dose:  100 mg


Ferrous Sulfate (Feosol -)  325 mg PO Q2D [facility]


   Last Admin: 05/23/17 10:30 Dose:  325 mg


Furosemide (Lasix Injection -)  80 mg IVPB BID@0600,1400 [facility]


   Last Admin: 05/24/17 13:43 Dose:  80 mg


Milrinone Lactate/Dextrose (Milrinone 20mg/100ml Ivpb -)  100 mls @ 9.573 mls/

hr IVPB TITR [facility]


   PRN Reason: 0.35 MCG/KG/MIN


   Last Admin: 05/24/17 13:44 Dose:  9.722 mls/hr


Insulin Aspart (Novolog Vial Sliding Scale -)  1 vial SQ ACHS [facility]


   PRN Reason: Protocol


   Last Admin: 05/24/17 12:10 Dose:  3 units


Multivitamins/Minerals/Vitamin C (Tab-A-Vit -)  1 tab PO DAILY [facility]


   Last Admin: 05/24/17 09:54 Dose:  1 tab


Potassium Chloride (K-Dur -)  20 meq PO TID [facility]


   Last Admin: 05/24/17 13:44 Dose:  20 meq


Ranitidine HCl (Zantac -)  75 mg PO DAILY [facility]


   Last Admin: 05/24/17 09:54 Dose:  75 mg


Warfarin Sodium (Coumadin -)  4 mg PO DAILY@1800 [facility]


   Last Admin: 05/23/17 18:00 Dose:  4 mg











- Objective


Vital Signs: 


 Vital Signs











Temperature  97.6 F   05/24/17 13:37


 


Pulse Rate  75   05/24/17 13:37


 


Respiratory Rate  20   05/24/17 13:37


 


Blood Pressure  105/56   05/24/17 13:37


 


O2 Sat by Pulse Oximetry (%)  95   05/24/17 10:46











Constitutional: Yes: Well Nourished, No Distress, Calm


Cardiovascular: Yes: Regular Rate and Rhythm.  No: Gallop, Murmur, Rub


Respiratory: Yes: Regular, On Nasal O2, Rhonchi.  No: Rales, Wheezes


Gastrointestinal: Yes: Normal Bowel Sounds, Soft.  No: Distention, Tenderness


Extremities: Yes: WNL


Edema: Yes


Edema: LLE: 1+, RLE: 1+


Labs: 


 CBC, BMP





 05/23/17 05:35 





 05/24/17 05:38 





 INR, PTT











INR  2.39  (0.82-1.09)  H  05/24/17  05:38    














Problem List





- Problems


(1) CHF (congestive heart failure)


Code(s): I50.9 - HEART FAILURE, UNSPECIFIED   Qualifiers: 


     Congestive heart failure type: systolic     Congestive heart failure 

chronicity: acute on chronic        Qualified Code(s): I50.23 - Acute on 

chronic systolic (congestive) heart failure  





(2) Acute and chronic respiratory failure (acute-on-chronic)


Code(s): J96.20 - ACUTE AND CHR RESP FAILURE, UNSP W HYPOXIA OR HYPERCAPNIA   

Qualifiers: 


     Respiratory failure complication: hypoxia        Qualified Code(s): J96.21 

- Acute and chronic respiratory failure with hypoxia  





(3) Chest pain


Code(s): R07.9 - CHEST PAIN, UNSPECIFIED   





(4) CAD (coronary artery disease)


Code(s): I25.10 - ATHSCL HEART DISEASE OF NATIVE CORONARY ARTERY W/O ANG PCTRS 

  





(5) CKD (chronic kidney disease)


Code(s): N18.9 - CHRONIC KIDNEY DISEASE, UNSPECIFIED   Qualifiers: 


     Chronic kidney disease stage: stage 4 (severe)        Qualified Code(s): 

N18.4 - Chronic kidney disease, stage 4 (severe)  





(6) COPD (chronic obstructive pulmonary disease)


Code(s): J44.9 - CHRONIC OBSTRUCTIVE PULMONARY DISEASE, UNSPECIFIED   Qualifiers

: 


     COPD type: COPD with acute exacerbation        Qualified Code(s): J44.1 - 

Chronic obstructive pulmonary disease with (acute) exacerbation  





(7) Diabetes


Code(s): E11.9 - TYPE 2 DIABETES MELLITUS WITHOUT COMPLICATIONS   Qualifiers: 


     Diabetes mellitus type: type 2     Diabetes mellitus complication status: 

with kidney complications     Diabetes mellitus complication detail: with 

chronic kidney disease     Diabetes mellitus long term insulin use: with long 

term use     Chronic kidney disease stage: stage 4 (severe)        Qualified 

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

; N18.1 - Chronic kidney disease, stage 1; Z79.4 - Long term (current) use of 

insulin  








Assessment/Plan


(1) Acute and chronic respiratory failure (acute-on-chronic)


Assessment/Plan: 


-patient says feeling better, but still with CARTAGENA


-continue IV lasix per cardiology


-appears to be diuresing


-discharge when safe from cardiology standpoint


Code(s): J96.20 - ACUTE AND CHR RESP FAILURE, UNSP W HYPOXIA OR HYPERCAPNIA   

Qualifiers: 


     Respiratory failure complication: hypoxia        Qualified Code(s): J96.21 

- Acute and chronic respiratory failure with hypoxia  





(2) Chest pain


Assessment/Plan: 


-resolved


Code(s): R07.9 - CHEST PAIN, UNSPECIFIED   





(3) CHF (congestive heart failure)


Assessment/Plan: 


-continue milrinone gtt


-diuresing with lasix


-continue lasix 80mg IV bid


Code(s): I50.9 - HEART FAILURE, UNSPECIFIED   Qualifiers: 


     Congestive heart failure type: systolic





(4) CAD (coronary artery disease)


Assessment/Plan: 


-stable


-continue home regimen


Code(s): I25.10 - ATHSCL HEART DISEASE OF NATIVE CORONARY ARTERY W/O ANG PCTRS 

  





(5) CKD (chronic kidney disease)


Assessment/Plan: 


-at baseline


-continue to monitor


Code(s): N18.9 - CHRONIC KIDNEY DISEASE, UNSPECIFIED   Qualifiers: 


     Chronic kidney disease stage: stage 4 (severe)        Qualified Code(s): 

N18.4 - Chronic kidney disease, stage 4 (severe)  





(6) COPD (chronic obstructive pulmonary disease)


Assessment/Plan: 


-continue current management


-suspect SOB now secondary to CHF


-pulmonary following


Code(s): J44.9 - CHRONIC OBSTRUCTIVE PULMONARY DISEASE, UNSPECIFIED   Qualifiers

: 


     COPD type: COPD with acute exacerbation        Qualified Code(s): J44.1 - 

Chronic obstructive pulmonary disease with (acute) exacerbation  





(7) Diabetes


Assessment/Plan: 


-diabetic diet


-FSBS and SSI


Code(s): E11.9 - TYPE 2 DIABETES MELLITUS WITHOUT COMPLICATIONS   Qualifiers: 


     Diabetes mellitus type: type 2     Diabetes mellitus complication status: 

with kidney complications     Diabetes mellitus complication detail: with 

chronic kidney disease     Diabetes mellitus long term insulin use: with long 

term use     Chronic kidney disease stage: stage 4 (severe)        Qualified 

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

; N18.4 - Chronic kidney disease, stage 4 (severe); Z79.4 - Long term (current) 

use of insulin

## 2017-05-25 VITALS — SYSTOLIC BLOOD PRESSURE: 115 MMHG | DIASTOLIC BLOOD PRESSURE: 79 MMHG | TEMPERATURE: 99 F

## 2017-05-25 VITALS — HEART RATE: 76 BPM

## 2017-05-25 LAB
ANION GAP SERPL CALC-SCNC: 11 MMOL/L (ref 8–16)
BASOPHILS # BLD: 0.4 % (ref 0–2)
CALCIUM SERPL-MCNC: 8.7 MG/DL (ref 8.5–10.1)
CO2 SERPL-SCNC: 29 MMOL/L (ref 21–32)
COCKROFT - GAULT: 24.54
CREAT SERPL-MCNC: 1.8 MG/DL (ref 0.7–1.3)
DEPRECATED RDW RBC AUTO: 18.3 % (ref 11.9–15.9)
EOSINOPHIL # BLD: 2.3 % (ref 0–4.5)
GLUCOSE SERPL-MCNC: 116 MG/DL (ref 74–106)
INR BLD: 2.17 (ref 0.82–1.09)
MAGNESIUM SERPL-MCNC: 2.8 MG/DL (ref 1.8–2.4)
MCH RBC QN AUTO: 26.3 PG (ref 25.7–33.7)
MCHC RBC AUTO-ENTMCNC: 32.3 G/DL (ref 32–35.9)
MCV RBC: 81.6 FL (ref 80–96)
NEUTROPHILS # BLD: 73.3 % (ref 42.8–82.8)
PHOSPHATE SERPL-MCNC: 3.4 MG/DL (ref 2.5–4.9)
PLATELET # BLD AUTO: 190 K/MM3 (ref 134–434)
PMV BLD: 8.7 FL (ref 7.5–11.1)
PT PNL PPP: 24.3 SEC (ref 9.98–11.88)
WBC # BLD AUTO: 8.5 K/MM3 (ref 4–10)

## 2017-05-25 RX ADMIN — MILRINONE LACTATE SCH MLS/HR: 200 INJECTION, SOLUTION INTRAVENOUS at 08:59

## 2017-05-25 RX ADMIN — POTASSIUM CHLORIDE SCH MEQ: 1500 TABLET, EXTENDED RELEASE ORAL at 06:18

## 2017-05-25 RX ADMIN — FERROUS SULFATE TAB EC 324 MG (65 MG FE EQUIVALENT) SCH MG: 324 (65 FE) TABLET DELAYED RESPONSE at 10:24

## 2017-05-25 RX ADMIN — CLOPIDOGREL BISULFATE SCH MG: 75 TABLET, FILM COATED ORAL at 10:24

## 2017-05-25 RX ADMIN — MULTIVITAMIN TABLET SCH TAB: TABLET at 10:24

## 2017-05-25 RX ADMIN — AZITHROMYCIN SCH MG: 250 TABLET, FILM COATED ORAL at 10:24

## 2017-05-25 RX ADMIN — CHOLECALCIFEROL TAB 10 MCG (400 UNIT) SCH UNIT: 10 TAB at 10:23

## 2017-05-25 RX ADMIN — RANITIDINE SCH MG: 150 TABLET ORAL at 10:24

## 2017-05-25 RX ADMIN — CARVEDILOL SCH MG: 6.25 TABLET, FILM COATED ORAL at 10:23

## 2017-05-25 RX ADMIN — FUROSEMIDE SCH MG: 10 INJECTION, SOLUTION INTRAVENOUS at 06:18

## 2017-05-25 RX ADMIN — INSULIN ASPART SCH UNITS: 100 INJECTION, SOLUTION INTRAVENOUS; SUBCUTANEOUS at 12:01

## 2017-05-25 RX ADMIN — DOCUSATE SODIUM SCH MG: 100 CAPSULE, LIQUID FILLED ORAL at 10:24

## 2017-05-25 RX ADMIN — ALLOPURINOL SCH MG: 100 TABLET ORAL at 10:25

## 2017-05-25 RX ADMIN — INSULIN ASPART SCH: 100 INJECTION, SOLUTION INTRAVENOUS; SUBCUTANEOUS at 06:18

## 2017-05-25 RX ADMIN — IPRATROPIUM BROMIDE AND ALBUTEROL SULFATE SCH AMP: .5; 3 SOLUTION RESPIRATORY (INHALATION) at 06:40

## 2017-05-25 RX ADMIN — OXYCODONE HYDROCHLORIDE AND ACETAMINOPHEN SCH MG: 500 TABLET ORAL at 10:25

## 2017-05-25 NOTE — DS
Physical Examination


Vital Signs: 


 Vital Signs











Temperature  99 F   05/25/17 10:28


 


Pulse Rate  76   05/25/17 10:28


 


Respiratory Rate  22   05/25/17 10:28


 


Blood Pressure  115/79   05/25/17 10:28


 


O2 Sat by Pulse Oximetry (%)  96   05/24/17 21:00











Constitutional: Yes: Well Nourished, No Distress, Calm


Cardiovascular: Yes: Regular Rate and Rhythm, Murmur.  No: Gallop, Rub


Respiratory: Yes: Regular, On Nasal O2, Rhonchi.  No: Rales, Wheezes


Gastrointestinal: Yes: Normal Bowel Sounds, Soft.  No: Distention, Tenderness


Extremities: Yes: WNL


Edema: Yes


Edema: LLE: 2+, RLE: 2+


Labs: 


 CBC, BMP





 05/25/17 05:35 





 05/25/17 06:00 











Discharge Summary


Reason For Visit: CHF, CHEST PAIN, ACUTE KIDNEY INJURY


Current Active Problems





Acute kidney injury (Acute) 


CHF (congestive heart failure) (Acute) 


Chest pain (Acute) 


Diabetes (Acute) 








Hospital Course: 


(1) CHF (congestive heart failure)


Code(s): I50.9 - HEART FAILURE, UNSPECIFIED   Qualifiers: 


     Congestive heart failure type: systolic     Congestive heart failure 

chronicity: acute on chronic        Qualified Code(s): I50.23 - Acute on 

chronic systolic (congestive) heart failure  





(2) Acute and chronic respiratory failure (acute-on-chronic)


Code(s): J96.20 - ACUTE AND CHR RESP FAILURE, UNSP W HYPOXIA OR HYPERCAPNIA   

Qualifiers: 


     Respiratory failure complication: hypoxia        Qualified Code(s): J96.21 

- Acute and chronic respiratory failure with hypoxia  





(3) Chest pain


Code(s): R07.9 - CHEST PAIN, UNSPECIFIED   





(4) CAD (coronary artery disease)


Code(s): I25.10 - ATHSCL HEART DISEASE OF NATIVE CORONARY ARTERY W/O ANG PCTRS 

  





(5) CKD (chronic kidney disease)


Code(s): N18.9 - CHRONIC KIDNEY DISEASE, UNSPECIFIED   Qualifiers: 


     Chronic kidney disease stage: stage 4 (severe)        Qualified Code(s): 

N18.4 - Chronic kidney disease, stage 4 (severe)  





(6) COPD (chronic obstructive pulmonary disease)


Code(s): J44.9 - CHRONIC OBSTRUCTIVE PULMONARY DISEASE, UNSPECIFIED   Qualifiers

: 


     COPD type: COPD with acute exacerbation        Qualified Code(s): J44.1 - 

Chronic obstructive pulmonary disease with (acute) exacerbation  





(7) Diabetes


Code(s): E11.9 - TYPE 2 DIABETES MELLITUS WITHOUT COMPLICATIONS   Qualifiers: 


     Diabetes mellitus type: type 2     Diabetes mellitus complication status: 

with kidney complications     Diabetes mellitus complication detail: with 

chronic kidney disease     Diabetes mellitus long term insulin use: with long 

term use     Chronic kidney disease stage: stage 4 (severe)        Qualified 

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

; N18.1 - Chronic kidney disease, stage 1; Z79.4 - Long term (current) use of 

insulin  





Mr Jerome is a very pleasant 76 year old male who comes in with shortness of 

breath. He was originally treated for COPD and improved, however on the day of 

discharge he became acutely short of breath with exertion. He was evaluated and 

found to be in CHF exacerbation. His discharge was cancelled and he was 

diuresed with IV lasix. He improved, however he was still fluid overloaded. 

Nonetheless he felt he was at baseline and was ready for discharged. He will be 

transitioned over to oral torsemide. He should have close follow up with 

cardiology and his PCP.





35 minutes spent in preparation of this discharge


Condition: Good





- Instructions


Diet, Activity, Other Instructions: 


resume previous diet and activity. BMP on Thursday. Follow up with Dr Titus in 

2 weeks.


Referrals: 


Radhames Gama MD [Staff Physician] - 


Sathya Flower MD [Primary Care Provider] - 


Deuce Titus MD [Staff Physician] - 


Disposition: HOME





- Home Medications


Comprehensive Discharge Medication List: 


Ambulatory Orders





Allopurinol [Zyloprim -] 100 mg PO DAILY 01/14/16 


Atorvastatin Ca [Lipitor] 40 mg PO HS 01/14/16 


Carvedilol 6.25 mg PO BID 01/14/16 


Docusate Sodium [Colace -] 100 mg PO BID 01/14/16 


Insulin NPL/Insulin Lispro [Humalog Mix 75-25 Vial] 0 unit SQ PRN 01/14/16 


Milrinone Lactate/D5w [Milrinone 0.2 mg/ml in D5w] 0 mg IV DAILY 01/14/16 


Ranitidine [Zantac -] 75 mg PO DAILY 01/14/16 


Albuterol 0.083% Nebulizer Sol [Ventolin 0.083% Nebulizer Soln -] 1 amp NEB Q6H 

PRN #120 amp 01/18/16 


Ferrous Sulfate [Feosol] 325 mg PO Q48H  ud 08/27/16 


Potassium Chloride [K-Dur -] 20 meq PO TID #90 tablet.er 08/27/16 


Warfarin Na [Coumadin -] 4 mg PO DAILY@1800 01/10/17 


Bupropion HCl [Wellbutrin -] 75 mg DAILY 01/12/17 


Ascorbic Acid [Vitamin C -] 1,000 mg PO DAILY 03/30/17 


Cholecalciferol (Vitamin D3) [Vitamin D -] 1,000 unit PO DAILY 03/30/17 


Clopidogrel Bisulfate [Plavix -] 75 mg PO DAILY 03/30/17 


Multivitamin [Poly-Vitamin] 1 each PO DAILY 03/30/17 


Torsemide [Demadex -] 40 mg PO HS #30 tablet 05/22/17 


Torsemide [Demadex -] 80 mg PO DAILY@0600 #30 tablet 05/22/17

## 2017-05-25 NOTE — PN
Progress Note (short form)





- Note


Progress Note: 


Progress Note: 


Chief Complaint: 


sob


History of Present Illness: 


no cp/palps/dizzy/loc; pt says he is at his baseline and wants to go home today





ex cigs





 


 


 


 Current Medications











Generic Name Dose Route Start Last Admin





  Trade Name Freq  PRN Reason Stop Dose Admin


 


Acetaminophen  650 mg 05/19/17 10:09 05/22/17 01:58





  Tylenol -  PO   650 mg





  Q4H PRN   Administration





  FEVER OR PAIN   


 


Albuterol Sulfate  1 amp 05/19/17 10:02 05/23/17 02:20





  Ventolin 0.083% Nebulizer Soln -  NEB   1 amp





  Q6H PRN   Administration





  SHORT OF BREATH/WHEEZING   


 


Albuterol/Ipratropium  1 amp 05/19/17 22:00 05/25/17 06:40





  Duoneb -  NEB   1 amp





  TIDR JARROD   Administration


 


Allopurinol  100 mg 05/20/17 10:00 05/25/17 10:25





  Zyloprim -  PO   100 mg





  DAILY JARROD   Administration


 


Ascorbic Acid  1,000 mg 05/20/17 10:00 05/25/17 10:25





  Vitamin C -  PO   1,000 mg





  DAILY JARROD   Administration


 


Atorvastatin Calcium  40 mg 05/19/17 22:00 05/24/17 22:10





  Lipitor -  PO   40 mg





  HS JARROD   Administration


 


Azithromycin  250 mg 05/21/17 10:45 05/25/17 10:24





  Zithromax -  PO   250 mg





  DAILY JARROD   Administration


 


Bupropion HCl  75 mg 05/21/17 10:00 05/25/17 10:23





  Wellbutrin -  PO   75 mg





  DAILY JARROD   Administration


 


Carvedilol  6.25 mg 05/19/17 22:00 05/25/17 10:23





  Coreg -  PO   6.25 mg





  BID JARROD   Administration


 


Cholecalciferol  1,000 unit 05/20/17 10:00 05/25/17 10:23





  Vitamin D3 -  PO   1,000 unit





  DAILY JARROD   Administration


 


Clopidogrel Bisulfate  75 mg 05/20/17 10:00 05/25/17 10:24





  Plavix -  PO   75 mg





  DAILY JARROD   Administration


 


Docusate Sodium  100 mg 05/19/17 22:00 05/25/17 10:24





  Colace -  PO   100 mg





  BID JARROD   Administration


 


Ferrous Sulfate  325 mg 05/19/17 10:15 05/25/17 10:24





  Feosol -  PO   325 mg





  Q2D JARROD   Administration


 


Furosemide  80 mg 05/22/17 14:45 05/25/17 06:18





  Lasix Injection -  IVPB   80 mg





  BID@0600,1400 JARROD   Administration


 


Milrinone Lactate/Dextrose  100 mls @ 9.573 mls/hr 05/19/17 15:50 05/25/17 08:59





  Milrinone 20mg/100ml Ivpb -  IVPB   9.9 mls/hr





  TITR JARROD   Administration





  0.35 MCG/KG/MIN   


 


Insulin Aspart  1 vial 05/19/17 11:00 05/25/17 06:18





  Novolog Vial Sliding Scale -  SQ   Not Given





  [facility]   





  Protocol   


 


Multivitamins/Minerals/Vitamin C  1 tab 05/21/17 10:00 05/25/17 10:24





  Tab-A-Vit -  PO   1 tab





  DAILY JARROD   Administration


 


Potassium Chloride  20 meq 05/19/17 14:00 05/25/17 06:18





  K-Dur -  PO   20 meq





  TID JARROD   Administration


 


Ranitidine HCl  75 mg 05/20/17 10:00 05/25/17 10:24





  Zantac -  PO   75 mg





  DAILY JARROD   Administration


 


Warfarin Sodium  4 mg 05/19/17 18:00 05/24/17 17:46





  Coumadin -  PO   4 mg





  DAILY@1800 JARROD   Administration











 Vital Signs











Temp  99 F   05/25/17 10:28


 


Pulse  76   05/25/17 10:28


 


Resp  22   05/25/17 10:28


 


BP  115/79   05/25/17 10:28


 


Pulse Ox  96   05/24/17 21:00








 Intake & Output











 05/24/17 05/24/17 05/25/17





 11:59 23:59 11:59


 


Intake Total 357.9 446.4 236.4


 


Output Total 1200 1050 700


 


Balance -842.1 -603.6 -463.6


 


Weight 210 lb 2 oz  213 lb 3.2 oz


 


Intake:   


 


  IV 67.9 116.4 116.4


 


    Milrinone 20Mg/100Ml Ivpb 67.9 116.4 116.4





    - 100 ml @ 0.35 MCG/KG/   





    MIN 9.573 mls/hr IVPB   





    TITR JARROD Rx#:[id number]   


 


  IVPB 50  


 


  Oral 240 330 120


 


Output:   


 


  Urine 1200 1050 700


 


    Void 1200 1050 700


 


Other:   


 


  Voiding Method Urinal Urinal Urinal


 


  Bowel Movement Yes  Yes


 


  Weight Measurement Method Standing Scale  Baby Scale











Constitutional: Yes: No Distress, Calm


Eyes: No: Sclera Icterus


HENT: No: Nasal Congestion


Cardiovascular: Yes: Regular Rate and Rhythm, JVD (++), S1, S2, Other (PMI non 

diplaced).  No: Gallop, Murmur


Respiratory: Yes: cta bl nl eff No: Accessory Muscle Use, Rales


Gastrointestinal: Yes: Normal Bowel Sounds, Soft.  No: Tenderness


Musculoskeletal: Yes: Other (No kyphosis)


Extremities: No: Cold


Edema: 1+ bl le edema


Integumentary: No: Jaundice diaphoresis


Neurological: Yes: Alert, Oriented (x3)


Psychiatric: No: Agitated


Labs: 


  


 


 


 Laboratory Last Values











WBC  8.5 K/mm3 (4.0-10.0)   05/25/17  05:35    


 


RBC  4.43 M/mm3 (4.00-5.60)   05/25/17  05:35    


 


Hgb  11.7 GM/dL (11.7-16.9)   05/25/17  05:35    


 


Hct  36.1 % (35.4-49)   05/25/17  05:35    


 


MCV  81.6 fl (80-96)   05/25/17  05:35    


 


MCHC  32.3 g/dl (32.0-35.9)   05/25/17  05:35    


 


RDW  18.3 % (11.9-15.9)  H  05/25/17  05:35    


 


Plt Count  190 K/MM3 (134-434)   05/25/17  05:35    


 


MPV  8.7 fl (7.5-11.1)   05/25/17  05:35    


 


Neutrophils %  73.3 % (42.8-82.8)   05/25/17  05:35    


 


Lymphocytes %  13.7 % (8-40)   05/25/17  05:35    


 


Monocytes %  10.3 % (3.8-10.2)  H  05/25/17  05:35    


 


Eosinophils %  2.3 % (0-4.5)   05/25/17  05:35    


 


Basophils %  0.4 % (0-2.0)   05/25/17  05:35    


 


Platelet Estimate  Adequate  (NORMAL)   05/21/17  06:00    


 


Platelet Comment  No clumping noted   05/21/17  06:00    


 


INR  2.17  (0.82-1.09)  H  05/25/17  05:35    


 


Sodium  141 mmol/L (136-145)   05/25/17  06:00    


 


Potassium  4.5 mmol/L (3.5-5.1)   05/25/17  06:00    


 


Chloride  101 mmol/L ()   05/25/17  06:00    


 


Carbon Dioxide  29 mmol/L (21-32)   05/25/17  06:00    


 


Anion Gap  11  (8-16)   05/25/17  06:00    


 


BUN  55 mg/dL (7-18)  H  05/25/17  06:00    


 


Creatinine  1.8 mg/dL (0.7-1.3)  H  05/25/17  06:00    


 


Creat Clearance w eGFR  36.67  (>60)   05/24/17  05:38    


 


POC Glucometer  112 UNITS (())   05/25/17  05:21    


 


Random Glucose  116 mg/dL ()  H D 05/25/17  06:00    


 


Calcium  8.7 mg/dL (8.5-10.1)   05/25/17  06:00    


 


Phosphorus  3.4 mg/dL (2.5-4.9)   05/25/17  06:00    


 


Magnesium  2.8 mg/dL (1.8-2.4)  H  05/25/17  06:00    


 


Total Bilirubin  0.7 mg/dL (0.2-1.0)  D 05/24/17  05:38    


 


AST  21 U/L (15-37)   05/24/17  05:38    


 


ALT  21 U/L (12-78)   05/24/17  05:38    


 


Alkaline Phosphatase  156 U/L ()  H  05/24/17  05:38    


 


Creatine Kinase  51 IU/L ()   05/22/17  11:13    


 


Troponin I  0.03 ng/ml (0.00-0.05)   05/22/17  11:13    


 


B-Natriuretic Peptide  2012.19 pg/ml (5-450)  H  05/19/17  01:36    


 


Total Protein  7.1 g/dl (6.4-8.2)   05/24/17  05:38    


 


Albumin  3.2 g/dl (3.4-5.0)  L  05/24/17  05:38    


 


Urine Color  Ltyellow   05/19/17  20:15    


 


Urine Appearance  Clear   05/19/17  20:15    


 


Urine pH  6.0  (5.0-8.0)   05/19/17  20:15    


 


Ur Specific Gravity  <= 1.005  (1.005-1.025)   05/19/17  20:15    


 


Urine Protein  Negative  (NEGATIVE)   05/19/17  20:15    


 


Urine Glucose (UA)  Negative  (NEGATIVE)   05/19/17  20:15    


 


Urine Ketones  Negative  (NEGATIVE)   05/19/17  20:15    


 


Urine Blood  Negative  (NEGATIVE)   05/19/17  20:15    


 


Urine Nitrite  Negative  (NEGATIVE)   05/19/17  20:15    


 


Urine Bilirubin  Negative  (NEGATIVE)   05/19/17  20:15    


 


Urine Urobilinogen  Negative E.U./dl (0.2-1.0)   05/19/17  20:15    


 


Ur Leukocyte Esterase  Trace  (NEGATIVE)  H D 05/19/17  20:15    


 


Urine RBC  <1 /hpf (0-3)   05/19/17  20:15    


 


Urine WBC  7 /hpf (3-5)   05/19/17  20:15    


 


Ur Epithelial Cells  Rare /hpf (FEW)   05/19/17  20:15    


 


Hyaline Casts  9 /lpf  05/19/17  20:15    


 


Urine Mucus  Rare   05/19/17  20:15    











- ....Imaging


EKG: Other (tele: NSR, V-paced)





cxr 5/22: inc bridgette changes. 





Assessment/Plan


a/p:  76 year old man with PMH of IDDM, COPD (O2 dependant) HTN, HLD, Afib (on 

coumadin), CAD s/p PCIs, end stage CHF with ICD placement (on milrinone pump), 

PVD, BPH here with sob, cp, HA.








acute on chronic end stage systolic heart failure s/p ICD, on home milrinone 

infusion:


- vol status seems stable (as above), home regimen = torsemide 40 po bid


- worsened sob over baseline = progressive copd vs chf


- cont milrinone at patient's home maintenance dose (on chronic infusion 

destination therapy)--0.35 mcg/kg/min.  


- cont home coreg (doubt causing bronchospasm--will d/w dr degroot ? rpt pft's)


- recent office icd check with stable function


- 5/22: discharge delayed due to persistent wright.   CXR  noted to have increased 

bridgette changes.  weight slightly up.  started on lasix 80 mg IV bid.


- 5/23-24: Still not feeling at his baseline but improving.  Cr stable.  con't 

milrinone, bid IV lasix with kcl supplements, coreg.


- 5/25: today pt says he is at his baseline and wants to go home, declines any 

further inpt diuresis.  Ok for dc with following plan:


   - will give 2 wks empiric trial of incr'd torsemide at home: 80mg am, 40mg pm


   - should have repeat bun/creat after approx 5 days on this regimen


   - should see dr merchant 2 wks after discharge


   - cont milrinone





copd:


-per pulm





cp:


-atypical features ("like a swirl" sensation in center of chest, no radiation)


-resolved and no recurrence here


-trop WNL x 1 on DOA--rpt 5/22 ordered negative


-ECG BiV-paced


-this cp does not require inpatient evaluation--will consider outpt stress 

testing if his chronic sx (incr'd sob over baseline) does not respond to 

changes in inhalers for copd or incr diuretics





afib


- currently rate controlled on coreg


- cont coumadin per INR





PVD


- con't AC, plavix, statin.





CAD s/p PCIs


- No anginal symptoms, no signs acs.  Con't home ac, plavix, atorvastatin, coreg





HTN: controlled on coreg





HLD: con't statin. 





CKD


- at baseline renal fxn

## 2017-05-28 ENCOUNTER — HOSPITAL ENCOUNTER (INPATIENT)
Dept: HOSPITAL 74 - JER | Age: 77
LOS: 9 days | Discharge: HOME HEALTH | DRG: 291 | End: 2017-06-06
Attending: INTERNAL MEDICINE | Admitting: INTERNAL MEDICINE
Payer: COMMERCIAL

## 2017-05-28 VITALS — BODY MASS INDEX: 28.6 KG/M2

## 2017-05-28 DIAGNOSIS — I48.2: ICD-10-CM

## 2017-05-28 DIAGNOSIS — Z95.0: ICD-10-CM

## 2017-05-28 DIAGNOSIS — I13.0: Primary | ICD-10-CM

## 2017-05-28 DIAGNOSIS — E11.22: ICD-10-CM

## 2017-05-28 DIAGNOSIS — J44.9: ICD-10-CM

## 2017-05-28 DIAGNOSIS — I47.1: ICD-10-CM

## 2017-05-28 DIAGNOSIS — Z95.1: ICD-10-CM

## 2017-05-28 DIAGNOSIS — J96.22: ICD-10-CM

## 2017-05-28 DIAGNOSIS — N40.0: ICD-10-CM

## 2017-05-28 DIAGNOSIS — R07.9: ICD-10-CM

## 2017-05-28 DIAGNOSIS — Z87.891: ICD-10-CM

## 2017-05-28 DIAGNOSIS — J96.21: ICD-10-CM

## 2017-05-28 DIAGNOSIS — E78.5: ICD-10-CM

## 2017-05-28 DIAGNOSIS — I25.10: ICD-10-CM

## 2017-05-28 DIAGNOSIS — E87.2: ICD-10-CM

## 2017-05-28 DIAGNOSIS — I42.9: ICD-10-CM

## 2017-05-28 DIAGNOSIS — Z79.4: ICD-10-CM

## 2017-05-28 DIAGNOSIS — I50.43: ICD-10-CM

## 2017-05-28 DIAGNOSIS — N18.9: ICD-10-CM

## 2017-05-28 DIAGNOSIS — I73.9: ICD-10-CM

## 2017-05-28 LAB
ALBUMIN SERPL-MCNC: 3.2 G/DL (ref 3.4–5)
ALP SERPL-CCNC: 180 U/L (ref 45–117)
ALT SERPL-CCNC: 37 U/L (ref 12–78)
ANION GAP SERPL CALC-SCNC: 11 MMOL/L (ref 8–16)
APPEARANCE UR: CLEAR
AST SERPL-CCNC: 35 U/L (ref 15–37)
BASOPHILS # BLD: 0.6 % (ref 0–2)
BILIRUB SERPL-MCNC: 0.6 MG/DL (ref 0.2–1)
BILIRUB UR STRIP.AUTO-MCNC: NEGATIVE MG/DL
CALCIUM SERPL-MCNC: 8.6 MG/DL (ref 8.5–10.1)
CO2 SERPL-SCNC: 31 MMOL/L (ref 21–32)
COCKROFT - GAULT: 41.2
COLOR UR: (no result)
CREAT SERPL-MCNC: 2 MG/DL (ref 0.7–1.3)
DEPRECATED RDW RBC AUTO: 18.6 % (ref 11.9–15.9)
EOSINOPHIL # BLD: 1.9 % (ref 0–4.5)
GLUCOSE SERPL-MCNC: 129 MG/DL (ref 74–106)
HYALINE CASTS URNS QL MICRO: 4 /LPF
INR BLD: 2.24 (ref 0.82–1.09)
KETONES UR QL STRIP: NEGATIVE
LEUKOCYTE ESTERASE UR QL STRIP.AUTO: (no result)
MCH RBC QN AUTO: 25.6 PG (ref 25.7–33.7)
MCHC RBC AUTO-ENTMCNC: 31.3 G/DL (ref 32–35.9)
MCV RBC: 82 FL (ref 80–96)
MUCOUS THREADS URNS QL MICRO: (no result)
NEUTROPHILS # BLD: 76.2 % (ref 42.8–82.8)
NITRITE UR QL STRIP: NEGATIVE
PH UR: 5 [PH] (ref 5–8)
PLATELET # BLD AUTO: 195 K/MM3 (ref 134–434)
PMV BLD: 8.2 FL (ref 7.5–11.1)
PROT SERPL-MCNC: 7 G/DL (ref 6.4–8.2)
PROT UR QL STRIP: NEGATIVE
PROT UR QL STRIP: NEGATIVE
PT PNL PPP: 25.1 SEC (ref 9.98–11.88)
RBC # BLD AUTO: <1 /HPF (ref 0–3)
RBC # UR STRIP: NEGATIVE /UL
SP GR UR: <= 1.005 (ref 1–1.02)
TROPONIN I SERPL-MCNC: 0.04 NG/ML (ref 0–0.05)
UROBILINOGEN UR STRIP-MCNC: NEGATIVE E.U./DL (ref 0.2–1)
WBC # BLD AUTO: 7.8 K/MM3 (ref 4–10)
WBC # UR AUTO: 2 /HPF (ref 3–5)

## 2017-05-28 RX ADMIN — ALLOPURINOL SCH MG: 100 TABLET ORAL at 16:25

## 2017-05-28 RX ADMIN — CLOPIDOGREL BISULFATE SCH MG: 75 TABLET, FILM COATED ORAL at 16:25

## 2017-05-28 RX ADMIN — FERROUS SULFATE TAB EC 324 MG (65 MG FE EQUIVALENT) SCH MG: 324 (65 FE) TABLET DELAYED RESPONSE at 16:25

## 2017-05-28 RX ADMIN — CARVEDILOL SCH MG: 6.25 TABLET, FILM COATED ORAL at 21:14

## 2017-05-28 RX ADMIN — DOCUSATE SODIUM SCH MG: 100 CAPSULE, LIQUID FILLED ORAL at 21:14

## 2017-05-28 RX ADMIN — POTASSIUM CHLORIDE SCH MEQ: 1500 TABLET, EXTENDED RELEASE ORAL at 21:14

## 2017-05-28 RX ADMIN — OXYCODONE HYDROCHLORIDE AND ACETAMINOPHEN SCH: 500 TABLET ORAL at 16:42

## 2017-05-28 RX ADMIN — VITAMIN D, TAB 1000IU (100/BT) SCH: 25 TAB at 16:42

## 2017-05-28 RX ADMIN — POTASSIUM CHLORIDE SCH MEQ: 1500 TABLET, EXTENDED RELEASE ORAL at 16:25

## 2017-05-28 RX ADMIN — RANITIDINE SCH MG: 150 TABLET ORAL at 16:25

## 2017-05-28 RX ADMIN — ATORVASTATIN CALCIUM SCH MG: 40 TABLET, FILM COATED ORAL at 21:14

## 2017-05-28 NOTE — HP
DATE OF ADMISSION:  05/28/2017

 

The patient is a 76-year-old male who presented to the emergency room with increasing

shortness of breath of a few days duration.  The patient had a recent hospitalization

for worsening CHF.  He has a past medical history of severe congestive heart failure,

on home milrinone drip.  He also has a history of atrial fibrillation, severe

coronary artery disease, with a pacemaker, 2 stents, heart valve replacement, and

episode of myocardial infarction in 2011.  He also has a history of COPD,

insulin-dependent diabetes mellitus.  With these symptoms, the patient brought

himself to the emergency room.  He goes on and off into congestive heart failure but

denies any fever, palpitations, or chest pain.

 

His present medications at home include milrinone drip, torsemide, Zaroxolyn on a

p.r.n. basis, warfarin 4 mg daily, ranitidine 75 mg twice a day, potassium 20 mEq 3

times a day, multivitamin, insulin on a p.r.n. basis, ferrous sulfate 325 mg daily,

Plavix 75 mg daily, Vitamin D3, Wellbutrin 75 mg daily, Lipitor 40 mg daily, ascorbic

acid 1 g daily, _____ 100 mg daily, and albuterol nebulizer treatment.

 

ALLERGIES:  SHELLFISH.

 

SOCIAL HISTORY:  He is .  He has 6 children.  He quit smoking in 2011.  Prior

to that, the patient smoked about half a pack of cigarettes daily.  He denies any

excess alcohol or drug abuse.

 

Family history is significant for a brother.  There is no significant family history.

 

Review of systems is significant for on and off shortness of breath and recurrent

episodes of congestive heart failure.  He denies any chronic headache, dizziness,

chest pains, palpitation, abdominal pain, loss of weight, loss of appetite.

 

PHYSICAL EXAMINATION:

Vital Signs:  He has a blood pressure of 117/70, pulse rate of 77, respiratory rate

of 25, temperature 98.4.

Head and Neck:  He is not pale, not icteric.  JVD is elevated at 4 cm.

Heart:  Regular rhythm.  No murmurs.

Lungs:  Vesicular breathing.  There are a few scattered rales present.

Abdomen:  Benign.

Extremities:  Trace edema.

 

Blood work shows a BUN of 52, creatinine of 2, otherwise is within normal limits.  A

PT/INR is 2.24.  CBC is normal.

 

ASSESSMENT AND PLAN:  

1.  Congestive heart failure.  I would hold the torsemide, place him on intravenous

Lasix for a few days, and consider Entresto.

2.  Atrial fibrillation.

3.  Hypertension.

4.  Coronary artery disease.  

 

Continue present medications.

 

 

MORRO FULTON M.D.

 

[id number]

DD: 05/28/2017 15:28

DT: 05/28/2017 16:29

Job #:  [id number]

## 2017-05-28 NOTE — PDOC
History of Present Illness





- General


History Source: Patient


Exam Limitations: No Limitations





- History of Present Illness


Initial Comments: 





05/28/17 04:36


The patient is a 76 year old male with a significant past medical history of 

CHF (on the portal milrinone pump), A fib, CAD (with pacemaker, 2 stents, heart 

valve replacement; past episode of MI in 2011), COPD, and IDDM, who presents to 

the ED with complaints of shortness of breath for a week. The patient was 

recently seen in the ED on 5/19/17 and was admitted for a week with CHF. As per 

wife, the patient has shortness of breath and bilateral feet edema since being 

discharged from the hospital. Patient reports that the patient symptoms are 

progressively worsening and for the past several days has abdominal distension. 


Denies fevers or chills. Denies chest pain or palpitations. Denies abdominal 

pain, nausea, vomiting, or diarrhea. Denies any other symptoms. 








<Arianna Bradley - Last Filed: 05/28/17 04:36>





<Elisa Peña - Last Filed: 05/28/17 06:56>





- General


Stated Complaint: SOB, EDEMA


Time Seen by Provider: 05/28/17 03:22





Past History





<Arianna Bradley - Last Filed: 05/28/17 04:36>





- Past Medical History


Anemia: Yes


Asthma: No


Cancer: No


Cardiac Disorders: Yes (CHF, MI 2011,defibrilator placement,PACEMAKER)


CVA: No


COPD: Yes


CHF: Yes


Dementia: No


Diabetes: Yes


GI Disorders: No


 Disorders: Yes (bph)


HTN: Yes


Hypercholesterolemia: Yes


Liver Disease: No


Suicide Attempt (Hx): No


Seizures: No


Thyroid Disease: No





- Surgical History


Abdominal Surgery: Yes (HERNIA REPAIR 2003)


Appendectomy: No


Cardiac Surgery: Yes (2 OPEN HEART SURGERIES; TRACH PLACEMENT,PACEMAKER PLACED 7 /19/2013)


Cholecystectomy: Yes


Lung Surgery: No


Neurologic Surgery: No


Orthopedic Surgery: No





- Immunization History


Td Vaccination: Yes


TDAP Vaccination: Yes


Immunization Up to Date: Yes





- Psycho/Social/Smoking Cessation Hx


Anxiety: No


Suicidal Ideation: No


Smoking Status: No


Smoking History: Unknown if ever smoked


Years of Tobacco Use: 40


Have you smoked in the past 12 months: No


Number of Cigarettes Smoked Daily: 40


If you are a former smoker, when did you quit?: 2011


Cigars Per Day: 0


'Breaking Loose' booklet given: 06/11/12


Hx Alcohol Use: No


Drug/Substance Use Hx: No


Substance Use Type: None


Hx Substance Use Treatment: No





<Elisa Peña - Last Filed: 05/28/17 06:56>





- Past Medical History


Allergies/Adverse Reactions: 


 Allergies











Allergy/AdvReac Type Severity Reaction Status Date / Time


 


No Known Drug Allergies Allergy   Verified 05/28/17 03:51


 


shellfish derived Allergy   Verified 05/28/17 03:51











Home Medications: 


Ambulatory Orders





Allopurinol [Zyloprim -] 100 mg PO DAILY 01/14/16 


Atorvastatin Ca [Lipitor] 40 mg PO HS 01/14/16 


Carvedilol 6.25 mg PO BID 01/14/16 


Docusate Sodium [Colace -] 100 mg PO BID 01/14/16 


Insulin NPL/Insulin Lispro [Humalog Mix 75-25 Vial] 0 unit SQ PRN 01/14/16 


Milrinone Lactate/D5w [Milrinone 0.2 mg/ml in D5w] 0 mg IV DAILY 01/14/16 


Ranitidine [Zantac -] 75 mg PO DAILY 01/14/16 


Albuterol 0.083% Nebulizer Sol [Ventolin 0.083% Nebulizer Soln -] 1 amp NEB Q6H 

PRN #120 amp 01/18/16 


Ferrous Sulfate [Feosol] 325 mg PO Q48H  ud 08/27/16 


Potassium Chloride [K-Dur -] 20 meq PO TID #90 tablet.er 08/27/16 


Warfarin Na [Coumadin -] 4 mg PO DAILY@1800 01/10/17 


Bupropion HCl [Wellbutrin -] 75 mg DAILY 01/12/17 


Ascorbic Acid [Vitamin C -] 1,000 mg PO DAILY 03/30/17 


Cholecalciferol (Vitamin D3) [Vitamin D -] 1,000 unit PO DAILY 03/30/17 


Clopidogrel Bisulfate [Plavix -] 75 mg PO DAILY 03/30/17 


Multivitamin [Poly-Vitamin] 1 each PO DAILY 03/30/17 


Torsemide [Demadex -] 40 mg PO HS #30 tablet 05/22/17 


Torsemide [Demadex -] 80 mg PO DAILY@0600 #30 tablet 05/22/17 











**Review of Systems





- Review of Systems


Able to Perform ROS?: Yes


Comments:: 





05/28/17 04:37


CONSTITUTIONAL: 


Absent: fever, chills, diaphoresis, generalized weakness, malaise, loss of 

appetite


HEENT: 


Absent: rhinorrhea, nasal congestion, throat pain, throat swelling, difficulty 

swallowing, mouth swelling, ear pain, eye pain, visual Changes


CARDIOVASCULAR: 


Absent: chest pain, syncope, palpitations, irregular heart rate, lightheadedness

, peripheral edema


RESPIRATORY: + shortness of breath 


Absent: cough, dyspnea with exertion, orthopnea, wheezing, stridor, hemoptysis


GASTROINTESTINAL: + abdominal distension 


Absent: abdominal pain,  nausea, vomiting, diarrhea, constipation, melena, 

hematochezia


GENITOURINARY: 


Absent: dysuria, frequency, urgency, hesitancy, hematuria, flank pain, genital 

pain


MUSCULOSKELETAL: + feet swelling 


Absent: myalgia, arthralgia


SKIN: 


Absent: rash, itching, pallor


HEMATOLOGIC/IMMUNOLOGIC: 


Absent: easy bleeding, easy bruising, lymphadenopathy, frequent infections


ENDOCRINE:


Absent: unexplained weight gain, unexplained weight loss, heat intolerance, 

cold intolerance


NEUROLOGIC: 


Absent: headache, focal weakness or paresthesias, dizziness, unsteady gait, 

seizure, mental status changes, bladder or bowel incontinence


PSYCHIATRIC: 


Absent: anxiety, depression, suicidal or homicidal ideation, hallucinations.





All Other Systems: Reviewed and Negative





<Arianna Bradley - Last Filed: 05/28/17 04:36>





*Physical Exam





- Vital Signs


 Last Vital Signs











Temp Pulse Resp BP Pulse Ox


 


 97.4 F L  79   23   123/78   93 L


 


 05/28/17 03:25  05/28/17 03:25  05/28/17 03:25  05/28/17 03:25  05/28/17 03:25














- Physical Exam


Comments: 





05/28/17 04:37


 


GENERAL:


Well developed, well nourished. Awake and alert. No acute distress.


HEENT:


Normocephalic, atraumatic. PERRLA, EOMI. No conjunctival pallor. Sclera are non-

icteric. Moist mucous membranes. Oropharynx is clear.


NECK: 


Supple. Full ROM. No JVD. Carotid pulses 2+ and symmetric, without bruits. No 

thyromegaly. NCo lymphadenopathy.


CARDIOVASCULAR:


Regular rate and rhythm. No murmurs, rubs, or gallops. Distal pulses are 2+ and 

symmetric. 


PULMONARY: 


+ crackles assisted up his bilateral lungs, breathing easily sitting up on 

oxygen. No evidence of respiratory distress. Lungs clear to auscultation 

bilaterally. No wheezingor rhonchi.


ABDOMINAL:


+ abdominal distention, no ascites or fluid. Soft. Non-tender. No rebound or 

guarding. No organomegaly. Normoactive bowel sounds. 


MUSCULOSKELETAL 


Normal range of motion at all joints. No bony deformities or tenderness. No CVA 

tenderness.


EXTREMITIES: 


+ pitting edema in the bilateral feet and ankles. No cyanosis. No clubbing. No 

calf tenderness.


SKIN: 


Warm and dry. Normal capillary refill. No rashes. No jaundice. 


NEUROLOGICAL: 


Alert, awake, appropriate. Cranial nerves 2-12 intact. No deficits to light 

touch and temperature in face, upper extremities and lower extremities. No 

motor deficits in the in face, upper extremities and lower extremities. 

Normoreflexic in the upper and lower extremities. Normal speech. Toes are down-

going bilaterally. Gait is normal without ataxia.


PSYCHIATRIC: 


Cooperative. Good eye contact. Appropriate mood and affect. 





<Arianna Bradley - Last Filed: 05/28/17 04:36>





ED Treatment Course





- LABORATORY


CBC & Chemistry Diagram: 


 05/28/17 03:44





 05/28/17 03:44





- ADDITIONAL ORDERS


Additional order review: 


 Laboratory  Results











  05/28/17 05/28/17





  03:44 03:44


 


INR  2.24 H 


 


Sodium   148 H


 


Potassium   4.3


 


Chloride   106


 


Carbon Dioxide   31


 


Anion Gap   11


 


BUN   52 H


 


Creatinine   2.0 H


 


Creat Clearance w eGFR   32.48


 


Random Glucose   129 H


 


Calcium   8.6


 


Total Bilirubin   0.6


 


AST   35  D


 


ALT   37  D


 


Alkaline Phosphatase   180 H


 


Creatine Kinase   55


 


Troponin I   0.04  D


 


Total Protein   7.0


 


Albumin   3.2 L








 











  05/28/17





  03:44


 


RBC  4.40


 


MCV  82.0


 


MCHC  31.3 L


 


RDW  18.6 H


 


MPV  8.2


 


Neutrophils %  76.2


 


Lymphocytes %  11.0


 


Monocytes %  10.3 H


 


Eosinophils %  1.9


 


Basophils %  0.6














<Arianna Bradley - Last Filed: 05/28/17 04:36>





- LABORATORY


CBC & Chemistry Diagram: 


 05/28/17 03:44





 05/28/17 03:44





<Elisa Peña - Last Filed: 05/28/17 06:56>





Medical Decision Making





- Medical Decision Making


05/28/17 04:54


worsening patchy edema in the bialteral lungs.  Pt cannot sleep. Pt cannot 

breathe. He is very anxious.  BP is low; on milrinone drip. Pt had valve 

replaced in past and CABG and ventricular pacemaker. Pt was admitted 2 weeks 

ago for a week and now he is back for the same inability to breathe easily.


He will be readmitted to Dr. Flower's service.


Pt is improving and stable on BiPAP.





<Elisa Peña - Last Filed: 05/28/17 06:56>





*DC/Admit/Observation/Transfer





- Attestations


Scribe Attestion: 





05/28/17 04:37





Documentation prepared by Arianna Bradley, acting as medical scribe for Elisa Peña MD





<Arianna Bradley - Last Filed: 05/28/17 04:36>





- Discharge Dispostion


Admit: Yes





<Elisa Peña - Last Filed: 05/28/17 06:56>


Diagnosis at time of Disposition: 


 Presence of permanent cardiac pacemaker, Diabetes, CAD (coronary artery disease

), CHF (congestive heart failure), Cough, Renal insufficiency, Chest pain





- Referrals

## 2017-05-29 LAB
ANION GAP SERPL CALC-SCNC: 5 MMOL/L (ref 8–16)
CALCIUM SERPL-MCNC: 8.7 MG/DL (ref 8.5–10.1)
CO2 SERPL-SCNC: 36 MMOL/L (ref 21–32)
COCKROFT - GAULT: 44.01
CREAT SERPL-MCNC: 1.9 MG/DL (ref 0.7–1.3)
GLUCOSE SERPL-MCNC: 243 MG/DL (ref 74–106)

## 2017-05-29 PROCEDURE — 5A09557 ASSISTANCE WITH RESPIRATORY VENTILATION, GREATER THAN 96 CONSECUTIVE HOURS, CONTINUOUS POSITIVE AIRWAY PRESSURE: ICD-10-PCS | Performed by: INTERNAL MEDICINE

## 2017-05-29 RX ADMIN — RANITIDINE SCH MG: 150 TABLET ORAL at 09:19

## 2017-05-29 RX ADMIN — CARVEDILOL SCH: 6.25 TABLET, FILM COATED ORAL at 10:00

## 2017-05-29 RX ADMIN — ALLOPURINOL SCH MG: 100 TABLET ORAL at 09:19

## 2017-05-29 RX ADMIN — FUROSEMIDE SCH MG: 10 INJECTION, SOLUTION INTRAVENOUS at 05:58

## 2017-05-29 RX ADMIN — FUROSEMIDE SCH MG: 10 INJECTION, SOLUTION INTRAVENOUS at 14:01

## 2017-05-29 RX ADMIN — VITAMIN D, TAB 1000IU (100/BT) SCH UNIT: 25 TAB at 09:20

## 2017-05-29 RX ADMIN — WARFARIN SCH MG: 2 TABLET ORAL at 17:39

## 2017-05-29 RX ADMIN — DOCUSATE SODIUM SCH MG: 100 CAPSULE, LIQUID FILLED ORAL at 09:19

## 2017-05-29 RX ADMIN — ATORVASTATIN CALCIUM SCH MG: 40 TABLET, FILM COATED ORAL at 22:11

## 2017-05-29 RX ADMIN — CLOPIDOGREL BISULFATE SCH MG: 75 TABLET, FILM COATED ORAL at 09:19

## 2017-05-29 RX ADMIN — CARVEDILOL SCH MG: 6.25 TABLET, FILM COATED ORAL at 22:11

## 2017-05-29 RX ADMIN — POTASSIUM CHLORIDE SCH MEQ: 1500 TABLET, EXTENDED RELEASE ORAL at 14:01

## 2017-05-29 RX ADMIN — OXYCODONE HYDROCHLORIDE AND ACETAMINOPHEN SCH MG: 500 TABLET ORAL at 09:19

## 2017-05-29 RX ADMIN — MULTIVITAMIN TABLET SCH TAB: TABLET at 09:19

## 2017-05-29 RX ADMIN — DOCUSATE SODIUM SCH MG: 100 CAPSULE, LIQUID FILLED ORAL at 22:11

## 2017-05-29 RX ADMIN — POTASSIUM CHLORIDE SCH MEQ: 1500 TABLET, EXTENDED RELEASE ORAL at 05:59

## 2017-05-29 NOTE — PN
Progress Note (short form)





- Note


Progress Note: 


SOB is still present but is better


No palpitations








O/E





 Vital Signs











 Period  Temp  Pulse  Resp  BP Sys/Alonso  Pulse Ox


 


 Last 24 Hr  97.5 F-98.9 F  76-81  18-25  117-130/70-86  








Heart regular


Lungs clear


Abd soft


Ext b/l +1 edema





 Current Medications





Albuterol Sulfate (Ventolin 0.083% Nebulizer Soln -)  1 amp NEB Q6H PRN


   PRN Reason: SHORT OF BREATH/WHEEZING


   Last Admin: 05/28/17 22:30 Dose:  1 amp


Allopurinol (Zyloprim -)  100 mg PO DAILY [facility]


   Last Admin: 05/29/17 09:19 Dose:  100 mg


Ascorbic Acid (Vitamin C -)  1,000 mg PO DAILY [facility]


   Last Admin: 05/29/17 09:19 Dose:  1,000 mg


Atorvastatin Calcium (Lipitor -)  40 mg PO HS [facility]


   Last Admin: 05/28/17 21:14 Dose:  40 mg


Bupropion HCl (Wellbutrin -)  75 mg PO DAILY [facility]


   Last Admin: 05/29/17 09:20 Dose:  75 mg


Carvedilol (Coreg -)  6.25 mg PO BID [facility]


   Last Admin: 05/29/17 10:00 Dose:  Not Given


Cholecalciferol (Vitamin D3 -)  1,000 unit PO DAILY [facility]


   Last Admin: 05/29/17 09:20 Dose:  1,000 unit


Clopidogrel Bisulfate (Plavix -)  75 mg PO DAILY [facility]


   Last Admin: 05/29/17 09:19 Dose:  75 mg


Docusate Sodium (Colace -)  100 mg PO BID [facility]


   Last Admin: 05/29/17 09:19 Dose:  100 mg


Ferrous Sulfate (Feosol -)  325 mg PO Q48H [facility]


   Last Admin: 05/28/17 16:25 Dose:  325 mg


Furosemide (Lasix Injection -)  80 mg IVPB BID@0600,1400 [facility]


   Last Admin: 05/29/17 14:01 Dose:  80 mg


Milrinone Lactate/Dextrose (Milrinone 20mg/100ml Ivpb -)  100 mls @ 10.049 mls/

hr IVPB TITR JARROD; 0.35 MCG/KG/MIN


   PRN Reason: Protocol


   Last Admin: 05/28/17 21:16 Dose:  10.049 mls/hr


Multivitamins/Minerals/Vitamin C (Tab-A-Vit -)  1 tab PO DAILY [facility]


   Last Admin: 05/29/17 09:19 Dose:  1 tab


Potassium Chloride (K-Dur -)  20 meq PO TID [facility]


   Last Admin: 05/29/17 14:01 Dose:  20 meq


Ranitidine HCl (Zantac -)  75 mg PO DAILY [facility]


   Last Admin: 05/29/17 09:19 Dose:  75 mg


Warfarin Sodium (Coumadin -)  4 mg PO DAILY@1800 [facility]





 Laboratory Results - last 24 hr











  05/28/17





  12:20


 


Ur Specific Gravity  <= 1.005








A&P


1.CHF with poor LV dysfunction.  


Cont IV Lasix.  


Add Entresto and if the Potassium remains stable even a low dose of Aldactone 

will be of benefit





2.CMP as above


3.A.fib  INR OK

## 2017-05-29 NOTE — EKG
Test Reason : 

Blood Pressure : ***/*** mmHG

Vent. Rate : 076 BPM     Atrial Rate : 061 BPM

   P-R Int : 000 ms          QRS Dur : 204 ms

    QT Int : 508 ms       P-R-T Axes : 000 234 060 degrees

   QTc Int : 571 ms

 

Ventricular-paced rhythm WITH OCCASIONAL PREMATURE VENTRICULAR

COMPLEXES

Biventricular pacemaker detected

ABNORMAL ECG

WHEN COMPARED WITH ECG OF 19-MAY-2017 01:25,

NO SIGNIFICANT CHANGE WAS FOUND

Confirmed by MIGUEL LANDON, VALE ([id number]) on 5/29/2017 9:28:48 AM

 

Referred By:             Confirmed By:VALE RIVERA MD

## 2017-05-29 NOTE — CON.CARD
Cardiology Consult (text)





- Consultation


Consultation Note: 


CC: sob





75 yo with h/o IDDM, COPD (O2 dependant) HTN, HLD, Afib (on coumadin), CAD s/p 

CABG 2013, end stage CHF with ICD placement (on milrinone pump), PVD, BPH here 

with weight gain/sob.  





Recent discharge for similar symptoms.  States that upon returning home last 

week he weighed 213 lbs which is above his dry weight.  Over the next few days 

his weight came down to 211 lbs on an uptitrated po home regimen of torsemide 80

/40 mg.  However sob progressed.  Assoc severe orthopnea, le edema and 

abdominal distension.  





Patient started on entresto and aldactone here.  





no palps, dizziness, cp, bleeding, transient neurologic symptoms.  





Sees dr merchant for cardio.





Past Medical History/Surg hx: per hpi, additionally hernia repair


Soc hx: Former smoker, no etoh or illicits


fam hx: brother with pvd


ros: per hpi; no rash, ha, vision changes,  f/c/s, n/v/d, cough, congestion.  


meds:


 





  


Ambulatory Orders





Allopurinol [Zyloprim -] 100 mg PO DAILY 01/14/16 


Atorvastatin Ca [Lipitor] 40 mg PO HS 01/14/16 


Carvedilol 6.25 mg PO BID 01/14/16 


Docusate Sodium [Colace -] 100 mg PO BID 01/14/16 


Insulin NPL/Insulin Lispro [Humalog Mix 75-25 Vial] 0 unit SQ PRN 01/14/16 


Milrinone Lactate/D5w [Milrinone 0.2 mg/ml in D5w] 0 mg IV DAILY 01/14/16 


Ranitidine [Zantac -] 75 mg PO DAILY 01/14/16 


Albuterol 0.083% Nebulizer Sol [Ventolin 0.083% Nebulizer Soln -] 1 amp NEB Q6H 

PRN #120 amp 01/18/16 


Ferrous Sulfate [Feosol] 325 mg PO Q48H  ud 08/27/16 


Potassium Chloride [K-Dur -] 20 meq PO TID #90 tablet.er 08/27/16 


Warfarin Na [Coumadin -] 4 mg PO DAILY@1800 01/10/17 


Bupropion HCl [Wellbutrin -] 75 mg DAILY 01/12/17 


Ascorbic Acid [Vitamin C -] 1,000 mg PO DAILY 03/30/17 


Cholecalciferol (Vitamin D3) [Vitamin D -] 1,000 unit PO DAILY 03/30/17 


Clopidogrel Bisulfate [Plavix -] 75 mg PO DAILY 03/30/17 


Multivitamin [Poly-Vitamin] 1 each PO DAILY 03/30/17 


Torsemide [Demadex -] 40 mg PO HS #30 tablet 05/22/17 


Torsemide [Demadex -] 80 mg PO DAILY@0600 #30 tablet 05/22/17 








 Current Medications





Albuterol Sulfate (Ventolin 0.083% Nebulizer Soln -)  1 amp NEB Q6H PRN


   PRN Reason: SHORT OF BREATH/WHEEZING


   Last Admin: 05/28/17 22:30 Dose:  1 amp


Allopurinol (Zyloprim -)  100 mg PO DAILY [facility]


   Last Admin: 05/29/17 09:19 Dose:  100 mg


Ascorbic Acid (Vitamin C -)  1,000 mg PO DAILY [facility]


   Last Admin: 05/29/17 09:19 Dose:  1,000 mg


Atorvastatin Calcium (Lipitor -)  40 mg PO HS [facility]


   Last Admin: 05/28/17 21:14 Dose:  40 mg


Bupropion HCl (Wellbutrin -)  75 mg PO DAILY [facility]


   Last Admin: 05/29/17 09:20 Dose:  75 mg


Carvedilol (Coreg -)  6.25 mg PO BID [facility]


   Last Admin: 05/29/17 10:00 Dose:  Not Given


Cholecalciferol (Vitamin D3 -)  1,000 unit PO DAILY [facility]


   Last Admin: 05/29/17 09:20 Dose:  1,000 unit


Clopidogrel Bisulfate (Plavix -)  75 mg PO DAILY [facility]


   Last Admin: 05/29/17 09:19 Dose:  75 mg


Docusate Sodium (Colace -)  100 mg PO BID [facility]


   Last Admin: 05/29/17 09:19 Dose:  100 mg


Ferrous Sulfate (Feosol -)  325 mg PO Q48H [facility]


   Last Admin: 05/28/17 16:25 Dose:  325 mg


Furosemide (Lasix Injection -)  80 mg IVPB BID@0600,1400 [facility]


   Last Admin: 05/29/17 14:01 Dose:  80 mg


Milrinone Lactate/Dextrose (Milrinone 20mg/100ml Ivpb -)  100 mls @ 10.049 mls/

hr IVPB TITR [facility]


   PRN Reason: 0.35 MCG/KG/MIN


Multivitamins/Minerals/Vitamin C (Tab-A-Vit -)  1 tab PO DAILY [facility]


   Last Admin: 05/29/17 09:19 Dose:  1 tab


Ranitidine HCl (Zantac -)  75 mg PO DAILY [facility]


   Last Admin: 05/29/17 09:19 Dose:  75 mg


Spironolactone (Aldactone -)  25 mg PO DAILY [facility]


   Last Admin: 05/29/17 17:39 Dose:  25 mg


Warfarin Sodium (Coumadin -)  4 mg PO DAILY@1800 [facility]


   Last Admin: 05/29/17 17:39 Dose:  4 mg








 Vital Signs - 24 hr











  05/28/17 05/29/17 05/29/17





  21:00 02:00 06:34


 


Temperature 98 F 97.5 F L 97.5 F L


 


Pulse Rate 78 76 76


 


Respiratory 20 20 20





Rate   


 


Blood Pressure 117/86 124/76 124/76


 


O2 Sat by Pulse 98  





Oximetry (%)   














  05/29/17 05/29/17 05/29/17





  09:00 10:00 14:00


 


Temperature  98.0 F 


 


Pulse Rate  82 77


 


Respiratory 22 20 22





Rate   


 


Blood Pressure  109/67 128/75


 


O2 Sat by Pulse 94 L  





Oximetry (%)   














  05/29/17 05/29/17 05/29/17





  14:49 18:00 18:18


 


Temperature 97.7 F 97.7 F 


 


Pulse Rate  76 


 


Respiratory  21 





Rate   


 


Blood Pressure  126/70 


 


O2 Sat by Pulse   97





Oximetry (%)   











 Intake & Output











 05/27/17 05/28/17 05/29/17 05/30/17





 07:59 07:59 07:59 07:59


 


Intake Total   550 


 


Output Total   600 450


 


Balance   -50 -450


 


Weight  211 lb 211 lb 214 lb 2 oz











NAD, calm 


JVD elevated, neck supple


RRR nl s1, s2 no m/r/g.


bibasilar rales nl effort


+ bs soft nd, nt


1+ edema of LE.  no c/c


aaox3


diminished dp/pt no carotid bruits


no jaundice diaphoresis





 


  


 no CBC, BMP today





 05/28/17 03:44 





 05/28/17 03:44 








 Laboratory Tests











  05/28/17





  03:44


 


Total Bilirubin  0.6


 


AST  35  D


 


ALT  37  D


 


Alkaline Phosphatase  180 H


 


Troponin I  0.04  D


 


Albumin  3.2 L











 Laboratory Tests











  03/30/17 05/19/17 05/28/17





  13:52 01:36 03:44


 


B-Natriuretic Peptide  3380.17 H  2012.19 H  4547.49 H














ecg:  with occasional pvc's


tele: 





cxr: congestive changes, similar to priors. 





echo 10/2016: sev lve, global hk, sev dec lvef, mild rve, nl rv fcn, patty, mild-

mod mr, mv ring, tv ring, mild tr, mild pr, mild phtn, ivc mild dil





a/p:  76 year old gentleman with PMH of IDDM, COPD (O2 dependant) HTN, HLD, 

Afib (on coumadin), CAD s/p CABG 2013, end stage CHF with ICD placement (on 

milrinone pump), PVD, BPH here with productive cough.





chronic end stage systolic heart failure s/p ICD, on home milrinone infusion/

acute exacerbation:


-  prior reports have dry weight around 211 lbs, but patient and family feel 

that this is over dry weight. Clinical exam consistent with volume overload


- home regimen recently increased on discharge last week to torsemide 80 mg/40 

mg.  patient takes metolazone prn.  


- cont milrinone at patient's home maintenance dose (on chronic infusion 

destination therapy)--0.35 mcg/kg/min.  Followed by heart failure at [facility] --> according to their notes patient has declined further advanced 

therapies. 


- cont home coreg.  Per Dr. merchant's office notes has not been on ACE due to 

blood pressure limitations and fluctuating creatinine.  --> would hold entresto 

and aldactone for now (started here).  Consider resuming as outpatient per 

patient's outpatient CHF and cardiology doctors. 


-  previously diuresed on  lasix 80 mg IV BID on prior admits, but repeat 

weight this evening continues to trend up despite receiving that regimen today.

  Uptitrate lasix regimen to 100 mg bid.  Daily weights, bmp 


- repeat echo to reassess heart function.  patient with frequent recent admits. 





s/p ICD


- recent office icd check with stable function





afib


- currently rate controlled on coreg


- cont coumadin per INR





PVD


- con't AC, plavix statin.





CAD s/p CABG


- No anginal symptoms, no signs acs.  Con't home ac, plavix, atorvastatin, coreg


- CE's neg x 1





HTN: controlled on coreg





HLD: con't statin. 





CKD


- stable

## 2017-05-30 LAB
ALBUMIN SERPL-MCNC: 2.9 G/DL (ref 3.4–5)
ALP SERPL-CCNC: 144 U/L (ref 45–117)
ALT SERPL-CCNC: 29 U/L (ref 12–78)
ANION GAP SERPL CALC-SCNC: 8 MMOL/L (ref 8–16)
ART PUNCT SITE: (no result)
ARTERIAL PATENCY WRIST A: POSITIVE
AST SERPL-CCNC: 20 U/L (ref 15–37)
BASE EXCESS BLDA CALC-SCNC: 4.4 MEQ/L (ref -2–2)
BILIRUB SERPL-MCNC: 0.8 MG/DL (ref 0.2–1)
CALCIUM SERPL-MCNC: 8.5 MG/DL (ref 8.5–10.1)
CO2 SERPL-SCNC: 31 MMOL/L (ref 21–32)
COCKROFT - GAULT: 49.19
CREAT SERPL-MCNC: 1.7 MG/DL (ref 0.7–1.3)
GLUCOSE SERPL-MCNC: 212 MG/DL (ref 74–106)
HCO3 BLDA-SCNC: 30 MEQ/L (ref 22–26)
INR BLD: 4.02 (ref 0.82–1.09)
LPM/O2%: (no result)
PEEP SETTING VENT: 0 CMH2O
PO2 BLDA: 59.4 MMHG (ref 70–100)
PROT SERPL-MCNC: 6.3 G/DL (ref 6.4–8.2)
PT PNL PPP: 45.5 SEC (ref 9.98–11.88)
PT. ON O2?: YES
SAO2 % BLDA: 89.6 % (ref 90–98.9)
TYPE OF O2: (no result)

## 2017-05-30 RX ADMIN — MULTIVITAMIN TABLET SCH TAB: TABLET at 10:52

## 2017-05-30 RX ADMIN — FUROSEMIDE SCH MG: 10 INJECTION, SOLUTION INTRAVENOUS at 16:13

## 2017-05-30 RX ADMIN — ASPIRIN 81 MG SCH MG: 81 TABLET ORAL at 10:51

## 2017-05-30 RX ADMIN — RANITIDINE SCH: 150 TABLET ORAL at 11:29

## 2017-05-30 RX ADMIN — VITAMIN D, TAB 1000IU (100/BT) SCH UNIT: 25 TAB at 10:51

## 2017-05-30 RX ADMIN — DOCUSATE SODIUM SCH MG: 100 CAPSULE, LIQUID FILLED ORAL at 10:51

## 2017-05-30 RX ADMIN — MILRINONE LACTATE SCH MLS/HR: 200 INJECTION, SOLUTION INTRAVENOUS at 22:10

## 2017-05-30 RX ADMIN — DOCUSATE SODIUM SCH MG: 100 CAPSULE, LIQUID FILLED ORAL at 22:10

## 2017-05-30 RX ADMIN — MILRINONE LACTATE SCH MLS/HR: 200 INJECTION, SOLUTION INTRAVENOUS at 10:49

## 2017-05-30 RX ADMIN — CLOPIDOGREL BISULFATE SCH MG: 75 TABLET, FILM COATED ORAL at 10:51

## 2017-05-30 RX ADMIN — ALLOPURINOL SCH MG: 100 TABLET ORAL at 10:52

## 2017-05-30 RX ADMIN — INSULIN ASPART SCH UNITS: 100 INJECTION, SOLUTION INTRAVENOUS; SUBCUTANEOUS at 22:13

## 2017-05-30 RX ADMIN — FUROSEMIDE SCH MG: 10 INJECTION, SOLUTION INTRAVENOUS at 10:50

## 2017-05-30 RX ADMIN — CARVEDILOL SCH MG: 6.25 TABLET, FILM COATED ORAL at 22:11

## 2017-05-30 RX ADMIN — ATORVASTATIN CALCIUM SCH MG: 40 TABLET, FILM COATED ORAL at 22:11

## 2017-05-30 RX ADMIN — FERROUS SULFATE TAB EC 324 MG (65 MG FE EQUIVALENT) SCH MG: 324 (65 FE) TABLET DELAYED RESPONSE at 16:00

## 2017-05-30 RX ADMIN — CARVEDILOL SCH MG: 6.25 TABLET, FILM COATED ORAL at 10:51

## 2017-05-30 RX ADMIN — OXYCODONE HYDROCHLORIDE AND ACETAMINOPHEN SCH MG: 500 TABLET ORAL at 10:51

## 2017-05-30 RX ADMIN — INSULIN ASPART SCH: 100 INJECTION, SOLUTION INTRAVENOUS; SUBCUTANEOUS at 16:14

## 2017-05-30 RX ADMIN — FUROSEMIDE SCH MG: 10 INJECTION, SOLUTION INTRAVENOUS at 16:00

## 2017-05-30 NOTE — CON.PULM
Consult


Consult Specialty:: PULM/CCM 


Referred by:: TASHIA


Reason for Consultation:: SOB / abnormal ABG





- History of Present Illness


Chief Complaint: SOB


History of Present Illness: 


76 M, with listed medical history of end stage on the continuous milrinone pump 

for almost 3 years, A fib, CAD, S/P PCI x 2,  valve replacement, AMI, COPD from 

previous smoking, and IDDM. 





Admitted via the ER due to shortness of breath for a week. 





No travel history or sick contacts.  No fever or chills.  No night sweats or 

hemoptysis. 





ABG : Compensated respiratory acidosis. 





- History Source


History Provided By: Patient


Limitations to Obtaining History: No Limitations





- Past Medical History


Cardio/Vascular: Yes: AFIB, CAD, CHF, HTN, Hyperlipdemia, MI, Pulmonary 

Hypertension, Other (AICD placed)


Pulmonary: Yes: COPD, O2 Dependent


Renal/: Yes: Renal Inusuff, BPH


Endocrine: Yes: Diabetes Mellitus





- Past Surgical History


Past Surgical History: Yes: AICD, CABG, Hernia Repair





- Alcohol/Substance Use


Hx Alcohol Use: No





- Smoking History


Smoking history: Former smoker


Have you smoked in the past 12 months: No


Aproximately how many cigarettes per day: 40


If you are a former smoker, when did you quit?: 2011





- Social History


Usual Living Arrangement: With Spouse


ADL: Independent


History of Recent Travel: No





Home Medications





- Allergies


Allergies/Adverse Reactions: 


 Allergies











Allergy/AdvReac Type Severity Reaction Status Date / Time


 


No Known Drug Allergies Allergy   Verified 05/28/17 03:51


 


shellfish derived Allergy   Verified 05/28/17 03:51














- Home Medications


Home Medications: 


Ambulatory Orders





Allopurinol [Zyloprim -] 100 mg PO DAILY 01/14/16 


Atorvastatin Ca [Lipitor] 40 mg PO HS 01/14/16 


Carvedilol 6.25 mg PO BID 01/14/16 


Docusate Sodium [Colace -] 100 mg PO BID 01/14/16 


Insulin NPL/Insulin Lispro [Humalog Mix 75-25 Vial] 0 unit SQ PRN 01/14/16 


Milrinone Lactate/D5w [Milrinone 0.2 mg/ml in D5w] 0 mg IV DAILY 01/14/16 


Ranitidine [Zantac -] 75 mg PO DAILY 01/14/16 


Albuterol 0.083% Nebulizer Sol [Ventolin 0.083% Nebulizer Soln -] 1 amp NEB Q6H 

PRN #120 amp 01/18/16 


Ferrous Sulfate [Feosol] 325 mg PO Q48H  ud 08/27/16 


Potassium Chloride [K-Dur -] 20 meq PO TID #90 tablet.er 08/27/16 


Warfarin Na [Coumadin -] 4 mg PO DAILY@1800 01/10/17 


Bupropion HCl [Wellbutrin -] 75 mg DAILY 01/12/17 


Ascorbic Acid [Vitamin C -] 1,000 mg PO DAILY 03/30/17 


Cholecalciferol (Vitamin D3) [Vitamin D -] 1,000 unit PO DAILY 03/30/17 


Clopidogrel Bisulfate [Plavix -] 75 mg PO DAILY 03/30/17 


Multivitamin [Poly-Vitamin] 1 each PO DAILY 03/30/17 


Torsemide [Demadex -] 40 mg PO HS #30 tablet 05/22/17 


Torsemide [Demadex -] 80 mg PO DAILY@0600 #30 tablet 05/22/17 











Family Disease History





- Family Disease History


Family Disease History: Diabetes: Father, Heart Disease: Father





Review of Systems





- Review of Systems


Constitutional: reports: Lethargy, Malaise, Weakness.  denies: Chills, Fever, 

Loss of Appetite, Night Sweats, Unintentional Wgt. Loss


Eyes: reports: No Symptoms


HENT: reports: No Symptoms


Neck: reports: No Symptoms


Cardiovascular: reports: Edema, Shortness of Breath.  denies: Chest Pain, 

Palpitations


Respiratory: reports: Cough, Orthopnea, PND, SOB, SOB on Exertion.  denies: 

Hemoptysis, Snoring, Wheezing


Gastrointestinal: reports: Dysphagia


Genitourinary: reports: No Symptoms


Musculoskeletal: reports: No Symptoms


Integumentary: reports: No Symptoms


Neurological: reports: No Symptoms


Endocrine: reports: No Symptoms


Hematology/Lymphatic: reports: No Symptoms


Psychiatric: reports: No Symptoms





Physical Exam


Vital Sings: 


 Vital Signs











Temperature  98.4 F   05/30/17 14:00


 


Pulse Rate  75   05/30/17 14:00


 


Respiratory Rate  20   05/30/17 14:00


 


Blood Pressure  103/67   05/30/17 14:00


 


O2 Sat by Pulse Oximetry (%)  92 L  05/30/17 11:49











Constitutional: Yes: No Distress, Obese


Eyes: Yes: Conjunctiva Clear, EOM Intact


HENT: Yes: Atraumatic, Normocephalic


Neck: Yes: Trachea Midline


Cardiovascular: Yes: Pulse Irregular, JVD, Murmur


Respiratory: Yes: Accessory Muscle Use, Cough, Diminished, On Venti-Mask, Rales

, Tachypnea.  No: Stridor, Wheezes


...Inspection: Yes: WNL


...Clubbing: No


Gastrointestinal: Yes: Normal Bowel Sounds, Soft, Abdomen, Obese


Renal/: Yes: WNL


Musculoskeletal: Yes: WNL


Extremities: Yes: WNL


Edema: Yes


Peripheral Pulses WNL: Yes


Integumentary: Yes: Venous Stasis Changes


Neurological: Yes: Alert, Oriented


...Motor Strength: WNL


Psychiatric: Yes: WNL, Alert, Oriented


Labs: 


 CBC, BMP





 05/30/17 05:35 





 ABG Results











ABG pH  7.38  (7.35-7.45)   05/30/17  12:35    


 


ABG pCO2 at Pt Temp  51.8 mmHg (35-45)  H  05/30/17  12:35    


 


ABG pO2 at Pt Temp  59.4 mmHg ()  L D 05/30/17  12:35    


 


ABG HCO3  30.0 meq/L (22-26)  H  05/30/17  12:35    


 


ABG O2 Sat (Measured)  89.6 % (90-98.9)  L  05/30/17  12:35    


 


ABG O2 Content  13.9 % vol (15-22)  L  05/30/17  12:35    


 


ABG Base Excess  4.4 meq/l (-2-2)  H  05/30/17  12:35    














Imaging





- Results


Chest X-ray: Report Reviewed, Image Reviewed





Problem List





- Problems


(1) CAD (coronary artery disease)


Code(s): I25.10 - ATHSCL HEART DISEASE OF NATIVE CORONARY ARTERY W/O ANG PCTRS 

  





(2) CHF (congestive heart failure)


Code(s): I50.9 - HEART FAILURE, UNSPECIFIED   Qualifiers: 


     Congestive heart failure type: combined     Congestive heart failure 

chronicity: acute on chronic        Qualified Code(s): I50.43 - Acute on 

chronic combined systolic (congestive) and diastolic (congestive) heart failure

  





(3) Chest pain


Code(s): R07.9 - CHEST PAIN, UNSPECIFIED   





(4) Diabetes


Code(s): E11.9 - TYPE 2 DIABETES MELLITUS WITHOUT COMPLICATIONS   Qualifiers: 


     Diabetes mellitus type: type 2     Diabetes mellitus complication status: 

with kidney complications     Diabetes mellitus complication detail: with 

chronic kidney disease     Chronic kidney disease stage: stage 4 (severe)





(5) Presence of permanent cardiac pacemaker


Code(s): Z95.0 - PRESENCE OF CARDIAC PACEMAKER





(6) Renal insufficiency


Code(s): N28.9 - DISORDER OF KIDNEY AND URETER, UNSPECIFIED





(7) AICD (automatic cardioverter/defibrillator) present


Code(s): Z95.810 - PRESENCE OF AUTOMATIC (IMPLANTABLE) CARDIAC DEFIBRILLATOR





(8) Acute and chronic respiratory failure (acute-on-chronic)


Code(s): J96.20 - ACUTE AND CHR RESP FAILURE, UNSP W HYPOXIA OR HYPERCAPNIA   

Qualifiers: 


     Respiratory failure complication: hypoxia        Qualified Code(s): J96.21 

- Acute and chronic respiratory failure with hypoxia  





(9) Atrial fibrillation


Code(s): I48.91 - UNSPECIFIED ATRIAL FIBRILLATION   Qualifiers: 


     Atrial fibrillation type: chronic     Qualified Code(s): I48.2 - Chronic 

atrial fibrillation  





(10) CKD (chronic kidney disease)


Code(s): N18.9 - CHRONIC KIDNEY DISEASE, UNSPECIFIED   Qualifiers: 


     Chronic kidney disease stage: stage 4 (severe)        Qualified Code(s): 

N18.4 - Chronic kidney disease, stage 4 (severe)  





(11) COPD (chronic obstructive pulmonary disease)


Code(s): J44.9 - CHRONIC OBSTRUCTIVE PULMONARY DISEASE, UNSPECIFIED   





(12) Cardiomyopathy


Code(s): I42.9 - CARDIOMYOPATHY, UNSPECIFIED   





(13) Diabetes mellitus, insulin dependent (IDDM), uncontrolled


Code(s): E10.65 - TYPE 1 DIABETES MELLITUS WITH HYPERGLYCEMIA   





(14) Dyspnea on exertion


Code(s): R06.09 - OTHER FORMS OF DYSPNEA








Assessment/Plan


PLAN:


Suspect chronic hypercapnia to possible CheyneSR due to advanced heart failure.


Patient may also have a component of hypercapnia due to obesity hypoventilation 

+/- Obstructive airways disease


Will order empiric NIPPV orders for now 


Would likely benefit from Ruling Out Sleep disordered breathing as nocturnal 

NIPPV may help his overall condition 


VM O2 as needed


Patient on continuous Milrinone


Diuresis as BP allows


Do not suspect AE of COPD at this time 


Will follow 





Thank you. 





Dr Sheppard

## 2017-05-30 NOTE — PN
Progress Note (short form)





- Note


Progress Note: 


CC: sob





S:  episode of hypotension this morning.  Lasix dose delayed and decreased back 

to 80 bid.  Patient sleepy this morning, states he didn't sleep last night.  

Was wearing bipap yesterday evening, but currently patient denying using bipap. 

no cp, palps, dizziness. + sob, orthopnea, le edema, abdominal distension





 Current Medications





Albuterol Sulfate (Ventolin 0.083% Nebulizer Soln -)  1 amp NEB Q6H PRN


   PRN Reason: SHORT OF BREATH/WHEEZING


   Last Admin: 05/28/17 22:30 Dose:  1 amp


Allopurinol (Zyloprim -)  100 mg PO DAILY [facility]


   Last Admin: 05/29/17 09:19 Dose:  100 mg


Ascorbic Acid (Vitamin C -)  1,000 mg PO DAILY [facility]


   Last Admin: 05/29/17 09:19 Dose:  1,000 mg


Aspirin (Asa -)  81 mg PO DAILY [facility]


Atorvastatin Calcium (Lipitor -)  40 mg PO HS [facility]


   Last Admin: 05/29/17 22:11 Dose:  40 mg


Bupropion HCl (Wellbutrin -)  75 mg PO DAILY [facility]


   Last Admin: 05/29/17 09:20 Dose:  75 mg


Carvedilol (Coreg -)  6.25 mg PO BID [facility]


   Last Admin: 05/29/17 22:11 Dose:  6.25 mg


Cholecalciferol (Vitamin D3 -)  1,000 unit PO DAILY [facility]


   Last Admin: 05/29/17 09:20 Dose:  1,000 unit


Clopidogrel Bisulfate (Plavix -)  75 mg PO DAILY [facility]


   Last Admin: 05/29/17 09:19 Dose:  75 mg


Docusate Sodium (Colace -)  100 mg PO BID [facility]


   Last Admin: 05/29/17 22:11 Dose:  100 mg


Ferrous Sulfate (Feosol -)  325 mg PO Q48H [facility]


   Last Admin: 05/28/17 16:25 Dose:  325 mg


Furosemide (Lasix Injection -)  80 mg IVPB BIDLASIX [facility]


Milrinone Lactate/Dextrose (Milrinone 20mg/100ml Ivpb -)  100 mls @ 10.049 mls/

hr IVPB TITR [facility]


   PRN Reason: 0.35 MCG/KG/MIN


Multivitamins/Minerals/Vitamin C (Tab-A-Vit -)  1 tab PO DAILY [facility]


   Last Admin: 05/29/17 09:19 Dose:  1 tab


Ranitidine HCl (Zantac -)  75 mg PO DAILY [facility]


   Last Admin: 05/29/17 09:19 Dose:  75 mg


Warfarin Sodium (Coumadin -)  4 mg PO DAILY@1800 [facility]


   Last Admin: 05/29/17 17:39 Dose:  4 mg





 Vital Signs - 24 hr











  05/29/17 05/29/17 05/29/17





  14:00 14:49 18:00


 


Temperature  97.7 F 97.7 F


 


Pulse Rate 77  76


 


Respiratory 22  21





Rate   


 


Blood Pressure 128/75  126/70


 


O2 Sat by Pulse   





Oximetry (%)   














  05/29/17 05/29/17 05/29/17





  18:18 20:36 22:00


 


Temperature   98.0 F


 


Pulse Rate   76


 


Respiratory  20 18





Rate   


 


Blood Pressure   124/78


 


O2 Sat by Pulse 97 98 





Oximetry (%)   














  05/30/17 05/30/17





  02:00 06:00


 


Temperature 97.6 F 98.2 F


 


Pulse Rate 76 77


 


Respiratory 20 20





Rate  


 


Blood Pressure 111/74 85/55


 


O2 Sat by Pulse  





Oximetry (%)  








 Intake & Output











 05/28/17 05/29/17 05/30/17 05/31/17





 07:59 07:59 07:59 07:59


 


Intake Total  550 220 


 


Output Total  600 450 


 


Balance  -50 -230 


 


Weight 211 lb 214 lb 213 lb 6.4 oz 














NAD, calm sleepy


JVD elevated, neck supple


RRR nl s1, s2 no m/r/g.


bibasilar rales nl effort


+ bs soft nd, nt


1+ edema of LE.  no c/c


aaox3


diminished dp/pt no carotid bruits


no jaundice diaphoresis





 


  


  


 CBC, BMP





 05/28/17 03:44 





 05/30/17 05:35 








 Laboratory Tests











  05/30/17 05/30/17





  05:35 05:35


 


INR  4.02 H* D 


 


Alkaline Phosphatase   144 H


 


Albumin   2.9 L

















ecg:  with occasional pvc's


tele: 





cxr: congestive changes, similar to priors. 





echo 10/2016: sev lve, global hk, sev dec lvef, mild rve, nl rv fcn, patty, mild-

mod mr, mv ring, tv ring, mild tr, mild pr, mild phtn, ivc mild dil





a/p:  77 yo with h/o IDDM, COPD (O2 dependant) HTN, HLD, Afib (on coumadin), 

CAD s/p CABG 2013, end stage CHF with ICD placement (on milrinone pump), PVD, 

BPH here with weight gain/sob.  





chronic end stage systolic heart failure s/p ICD, on home milrinone infusion/

acute exacerbation:


-  prior reports have dry weight around 211 lbs, but patient and family feel 

that this is over dry weight. Clinical exam consistent with volume overload


- home regimen recently increased on discharge last week to torsemide 80 mg/40 

mg.  patient takes metolazone prn.  


- cont milrinone at patient's home maintenance dose (on chronic infusion 

destination therapy)--0.35 mcg/kg/min.  Followed by heart failure at [place] --> according to their notes patient has declined further advanced 

therapies. 


- cont home coreg.  Per Dr. merchant's office notes has not been on ACE due to 

blood pressure limitations and fluctuating creatinine.  --> would hold entresto 

and aldactone for now (started here).  Consider resuming as outpatient per 

patient's outpatient CHF and cardiology doctors. 


- 5/29 previously diuresed on  lasix 80 mg IV BID on prior admits, but repeat 

weight this evening continues to trend up despite receiving that regimen today.

  Uptitrate lasix regimen to 100 mg bid.  


- 5/30: uptitration of lasix limited today due to hypotension this morning (

received entresto, aldactone yesterday).  Will try to uptitrate regimen later 

today if bp improves. 


- Daily weights, bmp, intake/outake


- repeat echo to reassess heart function.  patient with frequent recent admits. 





s/p ICD


- recent office icd check with stable function





afib


- currently rate controlled on coreg


- cont coumadin per INR





PVD


- con't AC, plavix statin.





CAD s/p CABG


- No anginal symptoms, no signs acs.  Con't home ac, plavix, atorvastatin, coreg


- CE's neg x 1





TROY? wears bipap qhs at home


- con't nighttime bipap





HTN: controlled on coreg





HLD: con't statin. 





CKD


- stable

## 2017-05-31 LAB
ANION GAP SERPL CALC-SCNC: 7 MMOL/L (ref 8–16)
BASOPHILS # BLD: 0.4 % (ref 0–2)
CALCIUM SERPL-MCNC: 8.7 MG/DL (ref 8.5–10.1)
CO2 SERPL-SCNC: 31 MMOL/L (ref 21–32)
COCKROFT - GAULT: 47.04
CREAT SERPL-MCNC: 1.8 MG/DL (ref 0.7–1.3)
DEPRECATED RDW RBC AUTO: 18.6 % (ref 11.9–15.9)
EOSINOPHIL # BLD: 2.9 % (ref 0–4.5)
GLUCOSE SERPL-MCNC: 143 MG/DL (ref 74–106)
INR BLD: 3.86 (ref 0.82–1.09)
MAGNESIUM SERPL-MCNC: 2.8 MG/DL (ref 1.8–2.4)
MCH RBC QN AUTO: 26.3 PG (ref 25.7–33.7)
MCHC RBC AUTO-ENTMCNC: 31.6 G/DL (ref 32–35.9)
MCV RBC: 83 FL (ref 80–96)
NEUTROPHILS # BLD: 71.4 % (ref 42.8–82.8)
PHOSPHATE SERPL-MCNC: 4.1 MG/DL (ref 2.5–4.9)
PLATELET # BLD AUTO: 203 K/MM3 (ref 134–434)
PMV BLD: 9 FL (ref 7.5–11.1)
PT PNL PPP: 43.6 SEC (ref 9.98–11.88)
WBC # BLD AUTO: 7.3 K/MM3 (ref 4–10)

## 2017-05-31 RX ADMIN — INSULIN ASPART SCH: 100 INJECTION, SOLUTION INTRAVENOUS; SUBCUTANEOUS at 16:50

## 2017-05-31 RX ADMIN — FUROSEMIDE SCH MG: 10 INJECTION, SOLUTION INTRAVENOUS at 15:23

## 2017-05-31 RX ADMIN — OXYCODONE HYDROCHLORIDE AND ACETAMINOPHEN SCH MG: 500 TABLET ORAL at 11:36

## 2017-05-31 RX ADMIN — CARVEDILOL SCH MG: 6.25 TABLET, FILM COATED ORAL at 11:35

## 2017-05-31 RX ADMIN — DOCUSATE SODIUM SCH MG: 100 CAPSULE, LIQUID FILLED ORAL at 11:35

## 2017-05-31 RX ADMIN — CLOPIDOGREL BISULFATE SCH MG: 75 TABLET, FILM COATED ORAL at 11:35

## 2017-05-31 RX ADMIN — RANITIDINE SCH MG: 150 TABLET ORAL at 11:36

## 2017-05-31 RX ADMIN — CARVEDILOL SCH MG: 6.25 TABLET, FILM COATED ORAL at 22:27

## 2017-05-31 RX ADMIN — INSULIN ASPART SCH: 100 INJECTION, SOLUTION INTRAVENOUS; SUBCUTANEOUS at 06:02

## 2017-05-31 RX ADMIN — MILRINONE LACTATE SCH: 200 INJECTION, SOLUTION INTRAVENOUS at 22:31

## 2017-05-31 RX ADMIN — ASPIRIN 81 MG SCH MG: 81 TABLET ORAL at 11:35

## 2017-05-31 RX ADMIN — ATORVASTATIN CALCIUM SCH MG: 40 TABLET, FILM COATED ORAL at 22:27

## 2017-05-31 RX ADMIN — VITAMIN D, TAB 1000IU (100/BT) SCH UNIT: 25 TAB at 11:36

## 2017-05-31 RX ADMIN — INSULIN ASPART SCH: 100 INJECTION, SOLUTION INTRAVENOUS; SUBCUTANEOUS at 12:38

## 2017-05-31 RX ADMIN — DOCUSATE SODIUM SCH MG: 100 CAPSULE, LIQUID FILLED ORAL at 22:27

## 2017-05-31 RX ADMIN — ALLOPURINOL SCH MG: 100 TABLET ORAL at 11:36

## 2017-05-31 RX ADMIN — FUROSEMIDE SCH MG: 10 INJECTION, SOLUTION INTRAVENOUS at 05:57

## 2017-05-31 RX ADMIN — INSULIN ASPART SCH UNITS: 100 INJECTION, SOLUTION INTRAVENOUS; SUBCUTANEOUS at 22:27

## 2017-05-31 RX ADMIN — MULTIVITAMIN TABLET SCH TAB: TABLET at 11:35

## 2017-05-31 NOTE — PN
Progress Note, Physician


History of Present Illness: 


pulmonary








alert,feeling better,less dyspneic,sitting up in bed





- Current Medication List


Current Medications: 


Active Medications





Acetaminophen (Tylenol -)  650 mg PO Q6H PRN


   PRN Reason: PAIN


   Last Admin: 05/30/17 18:35 Dose:  650 mg


Albuterol Sulfate (Ventolin 0.083% Nebulizer Soln -)  1 amp NEB Q6H PRN


   PRN Reason: SHORT OF BREATH/WHEEZING


   Last Admin: 05/28/17 22:30 Dose:  1 amp


Allopurinol (Zyloprim -)  100 mg PO DAILY [facility]


   Last Admin: 05/30/17 10:52 Dose:  100 mg


Ascorbic Acid (Vitamin C -)  1,000 mg PO DAILY [facility]


   Last Admin: 05/30/17 10:51 Dose:  1,000 mg


Aspirin (Asa -)  81 mg PO DAILY [facility]


   Last Admin: 05/30/17 10:51 Dose:  81 mg


Atorvastatin Calcium (Lipitor -)  40 mg PO HS [facility]


   Last Admin: 05/30/17 22:11 Dose:  40 mg


Bupropion HCl (Wellbutrin -)  75 mg PO DAILY [facility]


   Last Admin: 05/30/17 10:51 Dose:  75 mg


Carvedilol (Coreg -)  6.25 mg PO BID [facility]


   Last Admin: 05/30/17 22:11 Dose:  6.25 mg


Cholecalciferol (Vitamin D3 -)  1,000 unit PO DAILY [facility]


   Last Admin: 05/30/17 10:51 Dose:  1,000 unit


Clopidogrel Bisulfate (Plavix -)  75 mg PO DAILY [facility]


   Last Admin: 05/30/17 10:51 Dose:  75 mg


Docusate Sodium (Colace -)  100 mg PO BID [facility]


   Last Admin: 05/30/17 22:10 Dose:  100 mg


Ferrous Sulfate (Feosol -)  325 mg PO Q48H [facility]


   Last Admin: 05/30/17 16:00 Dose:  325 mg


Furosemide (Lasix Injection -)  100 mg IVPB BIDLASIX [facility]


   Last Admin: 05/31/17 05:57 Dose:  100 mg


Milrinone Lactate/Dextrose (Milrinone 20mg/100ml Ivpb -)  100 mls @ 10.049 mls/

hr IVPB TITR [facility]


   PRN Reason: 0.35 MCG/KG/MIN


   Last Admin: 05/30/17 22:10 Dose:  10.049 mls/hr


Insulin Aspart (Novolog Vial)  0 units SQ ACHS [facility]


   PRN Reason: Protocol


   Last Admin: 05/31/17 06:02 Dose:  Not Given


Metolazone (Zaroxolyn -)  5 mg PO ONCE ONE


   Stop: 05/31/17 13:31


Multivitamins/Minerals/Vitamin C (Tab-A-Vit -)  1 tab PO DAILY [facility]


   Last Admin: 05/30/17 10:52 Dose:  1 tab


Ranitidine HCl (Zantac -)  75 mg PO DAILY [facility]


   Last Admin: 05/30/17 11:29 Dose:  Not Given


Warfarin Sodium (Coumadin -)  4 mg PO DAILY@1800 [facility]


   Last Admin: 05/29/17 17:39 Dose:  4 mg











- Objective


Vital Signs: 


 Vital Signs











Temperature  97.9 F   05/31/17 05:35


 


Pulse Rate  89   05/31/17 09:45


 


Respiratory Rate  20   05/31/17 05:35


 


Blood Pressure  117/65   05/31/17 05:35


 


O2 Sat by Pulse Oximetry (%)  99   05/31/17 09:45











Constitutional: Yes: Well Nourished, Calm


Eyes: Yes: WNL


HENT: Yes: WNL


Neck: Yes: WNL


Cardiovascular: Yes: Pulse Irregular, S1, S2


Respiratory: Yes: Rales (few bibasilar rales)


Gastrointestinal: Yes: Normal Bowel Sounds, Soft


Extremities: Yes: WNL


Edema: Yes


Labs: 


 CBC, BMP





 05/31/17 05:35 





 05/31/17 05:35 





 INR, PTT











INR  3.86  (0.82-1.09)  H  05/31/17  05:35    














Assessment/Plan


Problem List





- Problems


(1) CAD (coronary artery disease)


Code(s): I25.10 - ATHSCL HEART DISEASE OF NATIVE CORONARY ARTERY W/O ANG PCTRS 

  





(2) CHF (congestive heart failure)


Code(s): I50.9 - HEART FAILURE, UNSPECIFIED   Qualifiers: 


     Congestive heart failure type: combined     Congestive heart failure 

chronicity: acute on chronic        Qualified Code(s): I50.43 - Acute on 

chronic combined systolic (congestive) and diastolic (congestive) heart failure

  





(3) Chest pain


Code(s): R07.9 - CHEST PAIN, UNSPECIFIED   





(4) Diabetes


Code(s): E11.9 - TYPE 2 DIABETES MELLITUS WITHOUT COMPLICATIONS   Qualifiers: 


     Diabetes mellitus type: type 2     Diabetes mellitus complication status: 

with kidney complications     Diabetes mellitus complication detail: with 

chronic kidney disease     Chronic kidney disease stage: stage 4 (severe)





(5) Presence of permanent cardiac pacemaker


Code(s): Z95.0 - PRESENCE OF CARDIAC PACEMAKER





(6) Renal insufficiency


Code(s): N28.9 - DISORDER OF KIDNEY AND URETER, UNSPECIFIED





(7) AICD (automatic cardioverter/defibrillator) present


Code(s): Z95.810 - PRESENCE OF AUTOMATIC (IMPLANTABLE) CARDIAC DEFIBRILLATOR





(8) Acute and chronic respiratory failure (acute-on-chronic)


Code(s): J96.20 - ACUTE AND CHR RESP FAILURE, UNSP W HYPOXIA OR HYPERCAPNIA   

Qualifiers: 


     Respiratory failure complication: hypoxia        Qualified Code(s): J96.21 

- Acute and chronic respiratory failure with hypoxia  





(9) Atrial fibrillation


Code(s): I48.91 - UNSPECIFIED ATRIAL FIBRILLATION   Qualifiers: 


     Atrial fibrillation type: chronic     Qualified Code(s): I48.2 - Chronic 

atrial fibrillation  





(10) CKD (chronic kidney disease)


Code(s): N18.9 - CHRONIC KIDNEY DISEASE, UNSPECIFIED   Qualifiers: 


     Chronic kidney disease stage: stage 4 (severe)        Qualified Code(s): 

N18.4 - Chronic kidney disease, stage 4 (severe)  





(11) COPD (chronic obstructive pulmonary disease)


Code(s): J44.9 - CHRONIC OBSTRUCTIVE PULMONARY DISEASE, UNSPECIFIED   





(12) Cardiomyopathy


Code(s): I42.9 - CARDIOMYOPATHY, UNSPECIFIED   





(13) Diabetes mellitus, insulin dependent (IDDM), uncontrolled


Code(s): E10.65 - TYPE 1 DIABETES MELLITUS WITH HYPERGLYCEMIA   





(14) Dyspnea on exertion


Code(s): R06.09 - OTHER FORMS OF DYSPNEA








Assessment/Plan


Acute on chronic hypoxemic/hypercapneic respiratory failure


Chf


Cardiomyopathy


 Copd


DM


A-fib





Bipap prn


VM O2 as needed


Milrinone


Diuresis 


Inhaled bronchodilators


Daily wts'


monitor lytes,renal function


f/u abg


f/u chest x-ray


sleep studies outpatient








DR EASTMAN

## 2017-05-31 NOTE — PN
Progress Note (short form)





- Note


Progress Note: 


CC: sob, chf





S:  still with sob, le edema, no cp/palps/dizzy/loc.  no cigs





o:


 Vital Signs











Temp  97.9 F   05/31/17 05:35


 


Pulse  89   05/31/17 09:45


 


Resp  20   05/31/17 05:35


 


BP  117/65   05/31/17 05:35


 


Pulse Ox  99   05/31/17 09:45








 Intake & Output











 05/30/17 05/30/17 05/31/17





 11:59 23:59 11:59


 


Intake Total 120 480 200


 


Output Total  650 


 


Balance 120 -170 200


 


Weight 213 lb 6.4 oz  216 lb 12.8 oz


 


Intake:   


 


    120


 


    Milrinone 20Mg/100Ml Ivpb 120  120





    - 100 ml @ 0.35 MCG/KG/   





    MIN 10.049 mls/hr IVPB   





    TITR JARROD Rx#:[id number]   


 


  IVPB   50


 


  Oral  480 30


 


Output:   


 


  Urine  650 


 


    Void  650 


 


Other:   


 


  Voiding Method Urinal Urinal Toilet


 


  # Unmeasured Voids   


 


    Void  2 


 


  Weight Measurement Method Standing Scale  Standing Scale








NAD


JVD elevated, neck supple


RRR nl s1, s2 no m/r/g.


bibasilar rales nl effort


+ bs soft nd, nt


1+ edema of LE.  no c/c


aaox3


pos dp/pt 


no jaundice diaphoresis





 


  


  


 


 Laboratory Last Values











WBC  7.3 K/mm3 (4.0-10.0)   05/31/17  05:35    


 


RBC  4.34 M/mm3 (4.00-5.60)   05/31/17  05:35    


 


Hgb  11.4 GM/dL (11.7-16.9)  L  05/31/17  05:35    


 


Hct  36.0 % (35.4-49)   05/31/17  05:35    


 


MCV  83.0 fl (80-96)   05/31/17  05:35    


 


MCHC  31.6 g/dl (32.0-35.9)  L  05/31/17  05:35    


 


RDW  18.6 % (11.9-15.9)  H  05/31/17  05:35    


 


Plt Count  203 K/MM3 (134-434)   05/31/17  05:35    


 


MPV  9.0 fl (7.5-11.1)   05/31/17  05:35    


 


Neutrophils %  71.4 % (42.8-82.8)   05/31/17  05:35    


 


Lymphocytes %  15.6 % (8-40)  D 05/31/17  05:35    


 


Monocytes %  9.7 % (3.8-10.2)   05/31/17  05:35    


 


Eosinophils %  2.9 % (0-4.5)   05/31/17  05:35    


 


Basophils %  0.4 % (0-2.0)   05/31/17  05:35    


 


INR  3.86  (0.82-1.09)  H  05/31/17  05:35    


 


Puncture Site  Right radial   05/30/17  12:35    


 


ABG pH  7.38  (7.35-7.45)   05/30/17  12:35    


 


ABG pCO2 at Pt Temp  51.8 mmHg (35-45)  H  05/30/17  12:35    


 


ABG pO2 at Pt Temp  59.4 mmHg ()  L D 05/30/17  12:35    


 


ABG HCO3  30.0 meq/L (22-26)  H  05/30/17  12:35    


 


ABG O2 Sat (Measured)  89.6 % (90-98.9)  L  05/30/17  12:35    


 


ABG O2 Content  13.9 % vol (15-22)  L  05/30/17  12:35    


 


ABG Base Excess  4.4 meq/l (-2-2)  H  05/30/17  12:35    


 


Francois Test  Positive   05/30/17  12:35    


 


O2 Delivery Device  N/c   05/30/17  12:35    


 


Oxygen Flow Rate  3l   05/30/17  12:35    


 


PEEP  0.0 cmH2O  05/30/17  12:35    


 


Sodium  141 mmol/L (136-145)   05/31/17  05:35    


 


Potassium  4.1 mmol/L (3.5-5.1)   05/31/17  05:35    


 


Chloride  103 mmol/L ()   05/31/17  05:35    


 


Carbon Dioxide  31 mmol/L (21-32)   05/31/17  05:35    


 


Anion Gap  7  (8-16)  L  05/31/17  05:35    


 


BUN  48 mg/dL (7-18)  H  05/31/17  05:35    


 


Creatinine  1.8 mg/dL (0.7-1.3)  H  05/31/17  05:35    


 


Creat Clearance w eGFR  39.18  (>60)   05/30/17  05:35    


 


POC Glucometer  154 UNITS (())   05/31/17  05:33    


 


Random Glucose  143 mg/dL ()  H D 05/31/17  05:35    


 


Calcium  8.7 mg/dL (8.5-10.1)   05/31/17  05:35    


 


Phosphorus  4.1 mg/dL (2.5-4.9)  D 05/31/17  05:35    


 


Magnesium  2.8 mg/dL (1.8-2.4)  H  05/31/17  05:35    


 


Total Bilirubin  0.8 mg/dL (0.2-1.0)  D 05/30/17  05:35    


 


AST  20 U/L (15-37)  D 05/30/17  05:35    


 


ALT  29 U/L (12-78)  D 05/30/17  05:35    


 


Alkaline Phosphatase  144 U/L ()  H  05/30/17  05:35    


 


Creatine Kinase  55 IU/L ()   05/28/17  03:44    


 


Troponin I  0.04 ng/ml (0.00-0.05)  D 05/28/17  03:44    


 


B-Natriuretic Peptide  4547.49 pg/ml (5-450)  H  05/28/17  03:44    


 


Total Protein  6.3 g/dl (6.4-8.2)  L  05/30/17  05:35    


 


Albumin  2.9 g/dl (3.4-5.0)  L  05/30/17  05:35    


 


Urine Color  Straw   05/28/17  12:20    


 


Urine Appearance  Clear   05/28/17  12:20    


 


Urine pH  5.0  (5.0-8.0)   05/28/17  12:20    


 


Ur Specific Gravity  <= 1.005  (1.005-1.025)   05/28/17  12:20    


 


Urine Protein  Negative  (NEGATIVE)   05/28/17  12:20    


 


Urine Glucose (UA)  Negative  (NEGATIVE)   05/28/17  12:20    


 


Urine Ketones  Negative  (NEGATIVE)   05/28/17  12:20    


 


Urine Blood  Negative  (NEGATIVE)   05/28/17  12:20    


 


Urine Nitrite  Negative  (NEGATIVE)   05/28/17  12:20    


 


Urine Bilirubin  Negative  (NEGATIVE)   05/28/17  12:20    


 


Urine Urobilinogen  Negative E.U./dl (0.2-1.0)   05/28/17  12:20    


 


Ur Leukocyte Esterase  Trace  (NEGATIVE)  H  05/28/17  12:20    


 


Urine RBC  <1 /hpf (0-3)   05/28/17  12:20    


 


Urine WBC  2 /hpf (3-5)   05/28/17  12:20    


 


Ur Epithelial Cells  Rare /hpf (FEW)   05/28/17  12:20    


 


Hyaline Casts  4 /lpf  05/28/17  12:20    


 


Urine Mucus  Rare   05/28/17  12:20    











ecg:  with occasional pvc's


tele: , occ pvcs





cxr: congestive changes, similar to priors. 





echo 10/2016: sev lve, global hk, sev dec lvef, mild rve, nl rv fcn, patty, mild-

mod mr, mv ring, tv ring, mild tr, mild pr, mild phtn, ivc mild dil








a/p:  77 yo with h/o IDDM, COPD (O2 dependant) HTN, HLD, Afib (on coumadin), 

CAD s/p CABG 2013, end stage CHF with ICD placement (on milrinone pump), PVD, 

BPH here with weight gain/sob.  





chronic end stage systolic heart failure s/p ICD, on home milrinone infusion/

acute exacerbation:


-  prior reports have dry weight around 211 lbs, but patient and family feel 

that this is over dry weight. Clinical exam consistent with volume overload


- home regimen recently increased on discharge last week to torsemide 80 mg/40 

mg.  patient takes metolazone prn.  


- cont milrinone at patient's home maintenance dose (on chronic infusion 

destination therapy)--0.35 mcg/kg/min.  Followed by heart failure at [place] --> according to their notes patient has declined further advanced 

therapies. 


- cont home coreg.  Per Dr. merchant's office notes has not been on ACE due to 

blood pressure limitations and fluctuating creatinine.  --> would hold entresto 

and aldactone for now (started here).  Consider resuming as outpatient per 

patient's outpatient CHF and cardiology doctors. 


- 5/29 previously diuresed on  lasix 80 mg IV BID on prior admits, but repeat 

weight this evening continues to trend up despite receiving that regimen today.

  Uptitrate lasix regimen to 100 mg bid.  


- 5/30: uptitration of lasix limited today due to hypotension this morning (

received entresto, aldactone yesterday).  Will try to uptitrate regimen later 

today if bp improves. 


- 5/31: wt still up, cr stable.  will cont lasix 100 iv bid but also give 

metolazone 5 mg this afternoon.


- Daily weights, bmp, intake/outake





s/p ICD


- recent office icd check with stable function





afib


- currently rate controlled on coreg


- cont coumadin per INR





PVD


- con't AC, plavix statin.





CAD s/p CABG


- No anginal symptoms, no signs acs.  Con't home ac, plavix, atorvastatin, coreg





TROY? wears bipap qhs at home


- con't nighttime bipap





HTN: controlled on coreg





HLD: con't statin. 





CKD


- stable, monitor with diuresis

## 2017-05-31 NOTE — PN
Progress Note, Physician


Chief Complaint: 


Mr Jerome says he is feeling better today. Says his breathing is better. Still 

with swelling in his legs. No cp or n/v.





- Current Medication List


Current Medications: 


Active Medications





Acetaminophen (Tylenol -)  650 mg PO Q6H PRN


   PRN Reason: PAIN


   Last Admin: 05/30/17 18:35 Dose:  650 mg


Albuterol Sulfate (Ventolin 0.083% Nebulizer Soln -)  1 amp NEB Q6H PRN


   PRN Reason: SHORT OF BREATH/WHEEZING


   Last Admin: 05/28/17 22:30 Dose:  1 amp


Allopurinol (Zyloprim -)  100 mg PO DAILY [facility]


   Last Admin: 05/31/17 11:36 Dose:  100 mg


Ascorbic Acid (Vitamin C -)  1,000 mg PO DAILY [facility]


   Last Admin: 05/31/17 11:36 Dose:  1,000 mg


Aspirin (Asa -)  81 mg PO DAILY [facility]


   Last Admin: 05/31/17 11:35 Dose:  81 mg


Atorvastatin Calcium (Lipitor -)  40 mg PO HS [facility]


   Last Admin: 05/30/17 22:11 Dose:  40 mg


Bupropion HCl (Wellbutrin -)  75 mg PO DAILY [facility]


   Last Admin: 05/31/17 11:36 Dose:  75 mg


Carvedilol (Coreg -)  6.25 mg PO BID [facility]


   Last Admin: 05/31/17 11:35 Dose:  6.25 mg


Cholecalciferol (Vitamin D3 -)  1,000 unit PO DAILY [facility]


   Last Admin: 05/31/17 11:36 Dose:  1,000 unit


Clopidogrel Bisulfate (Plavix -)  75 mg PO DAILY [facility]


   Last Admin: 05/31/17 11:35 Dose:  75 mg


Docusate Sodium (Colace -)  100 mg PO BID [facility]


   Last Admin: 05/31/17 11:35 Dose:  100 mg


Ferrous Sulfate (Feosol -)  325 mg PO Q48H [facility]


   Last Admin: 05/30/17 16:00 Dose:  325 mg


Furosemide (Lasix Injection -)  100 mg IVPB BIDLASIX [facility]


   Last Admin: 05/31/17 05:57 Dose:  100 mg


Milrinone Lactate/Dextrose (Milrinone 20mg/100ml Ivpb -)  100 mls @ 10.049 mls/

hr IVPB TITR JARROD


   PRN Reason: 0.35 MCG/KG/MIN


   Last Admin: 05/30/17 22:10 Dose:  10.049 mls/hr


Insulin Aspart (Novolog Vial)  0 units SQ ACHS [facility]


   PRN Reason: Protocol


   Last Admin: 05/31/17 06:02 Dose:  Not Given


Metolazone (Zaroxolyn -)  5 mg PO ONCE ONE


   Stop: 05/31/17 13:31


Multivitamins/Minerals/Vitamin C (Tab-A-Vit -)  1 tab PO DAILY [facility]


   Last Admin: 05/31/17 11:35 Dose:  1 tab


Ranitidine HCl (Zantac -)  75 mg PO DAILY [facility]


   Last Admin: 05/31/17 11:36 Dose:  75 mg


Warfarin Sodium (Coumadin -)  4 mg PO DAILY@1800 [facility]


   Last Admin: 05/29/17 17:39 Dose:  4 mg











- Objective


Vital Signs: 


 Vital Signs











Temperature  97.9 F   05/31/17 05:35


 


Pulse Rate  89   05/31/17 09:45


 


Respiratory Rate  20   05/31/17 05:35


 


Blood Pressure  117/65   05/31/17 05:35


 


O2 Sat by Pulse Oximetry (%)  99   05/31/17 09:45











Constitutional: Yes: Well Nourished, No Distress, Calm


Cardiovascular: Yes: Regular Rate and Rhythm.  No: Gallop, Murmur, Rub


Respiratory: Yes: Regular, On Nasal O2, Rhonchi.  No: CTA Bilaterally, Rales, 

Wheezes


Gastrointestinal: Yes: Normal Bowel Sounds, Soft.  No: Distention, Tenderness


Extremities: Yes: Erythema (chronic)


Edema: Yes


Edema: LLE: 2+, RLE: 2+


Labs: 


 CBC, BMP





 05/31/17 05:35 





 05/31/17 05:35 





 INR, PTT











INR  3.86  (0.82-1.09)  H  05/31/17  05:35    














Problem List





- Problems


(1) CHF (congestive heart failure)


Code(s): I50.9 - HEART FAILURE, UNSPECIFIED   Qualifiers: 


     Congestive heart failure type: combined     Congestive heart failure 

chronicity: acute on chronic        Qualified Code(s): I50.43 - Acute on 

chronic combined systolic (congestive) and diastolic (congestive) heart failure

  





(2) Acute and chronic respiratory failure (acute-on-chronic)


Code(s): J96.20 - ACUTE AND CHR RESP FAILURE, UNSP W HYPOXIA OR HYPERCAPNIA   

Qualifiers: 


     Respiratory failure complication: hypoxia        Qualified Code(s): J96.21 

- Acute and chronic respiratory failure with hypoxia  





(3) CAD (coronary artery disease)


Code(s): I25.10 - ATHSCL HEART DISEASE OF NATIVE CORONARY ARTERY W/O ANG PCTRS 

  





(4) Diabetes


Code(s): E11.9 - TYPE 2 DIABETES MELLITUS WITHOUT COMPLICATIONS   Qualifiers: 


     Diabetes mellitus type: type 2     Diabetes mellitus complication status: 

with kidney complications     Diabetes mellitus complication detail: with 

chronic kidney disease     Chronic kidney disease stage: stage 4 (severe)





(5) AICD (automatic cardioverter/defibrillator) present


Code(s): Z95.810 - PRESENCE OF AUTOMATIC (IMPLANTABLE) CARDIAC DEFIBRILLATOR





(6) CKD (chronic kidney disease)


Code(s): N18.9 - CHRONIC KIDNEY DISEASE, UNSPECIFIED   Qualifiers: 


     Chronic kidney disease stage: stage 4 (severe)        Qualified Code(s): 

N18.4 - Chronic kidney disease, stage 4 (severe)  





(7) COPD (chronic obstructive pulmonary disease)


Code(s): J44.9 - CHRONIC OBSTRUCTIVE PULMONARY DISEASE, UNSPECIFIED   








Assessment/Plan


(1) CHF (congestive heart failure)


Assessment/Plan: 


-end stage, on chronic milrinone


-cardiology note reviewed


-lasix increased to 100mg IV bid


-one dose metolazone added today


-patient improving but not at baseline


Code(s): I50.9 - HEART FAILURE, UNSPECIFIED   Qualifiers: 


     Congestive heart failure type: combined     Congestive heart failure 

chronicity: acute on chronic        Qualified Code(s): I50.43 - Acute on 

chronic combined systolic (congestive) and diastolic (congestive) heart failure

  





(2) Acute and chronic respiratory failure (acute-on-chronic)


Assessment/Plan: 


-pulmonary consulted and case discussed


-may benefit from outpatient sleep study


-continue oxygen supplementation


Code(s): J96.20 - ACUTE AND CHR RESP FAILURE, UNSP W HYPOXIA OR HYPERCAPNIA   

Qualifiers: 


     Respiratory failure complication: hypoxia        Qualified Code(s): J96.21 

- Acute and chronic respiratory failure with hypoxia  





(3) CAD (coronary artery disease)


Assessment/Plan: 


-at baseline


-no chest pain


-cardiology following


-continue current management


Code(s): I25.10 - ATHSCL HEART DISEASE OF NATIVE CORONARY ARTERY W/O ANG PCTRS 

  





(4) Diabetes


Assessment/Plan: 


-diabetic diet


-FSBS and SSI


Code(s): E11.9 - TYPE 2 DIABETES MELLITUS WITHOUT COMPLICATIONS   Qualifiers: 


     Diabetes mellitus type: type 2     Diabetes mellitus complication status: 

with kidney complications     Diabetes mellitus complication detail: with 

chronic kidney disease     Chronic kidney disease stage: stage 4 (severe)      

  Qualified Code(s):  -   





(5) AICD (automatic cardioverter/defibrillator) present


Assessment/Plan: 


-on chronic coumadin


-hold, INR elevated


Code(s): Z95.810 - PRESENCE OF AUTOMATIC (IMPLANTABLE) CARDIAC DEFIBRILLATOR





(6) CKD (chronic kidney disease)


Assessment/Plan: 


-stable


Code(s): N18.9 - CHRONIC KIDNEY DISEASE, UNSPECIFIED   Qualifiers: 


     Chronic kidney disease stage: stage 4 (severe)        Qualified Code(s): 

N18.4 - Chronic kidney disease, stage 4 (severe)  





(7) COPD (chronic obstructive pulmonary disease)


Assessment/Plan: 


-case d/w pulmonary


-continue current management


-patient appears improved today


Code(s): J44.9 - CHRONIC OBSTRUCTIVE PULMONARY DISEASE, UNSPECIFIED           

Qualified Code(s):  -

## 2017-06-01 LAB
ANION GAP SERPL CALC-SCNC: 10 MMOL/L (ref 8–16)
BASOPHILS # BLD: 0.7 % (ref 0–2)
CALCIUM SERPL-MCNC: 8.7 MG/DL (ref 8.5–10.1)
CO2 SERPL-SCNC: 31 MMOL/L (ref 21–32)
COCKROFT - GAULT: 49.44
CREAT SERPL-MCNC: 1.7 MG/DL (ref 0.7–1.3)
DEPRECATED RDW RBC AUTO: 18.7 % (ref 11.9–15.9)
EOSINOPHIL # BLD: 2.9 % (ref 0–4.5)
GLUCOSE SERPL-MCNC: 118 MG/DL (ref 74–106)
INR BLD: 2.66 (ref 0.82–1.09)
MAGNESIUM SERPL-MCNC: 2.6 MG/DL (ref 1.8–2.4)
MCH RBC QN AUTO: 26.2 PG (ref 25.7–33.7)
MCHC RBC AUTO-ENTMCNC: 31.9 G/DL (ref 32–35.9)
MCV RBC: 82.1 FL (ref 80–96)
NEUTROPHILS # BLD: 70.1 % (ref 42.8–82.8)
PHOSPHATE SERPL-MCNC: 3.5 MG/DL (ref 2.5–4.9)
PLATELET # BLD AUTO: 197 K/MM3 (ref 134–434)
PMV BLD: 8.5 FL (ref 7.5–11.1)
PT PNL PPP: 29.8 SEC (ref 9.98–11.88)
WBC # BLD AUTO: 7.5 K/MM3 (ref 4–10)

## 2017-06-01 RX ADMIN — ALLOPURINOL SCH MG: 100 TABLET ORAL at 10:10

## 2017-06-01 RX ADMIN — INSULIN ASPART SCH UNITS: 100 INJECTION, SOLUTION INTRAVENOUS; SUBCUTANEOUS at 22:59

## 2017-06-01 RX ADMIN — DOCUSATE SODIUM SCH MG: 100 CAPSULE, LIQUID FILLED ORAL at 22:58

## 2017-06-01 RX ADMIN — FUROSEMIDE SCH MG: 10 INJECTION, SOLUTION INTRAVENOUS at 15:01

## 2017-06-01 RX ADMIN — MILRINONE LACTATE SCH MLS/HR: 200 INJECTION, SOLUTION INTRAVENOUS at 17:15

## 2017-06-01 RX ADMIN — INSULIN ASPART SCH: 100 INJECTION, SOLUTION INTRAVENOUS; SUBCUTANEOUS at 12:11

## 2017-06-01 RX ADMIN — OXYCODONE HYDROCHLORIDE AND ACETAMINOPHEN SCH MG: 500 TABLET ORAL at 10:10

## 2017-06-01 RX ADMIN — WARFARIN SCH MG: 2 TABLET ORAL at 17:15

## 2017-06-01 RX ADMIN — VITAMIN D, TAB 1000IU (100/BT) SCH UNIT: 25 TAB at 10:10

## 2017-06-01 RX ADMIN — MILRINONE LACTATE SCH MLS/HR: 200 INJECTION, SOLUTION INTRAVENOUS at 22:58

## 2017-06-01 RX ADMIN — INSULIN ASPART SCH UNITS: 100 INJECTION, SOLUTION INTRAVENOUS; SUBCUTANEOUS at 17:15

## 2017-06-01 RX ADMIN — CARVEDILOL SCH MG: 6.25 TABLET, FILM COATED ORAL at 22:58

## 2017-06-01 RX ADMIN — CLOPIDOGREL BISULFATE SCH MG: 75 TABLET, FILM COATED ORAL at 10:10

## 2017-06-01 RX ADMIN — RANITIDINE SCH MG: 150 TABLET ORAL at 10:09

## 2017-06-01 RX ADMIN — ASPIRIN 81 MG SCH MG: 81 TABLET ORAL at 10:10

## 2017-06-01 RX ADMIN — INSULIN ASPART SCH: 100 INJECTION, SOLUTION INTRAVENOUS; SUBCUTANEOUS at 06:57

## 2017-06-01 RX ADMIN — ATORVASTATIN CALCIUM SCH MG: 40 TABLET, FILM COATED ORAL at 22:58

## 2017-06-01 RX ADMIN — MULTIVITAMIN TABLET SCH TAB: TABLET at 10:10

## 2017-06-01 RX ADMIN — DOCUSATE SODIUM SCH MG: 100 CAPSULE, LIQUID FILLED ORAL at 10:09

## 2017-06-01 RX ADMIN — CARVEDILOL SCH MG: 6.25 TABLET, FILM COATED ORAL at 10:10

## 2017-06-01 RX ADMIN — FUROSEMIDE SCH MG: 10 INJECTION, SOLUTION INTRAVENOUS at 06:57

## 2017-06-01 RX ADMIN — FERROUS SULFATE TAB EC 324 MG (65 MG FE EQUIVALENT) SCH MG: 324 (65 FE) TABLET DELAYED RESPONSE at 17:14

## 2017-06-01 NOTE — PN
Progress Note (short form)





- Note


Progress Note: 


CC: sob, chf





S:  still with sob, le edema, no cp/palps/dizzy/loc. urinated much more 

yesterday after metolazone. no cigs





o:


 


 Vital Signs











Temp  97.2 F L  06/01/17 06:00


 


Pulse  61   06/01/17 10:22


 


Resp  20   06/01/17 06:00


 


BP  111/65   06/01/17 06:00


 


Pulse Ox  92 L  06/01/17 10:22








 Intake & Output











 05/31/17 06/01/17 06/01/17





 23:59 11:59 23:59


 


Intake Total  480 


 


Output Total  110 


 


Balance  370 


 


Weight  215 lb 3.2 oz 


 


Intake:   


 


  Oral  480 


 


Output:   


 


  Urine  110 


 


    Void  110 


 


Other:   


 


  Voiding Method Urinal Urinal 


 


  # Unmeasured Voids   


 


    Void  2 


 


  Weight Measurement Method  Standing Scale 








NAD


JVD elevated, neck supple


RRR nl s1, s2 no m/r/g.


bibasilar rales nl effort


+ bs soft nd, nt


1+ edema of LE.  no c/c


aaox3


pos dp/pt 


no jaundice diaphoresis


 Current Medications











Generic Name Dose Route Start Last Admin





  Trade Name Freq  PRN Reason Stop Dose Admin


 


Acetaminophen  650 mg 05/30/17 18:27 05/30/17 18:35





  Tylenol -  PO   650 mg





  Q6H PRN   Administration





  PAIN   


 


Albuterol Sulfate  1 amp 05/28/17 15:20 05/28/17 22:30





  Ventolin 0.083% Nebulizer Soln -  NEB   1 amp





  Q6H PRN   Administration





  SHORT OF BREATH/WHEEZING   


 


Allopurinol  100 mg 05/28/17 15:30 06/01/17 10:10





  Zyloprim -  PO   100 mg





  DAILY JARROD   Administration


 


Ascorbic Acid  1,000 mg 05/28/17 15:30 06/01/17 10:10





  Vitamin C -  PO   1,000 mg





  DAILY JARROD   Administration


 


Aspirin  81 mg 05/30/17 10:00 06/01/17 10:10





  Asa -  PO   81 mg





  DAILY JARROD   Administration


 


Atorvastatin Calcium  40 mg 05/28/17 22:00 05/31/17 22:27





  Lipitor -  PO   40 mg





  HS JARROD   Administration


 


Bupropion HCl  75 mg 05/29/17 10:00 06/01/17 10:09





  Wellbutrin -  PO   75 mg





  DAILY JARROD   Administration


 


Carvedilol  6.25 mg 05/28/17 22:00 06/01/17 10:10





  Coreg -  PO   6.25 mg





  BID JARROD   Administration


 


Cholecalciferol  1,000 unit 05/28/17 15:30 06/01/17 10:10





  Vitamin D3 -  PO   1,000 unit





  DAILY JARROD   Administration


 


Clopidogrel Bisulfate  75 mg 05/28/17 15:30 06/01/17 10:10





  Plavix -  PO   75 mg





  DAILY JARROD   Administration


 


Docusate Sodium  100 mg 05/28/17 22:00 06/01/17 10:09





  Colace -  PO   100 mg





  BID JARROD   Administration


 


Ferrous Sulfate  325 mg 05/28/17 15:30 05/30/17 16:00





  Feosol -  PO   325 mg





  Q48H JARROD   Administration


 


Furosemide  100 mg 05/30/17 16:15 06/01/17 06:57





  Lasix Injection -  IVPB   100 mg





  BIDLASIX JARROD   Administration


 


Milrinone Lactate/Dextrose  100 mls @ 10.049 mls/hr 05/29/17 18:35 05/31/17 22:

31





  Milrinone 20mg/100ml Ivpb -  IVPB   Not Given





  TITR JARROD   





  0.35 MCG/KG/MIN   


 


Insulin Aspart  0 units 05/30/17 16:30 06/01/17 06:57





  Novolog Vial  SQ   Not Given





  [facility]   





  Protocol   


 


Metolazone  5 mg 06/01/17 13:00  





  Zaroxolyn -  PO 06/01/17 13:01  





  ONCE ONE   


 


Multivitamins/Minerals/Vitamin C  1 tab 05/29/17 10:00 06/01/17 10:10





  Tab-A-Vit -  PO   1 tab





  DAILY JARROD   Administration


 


Ranitidine HCl  75 mg 05/28/17 15:30 06/01/17 10:09





  Zantac -  PO   75 mg





  DAILY JARROD   Administration


 


Warfarin Sodium  4 mg 05/28/17 18:57 05/29/17 17:39





  Coumadin -  PO   4 mg





  DAILY@1800 JARROD   Administration











 


  


 


 Laboratory Last Values











WBC  7.5 K/mm3 (4.0-10.0)   06/01/17  06:00    


 


RBC  4.47 M/mm3 (4.00-5.60)   06/01/17  06:00    


 


Hgb  11.7 GM/dL (11.7-16.9)   06/01/17  06:00    


 


Hct  36.7 % (35.4-49)   06/01/17  06:00    


 


MCV  82.1 fl (80-96)   06/01/17  06:00    


 


MCHC  31.9 g/dl (32.0-35.9)  L  06/01/17  06:00    


 


RDW  18.7 % (11.9-15.9)  H  06/01/17  06:00    


 


Plt Count  197 K/MM3 (134-434)   06/01/17  06:00    


 


MPV  8.5 fl (7.5-11.1)   06/01/17  06:00    


 


Neutrophils %  70.1 % (42.8-82.8)   06/01/17  06:00    


 


Lymphocytes %  17.6 % (8-40)   06/01/17  06:00    


 


Monocytes %  8.7 % (3.8-10.2)   06/01/17  06:00    


 


Eosinophils %  2.9 % (0-4.5)   06/01/17  06:00    


 


Basophils %  0.7 % (0-2.0)   06/01/17  06:00    


 


INR  2.66  (0.82-1.09)  H D 06/01/17  06:00    


 


Puncture Site  Right radial   05/30/17  12:35    


 


ABG pH  7.38  (7.35-7.45)   05/30/17  12:35    


 


ABG pCO2 at Pt Temp  51.8 mmHg (35-45)  H  05/30/17  12:35    


 


ABG pO2 at Pt Temp  59.4 mmHg ()  L D 05/30/17  12:35    


 


ABG HCO3  30.0 meq/L (22-26)  H  05/30/17  12:35    


 


ABG O2 Sat (Measured)  89.6 % (90-98.9)  L  05/30/17  12:35    


 


ABG O2 Content  13.9 % vol (15-22)  L  05/30/17  12:35    


 


ABG Base Excess  4.4 meq/l (-2-2)  H  05/30/17  12:35    


 


Francois Test  Positive   05/30/17  12:35    


 


O2 Delivery Device  N/c   05/30/17  12:35    


 


Oxygen Flow Rate  3l   05/30/17  12:35    


 


PEEP  0.0 cmH2O  05/30/17  12:35    


 


Sodium  141 mmol/L (136-145)   06/01/17  06:00    


 


Potassium  3.7 mmol/L (3.5-5.1)   06/01/17  06:00    


 


Chloride  100 mmol/L ()   06/01/17  06:00    


 


Carbon Dioxide  31 mmol/L (21-32)   06/01/17  06:00    


 


Anion Gap  10  (8-16)   06/01/17  06:00    


 


BUN  49 mg/dL (7-18)  H  06/01/17  06:00    


 


Creatinine  1.7 mg/dL (0.7-1.3)  H  06/01/17  06:00    


 


Creat Clearance w eGFR  39.18  (>60)   05/30/17  05:35    


 


POC Glucometer  151 UNITS (())   06/01/17  06:55    


 


Random Glucose  118 mg/dL ()  H  06/01/17  06:00    


 


Calcium  8.7 mg/dL (8.5-10.1)   06/01/17  06:00    


 


Phosphorus  3.5 mg/dL (2.5-4.9)   06/01/17  06:00    


 


Magnesium  2.6 mg/dL (1.8-2.4)  H  06/01/17  06:00    


 


Total Bilirubin  0.8 mg/dL (0.2-1.0)  D 05/30/17  05:35    


 


AST  20 U/L (15-37)  D 05/30/17  05:35    


 


ALT  29 U/L (12-78)  D 05/30/17  05:35    


 


Alkaline Phosphatase  144 U/L ()  H  05/30/17  05:35    


 


Creatine Kinase  55 IU/L ()   05/28/17  03:44    


 


Troponin I  0.04 ng/ml (0.00-0.05)  D 05/28/17  03:44    


 


B-Natriuretic Peptide  4547.49 pg/ml (5-450)  H  05/28/17  03:44    


 


Total Protein  6.3 g/dl (6.4-8.2)  L  05/30/17  05:35    


 


Albumin  2.9 g/dl (3.4-5.0)  L  05/30/17  05:35    


 


Urine Color  Straw   05/28/17  12:20    


 


Urine Appearance  Clear   05/28/17  12:20    


 


Urine pH  5.0  (5.0-8.0)   05/28/17  12:20    


 


Ur Specific Gravity  <= 1.005  (1.005-1.025)   05/28/17  12:20    


 


Urine Protein  Negative  (NEGATIVE)   05/28/17  12:20    


 


Urine Glucose (UA)  Negative  (NEGATIVE)   05/28/17  12:20    


 


Urine Ketones  Negative  (NEGATIVE)   05/28/17  12:20    


 


Urine Blood  Negative  (NEGATIVE)   05/28/17  12:20    


 


Urine Nitrite  Negative  (NEGATIVE)   05/28/17  12:20    


 


Urine Bilirubin  Negative  (NEGATIVE)   05/28/17  12:20    


 


Urine Urobilinogen  Negative E.U./dl (0.2-1.0)   05/28/17  12:20    


 


Ur Leukocyte Esterase  Trace  (NEGATIVE)  H  05/28/17  12:20    


 


Urine RBC  <1 /hpf (0-3)   05/28/17  12:20    


 


Urine WBC  2 /hpf (3-5)   05/28/17  12:20    


 


Ur Epithelial Cells  Rare /hpf (FEW)   05/28/17  12:20    


 


Hyaline Casts  4 /lpf  05/28/17  12:20    


 


Urine Mucus  Rare   05/28/17  12:20    














ecg:  with occasional pvc's


tele: , occ pvcs





cxr: congestive changes, similar to priors. 





echo 10/2016: sev lve, global hk, sev dec lvef, mild rve, nl rv fcn, patty, mild-

mod mr, mv ring, tv ring, mild tr, mild pr, mild phtn, ivc mild dil








a/p:  77 yo with h/o IDDM, COPD (O2 dependant) HTN, HLD, Afib (on coumadin), 

CAD s/p CABG 2013, end stage CHF with ICD placement (on milrinone pump), PVD, 

BPH here with weight gain/sob.  





chronic end stage systolic heart failure s/p ICD, on home milrinone infusion/

acute exacerbation:


-  prior reports have dry weight around 211 lbs, but patient and family feel 

that this is over dry weight. Clinical exam consistent with volume overload


- home regimen recently increased on discharge last week to torsemide 80 mg/40 

mg.  patient takes metolazone prn.  


- cont milrinone at patient's home maintenance dose (on chronic infusion 

destination therapy)--0.35 mcg/kg/min.  Followed by heart failure at [facility] --> according to their notes patient has declined further advanced 

therapies. 


- cont home coreg.  Per Dr. merchant's office notes has not been on ACE due to 

blood pressure limitations and fluctuating creatinine.  --> would hold entresto 

and aldactone for now (started here).  Consider resuming as outpatient per 

patient's outpatient CHF and cardiology doctors. 


- 5/29 previously diuresed on  lasix 80 mg IV BID on prior admits, but repeat 

weight this evening continues to trend up despite receiving that regimen today.

  Uptitrate lasix regimen to 100 mg bid.  


- 5/30: uptitration of lasix limited today due to hypotension this morning (

received entresto, aldactone yesterday).  Will try to uptitrate regimen later 

today if bp improves. 


- 5/31: wt still up, cr stable.  will cont lasix 100 iv bid but also give 

metolazone 5 mg this afternoon.


- 6/1:  cr stable, wt down slightly, cont with lasix 100 iv bid and metolazone 

this afternoon


- Daily weights, bmp, intake/outake





s/p ICD


- recent office icd check with stable function





afib


- currently rate controlled on coreg


- cont coumadin per INR





PVD


- con't AC, plavix statin.





CAD s/p CABG


- No anginal symptoms, no signs acs.  Con't home ac, plavix, atorvastatin, coreg





TROY? wears bipap qhs at home


- con't nighttime bipap





HTN: controlled on coreg





HLD: con't statin. 





CKD


- stable, monitor with diuresis

## 2017-06-01 NOTE — PN
Progress Note, Physician


Chief Complaint: 


Mr Jerome says he is feeling fine. Says he is not short of breath, however has 

not gotten up and walked around. No cp or n/v. Wife at bedside saying he is 

getting better but still is not at baseline. 





- Current Medication List


Current Medications: 


Active Medications





Acetaminophen (Tylenol -)  650 mg PO Q6H PRN


   PRN Reason: PAIN


   Last Admin: 05/30/17 18:35 Dose:  650 mg


Albuterol Sulfate (Ventolin 0.083% Nebulizer Soln -)  1 amp NEB Q6H PRN


   PRN Reason: SHORT OF BREATH/WHEEZING


   Last Admin: 05/28/17 22:30 Dose:  1 amp


Allopurinol (Zyloprim -)  100 mg PO DAILY [facility]


   Last Admin: 06/01/17 10:10 Dose:  100 mg


Ascorbic Acid (Vitamin C -)  1,000 mg PO DAILY [facility]


   Last Admin: 06/01/17 10:10 Dose:  1,000 mg


Aspirin (Asa -)  81 mg PO DAILY [facility]


   Last Admin: 06/01/17 10:10 Dose:  81 mg


Atorvastatin Calcium (Lipitor -)  40 mg PO HS [facility]


   Last Admin: 05/31/17 22:27 Dose:  40 mg


Bupropion HCl (Wellbutrin -)  75 mg PO DAILY [facility]


   Last Admin: 06/01/17 10:09 Dose:  75 mg


Carvedilol (Coreg -)  6.25 mg PO BID [facility]


   Last Admin: 06/01/17 10:10 Dose:  6.25 mg


Cholecalciferol (Vitamin D3 -)  1,000 unit PO DAILY [facility]


   Last Admin: 06/01/17 10:10 Dose:  1,000 unit


Clopidogrel Bisulfate (Plavix -)  75 mg PO DAILY [facility]


   Last Admin: 06/01/17 10:10 Dose:  75 mg


Docusate Sodium (Colace -)  100 mg PO BID [facility]


   Last Admin: 06/01/17 10:09 Dose:  100 mg


Ferrous Sulfate (Feosol -)  325 mg PO Q48H [facility]


   Last Admin: 05/30/17 16:00 Dose:  325 mg


Furosemide (Lasix Injection -)  100 mg IVPB BIDLASIX [facility]


   Last Admin: 06/01/17 06:57 Dose:  100 mg


Milrinone Lactate/Dextrose (Milrinone 20mg/100ml Ivpb -)  100 mls @ 10.049 mls/

hr IVPB TITR [facility]


   PRN Reason: 0.35 MCG/KG/MIN


   Last Admin: 05/31/17 22:31 Dose:  Not Given


Insulin Aspart (Novolog Vial)  0 units SQ ACHS [facility]


   PRN Reason: Protocol


   Last Admin: 06/01/17 12:11 Dose:  Not Given


Multivitamins/Minerals/Vitamin C (Tab-A-Vit -)  1 tab PO DAILY [facility]


   Last Admin: 06/01/17 10:10 Dose:  1 tab


Ranitidine HCl (Zantac -)  75 mg PO DAILY [facility]


   Last Admin: 06/01/17 10:09 Dose:  75 mg


Warfarin Sodium (Coumadin -)  4 mg PO DAILY@1800 [facility]


   Last Admin: 05/29/17 17:39 Dose:  4 mg











- Objective


Vital Signs: 


 Vital Signs











Temperature  98.1 F   06/01/17 10:00


 


Pulse Rate  61   06/01/17 10:22


 


Respiratory Rate  22   06/01/17 10:00


 


Blood Pressure  107/65   06/01/17 10:00


 


O2 Sat by Pulse Oximetry (%)  93 L  06/01/17 14:28











Constitutional: Yes: No Distress, Calm, Obese


Cardiovascular: Yes: Regular Rate and Rhythm.  No: Gallop, Murmur, Rub


Respiratory: Yes: Regular, Rhonchi.  No: CTA Bilaterally, Rales, Wheezes


Gastrointestinal: Yes: Normal Bowel Sounds, Soft.  No: Distention, Tenderness


Extremities: Yes: WNL


Edema: Yes


Edema: LLE: 2+, RLE: 2+


Labs: 


 CBC, BMP





 06/01/17 06:00 





 06/01/17 06:00 





 INR, PTT











INR  2.66  (0.82-1.09)  H D 06/01/17  06:00    














Problem List





- Problems


(1) CHF (congestive heart failure)


Code(s): I50.9 - HEART FAILURE, UNSPECIFIED   Qualifiers: 


     Congestive heart failure type: combined     Congestive heart failure 

chronicity: acute on chronic        Qualified Code(s): I50.43 - Acute on 

chronic combined systolic (congestive) and diastolic (congestive) heart failure

  





(2) Acute and chronic respiratory failure (acute-on-chronic)


Code(s): J96.20 - ACUTE AND CHR RESP FAILURE, UNSP W HYPOXIA OR HYPERCAPNIA   

Qualifiers: 


     Respiratory failure complication: hypoxia        Qualified Code(s): J96.21 

- Acute and chronic respiratory failure with hypoxia  





(3) CAD (coronary artery disease)


Code(s): I25.10 - ATHSCL HEART DISEASE OF NATIVE CORONARY ARTERY W/O ANG PCTRS 

  





(4) Diabetes


Code(s): E11.9 - TYPE 2 DIABETES MELLITUS WITHOUT COMPLICATIONS   Qualifiers: 


     Diabetes mellitus type: type 2     Diabetes mellitus complication status: 

with kidney complications     Diabetes mellitus complication detail: with 

chronic kidney disease     Chronic kidney disease stage: stage 4 (severe)





(5) AICD (automatic cardioverter/defibrillator) present


Code(s): Z95.810 - PRESENCE OF AUTOMATIC (IMPLANTABLE) CARDIAC DEFIBRILLATOR





(6) CKD (chronic kidney disease)


Code(s): N18.9 - CHRONIC KIDNEY DISEASE, UNSPECIFIED   Qualifiers: 


     Chronic kidney disease stage: stage 4 (severe)        Qualified Code(s): 

N18.4 - Chronic kidney disease, stage 4 (severe)  





(7) COPD (chronic obstructive pulmonary disease)


Code(s): J44.9 - CHRONIC OBSTRUCTIVE PULMONARY DISEASE, UNSPECIFIED   








Assessment/Plan


(1) CHF (congestive heart failure)


Assessment/Plan: 


-end stage, on chronic milrinone


-continue lasix and metolazone


-weight decreased from yesterday but up from 5/30


Code(s): I50.9 - HEART FAILURE, UNSPECIFIED   Qualifiers: 


     Congestive heart failure type: combined     Congestive heart failure 

chronicity: acute on chronic        Qualified Code(s): I50.43 - Acute on 

chronic combined systolic (congestive) and diastolic (congestive) heart failure

  





(2) Acute and chronic respiratory failure (acute-on-chronic)


Assessment/Plan: 


-continue oxygen supplementation


-may benefit from outpatient sleep study


Code(s): J96.20 - ACUTE AND CHR RESP FAILURE, UNSP W HYPOXIA OR HYPERCAPNIA   

Qualifiers: 


     Respiratory failure complication: hypoxia        Qualified Code(s): J96.21 

- Acute and chronic respiratory failure with hypoxia  





(3) CAD (coronary artery disease)


Assessment/Plan: 


-at baseline


-no chest pain


-cardiology following


-continue current management


Code(s): I25.10 - ATHSCL HEART DISEASE OF NATIVE CORONARY ARTERY W/O ANG PCTRS 

  





(4) Diabetes


Assessment/Plan: 


-diabetic diet


-FSBS and SSI


Code(s): E11.9 - TYPE 2 DIABETES MELLITUS WITHOUT COMPLICATIONS   Qualifiers: 


     Diabetes mellitus type: type 2     Diabetes mellitus complication status: 

with kidney complications     Diabetes mellitus complication detail: with 

chronic kidney disease     Chronic kidney disease stage: stage 4 (severe)      

  Qualified Code(s):  -   





(5) AICD (automatic cardioverter/defibrillator) present


Assessment/Plan: 


-on chronic coumadin


-restart coumadin today


Code(s): Z95.810 - PRESENCE OF AUTOMATIC (IMPLANTABLE) CARDIAC DEFIBRILLATOR





(6) CKD (chronic kidney disease)


Assessment/Plan: 


-stable


Code(s): N18.9 - CHRONIC KIDNEY DISEASE, UNSPECIFIED   Qualifiers: 


     Chronic kidney disease stage: stage 4 (severe)        Qualified Code(s): 

N18.4 - Chronic kidney disease, stage 4 (severe)  





(7) COPD (chronic obstructive pulmonary disease)


Assessment/Plan: 


-stable


Code(s): J44.9 - CHRONIC OBSTRUCTIVE PULMONARY DISEASE, UNSPECIFIED           

Qualified Code(s):  -

## 2017-06-01 NOTE — PN
Progress Note (short form)





- Note


Progress Note: 


PULMONARY





SITTING ON EDGE OF BED


APPERS DYSPNEIC AT REST





104/58/AFEBRILE





ANICTERIC


BIBASILAR CRACKLES 


S1S2


OBESE


2-3 + EDEMA ANKLES





LABS/MEDS/NOTES/IMAGING REVIEWED





Acute on chronic hypoxemic/hypercapneic respiratory failure


Chf


Cardiomyopathy


 Copd


DM


A-fib





Bipap prn


VM O2 as needed


Milrinone


Diuresis 


Inhaled bronchodilators


Daily wts'


monitor lytes,renal function


sleep studies outpatient





LUZ SAEZ MD

## 2017-06-02 LAB
ANION GAP SERPL CALC-SCNC: 14 MMOL/L (ref 8–16)
BASOPHILS # BLD: 0.6 % (ref 0–2)
CALCIUM SERPL-MCNC: 9 MG/DL (ref 8.5–10.1)
CO2 SERPL-SCNC: 32 MMOL/L (ref 21–32)
COCKROFT - GAULT: 45.95
CREAT SERPL-MCNC: 1.8 MG/DL (ref 0.7–1.3)
DEPRECATED RDW RBC AUTO: 18.5 % (ref 11.9–15.9)
EOSINOPHIL # BLD: 2.1 % (ref 0–4.5)
GLUCOSE SERPL-MCNC: 159 MG/DL (ref 74–106)
INR BLD: 1.91 (ref 0.82–1.09)
MAGNESIUM SERPL-MCNC: 2.4 MG/DL (ref 1.8–2.4)
MCH RBC QN AUTO: 26.4 PG (ref 25.7–33.7)
MCHC RBC AUTO-ENTMCNC: 32.5 G/DL (ref 32–35.9)
MCV RBC: 81.1 FL (ref 80–96)
NEUTROPHILS # BLD: 70.6 % (ref 42.8–82.8)
PHOSPHATE SERPL-MCNC: 3.4 MG/DL (ref 2.5–4.9)
PLATELET # BLD AUTO: 193 K/MM3 (ref 134–434)
PMV BLD: 8.4 FL (ref 7.5–11.1)
PT PNL PPP: 21.3 SEC (ref 9.98–11.88)
WBC # BLD AUTO: 8.1 K/MM3 (ref 4–10)

## 2017-06-02 RX ADMIN — DOCUSATE SODIUM SCH MG: 100 CAPSULE, LIQUID FILLED ORAL at 10:40

## 2017-06-02 RX ADMIN — FUROSEMIDE SCH MG: 10 INJECTION, SOLUTION INTRAVENOUS at 05:46

## 2017-06-02 RX ADMIN — FUROSEMIDE SCH MG: 10 INJECTION, SOLUTION INTRAVENOUS at 14:00

## 2017-06-02 RX ADMIN — POTASSIUM CHLORIDE SCH MEQ: 1500 TABLET, EXTENDED RELEASE ORAL at 22:03

## 2017-06-02 RX ADMIN — POTASSIUM CHLORIDE SCH: 1500 TABLET, EXTENDED RELEASE ORAL at 14:00

## 2017-06-02 RX ADMIN — VITAMIN D, TAB 1000IU (100/BT) SCH UNIT: 25 TAB at 10:41

## 2017-06-02 RX ADMIN — ALLOPURINOL SCH MG: 100 TABLET ORAL at 10:40

## 2017-06-02 RX ADMIN — INSULIN ASPART SCH: 100 INJECTION, SOLUTION INTRAVENOUS; SUBCUTANEOUS at 21:59

## 2017-06-02 RX ADMIN — OXYCODONE HYDROCHLORIDE AND ACETAMINOPHEN SCH MG: 500 TABLET ORAL at 10:40

## 2017-06-02 RX ADMIN — MILRINONE LACTATE SCH MLS/HR: 200 INJECTION, SOLUTION INTRAVENOUS at 20:45

## 2017-06-02 RX ADMIN — ASPIRIN 81 MG SCH MG: 81 TABLET ORAL at 10:41

## 2017-06-02 RX ADMIN — ATORVASTATIN CALCIUM SCH MG: 40 TABLET, FILM COATED ORAL at 22:03

## 2017-06-02 RX ADMIN — DOCUSATE SODIUM SCH MG: 100 CAPSULE, LIQUID FILLED ORAL at 22:02

## 2017-06-02 RX ADMIN — CARVEDILOL SCH MG: 6.25 TABLET, FILM COATED ORAL at 10:40

## 2017-06-02 RX ADMIN — MILRINONE LACTATE SCH MLS/HR: 200 INJECTION, SOLUTION INTRAVENOUS at 20:46

## 2017-06-02 RX ADMIN — WARFARIN SCH MG: 2 TABLET ORAL at 18:26

## 2017-06-02 RX ADMIN — MULTIVITAMIN TABLET SCH TAB: TABLET at 10:41

## 2017-06-02 RX ADMIN — CLOPIDOGREL BISULFATE SCH MG: 75 TABLET, FILM COATED ORAL at 10:40

## 2017-06-02 RX ADMIN — RANITIDINE SCH MG: 150 TABLET ORAL at 10:40

## 2017-06-02 RX ADMIN — CARVEDILOL SCH MG: 6.25 TABLET, FILM COATED ORAL at 22:03

## 2017-06-02 RX ADMIN — INSULIN ASPART SCH UNITS: 100 INJECTION, SOLUTION INTRAVENOUS; SUBCUTANEOUS at 18:25

## 2017-06-02 RX ADMIN — INSULIN ASPART SCH: 100 INJECTION, SOLUTION INTRAVENOUS; SUBCUTANEOUS at 06:37

## 2017-06-02 NOTE — PN
Progress Note (short form)





- Note


Progress Note: 


CC: sob, chf





S:  still with sob but less, le edema present but less, no cp/palps/dizzy/loc.  

no cigs





o:


 


 


 Vital Signs











Temp  97.8 F   06/02/17 05:47


 


Pulse  76   06/02/17 05:47


 


Resp  20   06/02/17 05:47


 


BP  113/63   06/02/17 05:47


 


Pulse Ox  98   06/01/17 21:00








 Intake & Output











 06/01/17 06/01/17 06/02/17





 11:59 23:59 11:59


 


Intake Total 900 150 170


 


Output Total 511 2250 200


 


Balance 389 -2100 -30


 


Weight 215 lb 3.2 oz  211 lb 12.8 oz


 


Intake:   


 


    120


 


    Milrinone 20Mg/100Ml Ivpb 120  120





    - 100 ml @ 0.35 MCG/KG/   





    MIN 10.049 mls/hr IVPB   





    TITR JARROD Rx#:[id number]   


 


  IVPB 100  50


 


  Oral 680 150 


 


Output:   


 


  Urine 510 2250 200


 


    Void 510 2250 200


 


  Emesis 1  


 


Other:   


 


  Voiding Method Urinal Urinal 


 


  # Unmeasured Voids   


 


    Void 2  


 


  Bowel Movement No Yes Yes


 


  # Bowel Movements 1  


 


  Weight Measurement Method Standing Scale  Standing Scale











NAD


JVD elevated, neck supple


RRR nl s1, s2 no m/r/g.


bibasilar rales nl effort


+ bs soft nd, nt


trace edema of LE.  no c/c


aaox3


pos dp/pt 


no jaundice diaphoresis


 


 Current Medications











Generic Name Dose Route Start Last Admin





  Trade Name Freq  PRN Reason Stop Dose Admin


 


Acetaminophen  650 mg 05/30/17 18:27 05/30/17 18:35





  Tylenol -  PO   650 mg





  Q6H PRN   Administration





  PAIN   


 


Albuterol Sulfate  1 amp 05/28/17 15:20 05/28/17 22:30





  Ventolin 0.083% Nebulizer Soln -  NEB   1 amp





  Q6H PRN   Administration





  SHORT OF BREATH/WHEEZING   


 


Allopurinol  100 mg 05/28/17 15:30 06/02/17 10:40





  Zyloprim -  PO   100 mg





  DAILY JARROD   Administration


 


Ascorbic Acid  1,000 mg 05/28/17 15:30 06/02/17 10:40





  Vitamin C -  PO   1,000 mg





  DAILY JARROD   Administration


 


Aspirin  81 mg 05/30/17 10:00 06/02/17 10:41





  Asa -  PO   81 mg





  DAILY JARROD   Administration


 


Atorvastatin Calcium  40 mg 05/28/17 22:00 06/01/17 22:58





  Lipitor -  PO   40 mg





  HS JARROD   Administration


 


Bupropion HCl  75 mg 05/29/17 10:00 06/02/17 10:40





  Wellbutrin -  PO   75 mg





  DAILY JARROD   Administration


 


Carvedilol  6.25 mg 05/28/17 22:00 06/02/17 10:40





  Coreg -  PO   6.25 mg





  BID J[facility]   Administration


 


Cholecalciferol  1,000 unit 05/28/17 15:30 06/02/17 10:41





  Vitamin D3 -  PO   1,000 unit





  DAILY JARROD   Administration


 


Clopidogrel Bisulfate  75 mg 05/28/17 15:30 06/02/17 10:40





  Plavix -  PO   75 mg





  DAILY JARROD   Administration


 


Docusate Sodium  100 mg 05/28/17 22:00 06/02/17 10:40





  Colace -  PO   100 mg





  BID JARR[facility]   Administration


 


Ferrous Sulfate  325 mg 05/28/17 15:30 06/01/17 17:14





  Feosol -  PO   325 mg





  Q48H JARROD   Administration


 


Furosemide  100 mg 05/30/17 16:15 06/02/17 05:46





  Lasix Injection -  IVPB   100 mg





  BIDLASIX JARROD   Administration


 


Milrinone Lactate/Dextrose  100 mls @ 10.049 mls/hr 05/29/17 18:35 06/01/17 22:

58





  Milrinone 20mg/100ml Ivpb -  IVPB   10.049 mls/hr





  TITR JARROD   Administration





  0.35 MCG/KG/MIN   


 


Insulin Aspart  0 units 05/30/17 16:30 06/02/17 06:37





  Novolog Vial  SQ   Not Given





  ACHS Pending sale to [facility]   





  Protocol   


 


Multivitamins/Minerals/Vitamin C  1 tab 05/29/17 10:00 06/02/17 10:41





  Tab-A-Vit -  PO   1 tab





  DAILY JARROD   Administration


 


Potassium Chloride  20 meq 06/02/17 14:00  





  K-Dur -  PO   





  BID JARROD   


 


Ranitidine HCl  75 mg 05/28/17 15:30 06/02/17 10:40





  Zantac -  PO   75 mg





  DAILY JARROD   Administration


 


Warfarin Sodium  4 mg 05/28/17 18:57 06/01/17 17:15





  Coumadin -  PO   4 mg





  DAILY@1800 JARROD   Administration











 Laboratory Last Values











WBC  8.1 K/mm3 (4.0-10.0)   06/02/17  05:35    


 


RBC  4.39 M/mm3 (4.00-5.60)   06/02/17  05:35    


 


Hgb  11.6 GM/dL (11.7-16.9)  L  06/02/17  05:35    


 


Hct  35.6 % (35.4-49)   06/02/17  05:35    


 


MCV  81.1 fl (80-96)   06/02/17  05:35    


 


MCHC  32.5 g/dl (32.0-35.9)   06/02/17  05:35    


 


RDW  18.5 % (11.9-15.9)  H  06/02/17  05:35    


 


Plt Count  193 K/MM3 (134-434)   06/02/17  05:35    


 


MPV  8.4 fl (7.5-11.1)   06/02/17  05:35    


 


Neutrophils %  70.6 % (42.8-82.8)   06/02/17  05:35    


 


Lymphocytes %  17.1 % (8-40)   06/02/17  05:35    


 


Monocytes %  9.6 % (3.8-10.2)   06/02/17  05:35    


 


Eosinophils %  2.1 % (0-4.5)   06/02/17  05:35    


 


Basophils %  0.6 % (0-2.0)   06/02/17  05:35    


 


INR  1.91  (0.82-1.09)  H  06/02/17  05:35    


 


Puncture Site  Right radial   05/30/17  12:35    


 


ABG pH  7.38  (7.35-7.45)   05/30/17  12:35    


 


ABG pCO2 at Pt Temp  51.8 mmHg (35-45)  H  05/30/17  12:35    


 


ABG pO2 at Pt Temp  59.4 mmHg ()  L D 05/30/17  12:35    


 


ABG HCO3  30.0 meq/L (22-26)  H  05/30/17  12:35    


 


ABG O2 Sat (Measured)  89.6 % (90-98.9)  L  05/30/17  12:35    


 


ABG O2 Content  13.9 % vol (15-22)  L  05/30/17  12:35    


 


ABG Base Excess  4.4 meq/l (-2-2)  H  05/30/17  12:35    


 


Francois Test  Positive   05/30/17  12:35    


 


O2 Delivery Device  N/c   05/30/17  12:35    


 


Oxygen Flow Rate  3l   05/30/17  12:35    


 


PEEP  0.0 cmH2O  05/30/17  12:35    


 


Sodium  139 mmol/L (136-145)   06/02/17  05:35    


 


Potassium  3.1 mmol/L (3.5-5.1)  L  06/02/17  05:35    


 


Chloride  93 mmol/L ()  L  06/02/17  05:35    


 


Carbon Dioxide  32 mmol/L (21-32)   06/02/17  05:35    


 


Anion Gap  14  (8-16)   06/02/17  05:35    


 


BUN  52 mg/dL (7-18)  H  06/02/17  05:35    


 


Creatinine  1.8 mg/dL (0.7-1.3)  H  06/02/17  05:35    


 


Creat Clearance w eGFR  39.18  (>60)   05/30/17  05:35    


 


POC Glucometer  187 UNITS (())   06/02/17  05:39    


 


Random Glucose  159 mg/dL ()  H D 06/02/17  05:35    


 


Calcium  9.0 mg/dL (8.5-10.1)   06/02/17  05:35    


 


Phosphorus  3.4 mg/dL (2.5-4.9)   06/02/17  05:35    


 


Magnesium  2.4 mg/dL (1.8-2.4)   06/02/17  05:35    


 


Total Bilirubin  0.8 mg/dL (0.2-1.0)  D 05/30/17  05:35    


 


AST  20 U/L (15-37)  D 05/30/17  05:35    


 


ALT  29 U/L (12-78)  D 05/30/17  05:35    


 


Alkaline Phosphatase  144 U/L ()  H  05/30/17  05:35    


 


Creatine Kinase  55 IU/L ()   05/28/17  03:44    


 


Troponin I  0.04 ng/ml (0.00-0.05)  D 05/28/17  03:44    


 


B-Natriuretic Peptide  4547.49 pg/ml (5-450)  H  05/28/17  03:44    


 


Total Protein  6.3 g/dl (6.4-8.2)  L  05/30/17  05:35    


 


Albumin  2.9 g/dl (3.4-5.0)  L  05/30/17  05:35    


 


Urine Color  Straw   05/28/17  12:20    


 


Urine Appearance  Clear   05/28/17  12:20    


 


Urine pH  5.0  (5.0-8.0)   05/28/17  12:20    


 


Ur Specific Gravity  <= 1.005  (1.005-1.025)   05/28/17  12:20    


 


Urine Protein  Negative  (NEGATIVE)   05/28/17  12:20    


 


Urine Glucose (UA)  Negative  (NEGATIVE)   05/28/17  12:20    


 


Urine Ketones  Negative  (NEGATIVE)   05/28/17  12:20    


 


Urine Blood  Negative  (NEGATIVE)   05/28/17  12:20    


 


Urine Nitrite  Negative  (NEGATIVE)   05/28/17  12:20    


 


Urine Bilirubin  Negative  (NEGATIVE)   05/28/17  12:20    


 


Urine Urobilinogen  Negative E.U./dl (0.2-1.0)   05/28/17  12:20    


 


Ur Leukocyte Esterase  Trace  (NEGATIVE)  H  05/28/17  12:20    


 


Urine RBC  <1 /hpf (0-3)   05/28/17  12:20    


 


Urine WBC  2 /hpf (3-5)   05/28/17  12:20    


 


Ur Epithelial Cells  Rare /hpf (FEW)   05/28/17  12:20    


 


Hyaline Casts  4 /lpf  05/28/17  12:20    


 


Urine Mucus  Rare   05/28/17  12:20    











ecg:  with occasional pvc's


tele: , occ pvcs,nsvt 7 beats





cxr: congestive changes, similar to priors. 





echo 10/2016: sev lve, global hk, sev dec lvef, mild rve, nl rv fcn, patty, mild-

mod mr, mv ring, tv ring, mild tr, mild pr, mild phtn, ivc mild dil








a/p:  75 yo with h/o IDDM, COPD (O2 dependant) HTN, HLD, Afib (on coumadin), 

CAD s/p CABG 2013, end stage CHF with ICD placement (on milrinone pump), PVD, 

BPH here with weight gain/sob.  





chronic end stage systolic heart failure s/p ICD, on home milrinone infusion/

acute exacerbation:


-  prior reports have dry weight around 211 lbs, but patient and family feel 

that this is over dry weight. Clinical exam consistent with volume overload


- home regimen recently increased on discharge last week to torsemide 80 mg/40 

mg.  patient takes metolazone prn.  


- cont milrinone at patient's home maintenance dose (on chronic infusion 

destination therapy)--0.35 mcg/kg/min.  Followed by heart failure at [place] --> according to their notes patient has declined further advanced 

therapies. 


- cont home coreg.  Per Dr. merchant's office notes has not been on ACE due to 

blood pressure limitations and fluctuating creatinine.  --> would hold entresto 

and aldactone for now (started here).  Consider resuming as outpatient per 

patient's outpatient CHF and cardiology doctors. 


- 5/29 previously diuresed on  lasix 80 mg IV BID on prior admits, but repeat 

weight this evening continues to trend up despite receiving that regimen today.

  Uptitrate lasix regimen to 100 mg bid.  


- 5/30: uptitration of lasix limited today due to hypotension this morning (

received entresto, aldactone yesterday).  Will try to uptitrate regimen later 

today if bp improves. 


- 5/31: wt still up, cr stable.  will cont lasix 100 iv bid but also give 

metolazone 5 mg this afternoon.


- 6/1:  cr stable, wt down slightly, cont with lasix 100 iv bid and metolazone 

this afternoon


- 6/2: cr stable, wt down significantly today.  cont lasix 100 iv bid. will 

replete k.


- Daily weights, bmp, intake/outake





s/p ICD


- recent office icd check with stable function





afib


- currently rate controlled on coreg


- cont coumadin per INR





PVD


- con't AC, plavix statin.





CAD s/p CABG


- No anginal symptoms, no signs acs.  Con't home ac, plavix, atorvastatin, coreg





TROY? wears bipap qhs at home


- con't nighttime bipap





HTN: controlled on coreg





HLD: con't statin. 





CKD


- stable, monitor with diuresis

## 2017-06-02 NOTE — PN
Progress Note, Physician


History of Present Illness: 


pulmonary





alert,feeling better,less dyspneic





- Current Medication List


Current Medications: 


Active Medications





Acetaminophen (Tylenol -)  650 mg PO Q6H PRN


   PRN Reason: PAIN


   Last Admin: 05/30/17 18:35 Dose:  650 mg


Albuterol Sulfate (Ventolin 0.083% Nebulizer Soln -)  1 amp NEB Q6H PRN


   PRN Reason: SHORT OF BREATH/WHEEZING


   Last Admin: 05/28/17 22:30 Dose:  1 amp


Allopurinol (Zyloprim -)  100 mg PO DAILY [facility]


   Last Admin: 06/02/17 10:40 Dose:  100 mg


Ascorbic Acid (Vitamin C -)  1,000 mg PO DAILY [facility]


   Last Admin: 06/02/17 10:40 Dose:  1,000 mg


Aspirin (Asa -)  81 mg PO DAILY [facility]


   Last Admin: 06/02/17 10:41 Dose:  81 mg


Atorvastatin Calcium (Lipitor -)  40 mg PO HS [facility]


   Last Admin: 06/01/17 22:58 Dose:  40 mg


Bupropion HCl (Wellbutrin -)  75 mg PO DAILY [facility]


   Last Admin: 06/02/17 10:40 Dose:  75 mg


Carvedilol (Coreg -)  6.25 mg PO BID [facility]


   Last Admin: 06/02/17 10:40 Dose:  6.25 mg


Cholecalciferol (Vitamin D3 -)  1,000 unit PO DAILY [facility]


   Last Admin: 06/02/17 10:41 Dose:  1,000 unit


Clopidogrel Bisulfate (Plavix -)  75 mg PO DAILY [facility]


   Last Admin: 06/02/17 10:40 Dose:  75 mg


Docusate Sodium (Colace -)  100 mg PO BID [facility]


   Last Admin: 06/02/17 10:40 Dose:  100 mg


Ferrous Sulfate (Feosol -)  325 mg PO Q48H [facility]


   Last Admin: 06/01/17 17:14 Dose:  325 mg


Furosemide (Lasix Injection -)  100 mg IVPB BIDLASIX [facility]


   Last Admin: 06/02/17 05:46 Dose:  100 mg


Milrinone Lactate/Dextrose (Milrinone 20mg/100ml Ivpb -)  100 mls @ 10.049 mls/

hr IVPB TITR JARROD


   PRN Reason: 0.35 MCG/KG/MIN


   Last Admin: 06/01/17 22:58 Dose:  10.049 mls/hr


Insulin Aspart (Novolog Vial)  0 units SQ ACHS [facility]


   PRN Reason: Protocol


   Last Admin: 06/02/17 06:37 Dose:  Not Given


Multivitamins/Minerals/Vitamin C (Tab-A-Vit -)  1 tab PO DAILY [facility]


   Last Admin: 06/02/17 10:41 Dose:  1 tab


Potassium Chloride (K-Dur -)  20 meq PO BID [facility]


Ranitidine HCl (Zantac -)  75 mg PO DAILY [facility]


   Last Admin: 06/02/17 10:40 Dose:  75 mg


Warfarin Sodium (Coumadin -)  4 mg PO DAILY@1800 [facility]


   Last Admin: 06/01/17 17:15 Dose:  4 mg











- Objective


Vital Signs: 


 Vital Signs











Temperature  97.8 F   06/02/17 05:47


 


Pulse Rate  76   06/02/17 05:47


 


Respiratory Rate  20   06/02/17 05:47


 


Blood Pressure  113/63   06/02/17 05:47


 


O2 Sat by Pulse Oximetry (%)  98   06/01/17 21:00











Constitutional: Yes: Well Nourished, Calm


Eyes: Yes: WNL


HENT: Yes: WNL


Neck: Yes: WNL


Cardiovascular: Yes: Pulse Irregular, S1, S2


Respiratory: Yes: Rales (bibasilar crackles)


Gastrointestinal: Yes: Normal Bowel Sounds, Soft


Extremities: Yes: WNL


Edema: Yes (less edema bilaterally)


Labs: 


 CBC, BMP





 06/02/17 05:35 





 06/02/17 05:35 





 INR, PTT











INR  1.91  (0.82-1.09)  H  06/02/17  05:35    














Assessment/Plan


Problem List





- Problems


(1) CAD (coronary artery disease)


Code(s): I25.10 - ATHSCL HEART DISEASE OF NATIVE CORONARY ARTERY W/O ANG PCTRS 

  





(2) CHF (congestive heart failure)


Code(s): I50.9 - HEART FAILURE, UNSPECIFIED   Qualifiers: 


     Congestive heart failure type: combined     Congestive heart failure 

chronicity: acute on chronic        Qualified Code(s): I50.43 - Acute on 

chronic combined systolic (congestive) and diastolic (congestive) heart failure

  





(3) Chest pain


Code(s): R07.9 - CHEST PAIN, UNSPECIFIED   





(4) Diabetes


Code(s): E11.9 - TYPE 2 DIABETES MELLITUS WITHOUT COMPLICATIONS   Qualifiers: 


     Diabetes mellitus type: type 2     Diabetes mellitus complication status: 

with kidney complications     Diabetes mellitus complication detail: with 

chronic kidney disease     Chronic kidney disease stage: stage 4 (severe)





(5) Presence of permanent cardiac pacemaker


Code(s): Z95.0 - PRESENCE OF CARDIAC PACEMAKER





(6) Renal insufficiency


Code(s): N28.9 - DISORDER OF KIDNEY AND URETER, UNSPECIFIED





(7) AICD (automatic cardioverter/defibrillator) present


Code(s): Z95.810 - PRESENCE OF AUTOMATIC (IMPLANTABLE) CARDIAC DEFIBRILLATOR





(8) Acute and chronic respiratory failure (acute-on-chronic)


Code(s): J96.20 - ACUTE AND CHR RESP FAILURE, UNSP W HYPOXIA OR HYPERCAPNIA   

Qualifiers: 


     Respiratory failure complication: hypoxia        Qualified Code(s): J96.21 

- Acute and chronic respiratory failure with hypoxia  





(9) Atrial fibrillation


Code(s): I48.91 - UNSPECIFIED ATRIAL FIBRILLATION   Qualifiers: 


     Atrial fibrillation type: chronic     Qualified Code(s): I48.2 - Chronic 

atrial fibrillation  





(10) CKD (chronic kidney disease)


Code(s): N18.9 - CHRONIC KIDNEY DISEASE, UNSPECIFIED   Qualifiers: 


     Chronic kidney disease stage: stage 4 (severe)        Qualified Code(s): 

N18.4 - Chronic kidney disease, stage 4 (severe)  





(11) COPD (chronic obstructive pulmonary disease)


Code(s): J44.9 - CHRONIC OBSTRUCTIVE PULMONARY DISEASE, UNSPECIFIED   





(12) Cardiomyopathy


Code(s): I42.9 - CARDIOMYOPATHY, UNSPECIFIED   





(13) Diabetes mellitus, insulin dependent (IDDM), uncontrolled


Code(s): E10.65 - TYPE 1 DIABETES MELLITUS WITH HYPERGLYCEMIA   





(14) Dyspnea on exertion


Code(s): R06.09 - OTHER FORMS OF DYSPNEA








Assessment/Plan


Acute on chronic hypoxemic/hypercapneic respiratory failure improving


Chf


Cardiomyopathy


 Copd


DM


A-fib





Bipap prn


O2


Milrinone


Diuresis 


Inhaled bronchodilators


Daily wts'


monitor lytes,renal function


sleep studies outpatient








DR EASTMAN

## 2017-06-02 NOTE — PN
Progress Note, Physician


Chief Complaint: 


Mr Jerome says he is feeling fine. Denies sob, cp, n/v.





- Current Medication List


Current Medications: 


Active Medications





Acetaminophen (Tylenol -)  650 mg PO Q6H PRN


   PRN Reason: PAIN


   Last Admin: 05/30/17 18:35 Dose:  650 mg


Albuterol Sulfate (Ventolin 0.083% Nebulizer Soln -)  1 amp NEB Q6H PRN


   PRN Reason: SHORT OF BREATH/WHEEZING


   Last Admin: 05/28/17 22:30 Dose:  1 amp


Allopurinol (Zyloprim -)  100 mg PO DAILY [facility]


   Last Admin: 06/02/17 10:40 Dose:  100 mg


Ascorbic Acid (Vitamin C -)  1,000 mg PO DAILY [facility]


   Last Admin: 06/02/17 10:40 Dose:  1,000 mg


Aspirin (Asa -)  81 mg PO DAILY [facility]


   Last Admin: 06/02/17 10:41 Dose:  81 mg


Atorvastatin Calcium (Lipitor -)  40 mg PO HS [facility]


   Last Admin: 06/01/17 22:58 Dose:  40 mg


Bupropion HCl (Wellbutrin -)  75 mg PO DAILY [facility]


   Last Admin: 06/02/17 10:40 Dose:  75 mg


Carvedilol (Coreg -)  6.25 mg PO BID [facility]


   Last Admin: 06/02/17 10:40 Dose:  6.25 mg


Cholecalciferol (Vitamin D3 -)  1,000 unit PO DAILY [facility]


   Last Admin: 06/02/17 10:41 Dose:  1,000 unit


Clopidogrel Bisulfate (Plavix -)  75 mg PO DAILY [facility]


   Last Admin: 06/02/17 10:40 Dose:  75 mg


Docusate Sodium (Colace -)  100 mg PO BID [facility]


   Last Admin: 06/02/17 10:40 Dose:  100 mg


Ferrous Sulfate (Feosol -)  325 mg PO Q48H [facility]


   Last Admin: 06/01/17 17:14 Dose:  325 mg


Furosemide (Lasix Injection -)  100 mg IVPB BIDLASIX [facility]


   Last Admin: 06/02/17 05:46 Dose:  100 mg


Milrinone Lactate/Dextrose (Milrinone 20mg/100ml Ivpb -)  100 mls @ 10.049 mls/

hr IVPB TITR [facility]


   PRN Reason: 0.35 MCG/KG/MIN


   Last Admin: 06/01/17 22:58 Dose:  10.049 mls/hr


Insulin Aspart (Novolog Vial)  0 units SQ ACHS [facility]


   PRN Reason: Protocol


   Last Admin: 06/02/17 06:37 Dose:  Not Given


Multivitamins/Minerals/Vitamin C (Tab-A-Vit -)  1 tab PO DAILY [facility]


   Last Admin: 06/02/17 10:41 Dose:  1 tab


Potassium Chloride (K-Dur -)  20 meq PO BID [facility]


Ranitidine HCl (Zantac -)  75 mg PO DAILY [facility]


   Last Admin: 06/02/17 10:40 Dose:  75 mg


Warfarin Sodium (Coumadin -)  4 mg PO DAILY@1800 [facility]


   Last Admin: 06/01/17 17:15 Dose:  4 mg











- Objective


Vital Signs: 


 Vital Signs











Temperature  98.2 F   06/02/17 14:54


 


Pulse Rate  77   06/02/17 14:54


 


Respiratory Rate  20   06/02/17 14:54


 


Blood Pressure  125/72   06/02/17 14:54


 


O2 Sat by Pulse Oximetry (%)  96   06/02/17 11:18











Constitutional: Yes: Well Nourished, No Distress, Calm


Cardiovascular: Yes: Regular Rate and Rhythm.  No: Gallop, Murmur, Rub


Respiratory: Yes: Regular, CTA Bilaterally.  No: Rales, Rhonchi, Wheezes


Gastrointestinal: Yes: Normal Bowel Sounds, Soft.  No: Distention, Tenderness


Extremities: Yes: WNL


Edema: Yes


Edema: LLE: 1+, RLE: 1+


Labs: 


 CBC, BMP





 06/02/17 05:35 





 06/02/17 05:35 





 INR, PTT











INR  1.91  (0.82-1.09)  H  06/02/17  05:35    














Problem List





- Problems


(1) CHF (congestive heart failure)


Code(s): I50.9 - HEART FAILURE, UNSPECIFIED   Qualifiers: 


     Congestive heart failure type: combined     Congestive heart failure 

chronicity: acute on chronic        Qualified Code(s): I50.43 - Acute on 

chronic combined systolic (congestive) and diastolic (congestive) heart failure

  





(2) Acute and chronic respiratory failure (acute-on-chronic)


Code(s): J96.20 - ACUTE AND CHR RESP FAILURE, UNSP W HYPOXIA OR HYPERCAPNIA   

Qualifiers: 


     Respiratory failure complication: hypoxia        Qualified Code(s): J96.21 

- Acute and chronic respiratory failure with hypoxia  





(3) CAD (coronary artery disease)


Code(s): I25.10 - ATHSCL HEART DISEASE OF NATIVE CORONARY ARTERY W/O ANG PCTRS 

  





(4) Diabetes


Code(s): E11.9 - TYPE 2 DIABETES MELLITUS WITHOUT COMPLICATIONS   Qualifiers: 


     Diabetes mellitus type: type 2     Diabetes mellitus complication status: 

with kidney complications     Diabetes mellitus complication detail: with 

chronic kidney disease     Chronic kidney disease stage: stage 4 (severe)





(5) AICD (automatic cardioverter/defibrillator) present


Code(s): Z95.810 - PRESENCE OF AUTOMATIC (IMPLANTABLE) CARDIAC DEFIBRILLATOR





(6) CKD (chronic kidney disease)


Code(s): N18.9 - CHRONIC KIDNEY DISEASE, UNSPECIFIED   Qualifiers: 


     Chronic kidney disease stage: stage 4 (severe)        Qualified Code(s): 

N18.4 - Chronic kidney disease, stage 4 (severe)  





(7) COPD (chronic obstructive pulmonary disease)


Code(s): J44.9 - CHRONIC OBSTRUCTIVE PULMONARY DISEASE, UNSPECIFIED   








Assessment/Plan


(1) CHF (congestive heart failure)


Assessment/Plan: 


-end stage, on chronic milrinone


-cardiology note reviewed


-continue IV lasix


Code(s): I50.9 - HEART FAILURE, UNSPECIFIED   Qualifiers: 


     Congestive heart failure type: combined     Congestive heart failure 

chronicity: acute on chronic        Qualified Code(s): I50.43 - Acute on 

chronic combined systolic (congestive) and diastolic (congestive) heart failure

  





(2) Acute and chronic respiratory failure (acute-on-chronic)


Assessment/Plan: 


-continue oxygen supplementation


-may benefit from outpatient sleep study


Code(s): J96.20 - ACUTE AND CHR RESP FAILURE, UNSP W HYPOXIA OR HYPERCAPNIA   

Qualifiers: 


     Respiratory failure complication: hypoxia        Qualified Code(s): J96.21 

- Acute and chronic respiratory failure with hypoxia  





(3) CAD (coronary artery disease)


Assessment/Plan: 


-at baseline


-no chest pain


-cardiology following


-continue current management


Code(s): I25.10 - ATHSCL HEART DISEASE OF NATIVE CORONARY ARTERY W/O ANG PCTRS 

  





(4) Diabetes


Assessment/Plan: 


-diabetic diet


-FSBS and SSI


Code(s): E11.9 - TYPE 2 DIABETES MELLITUS WITHOUT COMPLICATIONS   Qualifiers: 


     Diabetes mellitus type: type 2     Diabetes mellitus complication status: 

with kidney complications     Diabetes mellitus complication detail: with 

chronic kidney disease     Chronic kidney disease stage: stage 4 (severe)      

  Qualified Code(s):  -   





(5) AICD (automatic cardioverter/defibrillator) present


Assessment/Plan: 


-on chronic coumadin


-continue coumadin


Code(s): Z95.810 - PRESENCE OF AUTOMATIC (IMPLANTABLE) CARDIAC DEFIBRILLATOR





(6) CKD (chronic kidney disease)


Assessment/Plan: 


-stable


Code(s): N18.9 - CHRONIC KIDNEY DISEASE, UNSPECIFIED   Qualifiers: 


     Chronic kidney disease stage: stage 4 (severe)        Qualified Code(s): 

N18.4 - Chronic kidney disease, stage 4 (severe)  





(7) COPD (chronic obstructive pulmonary disease)


Assessment/Plan: 


-stable


Code(s): J44.9 - CHRONIC OBSTRUCTIVE PULMONARY DISEASE, UNSPECIFIED           

Qualified Code(s):  -

## 2017-06-03 LAB
ANION GAP SERPL CALC-SCNC: 9 MMOL/L (ref 8–16)
BASOPHILS # BLD: 0.4 % (ref 0–2)
CALCIUM SERPL-MCNC: 8.9 MG/DL (ref 8.5–10.1)
CO2 SERPL-SCNC: 38 MMOL/L (ref 21–32)
COCKROFT - GAULT: 45.24
CREAT SERPL-MCNC: 1.8 MG/DL (ref 0.7–1.3)
DEPRECATED RDW RBC AUTO: 18.4 % (ref 11.9–15.9)
EOSINOPHIL # BLD: 2.3 % (ref 0–4.5)
GLUCOSE SERPL-MCNC: 141 MG/DL (ref 74–106)
INR BLD: 1.85 (ref 0.82–1.09)
MAGNESIUM SERPL-MCNC: 2.4 MG/DL (ref 1.8–2.4)
MCH RBC QN AUTO: 26.5 PG (ref 25.7–33.7)
MCHC RBC AUTO-ENTMCNC: 32.7 G/DL (ref 32–35.9)
MCV RBC: 81 FL (ref 80–96)
NEUTROPHILS # BLD: 70.1 % (ref 42.8–82.8)
PHOSPHATE SERPL-MCNC: 3.4 MG/DL (ref 2.5–4.9)
PLATELET # BLD AUTO: 187 K/MM3 (ref 134–434)
PMV BLD: 8.3 FL (ref 7.5–11.1)
PT PNL PPP: 20.6 SEC (ref 9.98–11.88)
WBC # BLD AUTO: 8.1 K/MM3 (ref 4–10)

## 2017-06-03 RX ADMIN — ALLOPURINOL SCH MG: 100 TABLET ORAL at 11:48

## 2017-06-03 RX ADMIN — INSULIN ASPART SCH UNITS: 100 INJECTION, SOLUTION INTRAVENOUS; SUBCUTANEOUS at 12:28

## 2017-06-03 RX ADMIN — FERROUS SULFATE TAB EC 324 MG (65 MG FE EQUIVALENT) SCH MG: 324 (65 FE) TABLET DELAYED RESPONSE at 14:37

## 2017-06-03 RX ADMIN — CLOPIDOGREL BISULFATE SCH MG: 75 TABLET, FILM COATED ORAL at 11:47

## 2017-06-03 RX ADMIN — INSULIN ASPART SCH UNITS: 100 INJECTION, SOLUTION INTRAVENOUS; SUBCUTANEOUS at 17:28

## 2017-06-03 RX ADMIN — ATORVASTATIN CALCIUM SCH MG: 40 TABLET, FILM COATED ORAL at 21:28

## 2017-06-03 RX ADMIN — CARVEDILOL SCH MG: 6.25 TABLET, FILM COATED ORAL at 11:47

## 2017-06-03 RX ADMIN — POTASSIUM CHLORIDE SCH MEQ: 1500 TABLET, EXTENDED RELEASE ORAL at 11:47

## 2017-06-03 RX ADMIN — RANITIDINE SCH MG: 150 TABLET ORAL at 11:48

## 2017-06-03 RX ADMIN — CARVEDILOL SCH MG: 6.25 TABLET, FILM COATED ORAL at 21:28

## 2017-06-03 RX ADMIN — WARFARIN SCH MG: 3 TABLET ORAL at 17:21

## 2017-06-03 RX ADMIN — INSULIN ASPART SCH UNITS: 100 INJECTION, SOLUTION INTRAVENOUS; SUBCUTANEOUS at 21:14

## 2017-06-03 RX ADMIN — FUROSEMIDE SCH MG: 10 INJECTION, SOLUTION INTRAVENOUS at 05:53

## 2017-06-03 RX ADMIN — MULTIVITAMIN TABLET SCH TAB: TABLET at 11:48

## 2017-06-03 RX ADMIN — ASPIRIN 81 MG SCH MG: 81 TABLET ORAL at 11:47

## 2017-06-03 RX ADMIN — DOCUSATE SODIUM SCH MG: 100 CAPSULE, LIQUID FILLED ORAL at 21:28

## 2017-06-03 RX ADMIN — FUROSEMIDE SCH: 10 INJECTION, SOLUTION INTRAVENOUS at 06:01

## 2017-06-03 RX ADMIN — INSULIN ASPART SCH: 100 INJECTION, SOLUTION INTRAVENOUS; SUBCUTANEOUS at 06:01

## 2017-06-03 RX ADMIN — OXYCODONE HYDROCHLORIDE AND ACETAMINOPHEN SCH MG: 500 TABLET ORAL at 11:48

## 2017-06-03 RX ADMIN — DOCUSATE SODIUM SCH MG: 100 CAPSULE, LIQUID FILLED ORAL at 11:47

## 2017-06-03 RX ADMIN — VITAMIN D, TAB 1000IU (100/BT) SCH UNIT: 25 TAB at 11:48

## 2017-06-03 RX ADMIN — POTASSIUM CHLORIDE SCH MEQ: 1500 TABLET, EXTENDED RELEASE ORAL at 21:28

## 2017-06-03 RX ADMIN — FUROSEMIDE SCH MG: 10 INJECTION, SOLUTION INTRAVENOUS at 15:31

## 2017-06-03 NOTE — PN
Progress Note, Physician





- Current Medication List


Current Medications: 


Active Medications





Acetaminophen (Tylenol -)  650 mg PO Q6H PRN


   PRN Reason: PAIN


   Last Admin: 05/30/17 18:35 Dose:  650 mg


Allopurinol (Zyloprim -)  100 mg PO DAILY [facility]


   Last Admin: 06/03/17 11:48 Dose:  100 mg


Ascorbic Acid (Vitamin C -)  1,000 mg PO DAILY [facility]


   Last Admin: 06/03/17 11:48 Dose:  1,000 mg


Aspirin (Asa -)  81 mg PO DAILY [facility]


   Last Admin: 06/03/17 11:47 Dose:  81 mg


Atorvastatin Calcium (Lipitor -)  40 mg PO HS [facility]


   Last Admin: 06/02/17 22:03 Dose:  40 mg


Bupropion HCl (Wellbutrin -)  75 mg PO DAILY [facility]


   Last Admin: 06/03/17 11:48 Dose:  75 mg


Carvedilol (Coreg -)  6.25 mg PO BID [facility]


   Last Admin: 06/03/17 11:47 Dose:  6.25 mg


Cholecalciferol (Vitamin D3 -)  1,000 unit PO DAILY [facility]


   Last Admin: 06/03/17 11:48 Dose:  1,000 unit


Clopidogrel Bisulfate (Plavix -)  75 mg PO DAILY [facility]


   Last Admin: 06/03/17 11:47 Dose:  75 mg


Docusate Sodium (Colace -)  100 mg PO BID [facility]


   Last Admin: 06/03/17 11:47 Dose:  100 mg


Ferrous Sulfate (Feosol -)  325 mg PO Q48H [facility]


   Last Admin: 06/01/17 17:14 Dose:  325 mg


Furosemide (Lasix Injection -)  100 mg IVPB BIDLASIX [facility]


   Last Admin: 06/03/17 06:01 Dose:  Not Given


Milrinone Lactate/Dextrose (Milrinone 20mg/100ml Ivpb -)  100 mls @ 10.049 mls/

hr IVPB TITR [facility]


   PRN Reason: 0.35 MCG/KG/MIN


   Last Admin: 06/02/17 20:46 Dose:  10.049 mls/hr


Insulin Aspart (Novolog Vial)  0 units SQ ACHS [facility]


   PRN Reason: Protocol


   Last Admin: 06/03/17 06:01 Dose:  Not Given


Multivitamins/Minerals/Vitamin C (Tab-A-Vit -)  1 tab PO DAILY [facility]


   Last Admin: 06/03/17 11:48 Dose:  1 tab


Potassium Chloride (K-Dur -)  20 meq PO BID [facility]


   Last Admin: 06/03/17 11:47 Dose:  20 meq


Ranitidine HCl (Zantac -)  75 mg PO DAILY [facility]


   Last Admin: 06/03/17 11:48 Dose:  75 mg


Warfarin Sodium (Coumadin -)  6 mg PO DAILY@1800 [facility]











- Objective


Vital Signs: 


 Vital Signs











Temperature  98.4 F   06/03/17 10:00


 


Pulse Rate  72   06/03/17 10:00


 


Respiratory Rate  20   06/03/17 10:00


 


Blood Pressure  101/72   06/03/17 10:00


 


O2 Sat by Pulse Oximetry (%)  96   06/03/17 10:00











Constitutional: Yes: No Distress


Eyes: Yes: WNL


HENT: Yes: WNL


Neck: Yes: Trachea Midline


Cardiovascular: Yes: Regular Rate and Rhythm


Respiratory: Yes: CTA Bilaterally


Edema: LLE: Trace, RLE: Trace


Labs: 


 CBC, BMP





 06/03/17 05:35 





 06/03/17 05:35 





 INR, PTT











INR  1.85  (0.82-1.09)  H  06/03/17  05:35    














Assessment/Plan


a/p:  75 yo with h/o IDDM, COPD (O2 dependant) HTN, HLD, Afib (on coumadin), 

CAD s/p CABG 2013, end stage CHF with ICD placement (on milrinone pump), PVD, 

BPH here with weight gain/sob.  





chronic end stage systolic heart failure s/p ICD, on home milrinone infusion/

acute exacerbation:


-  prior reports have dry weight around 211 lbs, but patient and family feel 

that this is over dry weight. Clinical exam consistent with volume overload


- home regimen recently increased on discharge last week to torsemide 80 mg/40 

mg.  patient takes metolazone prn.  


- cont milrinone at patient's home maintenance dose (on chronic infusion 

destination therapy)--0.35 mcg/kg/min.  Followed by heart failure at [place] --> according to their notes patient has declined further advanced 

therapies. 


- cont home coreg.  Per Dr. merchant's office notes has not been on ACE due to 

blood pressure limitations and fluctuating creatinine.  --> would hold entresto 

and aldactone for now (started here).  Consider resuming as outpatient per 

patient's outpatient CHF and cardiology doctors. 


- 5/29 previously diuresed on  lasix 80 mg IV BID on prior admits, but repeat 

weight this evening continues to trend up despite receiving that regimen today.

  Uptitrate lasix regimen to 100 mg bid.  


- 5/30: uptitration of lasix limited today due to hypotension this morning (

received entresto, aldactone yesterday).  Will try to uptitrate regimen later 

today if bp improves. 


- 5/31: wt still up, cr stable.  will cont lasix 100 iv bid but also give 

metolazone 5 mg this afternoon.


- 6/1:  cr stable, wt down slightly, cont with lasix 100 iv bid and metolazone 

this afternoon


- 6/3: cr stable, wt down significantly today (203).  cont lasix 100 iv bid. 

Low K but on standing K replacement.


- Continue Daily weights, bmp, intake/outake





s/p ICD


- recent office icd check with stable function





afib


- currently rate controlled on coreg


- cont coumadin per INR

## 2017-06-03 NOTE — PN
Progress Note (short form)





- Note


Progress Note: 


PULMONARY





OOB TO CHAIR


SUBJECTIVE IMPROVEMENT





VSS/AFEBRILE





ANICTERIC


BIBASILAR CRACKLES MINIMAL 


S1S2


OBESE


1+ EDEMA ANKLES





LABS/MEDS/NOTES/IMAGING REVIEWED





Acute on chronic hypoxemic/hypercapneic respiratory failure resolving


Chf


Cardiomyopathy


Copd


DM


A-fib





Bipap prn


VM O2 as needed


Milrinone


Diuresis 


Inhaled bronchodilators


Daily wts'


monitor lytes,renal function


sleep studies outpatient


overall improvement





LUZ SAEZ MD

## 2017-06-03 NOTE — PN
Progress Note, Physician


Chief Complaint: 


Mr Jerome says he is feeling fine. Denies sob, cp, n/v.





- Current Medication List


Current Medications: 


Active Medications





Acetaminophen (Tylenol -)  650 mg PO Q6H PRN


   PRN Reason: PAIN


   Last Admin: 05/30/17 18:35 Dose:  650 mg


Allopurinol (Zyloprim -)  100 mg PO DAILY [facility]


   Last Admin: 06/02/17 10:40 Dose:  100 mg


Ascorbic Acid (Vitamin C -)  1,000 mg PO DAILY [facility]


   Last Admin: 06/02/17 10:40 Dose:  1,000 mg


Aspirin (Asa -)  81 mg PO DAILY [facility]


   Last Admin: 06/02/17 10:41 Dose:  81 mg


Atorvastatin Calcium (Lipitor -)  40 mg PO HS [facility]


   Last Admin: 06/02/17 22:03 Dose:  40 mg


Bupropion HCl (Wellbutrin -)  75 mg PO DAILY [facility]


   Last Admin: 06/02/17 10:40 Dose:  75 mg


Carvedilol (Coreg -)  6.25 mg PO BID [facility]


   Last Admin: 06/02/17 22:03 Dose:  6.25 mg


Cholecalciferol (Vitamin D3 -)  1,000 unit PO DAILY [facility]


   Last Admin: 06/02/17 10:41 Dose:  1,000 unit


Clopidogrel Bisulfate (Plavix -)  75 mg PO DAILY [facility]


   Last Admin: 06/02/17 10:40 Dose:  75 mg


Docusate Sodium (Colace -)  100 mg PO BID [facility]


   Last Admin: 06/02/17 22:02 Dose:  100 mg


Ferrous Sulfate (Feosol -)  325 mg PO Q48H [facility]


   Last Admin: 06/01/17 17:14 Dose:  325 mg


Furosemide (Lasix Injection -)  100 mg IVPB BIDLASIX [facility]


   Last Admin: 06/03/17 06:01 Dose:  Not Given


Milrinone Lactate/Dextrose (Milrinone 20mg/100ml Ivpb -)  100 mls @ 10.049 mls/

hr IVPB TITR [facility]


   PRN Reason: 0.35 MCG/KG/MIN


   Last Admin: 06/02/17 20:46 Dose:  10.049 mls/hr


Insulin Aspart (Novolog Vial)  0 units SQ ACHS [facility]


   PRN Reason: Protocol


   Last Admin: 06/03/17 06:01 Dose:  Not Given


Multivitamins/Minerals/Vitamin C (Tab-A-Vit -)  1 tab PO DAILY [facility]


   Last Admin: 06/02/17 10:41 Dose:  1 tab


Potassium Chloride (K-Dur -)  20 meq PO BID [facility]


   Last Admin: 06/02/17 22:03 Dose:  20 meq


Ranitidine HCl (Zantac -)  75 mg PO DAILY [facility]


   Last Admin: 06/02/17 10:40 Dose:  75 mg


Warfarin Sodium (Coumadin -)  6 mg PO DAILY@1800 [facility]











- Objective


Vital Signs: 


 Vital Signs











Temperature  98.4 F   06/03/17 10:00


 


Pulse Rate  72   06/03/17 10:00


 


Respiratory Rate  20   06/03/17 10:00


 


Blood Pressure  101/72   06/03/17 10:00


 


O2 Sat by Pulse Oximetry (%)  96   06/03/17 10:00











Constitutional: Yes: Well Nourished, No Distress, Calm


Cardiovascular: Yes: Regular Rate and Rhythm.  No: Gallop, Murmur, Rub


Respiratory: Yes: Regular, On Nasal O2, Rales (minimal).  No: Rhonchi, Wheezes


Gastrointestinal: Yes: Normal Bowel Sounds, Soft.  No: Distention, Tenderness


Extremities: Yes: WNL


Edema: Yes


Edema: LLE: 1+, RLE: 1+


Labs: 


 CBC, BMP





 06/03/17 05:35 





 06/03/17 05:35 





 INR, PTT











INR  1.85  (0.82-1.09)  H  06/03/17  05:35    














Problem List





- Problems


(1) CHF (congestive heart failure)


Code(s): I50.9 - HEART FAILURE, UNSPECIFIED   Qualifiers: 


     Congestive heart failure type: combined     Congestive heart failure 

chronicity: acute on chronic        Qualified Code(s): I50.43 - Acute on 

chronic combined systolic (congestive) and diastolic (congestive) heart failure

  





(2) Acute and chronic respiratory failure (acute-on-chronic)


Code(s): J96.20 - ACUTE AND CHR RESP FAILURE, UNSP W HYPOXIA OR HYPERCAPNIA   

Qualifiers: 


     Respiratory failure complication: hypoxia        Qualified Code(s): J96.21 

- Acute and chronic respiratory failure with hypoxia  





(3) CAD (coronary artery disease)


Code(s): I25.10 - ATHSCL HEART DISEASE OF NATIVE CORONARY ARTERY W/O ANG PCTRS 

  





(4) Diabetes


Code(s): E11.9 - TYPE 2 DIABETES MELLITUS WITHOUT COMPLICATIONS   Qualifiers: 


     Diabetes mellitus type: type 2     Diabetes mellitus complication status: 

with kidney complications     Diabetes mellitus complication detail: with 

chronic kidney disease     Chronic kidney disease stage: stage 4 (severe)





(5) AICD (automatic cardioverter/defibrillator) present


Code(s): Z95.810 - PRESENCE OF AUTOMATIC (IMPLANTABLE) CARDIAC DEFIBRILLATOR





(6) CKD (chronic kidney disease)


Code(s): N18.9 - CHRONIC KIDNEY DISEASE, UNSPECIFIED   Qualifiers: 


     Chronic kidney disease stage: stage 4 (severe)        Qualified Code(s): 

N18.4 - Chronic kidney disease, stage 4 (severe)  





(7) COPD (chronic obstructive pulmonary disease)


Code(s): J44.9 - CHRONIC OBSTRUCTIVE PULMONARY DISEASE, UNSPECIFIED   








Assessment/Plan


(1) CHF (congestive heart failure)


Assessment/Plan: 


-end stage, on chronic milrinone


-cardiology following


-continue IV lasix


Code(s): I50.9 - HEART FAILURE, UNSPECIFIED   Qualifiers: 


     Congestive heart failure type: combined     Congestive heart failure 

chronicity: acute on chronic        Qualified Code(s): I50.43 - Acute on 

chronic combined systolic (congestive) and diastolic (congestive) heart failure

  





(2) Acute and chronic respiratory failure (acute-on-chronic)


Assessment/Plan: 


-continue oxygen supplementation


-may benefit from outpatient sleep study


Code(s): J96.20 - ACUTE AND CHR RESP FAILURE, UNSP W HYPOXIA OR HYPERCAPNIA   

Qualifiers: 


     Respiratory failure complication: hypoxia        Qualified Code(s): J96.21 

- Acute and chronic respiratory failure with hypoxia  





(3) CAD (coronary artery disease)


Assessment/Plan: 


-at baseline


-no chest pain


-cardiology following


-continue current management


Code(s): I25.10 - ATHSCL HEART DISEASE OF NATIVE CORONARY ARTERY W/O ANG PCTRS 

  





(4) Diabetes


Assessment/Plan: 


-diabetic diet


-FSBS and SSI


Code(s): E11.9 - TYPE 2 DIABETES MELLITUS WITHOUT COMPLICATIONS   Qualifiers: 


     Diabetes mellitus type: type 2     Diabetes mellitus complication status: 

with kidney complications     Diabetes mellitus complication detail: with 

chronic kidney disease     Chronic kidney disease stage: stage 4 (severe)      

  Qualified Code(s):  -   





(5) AICD (automatic cardioverter/defibrillator) present


Assessment/Plan: 


-on chronic coumadin


-continue coumadin, increase to 6mg since subtherapeutic


Code(s): Z95.810 - PRESENCE OF AUTOMATIC (IMPLANTABLE) CARDIAC DEFIBRILLATOR





(6) CKD (chronic kidney disease)


Assessment/Plan: 


-stable


Code(s): N18.9 - CHRONIC KIDNEY DISEASE, UNSPECIFIED   Qualifiers: 


     Chronic kidney disease stage: stage 4 (severe)        Qualified Code(s): 

N18.4 - Chronic kidney disease, stage 4 (severe)  





(7) COPD (chronic obstructive pulmonary disease)


Assessment/Plan: 


-stable


Code(s): J44.9 - CHRONIC OBSTRUCTIVE PULMONARY DISEASE, UNSPECIFIED           

Qualified Code(s):  -

## 2017-06-04 LAB
ANION GAP SERPL CALC-SCNC: 8 MMOL/L (ref 8–16)
BASOPHILS # BLD: 0.5 % (ref 0–2)
CALCIUM SERPL-MCNC: 9.2 MG/DL (ref 8.5–10.1)
CO2 SERPL-SCNC: 38 MMOL/L (ref 21–32)
COCKROFT - GAULT: 47.74
CREAT SERPL-MCNC: 1.7 MG/DL (ref 0.7–1.3)
DEPRECATED RDW RBC AUTO: 17.9 % (ref 11.9–15.9)
EOSINOPHIL # BLD: 2 % (ref 0–4.5)
GLUCOSE SERPL-MCNC: 151 MG/DL (ref 74–106)
INR BLD: 1.98 (ref 0.82–1.09)
MAGNESIUM SERPL-MCNC: 2.5 MG/DL (ref 1.8–2.4)
MCH RBC QN AUTO: 26 PG (ref 25.7–33.7)
MCHC RBC AUTO-ENTMCNC: 32 G/DL (ref 32–35.9)
MCV RBC: 81.3 FL (ref 80–96)
NEUTROPHILS # BLD: 71.1 % (ref 42.8–82.8)
PHOSPHATE SERPL-MCNC: 3.6 MG/DL (ref 2.5–4.9)
PLATELET # BLD AUTO: 200 K/MM3 (ref 134–434)
PMV BLD: 9 FL (ref 7.5–11.1)
PT PNL PPP: 22.1 SEC (ref 9.98–11.88)
WBC # BLD AUTO: 7.9 K/MM3 (ref 4–10)

## 2017-06-04 RX ADMIN — OXYCODONE HYDROCHLORIDE AND ACETAMINOPHEN SCH MG: 500 TABLET ORAL at 09:28

## 2017-06-04 RX ADMIN — FUROSEMIDE SCH MG: 10 INJECTION, SOLUTION INTRAVENOUS at 15:59

## 2017-06-04 RX ADMIN — INSULIN ASPART SCH UNITS: 100 INJECTION, SOLUTION INTRAVENOUS; SUBCUTANEOUS at 06:23

## 2017-06-04 RX ADMIN — DOCUSATE SODIUM SCH MG: 100 CAPSULE, LIQUID FILLED ORAL at 21:27

## 2017-06-04 RX ADMIN — INSULIN ASPART SCH UNITS: 100 INJECTION, SOLUTION INTRAVENOUS; SUBCUTANEOUS at 22:45

## 2017-06-04 RX ADMIN — CLOPIDOGREL BISULFATE SCH MG: 75 TABLET, FILM COATED ORAL at 09:27

## 2017-06-04 RX ADMIN — WARFARIN SCH MG: 3 TABLET ORAL at 17:32

## 2017-06-04 RX ADMIN — ATORVASTATIN CALCIUM SCH MG: 40 TABLET, FILM COATED ORAL at 21:27

## 2017-06-04 RX ADMIN — ASPIRIN 81 MG SCH MG: 81 TABLET ORAL at 09:23

## 2017-06-04 RX ADMIN — DOCUSATE SODIUM SCH MG: 100 CAPSULE, LIQUID FILLED ORAL at 09:28

## 2017-06-04 RX ADMIN — CARVEDILOL SCH MG: 6.25 TABLET, FILM COATED ORAL at 21:27

## 2017-06-04 RX ADMIN — MILRINONE LACTATE SCH: 200 INJECTION, SOLUTION INTRAVENOUS at 05:39

## 2017-06-04 RX ADMIN — MULTIVITAMIN TABLET SCH TAB: TABLET at 09:23

## 2017-06-04 RX ADMIN — POTASSIUM CHLORIDE SCH MEQ: 1500 TABLET, EXTENDED RELEASE ORAL at 21:27

## 2017-06-04 RX ADMIN — INSULIN ASPART SCH: 100 INJECTION, SOLUTION INTRAVENOUS; SUBCUTANEOUS at 16:30

## 2017-06-04 RX ADMIN — VITAMIN D, TAB 1000IU (100/BT) SCH UNIT: 25 TAB at 09:23

## 2017-06-04 RX ADMIN — CARVEDILOL SCH MG: 6.25 TABLET, FILM COATED ORAL at 09:23

## 2017-06-04 RX ADMIN — RANITIDINE SCH MG: 150 TABLET ORAL at 09:23

## 2017-06-04 RX ADMIN — INSULIN ASPART SCH UNITS: 100 INJECTION, SOLUTION INTRAVENOUS; SUBCUTANEOUS at 12:00

## 2017-06-04 RX ADMIN — MILRINONE LACTATE SCH MLS/HR: 200 INJECTION, SOLUTION INTRAVENOUS at 05:38

## 2017-06-04 RX ADMIN — ALLOPURINOL SCH MG: 100 TABLET ORAL at 09:28

## 2017-06-04 RX ADMIN — FUROSEMIDE SCH MG: 10 INJECTION, SOLUTION INTRAVENOUS at 05:38

## 2017-06-04 RX ADMIN — POTASSIUM CHLORIDE SCH MEQ: 1500 TABLET, EXTENDED RELEASE ORAL at 09:23

## 2017-06-04 RX ADMIN — MILRINONE LACTATE SCH: 200 INJECTION, SOLUTION INTRAVENOUS at 21:27

## 2017-06-04 NOTE — PN
Progress Note, Physician


Chief Complaint: 


Mr Jerome says he is feeling fine. Denies sob, cp, n/v.





- Current Medication List


Current Medications: 


Active Medications





Acetaminophen (Tylenol -)  650 mg PO Q6H PRN


   PRN Reason: PAIN


   Last Admin: 05/30/17 18:35 Dose:  650 mg


Allopurinol (Zyloprim -)  100 mg PO DAILY [facility]


   Last Admin: 06/04/17 09:28 Dose:  100 mg


Ascorbic Acid (Vitamin C -)  1,000 mg PO DAILY [facility]


   Last Admin: 06/04/17 09:28 Dose:  1,000 mg


Aspirin (Asa -)  81 mg PO DAILY [facility]


   Last Admin: 06/04/17 09:23 Dose:  81 mg


Atorvastatin Calcium (Lipitor -)  40 mg PO HS [facility]


   Last Admin: 06/03/17 21:28 Dose:  40 mg


Bupropion HCl (Wellbutrin -)  75 mg PO DAILY [facility]


   Last Admin: 06/04/17 09:28 Dose:  75 mg


Carvedilol (Coreg -)  6.25 mg PO BID [facility]


   Last Admin: 06/04/17 09:23 Dose:  6.25 mg


Cholecalciferol (Vitamin D3 -)  1,000 unit PO DAILY [facility]


   Last Admin: 06/04/17 09:23 Dose:  1,000 unit


Clopidogrel Bisulfate (Plavix -)  75 mg PO DAILY [facility]


   Last Admin: 06/04/17 09:27 Dose:  75 mg


Docusate Sodium (Colace -)  100 mg PO BID [facility]


   Last Admin: 06/04/17 09:28 Dose:  100 mg


Ferrous Sulfate (Feosol -)  325 mg PO Q48H [facility]


   Last Admin: 06/03/17 14:37 Dose:  325 mg


Furosemide (Lasix Injection -)  100 mg IVPB BIDLASIX [facility]


   Last Admin: 06/04/17 05:38 Dose:  100 mg


Milrinone Lactate/Dextrose (Milrinone 20mg/100ml Ivpb -)  100 mls @ 10.049 mls/

hr IVPB TITR [facility]


   PRN Reason: 0.35 MCG/KG/MIN


   Last Admin: 06/04/17 05:39 Dose:  Not Given


Insulin Aspart (Novolog Vial)  0 units SQ ACHS [facility]


   PRN Reason: Protocol


   Last Admin: 06/03/17 21:14 Dose:  3 units


Multivitamins/Minerals/Vitamin C (Tab-A-Vit -)  1 tab PO DAILY [facility]


   Last Admin: 06/04/17 09:23 Dose:  1 tab


Potassium Chloride (K-Dur -)  20 meq PO BID [facility]


   Last Admin: 06/04/17 09:23 Dose:  20 meq


Ranitidine HCl (Zantac -)  75 mg PO DAILY [facility]


   Last Admin: 06/04/17 09:23 Dose:  75 mg


Warfarin Sodium (Coumadin -)  6 mg PO DAILY@1800 [facility]


   Last Admin: 06/03/17 17:21 Dose:  6 mg











- Objective


Vital Signs: 


 Vital Signs











Temperature  98 F   06/04/17 10:00


 


Pulse Rate  77   06/04/17 10:00


 


Respiratory Rate  18   06/04/17 10:00


 


Blood Pressure  114/66   06/04/17 10:00


 


O2 Sat by Pulse Oximetry (%)  94 L  06/04/17 10:00











Constitutional: Yes: Well Nourished, No Distress, Calm


Cardiovascular: Yes: Regular Rate and Rhythm.  No: Gallop, Murmur, Rub


Respiratory: Yes: Regular, CTA Bilaterally.  No: Rales, Rhonchi, Wheezes


Gastrointestinal: Yes: Normal Bowel Sounds, Soft.  No: Distention, Tenderness


Extremities: Yes: WNL


Edema: Yes


Edema: LLE: 1+, RLE: 1+


Labs: 


 CBC, BMP





 06/04/17 05:35 





 06/04/17 05:35 





 INR, PTT











INR  1.98  (0.82-1.09)  H  06/04/17  05:35    














Problem List





- Problems


(1) CHF (congestive heart failure)


Code(s): I50.9 - HEART FAILURE, UNSPECIFIED   Qualifiers: 


     Congestive heart failure type: combined     Congestive heart failure 

chronicity: acute on chronic        Qualified Code(s): I50.43 - Acute on 

chronic combined systolic (congestive) and diastolic (congestive) heart failure

  





(2) Acute and chronic respiratory failure (acute-on-chronic)


Code(s): J96.20 - ACUTE AND CHR RESP FAILURE, UNSP W HYPOXIA OR HYPERCAPNIA   

Qualifiers: 


     Respiratory failure complication: hypoxia        Qualified Code(s): J96.21 

- Acute and chronic respiratory failure with hypoxia  





(3) CAD (coronary artery disease)


Code(s): I25.10 - ATHSCL HEART DISEASE OF NATIVE CORONARY ARTERY W/O ANG PCTRS 

  





(4) Diabetes


Code(s): E11.9 - TYPE 2 DIABETES MELLITUS WITHOUT COMPLICATIONS   Qualifiers: 


     Diabetes mellitus type: type 2     Diabetes mellitus complication status: 

with kidney complications     Diabetes mellitus complication detail: with 

chronic kidney disease     Chronic kidney disease stage: stage 4 (severe)





(5) AICD (automatic cardioverter/defibrillator) present


Code(s): Z95.810 - PRESENCE OF AUTOMATIC (IMPLANTABLE) CARDIAC DEFIBRILLATOR





(6) CKD (chronic kidney disease)


Code(s): N18.9 - CHRONIC KIDNEY DISEASE, UNSPECIFIED   Qualifiers: 


     Chronic kidney disease stage: stage 4 (severe)        Qualified Code(s): 

N18.4 - Chronic kidney disease, stage 4 (severe)  





(7) COPD (chronic obstructive pulmonary disease)


Code(s): J44.9 - CHRONIC OBSTRUCTIVE PULMONARY DISEASE, UNSPECIFIED   








Assessment/Plan


(1) CHF (congestive heart failure)


Assessment/Plan: 


-end stage, on chronic milrinone


-cardiology following


-continue IV lasix


-closer to dry weight


Code(s): I50.9 - HEART FAILURE, UNSPECIFIED   Qualifiers: 


     Congestive heart failure type: combined     Congestive heart failure 

chronicity: acute on chronic        Qualified Code(s): I50.43 - Acute on 

chronic combined systolic (congestive) and diastolic (congestive) heart failure

  





(2) Acute and chronic respiratory failure (acute-on-chronic)


Assessment/Plan: 


-continue oxygen supplementation


-may benefit from outpatient sleep study


Code(s): J96.20 - ACUTE AND CHR RESP FAILURE, UNSP W HYPOXIA OR HYPERCAPNIA   

Qualifiers: 


     Respiratory failure complication: hypoxia        Qualified Code(s): J96.21 

- Acute and chronic respiratory failure with hypoxia  





(3) CAD (coronary artery disease)


Assessment/Plan: 


-at baseline


-no chest pain


-cardiology following


-continue current management


Code(s): I25.10 - ATHSCL HEART DISEASE OF NATIVE CORONARY ARTERY W/O ANG PCTRS 

  





(4) Diabetes


Assessment/Plan: 


-diabetic diet


-FSBS and SSI


Code(s): E11.9 - TYPE 2 DIABETES MELLITUS WITHOUT COMPLICATIONS   Qualifiers: 


     Diabetes mellitus type: type 2     Diabetes mellitus complication status: 

with kidney complications     Diabetes mellitus complication detail: with 

chronic kidney disease     Chronic kidney disease stage: stage 4 (severe)      

  Qualified Code(s):  -   





(5) AICD (automatic cardioverter/defibrillator) present


Assessment/Plan: 


-on chronic coumadin


-continue coumadin, increased to 6mg since subtherapeutic


-INR 1.9 today


Code(s): Z95.810 - PRESENCE OF AUTOMATIC (IMPLANTABLE) CARDIAC DEFIBRILLATOR





(6) CKD (chronic kidney disease)


Assessment/Plan: 


-stable


Code(s): N18.9 - CHRONIC KIDNEY DISEASE, UNSPECIFIED   Qualifiers: 


     Chronic kidney disease stage: stage 4 (severe)        Qualified Code(s): 

N18.4 - Chronic kidney disease, stage 4 (severe)  





(7) COPD (chronic obstructive pulmonary disease)


Assessment/Plan: 


-stable


Code(s): J44.9 - CHRONIC OBSTRUCTIVE PULMONARY DISEASE, UNSPECIFIED           

Qualified Code(s):  -

## 2017-06-04 NOTE — PN
Progress Note, Physician


History of Present Illness: 


No events overnight


No complaints today


Tele V-paced





- Current Medication List


Current Medications: 


Active Medications





Acetaminophen (Tylenol -)  650 mg PO Q6H PRN


   PRN Reason: PAIN


   Last Admin: 05/30/17 18:35 Dose:  650 mg


Allopurinol (Zyloprim -)  100 mg PO DAILY [facility]


   Last Admin: 06/03/17 11:48 Dose:  100 mg


Ascorbic Acid (Vitamin C -)  1,000 mg PO DAILY [facility]


   Last Admin: 06/03/17 11:48 Dose:  1,000 mg


Aspirin (Asa -)  81 mg PO DAILY [facility]


   Last Admin: 06/03/17 11:47 Dose:  81 mg


Atorvastatin Calcium (Lipitor -)  40 mg PO HS [facility]


   Last Admin: 06/03/17 21:28 Dose:  40 mg


Bupropion HCl (Wellbutrin -)  75 mg PO DAILY [facility]


   Last Admin: 06/03/17 11:48 Dose:  75 mg


Carvedilol (Coreg -)  6.25 mg PO BID [facility]


   Last Admin: 06/03/17 21:28 Dose:  6.25 mg


Cholecalciferol (Vitamin D3 -)  1,000 unit PO DAILY [facility]


   Last Admin: 06/03/17 11:48 Dose:  1,000 unit


Clopidogrel Bisulfate (Plavix -)  75 mg PO DAILY [facility]


   Last Admin: 06/03/17 11:47 Dose:  75 mg


Docusate Sodium (Colace -)  100 mg PO BID [facility]


   Last Admin: 06/03/17 21:28 Dose:  100 mg


Ferrous Sulfate (Feosol -)  325 mg PO Q48H [facility]


   Last Admin: 06/03/17 14:37 Dose:  325 mg


Furosemide (Lasix Injection -)  100 mg IVPB BIDLASIX [facility]


   Last Admin: 06/04/17 05:38 Dose:  100 mg


Milrinone Lactate/Dextrose (Milrinone 20mg/100ml Ivpb -)  100 mls @ 10.049 mls/

hr IVPB TITR [facility]


   PRN Reason: 0.35 MCG/KG/MIN


   Last Admin: 06/04/17 05:39 Dose:  Not Given


Insulin Aspart (Novolog Vial)  0 units SQ ACHS [facility]


   PRN Reason: Protocol


   Last Admin: 06/03/17 21:14 Dose:  3 units


Multivitamins/Minerals/Vitamin C (Tab-A-Vit -)  1 tab PO DAILY [facility]


   Last Admin: 06/03/17 11:48 Dose:  1 tab


Potassium Chloride (K-Dur -)  20 meq PO BID [facility]


   Last Admin: 06/03/17 21:28 Dose:  20 meq


Ranitidine HCl (Zantac -)  75 mg PO DAILY [facility]


   Last Admin: 06/03/17 11:48 Dose:  75 mg


Warfarin Sodium (Coumadin -)  6 mg PO DAILY@1800 [facility]


   Last Admin: 06/03/17 17:21 Dose:  6 mg











- Objective


Vital Signs: 


 Vital Signs











Temperature  97.8 F   06/04/17 02:00


 


Pulse Rate  75   06/04/17 05:48


 


Respiratory Rate  18   06/04/17 05:48


 


Blood Pressure  116/70   06/04/17 05:48


 


O2 Sat by Pulse Oximetry (%)  94 L  06/04/17 06:34











Constitutional: Yes: No Distress, Calm


Eyes: Yes: WNL


HENT: Yes: WNL


Neck: Yes: WNL


Cardiovascular: Yes: Regular Rate and Rhythm


Respiratory: Yes: CTA Bilaterally


Gastrointestinal: Yes: WNL


Edema: Yes


Edema: LLE: 1+ (warm), RLE: 1+ (warm)


Labs: 


 CBC, BMP





 06/04/17 05:35 





 06/04/17 05:35 





 INR, PTT











INR  1.98  (0.82-1.09)  H  06/04/17  05:35    














Assessment/Plan


a/p:  77 yo with h/o IDDM, COPD (O2 dependant) HTN, HLD, Afib (on coumadin), 

CAD s/p CABG 2013, end stage CHF with ICD placement (on milrinone pump), PVD, 

BPH here with weight gain/sob.  





chronic end stage systolic heart failure s/p ICD, on home milrinone infusion/

acute exacerbation:


-  prior reports have dry weight around 211 lbs, but patient and family feel 

that this is over dry weight. Clinical exam consistent with volume overload


- home regimen recently increased on discharge last week to torsemide 80 mg/40 

mg.  patient takes metolazone prn.  


- cont milrinone at patient's home maintenance dose (on chronic infusion 

destination therapy)--0.35 mcg/kg/min.  Followed by heart failure at [place] --> according to their notes patient has declined further advanced 

therapies. 


- cont home coreg.  Per Dr. merchant's office notes has not been on ACE due to 

blood pressure limitations and fluctuating creatinine.  --> would hold entresto 

and aldactone for now (started here).  Consider resuming as outpatient per 

patient's outpatient CHF and cardiology doctors. 


- 5/29 previously diuresed on  lasix 80 mg IV BID on prior admits, but repeat 

weight this evening continues to trend up despite receiving that regimen today.

  Uptitrate lasix regimen to 100 mg bid.  


- 5/30: uptitration of lasix limited today due to hypotension this morning (

received entresto, aldactone yesterday).  Will try to uptitrate regimen later 

today if bp improves. 


- 5/31: wt still up, cr stable.  will cont lasix 100 iv bid but also give 

metolazone 5 mg this afternoon.


- 6/1:  cr stable, wt down slightly, cont with lasix 100 iv bid and metolazone 

this afternoon


- 6/3: cr stable, wt down significantly today (203).  cont lasix 100 iv bid. 

Low K but on standing K replacement.


6/4: Weight continues to decline 207 today with stable BUN/Cr. Continue current 

regimen. Dry weight goal approx 200#


- Continue Daily weights, bmp, intake/outake





s/p ICD


- recent office icd check with stable function





afib


- currently rate controlled on coreg


- cont coumadin per INR

## 2017-06-05 LAB
ANION GAP SERPL CALC-SCNC: 12 MMOL/L (ref 8–16)
CALCIUM SERPL-MCNC: 9.5 MG/DL (ref 8.5–10.1)
CO2 SERPL-SCNC: 37 MMOL/L (ref 21–32)
COCKROFT - GAULT: 45.17
CREAT SERPL-MCNC: 1.8 MG/DL (ref 0.7–1.3)
GLUCOSE SERPL-MCNC: 171 MG/DL (ref 74–106)
INR BLD: 1.04 (ref 0.82–1.09)
PT PNL PPP: 11.5 SEC (ref 9.98–11.88)

## 2017-06-05 RX ADMIN — FUROSEMIDE SCH MG: 10 INJECTION, SOLUTION INTRAVENOUS at 14:15

## 2017-06-05 RX ADMIN — ATORVASTATIN CALCIUM SCH MG: 40 TABLET, FILM COATED ORAL at 21:28

## 2017-06-05 RX ADMIN — INSULIN ASPART SCH UNITS: 100 INJECTION, SOLUTION INTRAVENOUS; SUBCUTANEOUS at 12:14

## 2017-06-05 RX ADMIN — CARVEDILOL SCH MG: 6.25 TABLET, FILM COATED ORAL at 21:28

## 2017-06-05 RX ADMIN — INSULIN ASPART SCH UNITS: 100 INJECTION, SOLUTION INTRAVENOUS; SUBCUTANEOUS at 06:14

## 2017-06-05 RX ADMIN — POTASSIUM CHLORIDE SCH MEQ: 1500 TABLET, EXTENDED RELEASE ORAL at 21:28

## 2017-06-05 RX ADMIN — INSULIN ASPART SCH UNITS: 100 INJECTION, SOLUTION INTRAVENOUS; SUBCUTANEOUS at 16:23

## 2017-06-05 RX ADMIN — DOCUSATE SODIUM SCH MG: 100 CAPSULE, LIQUID FILLED ORAL at 09:08

## 2017-06-05 RX ADMIN — MULTIVITAMIN TABLET SCH TAB: TABLET at 09:09

## 2017-06-05 RX ADMIN — RANITIDINE SCH MG: 150 TABLET ORAL at 09:08

## 2017-06-05 RX ADMIN — WARFARIN SCH MG: 3 TABLET ORAL at 18:09

## 2017-06-05 RX ADMIN — CARVEDILOL SCH MG: 6.25 TABLET, FILM COATED ORAL at 09:09

## 2017-06-05 RX ADMIN — POTASSIUM CHLORIDE SCH MEQ: 1500 TABLET, EXTENDED RELEASE ORAL at 09:08

## 2017-06-05 RX ADMIN — FERROUS SULFATE TAB EC 324 MG (65 MG FE EQUIVALENT) SCH MG: 324 (65 FE) TABLET DELAYED RESPONSE at 16:23

## 2017-06-05 RX ADMIN — ASPIRIN 81 MG SCH MG: 81 TABLET ORAL at 09:09

## 2017-06-05 RX ADMIN — OXYCODONE HYDROCHLORIDE AND ACETAMINOPHEN SCH MG: 500 TABLET ORAL at 09:08

## 2017-06-05 RX ADMIN — INSULIN ASPART SCH UNITS: 100 INJECTION, SOLUTION INTRAVENOUS; SUBCUTANEOUS at 21:27

## 2017-06-05 RX ADMIN — CLOPIDOGREL BISULFATE SCH MG: 75 TABLET, FILM COATED ORAL at 09:08

## 2017-06-05 RX ADMIN — ALLOPURINOL SCH MG: 100 TABLET ORAL at 09:08

## 2017-06-05 RX ADMIN — VITAMIN D, TAB 1000IU (100/BT) SCH UNIT: 25 TAB at 09:08

## 2017-06-05 RX ADMIN — FUROSEMIDE SCH MG: 10 INJECTION, SOLUTION INTRAVENOUS at 05:46

## 2017-06-05 RX ADMIN — DOCUSATE SODIUM SCH MG: 100 CAPSULE, LIQUID FILLED ORAL at 21:28

## 2017-06-05 RX ADMIN — MILRINONE LACTATE SCH MLS/HR: 200 INJECTION, SOLUTION INTRAVENOUS at 12:16

## 2017-06-05 RX ADMIN — MILRINONE LACTATE SCH MLS/HR: 200 INJECTION, SOLUTION INTRAVENOUS at 21:29

## 2017-06-05 NOTE — PN
Progress Note, Physician


Chief Complaint: 


chf


History of Present Illness: 


no sob walking intermediate down the [place] and back;


no cp, palpit;


legs mildly swollen--moreso than baseline





ex cigs





- Current Medication List


Current Medications: 


Active Medications





Acetaminophen (Tylenol -)  650 mg PO Q6H PRN


   PRN Reason: PAIN


   Last Admin: 05/30/17 18:35 Dose:  650 mg


Allopurinol (Zyloprim -)  100 mg PO DAILY [facility]


   Last Admin: 06/04/17 09:28 Dose:  100 mg


Ascorbic Acid (Vitamin C -)  1,000 mg PO DAILY [facility]


   Last Admin: 06/04/17 09:28 Dose:  1,000 mg


Aspirin (Asa -)  81 mg PO DAILY [facility]


   Last Admin: 06/04/17 09:23 Dose:  81 mg


Atorvastatin Calcium (Lipitor -)  40 mg PO HS [facility]


   Last Admin: 06/04/17 21:27 Dose:  40 mg


Bupropion HCl (Wellbutrin -)  75 mg PO DAILY [facility]


   Last Admin: 06/04/17 09:28 Dose:  75 mg


Carvedilol (Coreg -)  6.25 mg PO BID [facility]


   Last Admin: 06/04/17 21:27 Dose:  6.25 mg


Cholecalciferol (Vitamin D3 -)  1,000 unit PO DAILY [facility]


   Last Admin: 06/04/17 09:23 Dose:  1,000 unit


Clopidogrel Bisulfate (Plavix -)  75 mg PO DAILY [facility]


   Last Admin: 06/04/17 09:27 Dose:  75 mg


Docusate Sodium (Colace -)  100 mg PO BID [facility]


   Last Admin: 06/04/17 21:27 Dose:  100 mg


Ferrous Sulfate (Feosol -)  325 mg PO Q48H [facility]


   Last Admin: 06/03/17 14:37 Dose:  325 mg


Furosemide (Lasix Injection -)  100 mg IVPB BIDLASIX [facility]


   Last Admin: 06/05/17 05:46 Dose:  100 mg


Milrinone Lactate/Dextrose (Milrinone 20mg/100ml Ivpb -)  100 mls @ 10.049 mls/

hr IVPB TITR [facility]


   PRN Reason: 0.35 MCG/KG/MIN


   Last Admin: 06/04/17 21:27 Dose:  Not Given


Insulin Aspart (Novolog Vial)  0 units SQ ACHS [facility]


   PRN Reason: Protocol


   Last Admin: 06/05/17 06:14 Dose:  1 units


Multivitamins/Minerals/Vitamin C (Tab-A-Vit -)  1 tab PO DAILY [facility]


   Last Admin: 06/04/17 09:23 Dose:  1 tab


Potassium Chloride (K-Dur -)  20 meq PO BID [facility]


   Last Admin: 06/04/17 21:27 Dose:  20 meq


Ranitidine HCl (Zantac -)  75 mg PO DAILY [facility]


   Last Admin: 06/04/17 09:23 Dose:  75 mg


Warfarin Sodium (Coumadin -)  6 mg PO DAILY@1800 [facility]


   Last Admin: 06/04/17 17:32 Dose:  6 mg











- Objective


Vital Signs: 


 Vital Signs











Temperature  98.4 F   06/05/17 05:47


 


Pulse Rate  75   06/05/17 05:47


 


Respiratory Rate  20   06/05/17 05:47


 


Blood Pressure  115/69   06/05/17 05:47


 


O2 Sat by Pulse Oximetry (%)  95   06/04/17 21:00











Constitutional: Yes: No Distress, Calm


Eyes: No: Sclera Icterus


HENT: No: Nasal Congestion


Cardiovascular: Yes: Regular Rate and Rhythm, S1, S2, Other (PMI non diplaced).

  No: Gallop, Murmur


Respiratory: Yes: CTA Bilaterally.  No: Accessory Muscle Use, Rales, Wheezes


Gastrointestinal: Yes: Normal Bowel Sounds, Soft.  No: Tenderness


Musculoskeletal: Yes: Other (No kyphosis)


Extremities: No: Cold


Edema: Yes (trace pretib (elevated))


Integumentary: No: Jaundice


Neurological: Yes: Alert, Oriented (x3)


Psychiatric: No: Agitated


Labs: 


 CBC, BMP





 06/04/17 05:35 





 06/05/17 05:35 





 INR, PTT











INR  1.04  (0.82-1.09)  D 06/05/17  05:35    














- ....Imaging


EKG: Other (tele: AF, Bi-V paced; NSVT x 13b run)





Assessment/Plan


cxr: congestive changes, similar to priors. 





echo 10/2016: sev lve, global hk, sev dec lvef, mild rve, nl rv fcn, patty, mild-

mod mr, mv ring, tv ring, mild tr, mild pr, mild phtn, ivc mild dil








a/p:  77 yo with h/o IDDM, COPD (O2 dependant) HTN, HLD, Afib (on coumadin), 

CAD s/p CABG 2013, end stage CHF with ICD placement (on milrinone pump), PVD, 

BPH here with weight gain/sob.  





chronic end stage systolic heart failure s/p ICD, on home milrinone infusion/

acute exacerbation:


-  prior reports have dry weight around 211 lbs, but patient and family feel 

that this is over dry weight. Clinical exam consistent with volume overload


- home regimen recently increased on discharge last week to torsemide 80 mg/40 

mg.  patient takes metolazone prn.  


- cont milrinone at patient's home maintenance dose (on chronic infusion 

destination therapy)--0.35 mcg/kg/min.  Followed by heart failure at [place] --> according to their notes patient has declined further advanced 

therapies. 


- cont home coreg.  Per Dr. merchant's office notes has not been on ACE due to 

blood pressure limitations and fluctuating creatinine.  --> would hold entresto 

and aldactone for now (started here).  Consider resuming as outpatient per 

patient's outpatient CHF and cardiology doctors. 


- 5/29 previously diuresed on  lasix 80 mg IV BID on prior admits, but repeat 

weight this evening continues to trend up despite receiving that regimen today.

  Uptitrate lasix regimen to 100 mg bid.  


- 5/30: uptitration of lasix limited today due to hypotension this morning (

received entresto, aldactone yesterday).  Will try to uptitrate regimen later 

today if bp improves. 


- 5/31: wt still up, cr stable.  will cont lasix 100 iv bid but also give 

metolazone 5 mg this afternoon.


- 6/1:  cr stable, wt down slightly, cont with lasix 100 iv bid and metolazone 

this afternoon


- 6/2: cr stable, wt down significantly today.  cont lasix 100 iv bid. will 

replete k.


-6/5: wt trended down daily with lasix 100 iv bid plus metolazone 5mg, then 

stabilized at 207-208 past 2d without metolazone, down from 216. will repeat 

metolazone today.


-no longer sob, likely euvolemic or close


-will change to torsemide 80 po bid


-no Entresto here given pt intolerant of adequate dose of ACE/ARB (low bp, 

advanced CKD)


-will check bmp as outpt in 2 days, and see me 1-2 wks


-incr'd KCL to 40 bid here (aggressive repletion today)





NSVT, s/p ICD


- recent office icd check with stable function


- on max tolerated carvedilol


- aggressive K repletion as doing


- Mag stable





afib


- currently rate controlled on coreg


- cont coumadin per INR





PVD


- con't AC, plavix statin.





CAD s/p CABG


- No anginal symptoms, no signs acs.  Con't home ac, plavix, atorvastatin, coreg





TROY? wears bipap qhs at home


- con't nighttime bipap





HTN: controlled on coreg





HLD: con't statin. 





CKD


- stable, monitor with diuresis





ok for d/c from cv p.o.v.

## 2017-06-05 NOTE — PN
Progress Note, Physician


History of Present Illness: 


pulmonary





alert,feeling better,-resp distress





- Current Medication List


Current Medications: 


Active Medications





Acetaminophen (Tylenol -)  650 mg PO Q6H PRN


   PRN Reason: PAIN


   Last Admin: 05/30/17 18:35 Dose:  650 mg


Allopurinol (Zyloprim -)  100 mg PO DAILY [facility]


   Last Admin: 06/05/17 09:08 Dose:  100 mg


Ascorbic Acid (Vitamin C -)  1,000 mg PO DAILY [facility]


   Last Admin: 06/05/17 09:08 Dose:  1,000 mg


Aspirin (Asa -)  81 mg PO DAILY [facility]


   Last Admin: 06/05/17 09:09 Dose:  81 mg


Atorvastatin Calcium (Lipitor -)  40 mg PO HS [facility]


   Last Admin: 06/04/17 21:27 Dose:  40 mg


Bupropion HCl (Wellbutrin -)  75 mg PO DAILY [facility]


   Last Admin: 06/05/17 09:09 Dose:  75 mg


Carvedilol (Coreg -)  6.25 mg PO BID [facility]


   Last Admin: 06/05/17 09:09 Dose:  6.25 mg


Cholecalciferol (Vitamin D3 -)  1,000 unit PO DAILY [facility]


   Last Admin: 06/05/17 09:08 Dose:  1,000 unit


Clopidogrel Bisulfate (Plavix -)  75 mg PO DAILY [facility]


   Last Admin: 06/05/17 09:08 Dose:  75 mg


Docusate Sodium (Colace -)  100 mg PO BID [facility]


   Last Admin: 06/05/17 09:08 Dose:  100 mg


Ferrous Sulfate (Feosol -)  325 mg PO Q48H [facility]


   Last Admin: 06/03/17 14:37 Dose:  325 mg


Furosemide (Lasix Injection -)  100 mg IVPB BIDLASIX [facility]


   Last Admin: 06/05/17 05:46 Dose:  100 mg


Milrinone Lactate/Dextrose (Milrinone 20mg/100ml Ivpb -)  100 mls @ 10.049 mls/

hr IVPB TITR [facility]


   PRN Reason: 0.35 MCG/KG/MIN


   Last Admin: 06/04/17 21:27 Dose:  Not Given


Insulin Aspart (Novolog Vial)  0 units SQ ACHS [facility]


   PRN Reason: Protocol


   Last Admin: 06/05/17 06:14 Dose:  1 units


Multivitamins/Minerals/Vitamin C (Tab-A-Vit -)  1 tab PO DAILY [facility]


   Last Admin: 06/05/17 09:09 Dose:  1 tab


Potassium Chloride (K-Dur -)  40 meq PO BID [facility]


   Last Admin: 06/05/17 09:08 Dose:  40 meq


Potassium Chloride (Potassium Chloride Oral Liquid)  40 meq PO ONCE ONE


   Stop: 06/05/17 16:01


Ranitidine HCl (Zantac -)  75 mg PO DAILY [facility]


   Last Admin: 06/05/17 09:08 Dose:  75 mg


Warfarin Sodium (Coumadin -)  6 mg PO DAILY@1800 [facility]


   Last Admin: 06/04/17 17:32 Dose:  6 mg











- Objective


Vital Signs: 


 Vital Signs











Temperature  98.7 F   06/05/17 08:00


 


Pulse Rate  76   06/05/17 08:00


 


Respiratory Rate  20   06/05/17 08:00


 


Blood Pressure  125/74   06/05/17 08:00


 


O2 Sat by Pulse Oximetry (%)  94 L  06/05/17 08:00











Constitutional: Yes: Well Nourished, Calm


Eyes: Yes: WNL


HENT: Yes: WNL


Neck: Yes: Supple


Cardiovascular: Yes: Pulse Irregular, S1, S2


Respiratory: Yes: Rales (bilateral rales 1/3 up)


Gastrointestinal: Yes: Normal Bowel Sounds, Soft


Extremities: Yes: WNL


Edema: Yes


Labs: 


 CBC, BMP





 


 06/05/17 05:35 





 INR, PTT











INR  1.04  (0.82-1.09)  D 06/05/17  05:35    














Assessment/Plan


Problem List





- Problems


(1) CAD (coronary artery disease)


Code(s): I25.10 - ATHSCL HEART DISEASE OF NATIVE CORONARY ARTERY W/O ANG PCTRS 

  





(2) CHF (congestive heart failure)


Code(s): I50.9 - HEART FAILURE, UNSPECIFIED   Qualifiers: 


     Congestive heart failure type: combined     Congestive heart failure 

chronicity: acute on chronic        Qualified Code(s): I50.43 - Acute on 

chronic combined systolic (congestive) and diastolic (congestive) heart failure

  





(3) Chest pain


Code(s): R07.9 - CHEST PAIN, UNSPECIFIED   





(4) Diabetes


Code(s): E11.9 - TYPE 2 DIABETES MELLITUS WITHOUT COMPLICATIONS   Qualifiers: 


     Diabetes mellitus type: type 2     Diabetes mellitus complication status: 

with kidney complications     Diabetes mellitus complication detail: with 

chronic kidney disease     Chronic kidney disease stage: stage 4 (severe)





(5) Presence of permanent cardiac pacemaker


Code(s): Z95.0 - PRESENCE OF CARDIAC PACEMAKER





(6) Renal insufficiency


Code(s): N28.9 - DISORDER OF KIDNEY AND URETER, UNSPECIFIED





(7) AICD (automatic cardioverter/defibrillator) present


Code(s): Z95.810 - PRESENCE OF AUTOMATIC (IMPLANTABLE) CARDIAC DEFIBRILLATOR





(8) Acute and chronic respiratory failure (acute-on-chronic)


Code(s): J96.20 - ACUTE AND CHR RESP FAILURE, UNSP W HYPOXIA OR HYPERCAPNIA   

Qualifiers: 


     Respiratory failure complication: hypoxia        Qualified Code(s): J96.21 

- Acute and chronic respiratory failure with hypoxia  





(9) Atrial fibrillation


Code(s): I48.91 - UNSPECIFIED ATRIAL FIBRILLATION   Qualifiers: 


     Atrial fibrillation type: chronic     Qualified Code(s): I48.2 - Chronic 

atrial fibrillation  





(10) CKD (chronic kidney disease)


Code(s): N18.9 - CHRONIC KIDNEY DISEASE, UNSPECIFIED   Qualifiers: 


     Chronic kidney disease stage: stage 4 (severe)        Qualified Code(s): 

N18.4 - Chronic kidney disease, stage 4 (severe)  





(11) COPD (chronic obstructive pulmonary disease)


Code(s): J44.9 - CHRONIC OBSTRUCTIVE PULMONARY DISEASE, UNSPECIFIED   





(12) Cardiomyopathy


Code(s): I42.9 - CARDIOMYOPATHY, UNSPECIFIED   





(13) Diabetes mellitus, insulin dependent (IDDM), uncontrolled


Code(s): E10.65 - TYPE 1 DIABETES MELLITUS WITH HYPERGLYCEMIA   





(14) Dyspnea on exertion


Code(s): R06.09 - OTHER FORMS OF DYSPNEA








Assessment/Plan


Acute on chronic hypoxemic/hypercapneic respiratory failure improving


Chf


Cardiomyopathy


 Copd


DM


A-fib





Bipap prn


O2


Milrinone


Diuresis 


Inhaled bronchodilators


Daily wts


monitor lytes,renal function


replete k


sleep studies outpatient








DR EASTMAN

## 2017-06-05 NOTE — DS
Physical Examination


Vital Signs: 


 Vital Signs











Temperature  98.7 F   06/05/17 08:00


 


Pulse Rate  76   06/05/17 08:00


 


Respiratory Rate  20   06/05/17 08:00


 


Blood Pressure  125/74   06/05/17 08:00


 


O2 Sat by Pulse Oximetry (%)  94 L  06/05/17 08:00











Constitutional: Yes: Well Nourished, No Distress, Calm


Cardiovascular: Yes: Regular Rate and Rhythm.  No: Gallop, Murmur, Rub


Respiratory: Yes: Regular, On Nasal O2, Rhonchi.  No: Rales, Wheezes


Gastrointestinal: Yes: Normal Bowel Sounds, Soft.  No: Distention, Tenderness


Extremities: Yes: Erythema (chronic)


Edema: Yes


Edema: LLE: 1+, RLE: 1+


Labs: 


 CBC, BMP





 06/04/17 05:35 





 06/05/17 05:35 











Discharge Summary


Reason For Visit: CAD (CORONARY ARTERY DISEASE)


Current Active Problems





CAD (coronary artery disease) (Acute) 


CHF (congestive heart failure) (Acute) 


Chest pain (Acute) 


Cough (Acute) 


Diabetes (Acute) 


Presence of permanent cardiac pacemaker (Acute) 


Renal insufficiency (Acute) 








Hospital Course: 


(1) CHF (congestive heart failure)


Code(s): I50.9 - HEART FAILURE, UNSPECIFIED   Qualifiers: 


     Congestive heart failure type: combined     Congestive heart failure 

chronicity: acute on chronic        Qualified Code(s): I50.43 - Acute on 

chronic combined systolic (congestive) and diastolic (congestive) heart failure

  





(2) Acute and chronic respiratory failure (acute-on-chronic)


Code(s): J96.20 - ACUTE AND CHR RESP FAILURE, UNSP W HYPOXIA OR HYPERCAPNIA   

Qualifiers: 


     Respiratory failure complication: hypoxia        Qualified Code(s): J96.21 

- Acute and chronic respiratory failure with hypoxia  





(3) CAD (coronary artery disease)


Code(s): I25.10 - ATHSCL HEART DISEASE OF NATIVE CORONARY ARTERY W/O ANG PCTRS 

  





(4) Diabetes


Code(s): E11.9 - TYPE 2 DIABETES MELLITUS WITHOUT COMPLICATIONS   Qualifiers: 


     Diabetes mellitus type: type 2     Diabetes mellitus complication status: 

with kidney complications     Diabetes mellitus complication detail: with 

chronic kidney disease     Chronic kidney disease stage: stage 4 (severe)





(5) AICD (automatic cardioverter/defibrillator) present


Code(s): Z95.810 - PRESENCE OF AUTOMATIC (IMPLANTABLE) CARDIAC DEFIBRILLATOR





(6) CKD (chronic kidney disease)


Code(s): N18.9 - CHRONIC KIDNEY DISEASE, UNSPECIFIED   Qualifiers: 


     Chronic kidney disease stage: stage 4 (severe)        Qualified Code(s): 

N18.4 - Chronic kidney disease, stage 4 (severe)  





(7) COPD (chronic obstructive pulmonary disease)


Code(s): J44.9 - CHRONIC OBSTRUCTIVE PULMONARY DISEASE, UNSPECIFIED   





Mr Jerome is a 76 year old male who was admitted for acute on chronic 

respiratory failure secondary to end stage CHF with exacerbation. He was 

recently discharged but failed outpatient management. He was seen by cardiology 

and continued on his milrinone gtt. He was placed on IV lasix and was given 

metolazone as well to aid in diuresis. He tolerated this well and began to lose 

fluid. He is now euvolemic and safe for discharge. He will be sent on home on 

torsemide bid.





32 minutes spent in preparation of this discharge


Condition: Good





- Instructions


Diet, Activity, Other Instructions: 


resume previous diet and activity


Referrals: 


Radhames Gama MD [Staff Physician] - 


Sathya Flower MD [Primary Care Provider] - 


Deuce Titus MD [Staff Physician] - 


Disposition: VNS/HOME HEALTH CARE





- Home Medications


Comprehensive Discharge Medication List: 


Ambulatory Orders





Allopurinol [Zyloprim -] 100 mg PO DAILY 01/14/16 


Atorvastatin Ca [Lipitor] 40 mg PO HS 01/14/16 


Carvedilol 6.25 mg PO BID 01/14/16 


Docusate Sodium [Colace -] 100 mg PO BID 01/14/16 


Insulin NPL/Insulin Lispro [Humalog Mix 75-25 Vial] 0 unit SQ PRN 01/14/16 


Milrinone Lactate/D5w [Milrinone 0.2 mg/ml in D5w] 0 mg IV DAILY 01/14/16 


Ranitidine [Zantac -] 75 mg PO DAILY 01/14/16 


Albuterol 0.083% Nebulizer Sol [Ventolin 0.083% Nebulizer Soln -] 1 amp NEB Q6H 

PRN #120 amp 01/18/16 


Ferrous Sulfate [Feosol] 325 mg PO Q48H  ud 08/27/16 


Warfarin Na [Coumadin -] 4 mg PO DAILY@1800 01/10/17 


Bupropion HCl [Wellbutrin -] 75 mg DAILY 01/12/17 


Ascorbic Acid [Vitamin C -] 1,000 mg PO DAILY 03/30/17 


Cholecalciferol (Vitamin D3) [Vitamin D -] 1,000 unit PO DAILY 03/30/17 


Clopidogrel Bisulfate [Plavix -] 75 mg PO DAILY 03/30/17 


Multivitamin [Poly-Vitamin] 1 each PO DAILY 03/30/17 


Aspirin [ASA -] 81 mg PO DAILY  tab.chew 06/05/17 


Potassium Chloride [K-Dur -] 40 meq PO BID #120 tab.sr 06/05/17 


Torsemide [Demadex -] 80 mg PO BID #120 tablet 06/05/17

## 2017-06-06 VITALS — DIASTOLIC BLOOD PRESSURE: 70 MMHG | TEMPERATURE: 97.6 F | SYSTOLIC BLOOD PRESSURE: 116 MMHG

## 2017-06-06 VITALS — HEART RATE: 75 BPM

## 2017-06-06 LAB
ANION GAP SERPL CALC-SCNC: 10 MMOL/L (ref 8–16)
CALCIUM SERPL-MCNC: 9.4 MG/DL (ref 8.5–10.1)
CO2 SERPL-SCNC: 37 MMOL/L (ref 21–32)
COCKROFT - GAULT: 50.62
CREAT SERPL-MCNC: 1.6 MG/DL (ref 0.7–1.3)
GLUCOSE SERPL-MCNC: 144 MG/DL (ref 74–106)
INR BLD: 2.44 (ref 0.82–1.09)
PT PNL PPP: 27.3 SEC (ref 9.98–11.88)

## 2017-06-06 RX ADMIN — POTASSIUM CHLORIDE SCH MEQ: 1500 TABLET, EXTENDED RELEASE ORAL at 10:12

## 2017-06-06 RX ADMIN — VITAMIN D, TAB 1000IU (100/BT) SCH UNIT: 25 TAB at 10:12

## 2017-06-06 RX ADMIN — CARVEDILOL SCH MG: 6.25 TABLET, FILM COATED ORAL at 10:12

## 2017-06-06 RX ADMIN — INSULIN ASPART SCH: 100 INJECTION, SOLUTION INTRAVENOUS; SUBCUTANEOUS at 07:07

## 2017-06-06 RX ADMIN — FUROSEMIDE SCH MG: 10 INJECTION, SOLUTION INTRAVENOUS at 08:15

## 2017-06-06 RX ADMIN — ALLOPURINOL SCH MG: 100 TABLET ORAL at 10:12

## 2017-06-06 RX ADMIN — RANITIDINE SCH MG: 150 TABLET ORAL at 10:12

## 2017-06-06 RX ADMIN — OXYCODONE HYDROCHLORIDE AND ACETAMINOPHEN SCH MG: 500 TABLET ORAL at 10:12

## 2017-06-06 RX ADMIN — FUROSEMIDE SCH: 10 INJECTION, SOLUTION INTRAVENOUS at 07:07

## 2017-06-06 RX ADMIN — ASPIRIN 81 MG SCH MG: 81 TABLET ORAL at 10:12

## 2017-06-06 RX ADMIN — DOCUSATE SODIUM SCH MG: 100 CAPSULE, LIQUID FILLED ORAL at 10:13

## 2017-06-06 RX ADMIN — MULTIVITAMIN TABLET SCH TAB: TABLET at 10:12

## 2017-06-06 RX ADMIN — INSULIN ASPART SCH UNITS: 100 INJECTION, SOLUTION INTRAVENOUS; SUBCUTANEOUS at 12:33

## 2017-06-06 RX ADMIN — CLOPIDOGREL BISULFATE SCH MG: 75 TABLET, FILM COATED ORAL at 10:13

## 2017-06-22 ENCOUNTER — HOSPITAL ENCOUNTER (INPATIENT)
Dept: HOSPITAL 74 - JER | Age: 77
LOS: 13 days | Discharge: HOME | DRG: 291 | End: 2017-07-05
Attending: INTERNAL MEDICINE | Admitting: INTERNAL MEDICINE
Payer: COMMERCIAL

## 2017-06-22 VITALS — BODY MASS INDEX: 28 KG/M2

## 2017-06-22 DIAGNOSIS — N40.0: ICD-10-CM

## 2017-06-22 DIAGNOSIS — Z95.1: ICD-10-CM

## 2017-06-22 DIAGNOSIS — I25.2: ICD-10-CM

## 2017-06-22 DIAGNOSIS — I47.2: ICD-10-CM

## 2017-06-22 DIAGNOSIS — Z87.891: ICD-10-CM

## 2017-06-22 DIAGNOSIS — E11.22: ICD-10-CM

## 2017-06-22 DIAGNOSIS — Z95.0: ICD-10-CM

## 2017-06-22 DIAGNOSIS — Z79.4: ICD-10-CM

## 2017-06-22 DIAGNOSIS — J96.21: ICD-10-CM

## 2017-06-22 DIAGNOSIS — I48.2: ICD-10-CM

## 2017-06-22 DIAGNOSIS — N18.9: ICD-10-CM

## 2017-06-22 DIAGNOSIS — I50.23: ICD-10-CM

## 2017-06-22 DIAGNOSIS — I27.2: ICD-10-CM

## 2017-06-22 DIAGNOSIS — I25.10: ICD-10-CM

## 2017-06-22 DIAGNOSIS — E78.5: ICD-10-CM

## 2017-06-22 DIAGNOSIS — I73.9: ICD-10-CM

## 2017-06-22 DIAGNOSIS — N17.9: ICD-10-CM

## 2017-06-22 DIAGNOSIS — J44.9: ICD-10-CM

## 2017-06-22 DIAGNOSIS — I13.0: Primary | ICD-10-CM

## 2017-06-22 NOTE — PDOC
History of Present Illness





- General


History Source: Patient


Exam Limitations: No Limitations





- History of Present Illness


Initial Comments: 


06/23/17 00:06


The patient is a 76-year-old male, with a significant past medical history of 

anemia, asthma, a-fib, CHF, (MI 2011, defibrillator placement, pacemaker), CAD (

2 cardiac stents), aortic valve replacement, COPD, HTN, hypercholesterolemia, 

who presents to the ED with shortness of breath. Patient was seen in the ED on 5 /28/17 for similar symptoms. At baseline, pt states that he gets short of 

breath when walking long distances. He is now getting short of breath when 

taking a few steps. He is also complaining of lightheadedness, cough (

productive of yellow phlegm), and sneezing. Pt also reports noting bright red 

blood from rectum today. 


 


The patient denies any fever, chills, nausea, vomiting, diarrhea, or abdominal 

pain.


The patient denies any chest pain. 








<Brenda Mcfadden - Last Filed: 06/23/17 02:29>





- General


History Source: Patient





<Farrukh Jorge - Last Filed: 06/26/17 20:16>





- General


Chief Complaint: Shortness of Breath


Stated Complaint: RESPIRATORY


Time Seen by Provider: 06/22/17 23:38





Past History





<Brenda Mcfadden - Last Filed: 06/23/17 02:29>





- Past Medical History


Anemia: Yes


Asthma: Yes


Cancer: No


Cardiac Disorders: Yes (CHF, MI 2011,defibrilator placement,PACEMAKER)


CVA: No


COPD: Yes


CHF: Yes


Dementia: No


Diabetes: Yes


GI Disorders: No


 Disorders: Yes (BPH)


HTN: Yes


Hypercholesterolemia: Yes


Liver Disease: No


Suicide Attempt (Hx): No


Seizures: No


Thyroid Disease: No





- Surgical History


Abdominal Surgery: Yes (HERNIA REPAIR 2003)


Appendectomy: No


Cardiac Surgery: Yes (2 OPEN HEART SURGERIES; TRACH PLACEMENT,PACEMAKER PLACED 7 /19/2013)


Cholecystectomy: Yes


Lung Surgery: No


Neurologic Surgery: No


Orthopedic Surgery: No





- Immunization History


Td Vaccination: Yes


TDAP Vaccination: Yes


Immunization Up to Date: Yes





- Psycho/Social/Smoking Cessation Hx


Anxiety: No


Suicidal Ideation: No


Smoking Status: No


Smoking History: Former smoker


Years of Tobacco Use: 40


Have you smoked in the past 12 months: No


Number of Cigarettes Smoked Daily: 40


If you are a former smoker, when did you quit?: 2011


Cigars Per Day: 0


Information on smoking cessation initiated: No


'Breaking Loose' booklet given: 06/11/12


Hx Alcohol Use: No


Drug/Substance Use Hx: No


Substance Use Type: None


Hx Substance Use Treatment: No





<Farrukh Jorge - Last Filed: 06/26/17 20:16>





- Past Medical History


Allergies/Adverse Reactions: 


 Allergies











Allergy/AdvReac Type Severity Reaction Status Date / Time


 


No Known Drug Allergies Allergy   Verified 06/22/17 23:31


 


shellfish derived Allergy   Verified 06/22/17 23:31











Home Medications: 


Ambulatory Orders





Allopurinol [Zyloprim -] 100 mg PO DAILY 01/14/16 


Atorvastatin Ca [Lipitor] 40 mg PO HS 01/14/16 


Carvedilol 6.25 mg PO BID 01/14/16 


Docusate Sodium [Colace -] 100 mg PO BID 01/14/16 


Insulin NPL/Insulin Lispro [Humalog Mix 75-25 Vial] 0 unit SQ PRN 01/14/16 


Milrinone Lactate/D5w [Milrinone 0.2 mg/ml in D5w] 0 mg IV DAILY 01/14/16 


Ranitidine [Zantac -] 75 mg PO DAILY 01/14/16 


Albuterol 0.083% Nebulizer Sol [Ventolin 0.083% Nebulizer Soln -] 1 amp NEB Q6H 

PRN #120 amp 01/18/16 


Ferrous Sulfate [Feosol] 325 mg PO Q48H  ud 08/27/16 


Warfarin Na [Coumadin -] 4 mg PO DAILY@1800 01/10/17 


Bupropion HCl [Wellbutrin -] 75 mg DAILY 01/12/17 


Ascorbic Acid [Vitamin C -] 1,000 mg PO DAILY 03/30/17 


Cholecalciferol (Vitamin D3) [Vitamin D -] 1,000 unit PO DAILY 03/30/17 


Clopidogrel Bisulfate [Plavix -] 75 mg PO DAILY 03/30/17 


Multivitamin [Poly-Vitamin] 1 each PO DAILY 03/30/17 


Budesonide/Formeterol Fumarate [SYMBICORT 160/4.5mcg -] 1 inh PO BID 06/23/17 


Potassium Chloride [K-Dur -] 40 meq PO TID 06/23/17 


Tiotropium Bromide [Spiriva] 1 inh IH DAILY 06/23/17 


Torsemide [Demadex -] 40 mg PO BID 06/23/17 











**Review of Systems





- Review of Systems


Able to Perform ROS?: Yes


Comments:: 


06/23/17 00:06


CONSTITUTIONAL: 


Absent: fever, chills, diaphoresis, generalized weakness, malaise, loss of 

appetite


HEENT:


Present: sneezing


Absent: throat pain, throat swelling, difficulty swallowing, 


mouth swelling, ear pain, eye pain, visual Changes


CARDIOVASCULAR:


Present: lightheadedness


Absent: chest pain, syncope, palpitations, irregular heart rate, peripheral 


edema


RESPIRATORY: 


Present: cough, shortness of breath


Absent: orthopnea, wheezing, stridor, hemoptysis


GASTROINTESTINAL:


Absent: abdominal pain, abdominal distension, nausea, vomiting, diarrhea, 

constipation, 


melena, hematochezia


GENITOURINARY: 


Present: bleeding from rectum


Absent: dysuria, frequency, urgency, hesitancy, hematuria, flank pain, genital 

pain


MUSCULOSKELETAL:


Absent: myalgia, arthralgia, joint swelling


SKIN: 


Absent: rash, itching, pallor


HEMATOLOGIC/IMMUNOLOGIC: 


Absent: easy bleeding, easy bruising, lymphadenopathy, frequent infections


ENDOCRINE:


Absent: unexplained weight gain, unexplained weight loss, heat intolerance, 

cold intolerance


NEUROLOGIC: 


Absent: headache, focal weakness or paresthesias, dizziness, unsteady gait, 

seizure, mental 


status changes, bladder or bowel incontinence   


PSYCHIATRIC: 


Absent: anxiety, depression, suicidal or homicidal ideation, hallucinations.











<Brenda Mcfadden - Last Filed: 06/23/17 02:29>





*Physical Exam





- Vital Signs


 Last Vital Signs











Temp Pulse Resp BP Pulse Ox


 


 98.0 F   75   22   111/70   92 L


 


 06/22/17 23:32  06/22/17 23:32  06/22/17 23:32  06/22/17 23:32  06/22/17 23:32














- Physical Exam


Comments: 


06/23/17 00:08


Well developed, well nourished. Awake and alert. No acute distress.


HEENT:


Normocephalic, atraumatic. PERRLA, EOMI. No conjunctival pallor. Sclera are non-

icteric. Moist mucous 


membranes. Oropharynx is clear.


NECK: 


Supple. Full ROM. No JVD. Carotid pulses 2+ and symmetric, without bruits. No 

thyromegaly. No 


lymphadenopathy.


CARDIOVASCULAR:


Regular rate and rhythm. No murmurs, rubs, or gallops. Distal pulses are 2+ and 

symmetric. 


PULMONARY: 


+tachypneic, crackles


ABDOMINAL:


Soft. Non-tender. Non-distended. No rebound or guarding. No organomegaly. 

Normoactive bowel sounds. 


MUSCULOSKELETAL 


Normal range of motion at all joints. No bony deformities or tenderness. No CVA 

tenderness.


EXTREMITIES: 


No cyanosis. No clubbing. No calf tenderness. (+) +1 bilateral pitting edema in 

the lower extremities.


SKIN: 


Warm and dry. Normal capillary refill. No rashes. No jaundice. 


NEUROLOGICAL: 


Alert, awake, appropriate. 


PSYCHIATRIC: 


Cooperative. Good eye contact. Appropriate mood and affect.





Rectal Exam: 


+Internal hemorrhoids appreciated, scattered bright red blood through the stool 

itself. 








<Brenda Mcfadden - Last Filed: 06/23/17 02:29>





- Vital Signs


 Last Vital Signs











Temp Pulse Resp BP Pulse Ox


 


 98.0 F   75   22   111/70   92 L


 


 06/22/17 23:32  06/22/17 23:32  06/22/17 23:32  06/22/17 23:32  06/22/17 23:32














<Farrukh Jorge - Last Filed: 06/26/17 20:16>





ED Treatment Course





- LABORATORY


CBC & Chemistry Diagram: 


 06/23/17 00:15





 06/23/17 00:15





<Brenda Mcfadden - Last Filed: 06/23/17 02:29>





- LABORATORY


CBC & Chemistry Diagram: 


 06/26/17 05:57





 06/26/17 05:57





<Farrukh Jorge - Last Filed: 06/26/17 20:16>





Medical Decision Making





- Medical Decision Making





06/26/17 20:16


Dr. Jorge: The scribe's documentation has been prepared under my direction 

and personally reviewed by me in its entirery. I confirm that the note above 

accurately reflects all work, treatment, procedures, and medical decision 

making performed by me.





<Farrukh Jorge - Last Filed: 06/26/17 20:16>





*DC/Admit/Observation/Transfer





- Attestations


Scribe Attestion: 


06/23/17 00:10





Documentation prepared by Brenda Mcfadden, acting as medical scribe for Farrukh Jorge MD.





<Brenda Mcfadden - Last Filed: 06/23/17 02:29>





- Discharge Dispostion


Admit: Yes





<Farrukh Jorge - Last Filed: 06/26/17 20:16>


Diagnosis at time of Disposition: 


 CKD (chronic kidney disease)





CHF (congestive heart failure)


Qualifiers:


 Congestive heart failure type: systolic Congestive heart failure chronicity: 

acute on chronic Qualified Code(s): I50.23 - Acute on chronic systolic (

congestive) heart failure

## 2017-06-23 LAB
ALBUMIN SERPL-MCNC: 3.5 G/DL (ref 3.4–5)
ALP SERPL-CCNC: 156 U/L (ref 45–117)
ALT SERPL-CCNC: 18 U/L (ref 12–78)
ANION GAP SERPL CALC-SCNC: 7 MMOL/L (ref 8–16)
APPEARANCE UR: CLEAR
AST SERPL-CCNC: 20 U/L (ref 15–37)
BASOPHILS # BLD: 1.4 % (ref 0–2)
BILIRUB SERPL-MCNC: 0.7 MG/DL (ref 0.2–1)
BILIRUB UR STRIP.AUTO-MCNC: NEGATIVE MG/DL
CALCIUM SERPL-MCNC: 8.9 MG/DL (ref 8.5–10.1)
CO2 SERPL-SCNC: 34 MMOL/L (ref 21–32)
COLOR UR: (no result)
CREAT SERPL-MCNC: 2.5 MG/DL (ref 0.7–1.3)
DEPRECATED RDW RBC AUTO: 19.1 % (ref 11.9–15.9)
EOSINOPHIL # BLD: 1.9 % (ref 0–4.5)
GLUCOSE SERPL-MCNC: 120 MG/DL (ref 74–106)
HYALINE CASTS URNS QL MICRO: 1 /LPF
INR BLD: 3.19 (ref 0.82–1.09)
KETONES UR QL STRIP: NEGATIVE
LEUKOCYTE ESTERASE UR QL STRIP.AUTO: (no result)
MCH RBC QN AUTO: 25.8 PG (ref 25.7–33.7)
MCHC RBC AUTO-ENTMCNC: 31.5 G/DL (ref 32–35.9)
MCV RBC: 81.9 FL (ref 80–96)
MUCOUS THREADS URNS QL MICRO: (no result)
NEUTROPHILS # BLD: 68.8 % (ref 42.8–82.8)
NITRITE UR QL STRIP: NEGATIVE
PH UR: 6 [PH] (ref 5–8)
PLATELET # BLD AUTO: 223 K/MM3 (ref 134–434)
PMV BLD: 8.8 FL (ref 7.5–11.1)
PROT SERPL-MCNC: 7.6 G/DL (ref 6.4–8.2)
PROT UR QL STRIP: NEGATIVE
PROT UR QL STRIP: NEGATIVE
PT PNL PPP: 35.9 SEC (ref 9.98–11.88)
RBC # BLD AUTO: 1 /HPF (ref 0–3)
RBC # UR STRIP: NEGATIVE /UL
SP GR UR: 1.01 (ref 1–1.02)
TROPONIN I SERPL-MCNC: 0.04 NG/ML (ref 0–0.05)
UROBILINOGEN UR STRIP-MCNC: NEGATIVE E.U./DL (ref 0.2–1)
WBC # BLD AUTO: 8.3 K/MM3 (ref 4–10)
WBC # UR AUTO: 3 /HPF (ref 3–5)

## 2017-06-23 RX ADMIN — RANITIDINE SCH MG: 150 TABLET ORAL at 13:01

## 2017-06-23 RX ADMIN — CARVEDILOL SCH MG: 6.25 TABLET, FILM COATED ORAL at 21:27

## 2017-06-23 RX ADMIN — VITAMIN D, TAB 1000IU (100/BT) SCH UNIT: 25 TAB at 13:01

## 2017-06-23 RX ADMIN — INSULIN ASPART SCH UNITS: 100 INJECTION, SOLUTION INTRAVENOUS; SUBCUTANEOUS at 21:27

## 2017-06-23 RX ADMIN — CLOPIDOGREL BISULFATE SCH MG: 75 TABLET, FILM COATED ORAL at 13:01

## 2017-06-23 RX ADMIN — BUDESONIDE AND FORMOTEROL FUMARATE DIHYDRATE SCH INH: 160; 4.5 AEROSOL RESPIRATORY (INHALATION) at 21:28

## 2017-06-23 RX ADMIN — PHENYLEPHRINE HYDROCHLORIDE AND FAT, HARD SCH EACH: .00525; 1.86 SUPPOSITORY RECTAL at 21:27

## 2017-06-23 RX ADMIN — ACLIDINIUM BROMIDE SCH PUFF: 400 POWDER, METERED RESPIRATORY (INHALATION) at 21:28

## 2017-06-23 RX ADMIN — BUDESONIDE AND FORMOTEROL FUMARATE DIHYDRATE SCH INH: 160; 4.5 AEROSOL RESPIRATORY (INHALATION) at 18:18

## 2017-06-23 RX ADMIN — DOCUSATE SODIUM SCH MG: 100 CAPSULE, LIQUID FILLED ORAL at 21:27

## 2017-06-23 RX ADMIN — FERROUS SULFATE TAB EC 324 MG (65 MG FE EQUIVALENT) SCH MG: 324 (65 FE) TABLET DELAYED RESPONSE at 13:01

## 2017-06-23 RX ADMIN — MILRINONE LACTATE SCH MLS/HR: 200 INJECTION, SOLUTION INTRAVENOUS at 14:00

## 2017-06-23 RX ADMIN — ATORVASTATIN CALCIUM SCH MG: 40 TABLET, FILM COATED ORAL at 21:27

## 2017-06-23 RX ADMIN — CARVEDILOL SCH MG: 6.25 TABLET, FILM COATED ORAL at 17:17

## 2017-06-23 RX ADMIN — INSULIN ASPART SCH UNITS: 100 INJECTION, SOLUTION INTRAVENOUS; SUBCUTANEOUS at 17:18

## 2017-06-23 NOTE — CON.CARD
Cardiology Consult (text)





- Consultation


Consultation Note: 


 


cc: sob





hpi:  76 year old man with PMH of IDDM, COPD (O2 dependant) HTN, HLD, Afib (on 

coumadin), CAD s/p CABG 2013, end stage CHF with ICD placement (on milrinone 

pump), PVD, BPH here with sob.  Had been feeling well until past few days when 

he developed wright worse than baseline.  Also with productive cough.  No cp, palps

, dizzy, loc, pnd, orthopnea.  LE edema mild.  Sees dr merchant for cardio.





Past Medical History/Surg hx: per hpi, additionally hernia repair


Soc hx: Former smoker, no etoh or illicits


fam hx: brother with pvd


ros: per hpi; no nasal congestion, no rash, vision changes, wt loss, gib, 

hematuria, muscle pains


meds:


 





 


 Home Medications











 Medication  Instructions  Recorded


 


Allopurinol [Zyloprim -] 100 mg PO DAILY 01/14/16


 


Atorvastatin Ca [Lipitor] 40 mg PO HS 01/14/16


 


Carvedilol 6.25 mg PO BID 01/14/16


 


Docusate Sodium [Colace -] 100 mg PO BID 01/14/16


 


Insulin NPL/Insulin Lispro 0 unit SQ PRN 01/14/16





[Humalog Mix 75-25 Vial]  


 


Milrinone Lactate/D5w [Milrinone 0 mg IV DAILY 01/14/16





0.2 mg/ml in D5w]  


 


Ranitidine [Zantac -] 75 mg PO DAILY 01/14/16


 


Albuterol 0.083% Nebulizer Sol 1 amp NEB Q6H PRN #120 amp 01/18/16





[Ventolin 0.083% Nebulizer Soln -]  


 


Ferrous Sulfate [Feosol] 325 mg PO Q48H  ud 08/27/16


 


Warfarin Na [Coumadin -] 4 mg PO DAILY@1800 01/10/17


 


Bupropion HCl [Wellbutrin -] 75 mg DAILY 01/12/17


 


Ascorbic Acid [Vitamin C -] 1,000 mg PO DAILY 03/30/17


 


Cholecalciferol (Vitamin D3) 1,000 unit PO DAILY 03/30/17





[Vitamin D -]  


 


Clopidogrel Bisulfate [Plavix -] 75 mg PO DAILY 03/30/17


 


Multivitamin [Poly-Vitamin] 1 each PO DAILY 03/30/17


 


Budesonide/Formeterol Fumarate 1 inh PO BID 06/23/17





[SYMBICORT 160/4.5mcg -]  


 


Potassium Chloride [K-Dur -] 40 meq PO TID 06/23/17


 


Tiotropium Bromide [Spiriva] 1 inh IH DAILY 06/23/17


 


Torsemide [Demadex -] 40 mg PO BID 06/23/17

















pe:


 


 Vital Signs











 Period  Temp  Pulse  Resp  BP Sys/Alonso  Pulse Ox


 


 Last 24 Hr  98.0 F  75-88  18-22  /58-83  92-97











NAD, calm 


JVD flat, neck supple


RRR nl s1, s2 no m/r/g.


cta bl, nl effort


+ bs soft nd, nt


trace le edema no c/c


aaox3


pos pt dp, no carotid bruits


no jaundice diaphoresis





 


 


 


 Laboratory Last Values











WBC  8.3 K/mm3 (4.0-10.0)   06/23/17  00:15    


 


RBC  4.60 M/mm3 (4.00-5.60)   06/23/17  00:15    


 


Hgb  11.9 GM/dL (11.7-16.9)   06/23/17  00:15    


 


Hct  37.7 % (35.4-49)   06/23/17  00:15    


 


MCV  81.9 fl (80-96)   06/23/17  00:15    


 


MCHC  31.5 g/dl (32.0-35.9)  L  06/23/17  00:15    


 


RDW  19.1 % (11.9-15.9)  H  06/23/17  00:15    


 


Plt Count  223 K/MM3 (134-434)   06/23/17  00:15    


 


MPV  8.8 fl (7.5-11.1)   06/23/17  00:15    


 


Neutrophils %  68.8 % (42.8-82.8)   06/23/17  00:15    


 


Lymphocytes %  17.4 % (8-40)   06/23/17  00:15    


 


Monocytes %  10.5 % (3.8-10.2)  H  06/23/17  00:15    


 


Eosinophils %  1.9 % (0-4.5)   06/23/17  00:15    


 


Basophils %  1.4 % (0-2.0)   06/23/17  00:15    


 


INR  3.19  (0.82-1.09)  H D 06/23/17  00:15    


 


Sodium  144 mmol/L (136-145)   06/23/17  00:15    


 


Potassium  4.6 mmol/L (3.5-5.1)  D 06/23/17  00:15    


 


Chloride  103 mmol/L ()  D 06/23/17  00:15    


 


Carbon Dioxide  34 mmol/L (21-32)  H  06/23/17  00:15    


 


Anion Gap  7  (8-16)  L  06/23/17  00:15    


 


BUN  52 mg/dL (7-18)  H  06/23/17  00:15    


 


Creatinine  2.5 mg/dL (0.7-1.3)  H D 06/23/17  00:15    


 


Creat Clearance w eGFR  25.10  (>60)   06/23/17  00:15    


 


Random Glucose  120 mg/dL ()  H  06/23/17  00:15    


 


Calcium  8.9 mg/dL (8.5-10.1)   06/23/17  00:15    


 


Total Bilirubin  0.7 mg/dL (0.2-1.0)   06/23/17  00:15    


 


AST  20 U/L (15-37)   06/23/17  00:15    


 


ALT  18 U/L (12-78)  D 06/23/17  00:15    


 


Alkaline Phosphatase  156 U/L ()  H  06/23/17  00:15    


 


Creatine Kinase  59 IU/L ()   06/23/17  00:15    


 


Troponin I  0.04 ng/ml (0.00-0.05)   06/23/17  00:15    


 


B-Natriuretic Peptide  1720.46 pg/ml (5-450)  H  06/23/17  02:10    


 


Total Protein  7.6 g/dl (6.4-8.2)  D 06/23/17  00:15    


 


Albumin  3.5 g/dl (3.4-5.0)  D 06/23/17  00:15    


 


Urine Color  Ltyellow   06/23/17  05:15    


 


Urine Appearance  Clear   06/23/17  05:15    


 


Urine pH  6.0  (5.0-8.0)   06/23/17  05:15    


 


Urine Protein  Negative  (NEGATIVE)   06/23/17  05:15    


 


Urine Glucose (UA)  Negative  (NEGATIVE)   06/23/17  05:15    


 


Urine Ketones  Negative  (NEGATIVE)   06/23/17  05:15    


 


Urine Blood  Negative  (NEGATIVE)   06/23/17  05:15    


 


Urine Nitrite  Negative  (NEGATIVE)   06/23/17  05:15    


 


Urine Bilirubin  Negative  (NEGATIVE)   06/23/17  05:15    


 


Urine Urobilinogen  Negative E.U./dl (0.2-1.0)   06/23/17  05:15    


 


Ur Leukocyte Esterase  Trace  (NEGATIVE)  H  06/23/17  05:15    


 


Urine RBC  1 /hpf (0-3)   06/23/17  05:15    


 


Urine WBC  3 /hpf (3-5)   06/23/17  05:15    


 


Hyaline Casts  1 /lpf  06/23/17  05:15    


 


Urine Mucus  Rare   06/23/17  05:15    











ecg 6/22/17: 





echo 10/2016: sev lve, global hk, sev dec lvef, mild rve, nl rv fcn, patty, mild-

mod mr, mv ring, tv ring, mild tr, mild pr, mild phtn, ivc mild dil





cxr: chf





tele: 








a/p:  76 year old man with PMH of IDDM, COPD (O2 dependant) HTN, HLD, Afib (on 

coumadin), CAD s/p CABG 2013, end stage CHF with ICD placement (on milrinone 

pump), PVD, BPH here with sob.





sob, acute systolic CHF, end stage systolic heart failure s/p ICD, on home 

milrinone infusion:


- here with sob/wright


- cxr seems similar to priors with chronic congestive changes


- bnp lower than prior admits


- unclear if sxs from chf or some URI/copd (productive cough).  Will give trial 

of iv lasix 80 x1 today and monitor sxs, cr trend tomorrow to see if improving.


- cont milrinone at patient's home maintenance dose (on chronic infusion 

destination therapy)--0.35 mcg/kg/min.  


- cont home coreg


- recent office icd check with stable function





afib


- currently rate controlled on coreg


- cont coumadin per INR





PVD


- con't AC, statin.





CAD s/p CABG


- No anginal symptoms, no signs acs.  Con't home ac, atorvastatin, coreg





HTN: controlled on coreg





HLD: con't statin. 





master/ CKD:


- possibly cardiorenal, will give trial of iv lasix today and monitor cr trend 

tomorrow

## 2017-06-23 NOTE — PN
Progress Note (short form)





- Note


Progress Note: 


PULMONARY





CONSULTATION DICTATED 6/23/17








IMP ACUTE ON CHRONIC RESPIRATORY FAILURE


      DECOMPENASTED CHF


      SEVERE LV DYSFUNCTION S/P ICD ,ON MILRINONE DRIP


      ASHD S/P CABG


      ADVANCED COPD ON O2


      AFIB


      ACUTE ON CKD


      PVD


      BPH








PLAN IV LASIX


        O2


        MILRINONE 


        INHALED BRONCHODILATORS


        MONITOR LYTES ,RENAL FUNCTION


        DAILY WTS


        F/U CHEST X-RAYS


         AC


        MONITOR INR





        





DR EASTMAN





Problem List





- Problems


(1) CHF (congestive heart failure)


Code(s): I50.9 - HEART FAILURE, UNSPECIFIED   Qualifiers: 


     Congestive heart failure type: systolic     Congestive heart failure 

chronicity: acute on chronic        Qualified Code(s): I50.23 - Acute on 

chronic systolic (congestive) heart failure  





(2) CKD (chronic kidney disease)


Code(s): N18.9 - CHRONIC KIDNEY DISEASE, UNSPECIFIED   Qualifiers: 


   





(3) AICD (automatic cardioverter/defibrillator) present


Code(s): Z95.810 - PRESENCE OF AUTOMATIC (IMPLANTABLE) CARDIAC DEFIBRILLATOR





(4) Acute and chronic respiratory failure (acute-on-chronic)


Code(s): J96.20 - ACUTE AND CHR RESP FAILURE, UNSP W HYPOXIA OR HYPERCAPNIA   

Qualifiers: 


     Respiratory failure complication: hypoxia        Qualified Code(s): J96.21 

- Acute and chronic respiratory failure with hypoxia  





(5) Acute on chronic renal insufficiency


Code(s): N28.9 - DISORDER OF KIDNEY AND URETER, UNSPECIFIED


N18.9 - CHRONIC KIDNEY DISEASE, UNSPECIFIED





(6) Atrial fibrillation


Code(s): I48.91 - UNSPECIFIED ATRIAL FIBRILLATION   Qualifiers: 


     Atrial fibrillation type: chronic     Qualified Code(s): I48.2 - Chronic 

atrial fibrillation  





(7) CAD (coronary artery disease)


Code(s): I25.10 - ATHSCL HEART DISEASE OF NATIVE CORONARY ARTERY W/O ANG PCTRS 

  





(8) COPD (chronic obstructive pulmonary disease)


Code(s): J44.9 - CHRONIC OBSTRUCTIVE PULMONARY DISEASE, UNSPECIFIED   





(9) Cardiomyopathy


Code(s): I42.9 - CARDIOMYOPATHY, UNSPECIFIED   





(10) Diabetes


Code(s): E11.9 - TYPE 2 DIABETES MELLITUS WITHOUT COMPLICATIONS   Qualifiers: 


     Diabetes mellitus type: type 2     Diabetes mellitus complication status: 

with kidney complications     Diabetes mellitus complication detail: with 

chronic kidney disease     Chronic kidney disease stage: stage 4 (severe)





(11) Dyspnea on exertion


Code(s): R06.09 - OTHER FORMS OF DYSPNEA





(12) Presence of permanent cardiac pacemaker


Code(s): Z95.0 - PRESENCE OF CARDIAC PACEMAKER

## 2017-06-23 NOTE — HP
Admitting History and Physical





- Primary Care Physician


PCP: Sathya Flower





- Admission


Chief Complaint: I was short of breath


History of Present Illness: 


Mr Jerome is a 76 year old male who comes in with worsening shortness of 

breath. He was recently discharged from the hospital for end stage CHF with 

exacerbation, at that time his breathing was a baseline and he could do his 

normal amount of exertion (which is about 2 blocks) before getting short of 

breath. Over the past 3 days he noticed that he was having worsening shortness 

of breath on exertion. He says he can now walk less than a block before 

becoming short of breath. He also was having worsening shortness of breath with 

lying flat. He had a cough with productive white and frothy sputum. Because of 

this he came in. He denies fevers, chills, lightheadedness, dizziness, chest 

pain, nausea, vomiting, diarrhea, constipation, pain or difficulty urinating, 

or worsening leg swelling. He says his breathing is a little better today.


History Source: Patient


Limitations to Obtaining History: No Limitations





- Past Medical History


Cardiovascular: Yes: AFIB, CAD, CHF, HTN, Hyperlipdemia, MI, Pulmonary 

Hypertension, Other (AICD placed)


Pulmonary: Yes: COPD, O2 Dependent


Renal/: Yes: Renal Inusuff, BPH


Endocrine: Yes: Diabetes Mellitus





- Past Surgical History


Past Surgical History: Yes: AICD, CABG, Hernia Repair





- Smoking History


Smoking history: Former smoker


Have you smoked in the past 12 months: No


Aproximately how many cigarettes per day: 40


If you are a former smoker, when did you quit?: 2011





- Alcohol/Substance Use


Hx Alcohol Use: No


History of Substance Use: reports: None





- Social History


Usual Living Arrangement: Yes: With Spouse


ADL: Independent


History of Recent Travel: No





Home Medications





- Allergies


Allergies/Adverse Reactions: 


 Allergies











Allergy/AdvReac Type Severity Reaction Status Date / Time


 


No Known Drug Allergies Allergy   Verified 06/22/17 23:31


 


shellfish derived Allergy   Verified 06/22/17 23:31














- Home Medications


Home Medications: 


Ambulatory Orders





Allopurinol [Zyloprim -] 100 mg PO DAILY 01/14/16 


Atorvastatin Ca [Lipitor] 40 mg PO HS 01/14/16 


Carvedilol 6.25 mg PO BID 01/14/16 


Docusate Sodium [Colace -] 100 mg PO BID 01/14/16 


Insulin NPL/Insulin Lispro [Humalog Mix 75-25 Vial] 0 unit SQ PRN 01/14/16 


Milrinone Lactate/D5w [Milrinone 0.2 mg/ml in D5w] 0 mg IV DAILY 01/14/16 


Ranitidine [Zantac -] 75 mg PO DAILY 01/14/16 


Albuterol 0.083% Nebulizer Sol [Ventolin 0.083% Nebulizer Soln -] 1 amp NEB Q6H 

PRN #120 amp 01/18/16 


Ferrous Sulfate [Feosol] 325 mg PO Q48H  ud 08/27/16 


Warfarin Na [Coumadin -] 4 mg PO DAILY@1800 01/10/17 


Bupropion HCl [Wellbutrin -] 75 mg DAILY 01/12/17 


Ascorbic Acid [Vitamin C -] 1,000 mg PO DAILY 03/30/17 


Cholecalciferol (Vitamin D3) [Vitamin D -] 1,000 unit PO DAILY 03/30/17 


Clopidogrel Bisulfate [Plavix -] 75 mg PO DAILY 03/30/17 


Multivitamin [Poly-Vitamin] 1 each PO DAILY 03/30/17 


Budesonide/Formeterol Fumarate [SYMBICORT 160/4.5mcg -] 1 inh PO BID 06/23/17 


Potassium Chloride [K-Dur -] 40 meq PO TID 06/23/17 


Tiotropium Bromide [Spiriva] 1 inh IH DAILY 06/23/17 


Torsemide [Demadex -] 40 mg PO BID 06/23/17 











Family Disease History





- Family Disease History


Family Disease History: Diabetes: Father, Heart Disease: Father





Review of Systems


Findings/Remarks: 


full review of systems obtained, as per HPI and otherwise negative





Physical Examination


Vital Signs: 


 Vital Signs











Temperature  98.0 F   06/22/17 23:32


 


Pulse Rate  78   06/23/17 08:35


 


Respiratory Rate  18   06/23/17 06:32


 


Blood Pressure  130/80   06/23/17 08:35


 


O2 Sat by Pulse Oximetry (%)  97   06/23/17 08:35











Constitutional: Yes: Well Nourished, No Distress, Calm


Eyes: Yes: Conjunctiva Clear, EOM Intact, PERRL


HENT: Yes: Atraumatic, Normocephalic


Cardiovascular: Yes: Regular Rate and Rhythm, Murmur.  No: Gallop, Rub


Respiratory: Yes: Regular, On Nasal O2, Rhonchi, Wheezes.  No: Rales


Gastrointestinal: Yes: Normal Bowel Sounds, Soft, Distention.  No: Tenderness


Extremities: Yes: WNL


Edema: Yes


Edema: LLE: 1+, RLE: 1+


Labs: 


 Laboratory Results - last 24 hr











  06/23/17 06/23/17 06/23/17





  00:15 00:15 00:15


 


WBC  8.3  


 


RBC  4.60  


 


Hgb  11.9  


 


Hct  37.7  


 


MCV  81.9  


 


MCHC  31.5 L  


 


RDW  19.1 H  


 


Plt Count  223  


 


MPV  8.8  


 


Neutrophils %  68.8  


 


Lymphocytes %  17.4  


 


Monocytes %  10.5 H  


 


Eosinophils %  1.9  


 


Basophils %  1.4  


 


INR    3.19 H D


 


Sodium   144 


 


Potassium   4.6  D 


 


Chloride   103  D 


 


Carbon Dioxide   34 H 


 


Anion Gap   7 L 


 


BUN   52 H 


 


Creatinine   2.5 H D 


 


Creat Clearance w eGFR   25.10 


 


POC Glucometer   


 


Random Glucose   120 H 


 


Calcium   8.9 


 


Total Bilirubin   0.7 


 


AST   20 


 


ALT   18  D 


 


Alkaline Phosphatase   156 H 


 


Creatine Kinase   


 


Troponin I   


 


B-Natriuretic Peptide   


 


Total Protein   7.6  D 


 


Albumin   3.5  D 


 


Urine Color   


 


Urine Appearance   


 


Urine pH   


 


Ur Specific Gravity   


 


Urine Protein   


 


Urine Glucose (UA)   


 


Urine Ketones   


 


Urine Blood   


 


Urine Nitrite   


 


Urine Bilirubin   


 


Urine Urobilinogen   


 


Ur Leukocyte Esterase   


 


Urine RBC   


 


Urine WBC   


 


Hyaline Casts   


 


Urine Mucus   














  06/23/17 06/23/17 06/23/17





  00:15 02:10 05:15


 


WBC   


 


RBC   


 


Hgb   


 


Hct   


 


MCV   


 


MCHC   


 


RDW   


 


Plt Count   


 


MPV   


 


Neutrophils %   


 


Lymphocytes %   


 


Monocytes %   


 


Eosinophils %   


 


Basophils %   


 


INR   


 


Sodium   


 


Potassium   


 


Chloride   


 


Carbon Dioxide   


 


Anion Gap   


 


BUN   


 


Creatinine   


 


Creat Clearance w eGFR   


 


POC Glucometer   


 


Random Glucose   


 


Calcium   


 


Total Bilirubin   


 


AST   


 


ALT   


 


Alkaline Phosphatase   


 


Creatine Kinase  59  


 


Troponin I  0.04  


 


B-Natriuretic Peptide   1720.46 H 


 


Total Protein   


 


Albumin   


 


Urine Color    Ltyellow


 


Urine Appearance    Clear


 


Urine pH    6.0


 


Ur Specific Gravity    1.010


 


Urine Protein    Negative


 


Urine Glucose (UA)    Negative


 


Urine Ketones    Negative


 


Urine Blood    Negative


 


Urine Nitrite    Negative


 


Urine Bilirubin    Negative


 


Urine Urobilinogen    Negative


 


Ur Leukocyte Esterase    Trace H


 


Urine RBC    1


 


Urine WBC    3


 


Hyaline Casts    1


 


Urine Mucus    Rare














  06/23/17





  15:33


 


WBC 


 


RBC 


 


Hgb 


 


Hct 


 


MCV 


 


MCHC 


 


RDW 


 


Plt Count 


 


MPV 


 


Neutrophils % 


 


Lymphocytes % 


 


Monocytes % 


 


Eosinophils % 


 


Basophils % 


 


INR 


 


Sodium 


 


Potassium 


 


Chloride 


 


Carbon Dioxide 


 


Anion Gap 


 


BUN 


 


Creatinine 


 


Creat Clearance w eGFR 


 


POC Glucometer  234


 


Random Glucose 


 


Calcium 


 


Total Bilirubin 


 


AST 


 


ALT 


 


Alkaline Phosphatase 


 


Creatine Kinase 


 


Troponin I 


 


B-Natriuretic Peptide 


 


Total Protein 


 


Albumin 


 


Urine Color 


 


Urine Appearance 


 


Urine pH 


 


Ur Specific Gravity 


 


Urine Protein 


 


Urine Glucose (UA) 


 


Urine Ketones 


 


Urine Blood 


 


Urine Nitrite 


 


Urine Bilirubin 


 


Urine Urobilinogen 


 


Ur Leukocyte Esterase 


 


Urine RBC 


 


Urine WBC 


 


Hyaline Casts 


 


Urine Mucus 














Imaging





- Results


Chest X-ray: Report Reviewed, Image Reviewed





Problem List





- Problems


(1) CHF (congestive heart failure)


Assessment/Plan: 


-patient with end stage CHF on milrinone presents with acute exacerbation


-admit to telemetry


-continue milrinone


-cardiology consulted, giving IV lasix


-monitor for improvement


-holding torsemide while give IV lasix


-otherwise continue home regimen


Code(s): I50.9 - HEART FAILURE, UNSPECIFIED   Qualifiers: 


     Congestive heart failure type: systolic     Congestive heart failure 

chronicity: acute on chronic        Qualified Code(s): I50.23 - Acute on 

chronic systolic (congestive) heart failure  





(2) Acute and chronic respiratory failure (acute-on-chronic)


Assessment/Plan: 


-secondary to CHF exacerbation


-continue diuresis


-on supplemental oxygen


Code(s): J96.20 - ACUTE AND CHR RESP FAILURE, UNSP W HYPOXIA OR HYPERCAPNIA   

Qualifiers: 


     Respiratory failure complication: hypoxia        Qualified Code(s): J96.21 

- Acute and chronic respiratory failure with hypoxia  





(3) Acute on chronic renal insufficiency


Assessment/Plan: 


-cardiorenal


-monitor since diuresing


Code(s): N28.9 - DISORDER OF KIDNEY AND URETER, UNSPECIFIED


N18.9 - CHRONIC KIDNEY DISEASE, UNSPECIFIED





(4) Atrial fibrillation


Assessment/Plan: 


-rate controlled


-hold coumadin today secondary to supratherapeutic INR


-recheck INR in am


Code(s): I48.91 - UNSPECIFIED ATRIAL FIBRILLATION   Qualifiers: 


     Atrial fibrillation type: chronic     Qualified Code(s): I48.2 - Chronic 

atrial fibrillation  





(5) CAD (coronary artery disease)


Assessment/Plan: 


-quiescent


-cardiology following


-continue home regimen


Code(s): I25.10 - ATHSCL HEART DISEASE OF NATIVE CORONARY ARTERY W/O ANG PCTRS 

  





(6) COPD (chronic obstructive pulmonary disease)


Assessment/Plan: 


-consult pulmonary


-suspect more CHF than COPD 


-continue home regimen


-continue oxygen supplementation


Code(s): J44.9 - CHRONIC OBSTRUCTIVE PULMONARY DISEASE, UNSPECIFIED   





(7) Diabetes


Assessment/Plan: 


-diabetic diet


-continue SSI


Code(s): E11.9 - TYPE 2 DIABETES MELLITUS WITHOUT COMPLICATIONS   Qualifiers: 


     Diabetes mellitus type: type 2     Diabetes mellitus complication status: 

with kidney complications     Diabetes mellitus complication detail: with 

chronic kidney disease     Chronic kidney disease stage: stage 4 (severe)

## 2017-06-23 NOTE — CONS
DATE OF CONSULTATION:  06/23/2017 

 

PULMONARY CONSULTATION 

 

REFERRING PHYSICIAN:  Dr. Lagunas.

 

HISTORY OF PRESENT ILLNESS:  The patient is a 76-year-old  male known 
to me

from previous hospitalization as well as office followup with a past medical 
history

of COPD on home O2, insulin-dependent diabetes mellitus, congestive heart 
failure on

Milrinone pump, status post ICD, atrial fibrillation, arteriosclerotic heart 
disease,

status post CABG 2013, peripheral vascular disease, BPH, admitted to [facility] with complaint of acute onset of increasing shortness of 
breath. 

The patient apparently has been well until a couple of days prior to admission. 
He

started developing mild increasing shortness of breath.  Yesterday he also 
complained

of productive cough of clear sputum.  He denied any chest pains, palpitation, 
nausea,

vomiting, or diaphoresis.  He has noted increasing lower extremity edema.  Last 
night

he felt significant increasing shortness of breath at which time he presented 
to the

emergency room.  On admission he as felt to have congestive heart failure and 
was

admitted to the telemetry unit and started on IV Lasix, increased Lasix dose.  
The

patient has a history of tobacco use and quit a few years ago.  There is no 
history

of occupational exposure to chemicals or fumes.  He denies any fever or chills,

denies any hemoptysis.  He denies any change in color of sputum.  He denies any

bronchospasm.  

 

PAST MEDICAL HISTORY:  Again includes severe LV dysfunction with global 
hypokinesia,

decreased in left ventricular ejection fraction, pulmonary hypertension, COPD 
on home

O2, insulin-dependent diabetes mellitus, hypertension, atrial fibrillation,

hyperlipidemia, peripheral vascular disease, and BPH.  

 

REVIEW OF SYSTEMS:  No orthopnea, positive dyspnea, positive cough, white sputum
, no

hemoptysis, no chest pain, no palpitation, no nausea or vomiting.  Positive 
lower

extremity edema.

 

CURRENT MEDICATIONS:  Include Zofran, Symbicort, Tylenol, Coumadin, Wellbutrin,

Zyloprim, Tudorza, Albuterol nebulizer, carvedilol, Milrinone drip, Colace, 
Zantac,

Lipitor, NovoLog, DSO, Tab-A-Giles, Plavix, vitamin C and vitamin D3.   

 

PHYSICAL EXAMINATION: 

General:  The patient is a well-developed, well-nourished male, awake, alert, 
mildly

dyspneic, but in no acute distress. 

Vitals:  He is currently afebrile.  Blood pressure is 130/71, respiratory rate 
20, O2

saturation 95% on 2 L.

HEENT:  Normocephalic and atraumatic.

Neck:  Supple. 

Heart:  Irregular with normal S1, S2.

Lungs:  Bilateral rales, 2/3 up.  

Abdomen:  Soft, bowel sounds positive.

Extremities:  No signs of edema.

 

LABORATORY DATA:  WBC 8.3, hemoglobin 11.9, hematocrit 37.7, platelet count 223,
000. 

INR is 3.19.  BUN 52, creatinine 2.5, BNP is 1720.  Chest x-ray with mild 
increasing

pulmonary vascular congestion.  

 

IMPRESSION:  

1.  Dyspnea, chest congestion, most likely secondary to mild decompensated 
congestive

heart failure.

2.  Advanced chronic obstructive pulmonary disease on home O2.

3.  Atrial fibrillation.

4.  Pulmonary hypertension.

5.  Acute on chronic kidney disease.

6.  Arteriosclerotic heart disease status post coronary artery bypass graft.

7.  Peripheral vascular disease.

8.  Benign prostatic hypertrophy.

9.  Insulin-dependent diabetes mellitus.

 

 

PLAN:  Continue IV Lasix, supplemental O2, inhaled bronchodilators, daily 
weights,

obtain followup chest x-ray, antitussives, no improvement will start a short 
course

of steroids, monitor blood sugars.

 

Thank you will follow closely with you.

 

 

ESTHER EASTMAN M.D.

 

RHIANNON/[id number]

DD: 06/23/2017 14:20

DT: 06/23/2017 18:14

Job #:  [id number]

MTDD

## 2017-06-23 NOTE — EKG
Test Reason : 

Blood Pressure : ***/*** mmHG

Vent. Rate : 075 BPM     Atrial Rate : 075 BPM

   P-R Int : 000 ms          QRS Dur : 202 ms

    QT Int : 494 ms       P-R-T Axes : [phone] degrees

   QTc Int : 551 ms

 

Atrial-sensed ventricular-paced rhythm with prolonged AV conduction

Biventricular pacemaker detected

ABNORMAL ECG

WHEN COMPARED WITH ECG OF 28-MAY-2017 03:26,

PREMATURE VENTRICULAR COMPLEXES ARE NO LONGER PRESENT

Confirmed by PAVEL JO MD ([id number]) on 6/23/2017 10:22:35 AM

 

Referred By:             Confirmed By:PAVEL JO MD

## 2017-06-24 LAB
ANION GAP SERPL CALC-SCNC: 10 MMOL/L (ref 8–16)
BASOPHILS # BLD: 0.4 % (ref 0–2)
CALCIUM SERPL-MCNC: 8.8 MG/DL (ref 8.5–10.1)
CO2 SERPL-SCNC: 33 MMOL/L (ref 21–32)
CREAT SERPL-MCNC: 1.9 MG/DL (ref 0.7–1.3)
DEPRECATED RDW RBC AUTO: 19.2 % (ref 11.9–15.9)
EOSINOPHIL # BLD: 1.1 % (ref 0–4.5)
GLUCOSE SERPL-MCNC: 178 MG/DL (ref 74–106)
INR BLD: 2.42 (ref 0.82–1.09)
MAGNESIUM SERPL-MCNC: 2.6 MG/DL (ref 1.8–2.4)
MCH RBC QN AUTO: 26.5 PG (ref 25.7–33.7)
MCHC RBC AUTO-ENTMCNC: 31.6 G/DL (ref 32–35.9)
MCV RBC: 83.9 FL (ref 80–96)
NEUTROPHILS # BLD: 78.8 % (ref 42.8–82.8)
PHOSPHATE SERPL-MCNC: 2.9 MG/DL (ref 2.5–4.9)
PLATELET # BLD AUTO: 197 K/MM3 (ref 134–434)
PMV BLD: 9.6 FL (ref 7.5–11.1)
PT PNL PPP: 27.1 SEC (ref 9.98–11.88)
WBC # BLD AUTO: 9.4 K/MM3 (ref 4–10)

## 2017-06-24 RX ADMIN — CLOPIDOGREL BISULFATE SCH MG: 75 TABLET, FILM COATED ORAL at 10:03

## 2017-06-24 RX ADMIN — ATORVASTATIN CALCIUM SCH MG: 40 TABLET, FILM COATED ORAL at 21:08

## 2017-06-24 RX ADMIN — ALLOPURINOL SCH MG: 100 TABLET ORAL at 10:04

## 2017-06-24 RX ADMIN — CARVEDILOL SCH MG: 6.25 TABLET, FILM COATED ORAL at 10:04

## 2017-06-24 RX ADMIN — CARVEDILOL SCH MG: 6.25 TABLET, FILM COATED ORAL at 21:08

## 2017-06-24 RX ADMIN — OXYCODONE HYDROCHLORIDE AND ACETAMINOPHEN SCH MG: 500 TABLET ORAL at 10:03

## 2017-06-24 RX ADMIN — ACETAMINOPHEN PRN MG: 325 TABLET ORAL at 11:48

## 2017-06-24 RX ADMIN — INSULIN ASPART SCH UNITS: 100 INJECTION, SOLUTION INTRAVENOUS; SUBCUTANEOUS at 11:48

## 2017-06-24 RX ADMIN — ACLIDINIUM BROMIDE SCH PUFF: 400 POWDER, METERED RESPIRATORY (INHALATION) at 10:05

## 2017-06-24 RX ADMIN — ALBUTEROL SULFATE PRN AMP: 2.5 SOLUTION RESPIRATORY (INHALATION) at 19:42

## 2017-06-24 RX ADMIN — DOCUSATE SODIUM SCH MG: 100 CAPSULE, LIQUID FILLED ORAL at 10:04

## 2017-06-24 RX ADMIN — ALBUTEROL SULFATE PRN AMP: 2.5 SOLUTION RESPIRATORY (INHALATION) at 06:10

## 2017-06-24 RX ADMIN — DOCUSATE SODIUM SCH MG: 100 CAPSULE, LIQUID FILLED ORAL at 21:08

## 2017-06-24 RX ADMIN — PHENYLEPHRINE HYDROCHLORIDE AND FAT, HARD SCH: .00525; 1.86 SUPPOSITORY RECTAL at 22:50

## 2017-06-24 RX ADMIN — MULTIVITAMIN TABLET SCH TAB: TABLET at 10:04

## 2017-06-24 RX ADMIN — BUDESONIDE AND FORMOTEROL FUMARATE DIHYDRATE SCH INH: 160; 4.5 AEROSOL RESPIRATORY (INHALATION) at 10:05

## 2017-06-24 RX ADMIN — MILRINONE LACTATE SCH MLS/HR: 200 INJECTION, SOLUTION INTRAVENOUS at 11:12

## 2017-06-24 RX ADMIN — INSULIN ASPART SCH UNITS: 100 INJECTION, SOLUTION INTRAVENOUS; SUBCUTANEOUS at 21:10

## 2017-06-24 RX ADMIN — INSULIN ASPART SCH: 100 INJECTION, SOLUTION INTRAVENOUS; SUBCUTANEOUS at 18:10

## 2017-06-24 RX ADMIN — RANITIDINE SCH MG: 150 TABLET ORAL at 10:04

## 2017-06-24 RX ADMIN — BUDESONIDE AND FORMOTEROL FUMARATE DIHYDRATE SCH INH: 160; 4.5 AEROSOL RESPIRATORY (INHALATION) at 21:08

## 2017-06-24 RX ADMIN — VITAMIN D, TAB 1000IU (100/BT) SCH UNIT: 25 TAB at 10:04

## 2017-06-24 RX ADMIN — WARFARIN SCH MG: 2 TABLET ORAL at 18:10

## 2017-06-24 RX ADMIN — ACLIDINIUM BROMIDE SCH PUFF: 400 POWDER, METERED RESPIRATORY (INHALATION) at 21:08

## 2017-06-24 RX ADMIN — INSULIN ASPART SCH UNITS: 100 INJECTION, SOLUTION INTRAVENOUS; SUBCUTANEOUS at 06:14

## 2017-06-24 NOTE — PN
Progress Note (short form)





- Note


Progress Note: 


Patient seen and examined in the Telemetry unit.  On Milrinone. 


NAD but mildly tachypneic on 4 L NC O2. 


No CP. 


 Intake & Output











 06/21/17 06/22/17 06/23/17 06/24/17





 23:59 23:59 23:59 23:59


 


Intake Total    620


 


Balance    620


 


Weight  204 lb 207 lb 4 oz 207 lb 6 oz








 Last Vital Signs











Temp Pulse Resp BP Pulse Ox


 


 100.1 F H  72   22   100/57   95 


 


 06/24/17 10:00  06/24/17 10:58  06/24/17 10:00  06/24/17 10:00  06/24/17 10:58








Active Medications





Acetaminophen (Tylenol -)  650 mg PO Q4H PRN


   PRN Reason: FEVER OR PAIN


   Last Admin: 06/24/17 11:48 Dose:  650 mg


Aclidinium Bromide (Tudorza -)  1 puff IH BID [facility]


   Last Admin: 06/24/17 10:05 Dose:  1 puff


Albuterol Sulfate (Ventolin 0.083% Nebulizer Soln -)  1 amp NEB Q6H PRN


   PRN Reason: SHORT OF BREATH/WHEEZING


   Last Admin: 06/24/17 06:10 Dose:  1 amp


Allopurinol (Zyloprim -)  100 mg PO DAILY [facility]


   Last Admin: 06/24/17 10:04 Dose:  100 mg


Ascorbic Acid (Vitamin C -)  1,000 mg PO DAILY [facility]


   Last Admin: 06/24/17 10:03 Dose:  1,000 mg


Atorvastatin Calcium (Lipitor -)  40 mg PO HS [facility]


   Last Admin: 06/23/17 21:27 Dose:  40 mg


Budesonide/Formoterol Fumarate (Symbicort 160/4.5mcg -)  1 puff IH BID [facility]


   Last Admin: 06/24/17 10:05 Dose:  1 inh


Bupropion HCl (Wellbutrin -)  75 mg PO DAILY [facility]


   Last Admin: 06/24/17 10:04 Dose:  75 mg


Carvedilol (Coreg -)  6.25 mg PO BID [facility]


   Last Admin: 06/24/17 10:04 Dose:  6.25 mg


Cholecalciferol (Vitamin D3 -)  1,000 unit PO DAILY [facility]


   Last Admin: 06/24/17 10:04 Dose:  1,000 unit


Clopidogrel Bisulfate (Plavix -)  75 mg PO DAILY [facility]


   Last Admin: 06/24/17 10:03 Dose:  75 mg


Docusate Sodium (Colace -)  100 mg PO BID [facility]


   Last Admin: 06/24/17 10:04 Dose:  100 mg


Ferrous Sulfate (Feosol -)  325 mg PO Q48H [facility]


   Last Admin: 06/23/17 13:01 Dose:  325 mg


Milrinone Lactate/Dextrose (Milrinone 20mg/100ml Ivpb -)  100 mls @ 9.716 mls/

hr IVPB TITR [facility]


   PRN Reason: 0.35 MCG/KG/MIN


   Last Admin: 06/24/17 11:12 Dose:  9.716 mls/hr


Insulin Aspart (Novolog Vial Sliding Scale -)  1 vial SQ ACHS [facility]


   PRN Reason: Protocol


   Last Admin: 06/24/17 11:48 Dose:  2 units


Multivitamins/Minerals/Vitamin C (Tab-A-Vit -)  1 tab PO DAILY [facility]


   Last Admin: 06/24/17 10:04 Dose:  1 tab


Ondansetron HCl (Zofran Injection)  4 mg IVPB Q6H PRN


   PRN Reason: NAUSEA


Ranitidine HCl (Zantac -)  75 mg PO DAILY [facility]


   Last Admin: 06/24/17 10:04 Dose:  75 mg


Starch (Anusol Suppository -)  1 each RC HS [facility]


   Last Admin: 06/23/17 21:27 Dose:  1 each


Warfarin Sodium (Coumadin -)  4 mg PO DAILY@1800 [facility]





GENERAL: Awake, alert, mildly tachypneic at rest 


HEAD: Normal with no signs of trauma.


NECK: Trachea midline, full range of motion, supple. 


LUNGS: Bibasilar rales


HEART: irregular 


ABDOMEN: Soft, NT, ND, (+) BS 


EXTREMITIES: 2+ pulses, warm, well-perfused, no edema. 


SKIN: Warm, dry, normal turgor, no rashes or lesions noted





 Laboratory Results - last 24 hr











  06/23/17 06/23/17 06/24/17





  15:33 21:26 05:50


 


WBC   


 


RBC   


 


Hgb   


 


Hct   


 


MCV   


 


MCHC   


 


RDW   


 


Plt Count   


 


MPV   


 


Neutrophils %   


 


Lymphocytes %   


 


Monocytes %   


 


Eosinophils %   


 


Basophils %   


 


INR   


 


Sodium   


 


Potassium   


 


Chloride   


 


Carbon Dioxide   


 


Anion Gap   


 


BUN   


 


Creatinine   


 


POC Glucometer  234  232  181


 


Random Glucose   


 


Calcium   


 


Phosphorus   


 


Magnesium   














  06/24/17 06/24/17 06/24/17





  07:19 07:19 07:19


 


WBC  9.4  


 


RBC  4.33  


 


Hgb  11.5 L  


 


Hct  36.3  


 


MCV  83.9  


 


MCHC  31.6 L  


 


RDW  19.2 H  


 


Plt Count  197  


 


MPV  9.6  


 


Neutrophils %  78.8  


 


Lymphocytes %  9.4  D  


 


Monocytes %  10.3 H  


 


Eosinophils %  1.1  


 


Basophils %  0.4  


 


INR   2.42 H 


 


Sodium    145


 


Potassium    3.6  D


 


Chloride    102


 


Carbon Dioxide    33 H


 


Anion Gap    10


 


BUN    48 H


 


Creatinine    1.9 H D


 


POC Glucometer   


 


Random Glucose    178 H D


 


Calcium    8.8


 


Phosphorus    2.9


 


Magnesium    2.6 H














  06/24/17





  11:27


 


WBC 


 


RBC 


 


Hgb 


 


Hct 


 


MCV 


 


MCHC 


 


RDW 


 


Plt Count 


 


MPV 


 


Neutrophils % 


 


Lymphocytes % 


 


Monocytes % 


 


Eosinophils % 


 


Basophils % 


 


INR 


 


Sodium 


 


Potassium 


 


Chloride 


 


Carbon Dioxide 


 


Anion Gap 


 


BUN 


 


Creatinine 


 


POC Glucometer  193


 


Random Glucose 


 


Calcium 


 


Phosphorus 


 


Magnesium 











 Problem List 





- Problems


(1) CHF (congestive heart failure)


Code(s): I50.9 - HEART FAILURE, UNSPECIFIED   Qualifiers: 


   





(2) CKD (chronic kidney disease)


Code(s): N18.9 - CHRONIC KIDNEY DISEASE, UNSPECIFIED   Qualifiers: 


   





(3) AICD (automatic cardioverter/defibrillator) present


Code(s): Z95.810 - PRESENCE OF AUTOMATIC (IMPLANTABLE) CARDIAC DEFIBRILLATOR





(4) Acute and chronic respiratory failure (acute-on-chronic)


Code(s): J96.20 - ACUTE AND CHR RESP FAILURE, UNSP W HYPOXIA OR HYPERCAPNIA   

Qualifiers: 


     Respiratory failure complication: hypoxia        Qualified Code(s): J96.21 

- Acute and chronic respiratory failure with hypoxia  





(5) Acute on chronic renal insufficiency


Code(s): N28.9 - DISORDER OF KIDNEY AND URETER, UNSPECIFIED


N18.9 - CHRONIC KIDNEY DISEASE, UNSPECIFIED





(6) Atrial fibrillation


Code(s): I48.91 - UNSPECIFIED ATRIAL FIBRILLATION   Qualifiers: 


     Atrial fibrillation type: chronic     Qualified Code(s): I48.2 - Chronic 

atrial fibrillation  





(7) CAD (coronary artery disease)


Code(s): I25.10 - ATHSCL HEART DISEASE OF NATIVE CORONARY ARTERY W/O ANG PCTRS 

  





(8) COPD (chronic obstructive pulmonary disease)


Code(s): J44.9 - CHRONIC OBSTRUCTIVE PULMONARY DISEASE, UNSPECIFIED   





(9) Cardiomyopathy


Code(s): I42.9 - CARDIOMYOPATHY, UNSPECIFIED   





(10) Diabetes


Code(s): E11.9 - TYPE 2 DIABETES MELLITUS WITHOUT COMPLICATIONS   Qualifiers: 


     Diabetes mellitus type: type 2     Diabetes mellitus complication status: 

with kidney complications     Diabetes mellitus complication detail: with 

chronic kidney disease     Chronic kidney disease stage: stage 4 (severe)





(11) Dyspnea on exertion


Code(s): R06.09 - OTHER FORMS OF DYSPNEA





(12) Presence of permanent cardiac pacemaker


Code(s): Z95.0 - PRESENCE OF CARDIAC PACEMAKER











PLAN  IV LASIX


        O2


        MILRINONE 


        INHALED BRONCHODILATORS


        MONITOR LYTES ,RENAL FUNCTION


        DAILY WTS


        AC


        MONITOR INR





        


DR AUGUSTIN

## 2017-06-24 NOTE — PN
Physical Exam: 


SUBJECTIVE: Patient seen and examined today. No acute distress noted.  








OBJECTIVE:





 Vital Signs











 Period  Temp  Pulse  Resp  BP Sys/Alonso  Pulse Ox


 


 Last 24 Hr  96.6 F-100.1 F  20-81  16-22  100-130/57-84  95-98











GENERAL: The patient is awake, alert, and fully oriented, in no acute distress.


HEAD: Normal with no signs of trauma.


NECK: Trachea midline, full range of motion, supple. 


LUNGS: Breath sounds equal, clear to auscultation bilaterally, no wheezes, no 

crackles, no 


accessory muscle use. 


HEART: irregular rate and rhythm on AC (held yesterday for elevated INR)


ABDOMEN: Soft, nontender, nondistended, normoactive bowel sounds, no guarding, 

no 


rebound, no hepatosplenomegaly, no masses.


EXTREMITIES: 2+ pulses, warm, well-perfused, no edema. 


SKIN: Warm, dry, normal turgor, no rashes or lesions noted














 Laboratory Results - last 24 hr











  06/23/17 06/23/17 06/24/17





  15:33 21:26 05:50


 


WBC   


 


RBC   


 


Hgb   


 


Hct   


 


MCV   


 


MCHC   


 


RDW   


 


Plt Count   


 


MPV   


 


Neutrophils %   


 


Lymphocytes %   


 


Monocytes %   


 


Eosinophils %   


 


Basophils %   


 


INR   


 


Sodium   


 


Potassium   


 


Chloride   


 


Carbon Dioxide   


 


Anion Gap   


 


BUN   


 


Creatinine   


 


POC Glucometer  234  232  181


 


Random Glucose   


 


Calcium   


 


Phosphorus   


 


Magnesium   














  06/24/17 06/24/17 06/24/17





  07:19 07:19 07:19


 


WBC  9.4  


 


RBC  4.33  


 


Hgb  11.5 L  


 


Hct  36.3  


 


MCV  83.9  


 


MCHC  31.6 L  


 


RDW  19.2 H  


 


Plt Count  197  


 


MPV  9.6  


 


Neutrophils %  78.8  


 


Lymphocytes %  9.4  D  


 


Monocytes %  10.3 H  


 


Eosinophils %  1.1  


 


Basophils %  0.4  


 


INR   2.42 H 


 


Sodium    145


 


Potassium    3.6  D


 


Chloride    102


 


Carbon Dioxide    33 H


 


Anion Gap    10


 


BUN    48 H


 


Creatinine    1.9 H D


 


POC Glucometer   


 


Random Glucose    178 H D


 


Calcium    8.8


 


Phosphorus    2.9


 


Magnesium    2.6 H














  06/24/17





  11:27


 


WBC 


 


RBC 


 


Hgb 


 


Hct 


 


MCV 


 


MCHC 


 


RDW 


 


Plt Count 


 


MPV 


 


Neutrophils % 


 


Lymphocytes % 


 


Monocytes % 


 


Eosinophils % 


 


Basophils % 


 


INR 


 


Sodium 


 


Potassium 


 


Chloride 


 


Carbon Dioxide 


 


Anion Gap 


 


BUN 


 


Creatinine 


 


POC Glucometer  193


 


Random Glucose 


 


Calcium 


 


Phosphorus 


 


Magnesium 








Active Medications











Generic Name Dose Route Start Last Admin





  Trade Name Freq  PRN Reason Stop Dose Admin


 


Acetaminophen  650 mg 06/23/17 11:09 06/24/17 11:48





  Tylenol -  PO   650 mg





  Q4H PRN   Administration





  FEVER OR PAIN   


 


Aclidinium Bromide  1 puff 06/23/17 22:00 06/24/17 10:05





  Tudorza -  IH   1 puff





  BID [facility]   Administration


 


Albuterol Sulfate  1 amp 06/23/17 11:07 06/24/17 06:10





  Ventolin 0.083% Nebulizer Soln -  NEB   1 amp





  Q6H PRN   Administration





  SHORT OF BREATH/WHEEZING   


 


Allopurinol  100 mg 06/24/17 10:00 06/24/17 10:04





  Zyloprim -  PO   100 mg





  DAILY [facility]   Administration


 


Ascorbic Acid  1,000 mg 06/24/17 10:00 06/24/17 10:03





  Vitamin C -  PO   1,000 mg





  DAILY [facility]   Administration


 


Atorvastatin Calcium  40 mg 06/23/17 22:00 06/23/17 21:27





  Lipitor -  PO   40 mg





  HS [facility]   Administration


 


Budesonide/Formoterol Fumarate  1 puff 06/23/17 11:15 06/24/17 10:05





  Symbicort 160/4.5mcg -  IH   1 inh





  BID [facility]   Administration


 


Bupropion HCl  75 mg 06/23/17 11:15 06/24/17 10:04





  Wellbutrin -  PO   75 mg





  DAILY [facility]   Administration


 


Carvedilol  6.25 mg 06/23/17 11:15 06/24/17 10:04





  Coreg -  PO   6.25 mg





  BID [facility]   Administration


 


Cholecalciferol  1,000 unit 06/23/17 11:15 06/24/17 10:04





  Vitamin D3 -  PO   1,000 unit





  DAILY [facility]   Administration


 


Clopidogrel Bisulfate  75 mg 06/23/17 11:15 06/24/17 10:03





  Plavix -  PO   75 mg





  DAILY [facility]   Administration


 


Docusate Sodium  100 mg 06/23/17 22:00 06/24/17 10:04





  Colace -  PO   100 mg





  BID [facility]   Administration


 


Ferrous Sulfate  325 mg 06/23/17 11:15 06/23/17 13:01





  Feosol -  PO   325 mg





  Q48H [facility]   Administration


 


Milrinone Lactate/Dextrose  100 mls @ 9.716 mls/hr 06/23/17 11:00 06/24/17 11:12





  Milrinone 20mg/100ml Ivpb -  IVPB   9.716 mls/hr





  TITR [facility]   Administration





  0.35 MCG/KG/MIN   


 


Insulin Aspart  1 vial 06/23/17 16:30 06/24/17 11:48





  Novolog Vial Sliding Scale -  SQ   2 units





  ACHS [facility]   Administration





  Protocol   


 


Multivitamins/Minerals/Vitamin C  1 tab 06/24/17 10:00 06/24/17 10:04





  Tab-A-Vit -  PO   1 tab





  DAILY JARROD   Administration


 


Ondansetron HCl  4 mg 06/23/17 11:09  





  Zofran Injection  IVPB   





  Q6H PRN   





  NAUSEA   


 


Ranitidine HCl  75 mg 06/23/17 11:15 06/24/17 10:04





  Zantac -  PO   75 mg





  DAILY JARROD   Administration


 


Starch  1 each 06/23/17 22:00 06/23/17 21:27





  Anusol Suppository -  RC   1 each





  HS JARR[facility]   Administration


 


Warfarin Sodium  4 mg 06/23/17 18:00  





  Coumadin -  PO   





  DAILY@1800 [facility]   











ASSESSMENT/PLAN:








Problem List





- Problems


(1) CHF (congestive heart failure)


Assessment/Plan: 


-appreciate cardiology consult


-although still with dyspnea, states he is slightly better. 


-continue meds at this time. 





Code(s): I50.9 - HEART FAILURE, UNSPECIFIED   Qualifiers: 


     Congestive heart failure type: systolic     Congestive heart failure 

chronicity: acute on chronic        Qualified Code(s): I50.23 - Acute on 

chronic systolic (congestive) heart failure  





(2) Acute on chronic renal insufficiency


Assessment/Plan: 


-improving cr to 1.9


Code(s): N28.9 - DISORDER OF KIDNEY AND URETER, UNSPECIFIED


N18.9 - CHRONIC KIDNEY DISEASE, UNSPECIFIED





(3) Atrial fibrillation


Assessment/Plan: 


-held coumadin yesterday and will resume today for INR of 2.42


Code(s): I48.91 - UNSPECIFIED ATRIAL FIBRILLATION   Qualifiers: 


     Atrial fibrillation type: chronic     Qualified Code(s): I48.2 - Chronic 

atrial fibrillation  





(4) CAD (coronary artery disease)


Assessment/Plan: 


-continue home meds. 


Code(s): I25.10 - ATHSCL HEART DISEASE OF NATIVE CORONARY ARTERY W/O ANG PCTRS 

  





(5) COPD (chronic obstructive pulmonary disease)


Assessment/Plan: 


-oxygen dependance with oxygen sat monitor. 


Code(s): J44.9 - CHRONIC OBSTRUCTIVE PULMONARY DISEASE, UNSPECIFIED   





(6) Dyspnea on exertion


Code(s): R06.09 - OTHER FORMS OF DYSPNEA








Visit type





- Emergency Visit


Emergency Visit: No





- New Patient


This patient is new to me today: Yes


Date on this admission: 06/24/17





- Critical Care


Critical Care patient: No





- Discharge Referral


Referred to [facility] No

## 2017-06-24 NOTE — PN
Progress Note (short form)





- Note


Progress Note: 


cc: sob





S:  persistent dyspnea with exertion ambulating to bathroom.  + cough worse 

with lying down (improved from yesterday).  no cp, palps, dizziness





 Current Medications





Acetaminophen (Tylenol -)  650 mg PO Q4H PRN


   PRN Reason: FEVER OR PAIN


   Last Admin: 06/24/17 11:48 Dose:  650 mg


Aclidinium Bromide (Tudorza -)  1 puff IH BID [facility]


   Last Admin: 06/24/17 10:05 Dose:  1 puff


Albuterol Sulfate (Ventolin 0.083% Nebulizer Soln -)  1 amp NEB Q6H PRN


   PRN Reason: SHORT OF BREATH/WHEEZING


   Last Admin: 06/24/17 19:42 Dose:  1 amp


Allopurinol (Zyloprim -)  100 mg PO DAILY [facility]


   Last Admin: 06/24/17 10:04 Dose:  100 mg


Ascorbic Acid (Vitamin C -)  1,000 mg PO DAILY [facility]


   Last Admin: 06/24/17 10:03 Dose:  1,000 mg


Atorvastatin Calcium (Lipitor -)  40 mg PO HS [facility]


   Last Admin: 06/23/17 21:27 Dose:  40 mg


Budesonide/Formoterol Fumarate (Symbicort 160/4.5mcg -)  1 puff IH BID [facility]


   Last Admin: 06/24/17 10:05 Dose:  1 inh


Bupropion HCl (Wellbutrin -)  75 mg PO DAILY [facility]


   Last Admin: 06/24/17 10:04 Dose:  75 mg


Carvedilol (Coreg -)  6.25 mg PO BID [facility]


   Last Admin: 06/24/17 10:04 Dose:  6.25 mg


Cholecalciferol (Vitamin D3 -)  1,000 unit PO DAILY [facility]


   Last Admin: 06/24/17 10:04 Dose:  1,000 unit


Clopidogrel Bisulfate (Plavix -)  75 mg PO DAILY [facility]


   Last Admin: 06/24/17 10:03 Dose:  75 mg


Docusate Sodium (Colace -)  100 mg PO BID [facility]


   Last Admin: 06/24/17 10:04 Dose:  100 mg


Ferrous Sulfate (Feosol -)  325 mg PO Q48H [facility]


   Last Admin: 06/23/17 13:01 Dose:  325 mg


Milrinone Lactate/Dextrose (Milrinone 20mg/100ml Ivpb -)  100 mls @ 9.716 mls/

hr IVPB TITR [facility]


   PRN Reason: 0.35 MCG/KG/MIN


   Last Admin: 06/24/17 11:12 Dose:  9.716 mls/hr


Insulin Aspart (Novolog Vial Sliding Scale -)  1 vial SQ ACHS [facility]


   PRN Reason: Protocol


   Last Admin: 06/24/17 18:10 Dose:  Not Given


Multivitamins/Minerals/Vitamin C (Tab-A-Vit -)  1 tab PO DAILY [facility]


   Last Admin: 06/24/17 10:04 Dose:  1 tab


Ondansetron HCl (Zofran Injection)  4 mg IVPB Q6H PRN


   PRN Reason: NAUSEA


Ranitidine HCl (Zantac -)  75 mg PO DAILY [facility]


   Last Admin: 06/24/17 10:04 Dose:  75 mg


Starch (Anusol Suppository -)  1 each RC HS [facility]


   Last Admin: 06/23/17 21:27 Dose:  1 each


Warfarin Sodium (Coumadin -)  4 mg PO DAILY@1800 [facility]


   Last Admin: 06/24/17 18:10 Dose:  4 mg





 Vital Signs - 24 hr











  06/23/17 06/23/17 06/24/17





  22:00 22:14 02:00


 


Temperature 98.1 F  97.3 F L


 


Pulse Rate 76  20 L


 


Respiratory 16 20 20





Rate   


 


Blood Pressure 113/64  116/84


 


O2 Sat by Pulse  98 





Oximetry (%)   














  06/24/17 06/24/17 06/24/17





  05:57 09:00 10:00


 


Temperature 98 F  100.1 F H


 


Pulse Rate 81  80


 


Respiratory 22 22 22





Rate   


 


Blood Pressure 125/63  100/57


 


O2 Sat by Pulse  95 





Oximetry (%)   














  06/24/17 06/24/17 06/24/17





  10:58 14:00 17:00


 


Temperature  97.5 F L 97.2 F L


 


Pulse Rate 72 76 82


 


Respiratory  76 H 20





Rate   


 


Blood Pressure  120/70 106/67


 


O2 Sat by Pulse 95  





Oximetry (%)   








 Intake & Output











 06/22/17 06/23/17 06/24/17 06/25/17





 07:59 07:59 07:59 07:59


 


Intake Total   120 616


 


Balance   120 616


 


Weight  204 lb 207 lb 6 oz 














NAD, calm 


JVD elevated, neck supple


RRR nl s1, s2 no m/r/g.


bibasilar crackles, nl effort


+ bs soft nd, nt


trace le edema no c/c


aaox3


pos pt dp, no carotid bruits


no jaundice diaphoresis





 


  


 CBC, BMP





 06/24/17 07:19 





 06/24/17 07:19 








 Laboratory Tests











  06/24/17 06/24/17





  07:19 07:19


 


INR  2.42 H 


 


Magnesium   2.6 H











ecg 6/22/17: 





echo 10/2016: sev lve, global hk, sev dec lvef, mild rve, nl rv fcn, patty, mild-

mod mr, mv ring, tv ring, mild tr, mild pr, mild phtn, ivc mild dil





cxr: chf





tele: , occasional pvc's vs intrinsic qrs, nsvt








a/p:  76 year old man with PMH of IDDM, COPD (O2 dependant) HTN, HLD, Afib (on 

coumadin), CAD s/p CABG 2013, end stage CHF with ICD placement (on milrinone 

pump), PVD, BPH here with sob.





sob, acute systolic CHF, end stage systolic heart failure s/p ICD, on home 

milrinone infusion:


- here with sob/wright


- cxr seems similar to priors with chronic congestive changes


- bnp lower than prior admits


- cont milrinone at patient's home maintenance dose (on chronic infusion 

destination therapy)--0.35 mcg/kg/min.  


- cont home coreg. adverse effects to acei. 


- recent office icd check with stable function


- 6/23 unclear if sxs from chf or some URI/copd (productive cough).  Will give 

trial of iv lasix 80 x1 today and monitor sxs, cr trend tomorrow to see if 

improving.


- 6/24.  creatinine improved after lasix 80 mg IV x 1 yesterday.  Will continue 

daily dosing.  








afib


- currently rate controlled on coreg


- cont coumadin per INR





PVD


- con't AC, statin.





CAD s/p CABG


- No anginal symptoms, no signs acs.  Con't home ac, plavix, atorvastatin, coreg





HTN: controlled on coreg





HLD: con't statin. 





master/ CKD:


- improved after IV lasix. 





copd


- per pulm/pmd

## 2017-06-25 LAB
ALBUMIN SERPL-MCNC: 3.4 G/DL (ref 3.4–5)
ALP SERPL-CCNC: 148 U/L (ref 45–117)
ALT SERPL-CCNC: 22 U/L (ref 12–78)
ANION GAP SERPL CALC-SCNC: 8 MMOL/L (ref 8–16)
AST SERPL-CCNC: 31 U/L (ref 15–37)
BILIRUB SERPL-MCNC: 1.2 MG/DL (ref 0.2–1)
CALCIUM SERPL-MCNC: 9.4 MG/DL (ref 8.5–10.1)
CO2 SERPL-SCNC: 32 MMOL/L (ref 21–32)
CREAT SERPL-MCNC: 1.9 MG/DL (ref 0.7–1.3)
DEPRECATED RDW RBC AUTO: 18.8 % (ref 11.9–15.9)
GLUCOSE SERPL-MCNC: 131 MG/DL (ref 74–106)
INR BLD: 1.96 (ref 0.82–1.09)
MCH RBC QN AUTO: 26.5 PG (ref 25.7–33.7)
MCHC RBC AUTO-ENTMCNC: 31.9 G/DL (ref 32–35.9)
MCV RBC: 83.1 FL (ref 80–96)
PLATELET # BLD AUTO: 185 K/MM3 (ref 134–434)
PLATELET # BLD EST: ADEQUATE 10*3/UL
PMV BLD: 8.9 FL (ref 7.5–11.1)
PROT SERPL-MCNC: 7.3 G/DL (ref 6.4–8.2)
PT PNL PPP: 21.9 SEC (ref 9.98–11.88)
WBC # BLD AUTO: 8.5 K/MM3 (ref 4–10)

## 2017-06-25 RX ADMIN — VITAMIN D, TAB 1000IU (100/BT) SCH UNIT: 25 TAB at 10:53

## 2017-06-25 RX ADMIN — MILRINONE LACTATE SCH MLS/HR: 200 INJECTION, SOLUTION INTRAVENOUS at 09:57

## 2017-06-25 RX ADMIN — INSULIN ASPART SCH: 100 INJECTION, SOLUTION INTRAVENOUS; SUBCUTANEOUS at 21:39

## 2017-06-25 RX ADMIN — ALBUTEROL SULFATE PRN AMP: 2.5 SOLUTION RESPIRATORY (INHALATION) at 03:30

## 2017-06-25 RX ADMIN — DOCUSATE SODIUM SCH MG: 100 CAPSULE, LIQUID FILLED ORAL at 10:53

## 2017-06-25 RX ADMIN — MILRINONE LACTATE SCH MLS/HR: 200 INJECTION, SOLUTION INTRAVENOUS at 20:32

## 2017-06-25 RX ADMIN — ALBUTEROL SULFATE PRN AMP: 2.5 SOLUTION RESPIRATORY (INHALATION) at 09:13

## 2017-06-25 RX ADMIN — MULTIVITAMIN TABLET SCH TAB: TABLET at 10:53

## 2017-06-25 RX ADMIN — RANITIDINE SCH MG: 150 TABLET ORAL at 10:52

## 2017-06-25 RX ADMIN — CLOPIDOGREL BISULFATE SCH MG: 75 TABLET, FILM COATED ORAL at 10:52

## 2017-06-25 RX ADMIN — WARFARIN SCH MG: 2 TABLET ORAL at 17:19

## 2017-06-25 RX ADMIN — ALLOPURINOL SCH MG: 100 TABLET ORAL at 10:53

## 2017-06-25 RX ADMIN — BUDESONIDE AND FORMOTEROL FUMARATE DIHYDRATE SCH INH: 160; 4.5 AEROSOL RESPIRATORY (INHALATION) at 21:37

## 2017-06-25 RX ADMIN — PHENYLEPHRINE HYDROCHLORIDE AND FAT, HARD SCH EACH: .00525; 1.86 SUPPOSITORY RECTAL at 21:36

## 2017-06-25 RX ADMIN — INSULIN ASPART SCH UNITS: 100 INJECTION, SOLUTION INTRAVENOUS; SUBCUTANEOUS at 11:57

## 2017-06-25 RX ADMIN — TIOTROPIUM BROMIDE SCH PUFF: 18 CAPSULE ORAL; RESPIRATORY (INHALATION) at 21:35

## 2017-06-25 RX ADMIN — INSULIN ASPART SCH: 100 INJECTION, SOLUTION INTRAVENOUS; SUBCUTANEOUS at 07:22

## 2017-06-25 RX ADMIN — INSULIN ASPART SCH UNITS: 100 INJECTION, SOLUTION INTRAVENOUS; SUBCUTANEOUS at 17:18

## 2017-06-25 RX ADMIN — ATORVASTATIN CALCIUM SCH MG: 40 TABLET, FILM COATED ORAL at 21:37

## 2017-06-25 RX ADMIN — CARVEDILOL SCH MG: 6.25 TABLET, FILM COATED ORAL at 21:37

## 2017-06-25 RX ADMIN — OXYCODONE HYDROCHLORIDE AND ACETAMINOPHEN SCH MG: 500 TABLET ORAL at 10:53

## 2017-06-25 RX ADMIN — DOCUSATE SODIUM SCH MG: 100 CAPSULE, LIQUID FILLED ORAL at 21:37

## 2017-06-25 RX ADMIN — ACLIDINIUM BROMIDE SCH PUFF: 400 POWDER, METERED RESPIRATORY (INHALATION) at 10:53

## 2017-06-25 RX ADMIN — FERROUS SULFATE TAB EC 324 MG (65 MG FE EQUIVALENT) SCH MG: 324 (65 FE) TABLET DELAYED RESPONSE at 10:53

## 2017-06-25 RX ADMIN — CARVEDILOL SCH MG: 6.25 TABLET, FILM COATED ORAL at 10:53

## 2017-06-25 RX ADMIN — BUDESONIDE AND FORMOTEROL FUMARATE DIHYDRATE SCH INH: 160; 4.5 AEROSOL RESPIRATORY (INHALATION) at 10:54

## 2017-06-25 RX ADMIN — FUROSEMIDE SCH MG: 10 INJECTION, SOLUTION INTRAVENOUS at 14:51

## 2017-06-25 NOTE — PN
Physical Exam: 


Hospitalist covering for Dr. Lagunas.





SUBJECTIVE: Patient seen and examined. Reports feeling better, but not quite at 

baseline yet.








OBJECTIVE: Hospital day #2 for this 76 year old male with a history of IDDM, 

COPD (O2 dependent) HTN, HLD, Afib (on Coumadin), CAD s/p CABG 2013, end stage 

CHF with ICD placement and Milronone pump, PVD, and BPH admitted on 6/23 with 

worsening CARTAGENA.  





 Vital Signs











 Period  Temp  Pulse  Resp  BP Sys/Alonso  Pulse Ox


 


 Last 24 Hr  97.0 F-98.6 F  72-82  20-76  106-120/49-73  95-96











GENERAL: The patient is awake, alert, and fully oriented, in no acute distress.


HEAD: Normal with no signs of trauma.


EYES: PERRL, extraocular movements intact, sclera anicteric, conjunctiva clear. 

No ptosis. 


ENT: Ears normal, nares patent, oropharynx clear without exudates, moist mucous 


membranes.


NECK: Trachea midline, full range of motion, supple. 


LUNGS: Breath sounds equal, clear to auscultation bilaterally, no wheezes, no 

crackles, no 


accessory muscle use. 


HEART: Regular rate and rhythm, S1, S2 without murmur, rub or gallop.


ABDOMEN: Soft, nontender, nondistended, normoactive bowel sounds, no guarding, 

no 


rebound, no hepatosplenomegaly, no masses.


EXTREMITIES: 2+ pulses, warm, well-perfused, no edema. 


NEUROLOGICAL: Cranial nerves II through XII grossly intact. Normal speech, gait 

not 


observed.


PSYCH: Normal mood, normal affect.


SKIN: Warm, dry, normal turgor, no rashes or lesions noted














 Laboratory Results - last 24 hr











  06/24/17 06/24/17 06/25/17





  11:27 21:09 05:35


 


WBC    8.5


 


RBC    4.53


 


Hgb    12.0


 


Hct    37.7


 


MCV    83.1


 


MCHC    31.9 L


 


RDW    18.8 H


 


Plt Count    185


 


MPV    8.9


 


Platelet Estimate    Adequate


 


Platelet Comment    No clumping noted


 


INR   


 


Sodium   


 


Potassium   


 


Chloride   


 


Carbon Dioxide   


 


Anion Gap   


 


BUN   


 


Creatinine   


 


Creat Clearance w eGFR   


 


POC Glucometer  193  273 


 


Random Glucose   


 


Calcium   


 


Total Bilirubin   


 


AST   


 


ALT   


 


Alkaline Phosphatase   


 


Total Protein   


 


Albumin   














  06/25/17 06/25/17 06/25/17





  05:35 05:35 06:55


 


WBC   


 


RBC   


 


Hgb   


 


Hct   


 


MCV   


 


MCHC   


 


RDW   


 


Plt Count   


 


MPV   


 


Platelet Estimate   


 


Platelet Comment   


 


INR  1.96 H  


 


Sodium   142 


 


Potassium   4.6  D 


 


Chloride   102 


 


Carbon Dioxide   32 


 


Anion Gap   8 


 


BUN   49 H 


 


Creatinine   1.9 H 


 


Creat Clearance w eGFR   34.46 


 


POC Glucometer    146


 


Random Glucose   131 H D 


 


Calcium   9.4 


 


Total Bilirubin   1.2 H D 


 


AST   31  D 


 


ALT   22  D 


 


Alkaline Phosphatase   148 H 


 


Total Protein   7.3 


 


Albumin   3.4 








Active Medications











Generic Name Dose Route Start Last Admin





  Trade Name Freq  PRN Reason Stop Dose Admin


 


Acetaminophen  650 mg 06/23/17 11:09 06/24/17 11:48





  Tylenol -  PO   650 mg





  Q4H PRN   Administration





  FEVER OR PAIN   


 


Aclidinium Bromide  1 puff 06/23/17 22:00 06/24/17 21:08





  Tudorza -  IH   1 puff





  BID JARROD   Administration


 


Albuterol Sulfate  1 amp 06/23/17 11:07 06/25/17 09:13





  Ventolin 0.083% Nebulizer Soln -  NEB   1 amp





  Q6H PRN   Administration





  SHORT OF BREATH/WHEEZING   


 


Allopurinol  100 mg 06/24/17 10:00 06/24/17 10:04





  Zyloprim -  PO   100 mg





  DAILY JARROD   Administration


 


Ascorbic Acid  1,000 mg 06/24/17 10:00 06/24/17 10:03





  Vitamin C -  PO   1,000 mg





  DAILY JARROD   Administration


 


Atorvastatin Calcium  40 mg 06/23/17 22:00 06/24/17 21:08





  Lipitor -  PO   40 mg





  HS JARROD   Administration


 


Budesonide/Formoterol Fumarate  1 puff 06/23/17 11:15 06/24/17 21:08





  Symbicort 160/4.5mcg -  IH   1 inh





  BID JARROD   Administration


 


Bupropion HCl  75 mg 06/23/17 11:15 06/24/17 10:04





  Wellbutrin -  PO   75 mg





  DAILY JARROD   Administration


 


Carvedilol  6.25 mg 06/23/17 11:15 06/24/17 21:08





  Coreg -  PO   6.25 mg





  BID JARROD   Administration


 


Cholecalciferol  1,000 unit 06/23/17 11:15 06/24/17 10:04





  Vitamin D3 -  PO   1,000 unit





  DAILY JARROD   Administration


 


Clopidogrel Bisulfate  75 mg 06/23/17 11:15 06/24/17 10:03





  Plavix -  PO   75 mg





  DAILY JARROD   Administration


 


Docusate Sodium  100 mg 06/23/17 22:00 06/24/17 21:08





  Colace -  PO   100 mg





  BID JARROD   Administration


 


Ferrous Sulfate  325 mg 06/23/17 11:15 06/23/17 13:01





  Feosol -  PO   325 mg





  Q48H JARROD   Administration


 


Milrinone Lactate/Dextrose  100 mls @ 9.716 mls/hr 06/23/17 11:00 06/24/17 11:12





  Milrinone 20mg/100ml Ivpb -  IVPB   9.716 mls/hr





  TITR JARROD   Administration





  0.35 MCG/KG/MIN   


 


Insulin Aspart  1 vial 06/23/17 16:30 06/25/17 07:22





  Novolog Vial Sliding Scale -  SQ   Not Given





  [facility]   





  Protocol   


 


Multivitamins/Minerals/Vitamin C  1 tab 06/24/17 10:00 06/24/17 10:04





  Tab-A-Vit -  PO   1 tab





  DAILY JARROD   Administration


 


Ondansetron HCl  4 mg 06/23/17 11:09  





  Zofran Injection  IVPB   





  Q6H PRN   





  NAUSEA   


 


Ranitidine HCl  75 mg 06/23/17 11:15 06/24/17 10:04





  Zantac -  PO   75 mg





  DAILY JARROD   Administration


 


Starch  1 each 06/23/17 22:00 06/24/17 22:50





  Anusol Suppository -  RC   Not Given





  [facility]   


 


Warfarin Sodium  4 mg 06/23/17 18:00 06/24/17 18:10





  Coumadin -  PO   4 mg





  DAILY@1800 JARROD   Administration











ASSESSMENT/PLAN: 





1. CHF exacerbation


-Continue Milrinone at patient's home maintenance dose (0.35 mcg/kg/min) 


-Cont home Coreg


-Does not tolerate ACEI


-Weight up 1.5kg today; continue Lasix


-Monitor on telemetry


-Cardiology following





2. Respiratory failure (acute-on-chronic)


-Continue diuresis


-Continue supplemental O2





3. Acute-on-chronic renal insufficiency


-Cardiorenal


-Cr 1.9 (from 2.5 on admission)


-Follow





4. Atrial fibrillation


-Rate-controlled


-Continue Coumadin per INR





5. CAD


-No active issues


-Continue home Plavix/Liptor/Coreg





6. COPD


-Continue Symbicort, Tudorza


-Inhaled bronchodilators as needed


-Oxygen supplementation


-Pulmonary following





7. NIDDM


-FSACHS


-ISS


-Diabetic det





DISPO: Requires inpatient services.





Visit type





- Emergency Visit


Emergency Visit: Yes


ED Registration Date: 06/23/17


Care time: The patient presented to the Emergency Department on the above date 

and was hospitalized for further evaluation of their emergent condition.





- New Patient


This patient is new to me today: Yes


Date on this admission: 06/25/17





- Critical Care


Critical Care patient: No





- Discharge Referral


Referred to [facility] No

## 2017-06-25 NOTE — PN
Progress Note (short form)





- Note


Progress Note: 


Patient seen and examined in the Telemetry unit. 


Cough is a little better today, but still with CARTAGENA. 


NAD, on 4 L NC O2. 


No CP. 


 


 Intake & Output











 06/22/17 06/23/17 06/24/17 06/25/17





 23:59 23:59 23:59 23:59


 


Intake Total   1036 270


 


Balance   1036 270


 


Weight 204 lb 207 lb 4 oz 207 lb 6 oz 210 lb 6 oz











 Last Vital Signs











Temp Pulse Resp BP Pulse Ox


 


 97.0 F L  76   20   116/68   92 L


 


 06/25/17 14:16  06/25/17 14:16  06/25/17 14:16  06/25/17 14:16  06/25/17 11:04











Active Medications





Acetaminophen (Tylenol -)  650 mg PO Q4H PRN


   PRN Reason: FEVER OR PAIN


   Last Admin: 06/24/17 11:48 Dose:  650 mg


Aclidinium Bromide (Tudorza -)  1 puff IH BID [facility]


   Last Admin: 06/25/17 10:53 Dose:  1 puff


Albuterol Sulfate (Ventolin 0.083% Nebulizer Soln -)  1 amp NEB Q6H PRN


   PRN Reason: SHORT OF BREATH/WHEEZING


   Last Admin: 06/25/17 09:13 Dose:  1 amp


Allopurinol (Zyloprim -)  100 mg PO DAILY [facility]


   Last Admin: 06/25/17 10:53 Dose:  100 mg


Ascorbic Acid (Vitamin C -)  1,000 mg PO DAILY [facility]


   Last Admin: 06/25/17 10:53 Dose:  1,000 mg


Atorvastatin Calcium (Lipitor -)  40 mg PO HS [facility]


   Last Admin: 06/24/17 21:08 Dose:  40 mg


Budesonide/Formoterol Fumarate (Symbicort 160/4.5mcg -)  1 puff IH BID [facility]


   Last Admin: 06/25/17 10:54 Dose:  1 inh


Bupropion HCl (Wellbutrin -)  75 mg PO DAILY [facility]


   Last Admin: 06/25/17 10:53 Dose:  75 mg


Carvedilol (Coreg -)  6.25 mg PO BID [facility]


   Last Admin: 06/25/17 10:53 Dose:  6.25 mg


Cholecalciferol (Vitamin D3 -)  1,000 unit PO DAILY [facility]


   Last Admin: 06/25/17 10:53 Dose:  1,000 unit


Clopidogrel Bisulfate (Plavix -)  75 mg PO DAILY [facility]


   Last Admin: 06/25/17 10:52 Dose:  75 mg


Docusate Sodium (Colace -)  100 mg PO BID [facility]


   Last Admin: 06/25/17 10:53 Dose:  100 mg


Ferrous Sulfate (Feosol -)  325 mg PO Q48H [facility]


   Last Admin: 06/25/17 10:53 Dose:  325 mg


Furosemide (Lasix Injection -)  80 mg IVPUSH BID@0600,1400 [facility]


   Last Admin: 06/25/17 14:51 Dose:  80 mg


Milrinone Lactate/Dextrose (Milrinone 20mg/100ml Ivpb -)  100 mls @ 9.716 mls/

hr IVPB TITR [facility]


   PRN Reason: 0.35 MCG/KG/MIN


   Last Admin: 06/25/17 09:57 Dose:  9.716 mls/hr


Insulin Aspart (Novolog Vial Sliding Scale -)  1 vial SQ ACHS [facility]


   PRN Reason: Protocol


   Last Admin: 06/25/17 11:57 Dose:  2 units


Multivitamins/Minerals/Vitamin C (Tab-A-Vit -)  1 tab PO DAILY [facility]


   Last Admin: 06/25/17 10:53 Dose:  1 tab


Ondansetron HCl (Zofran Injection)  4 mg IVPB Q6H PRN


   PRN Reason: NAUSEA


Ranitidine HCl (Zantac -)  75 mg PO DAILY [facility]


   Last Admin: 06/25/17 10:52 Dose:  75 mg


Starch (Anusol Suppository -)  1 each RC HS [facility]


   Last Admin: 06/24/17 22:50 Dose:  Not Given


Warfarin Sodium (Coumadin -)  4 mg PO DAILY@1800 [facility]


   Last Admin: 06/24/17 18:10 Dose:  4 mg








GENERAL: Awake, alert, mildly tachypneic at rest 


HEAD: Normal with no signs of trauma.


NECK: Trachea midline, full range of motion, supple. 


LUNGS: Bibasilar rales


HEART: irregular 


ABDOMEN: Soft, NT, ND, (+) BS 


EXTREMITIES: 2+ pulses, warm, well-perfused, no edema. 


SKIN: Warm, dry, normal turgor, no rashes or lesions noted





 


 Laboratory Results - last 24 hr











  06/24/17 06/25/17 06/25/17





  21:09 05:35 05:35


 


WBC   8.5 


 


RBC   4.53 


 


Hgb   12.0 


 


Hct   37.7 


 


MCV   83.1 


 


MCHC   31.9 L 


 


RDW   18.8 H 


 


Plt Count   185 


 


MPV   8.9 


 


Platelet Estimate   Adequate 


 


Platelet Comment   No clumping noted 


 


INR    1.96 H


 


Sodium   


 


Potassium   


 


Chloride   


 


Carbon Dioxide   


 


Anion Gap   


 


BUN   


 


Creatinine   


 


Creat Clearance w eGFR   


 


POC Glucometer  273  


 


Random Glucose   


 


Calcium   


 


Total Bilirubin   


 


AST   


 


ALT   


 


Alkaline Phosphatase   


 


Total Protein   


 


Albumin   














  06/25/17 06/25/17 06/25/17





  05:35 06:55 11:42


 


WBC   


 


RBC   


 


Hgb   


 


Hct   


 


MCV   


 


MCHC   


 


RDW   


 


Plt Count   


 


MPV   


 


Platelet Estimate   


 


Platelet Comment   


 


INR   


 


Sodium  142  


 


Potassium  4.6  D  


 


Chloride  102  


 


Carbon Dioxide  32  


 


Anion Gap  8  


 


BUN  49 H  


 


Creatinine  1.9 H  


 


Creat Clearance w eGFR  34.46  


 


POC Glucometer   146  197


 


Random Glucose  131 H D  


 


Calcium  9.4  


 


Total Bilirubin  1.2 H D  


 


AST  31  D  


 


ALT  22  D  


 


Alkaline Phosphatase  148 H  


 


Total Protein  7.3  


 


Albumin  3.4  














 Problem List 





- Problems


(1) CHF (congestive heart failure)


Code(s): I50.9 - HEART FAILURE, UNSPECIFIED   Qualifiers: 


   





(2) CKD (chronic kidney disease)


Code(s): N18.9 - CHRONIC KIDNEY DISEASE, UNSPECIFIED   Qualifiers: 


   





(3) AICD (automatic cardioverter/defibrillator) present


Code(s): Z95.810 - PRESENCE OF AUTOMATIC (IMPLANTABLE) CARDIAC DEFIBRILLATOR





(4) Acute and chronic respiratory failure (acute-on-chronic)


Code(s): J96.20 - ACUTE AND CHR RESP FAILURE, UNSP W HYPOXIA OR HYPERCAPNIA   

Qualifiers: 


     Respiratory failure complication: hypoxia        Qualified Code(s): J96.21 

- Acute and chronic respiratory failure with hypoxia  





(5) Acute on chronic renal insufficiency


Code(s): N28.9 - DISORDER OF KIDNEY AND URETER, UNSPECIFIED


N18.9 - CHRONIC KIDNEY DISEASE, UNSPECIFIED





(6) Atrial fibrillation


Code(s): I48.91 - UNSPECIFIED ATRIAL FIBRILLATION   Qualifiers: 


     Atrial fibrillation type: chronic     Qualified Code(s): I48.2 - Chronic 

atrial fibrillation  





(7) CAD (coronary artery disease)


Code(s): I25.10 - ATHSCL HEART DISEASE OF NATIVE CORONARY ARTERY W/O ANG PCTRS 

  





(8) COPD (chronic obstructive pulmonary disease)


Code(s): J44.9 - CHRONIC OBSTRUCTIVE PULMONARY DISEASE, UNSPECIFIED   





(9) Cardiomyopathy


Code(s): I42.9 - CARDIOMYOPATHY, UNSPECIFIED   





(10) Diabetes


Code(s): E11.9 - TYPE 2 DIABETES MELLITUS WITHOUT COMPLICATIONS   Qualifiers: 


     Diabetes mellitus type: type 2     Diabetes mellitus complication status: 

with kidney complications     Diabetes mellitus complication detail: with 

chronic kidney disease     Chronic kidney disease stage: stage 4 (severe)





(11) Dyspnea on exertion


Code(s): R06.09 - OTHER FORMS OF DYSPNEA





(12) Presence of permanent cardiac pacemaker


Code(s): Z95.0 - PRESENCE OF CARDIAC PACEMAKER











PLAN  IV LASIX


        O2


        MILRINONE 


        INHALED BRONCHODILATORS


        MONITOR LYTES ,RENAL FUNCTION


        DAILY WTS


        AC


        MONITOR INR





        


DR AUGUSTNI

## 2017-06-25 NOTE — PN
Progress Note (short form)





- Note


Progress Note: 


cc: sob





S:  persistent dyspnea with exertion ambulating to bathroom. + cough slightly 

better but persists.   no cp, palps, dizziness. States he is feeling better but 

has had continued weight gain. 





  


 Current Medications





Acetaminophen (Tylenol -)  650 mg PO Q4H PRN


   PRN Reason: FEVER OR PAIN


   Last Admin: 06/24/17 11:48 Dose:  650 mg


Aclidinium Bromide (Tudorza -)  1 puff IH BID [facility]


   Last Admin: 06/25/17 10:53 Dose:  1 puff


Albuterol Sulfate (Ventolin 0.083% Nebulizer Soln -)  1 amp NEB Q6H PRN


   PRN Reason: SHORT OF BREATH/WHEEZING


   Last Admin: 06/25/17 09:13 Dose:  1 amp


Allopurinol (Zyloprim -)  100 mg PO DAILY [facility]


   Last Admin: 06/25/17 10:53 Dose:  100 mg


Ascorbic Acid (Vitamin C -)  1,000 mg PO DAILY [facility]


   Last Admin: 06/25/17 10:53 Dose:  1,000 mg


Atorvastatin Calcium (Lipitor -)  40 mg PO HS [facility]


   Last Admin: 06/24/17 21:08 Dose:  40 mg


Budesonide/Formoterol Fumarate (Symbicort 160/4.5mcg -)  1 puff IH BID [facility]


   Last Admin: 06/25/17 10:54 Dose:  1 inh


Bupropion HCl (Wellbutrin -)  75 mg PO DAILY [facility]


   Last Admin: 06/25/17 10:53 Dose:  75 mg


Carvedilol (Coreg -)  6.25 mg PO BID [facility]


   Last Admin: 06/25/17 10:53 Dose:  6.25 mg


Cholecalciferol (Vitamin D3 -)  1,000 unit PO DAILY JARR[facility]


   Last Admin: 06/25/17 10:53 Dose:  1,000 unit


Clopidogrel Bisulfate (Plavix -)  75 mg PO DAILY [facility]


   Last Admin: 06/25/17 10:52 Dose:  75 mg


Docusate Sodium (Colace -)  100 mg PO BID [facility]


   Last Admin: 06/25/17 10:53 Dose:  100 mg


Ferrous Sulfate (Feosol -)  325 mg PO Q48H [facility]


   Last Admin: 06/25/17 10:53 Dose:  325 mg


Milrinone Lactate/Dextrose (Milrinone 20mg/100ml Ivpb -)  100 mls @ 9.716 mls/

hr IVPB TITR JARR[facility]


   PRN Reason: 0.35 MCG/KG/MIN


   Last Admin: 06/25/17 09:57 Dose:  9.716 mls/hr


Insulin Aspart (Novolog Vial Sliding Scale -)  1 vial SQ ACHS [facility]


   PRN Reason: Protocol


   Last Admin: 06/25/17 11:57 Dose:  2 units


Multivitamins/Minerals/Vitamin C (Tab-A-Vit -)  1 tab PO DAILY [facility]


   Last Admin: 06/25/17 10:53 Dose:  1 tab


Ondansetron HCl (Zofran Injection)  4 mg IVPB Q6H PRN


   PRN Reason: NAUSEA


Ranitidine HCl (Zantac -)  75 mg PO DAILY [facility]


   Last Admin: 06/25/17 10:52 Dose:  75 mg


Starch (Anusol Suppository -)  1 each RC HS [facility]


   Last Admin: 06/24/17 22:50 Dose:  Not Given


Warfarin Sodium (Coumadin -)  4 mg PO DAILY@1800 [facility]


   Last Admin: 06/24/17 18:10 Dose:  4 mg





 Vital Signs - 24 hr











  06/24/17 06/24/17 06/24/17





  14:00 17:00 21:00


 


Temperature 97.5 F L 97.2 F L 


 


Pulse Rate 76 82 


 


Respiratory 76 H 20 20





Rate   


 


Blood Pressure 120/70 106/67 


 


O2 Sat by Pulse   96





Oximetry (%)   














  06/24/17 06/25/17 06/25/17





  22:00 02:00 09:00


 


Temperature 98.6 F 97.0 F L 98.5 F


 


Pulse Rate 75 75 


 


Respiratory 22 20 20





Rate   


 


Blood Pressure 115/73 107/49 


 


O2 Sat by Pulse   92 L





Oximetry (%)   














  06/25/17 06/25/17





  10:00 11:04


 


Temperature 97.7 F 


 


Pulse Rate 75 74


 


Respiratory 18 





Rate  


 


Blood Pressure 117/67 


 


O2 Sat by Pulse  92 L





Oximetry (%)  








 Intake & Output











 06/23/17 06/24/17 06/25/17 06/26/17





 07:59 07:59 07:59 07:59


 


Intake Total  120 1036 


 


Balance  120 1036 


 


Weight 204 lb 207 lb 6 oz 210 lb 6 oz 














NAD, calm 


JVD elevated, neck supple


RRR nl s1, s2 no m/r/g.


bibasilar crackles, nl effort


+ bs soft nd, nt


trace le edema no c/c


aaox3


pos pt dp, no carotid bruits


no jaundice diaphoresis





 


  


  


 CBC, BMP





 06/25/17 05:35 





 06/25/17 05:35 








 Laboratory Tests











  06/25/17 06/25/17





  05:35 05:35


 


INR  1.96 H 


 


Total Bilirubin   1.2 H D


 


AST   31  D


 


ALT   22  D


 


Alkaline Phosphatase   148 H


 


Albumin   3.4














ecg 6/22/17: 





echo 10/2016: sev lve, global hk, sev dec lvef, mild rve, nl rv fcn, patty, mild-

mod mr, mv ring, tv ring, mild tr, mild pr, mild phtn, ivc mild dil





cxr: chf





tele: , occasional pvc's  








a/p:  76 year old man with PMH of IDDM, COPD (O2 dependant) HTN, HLD, Afib (on 

coumadin), CAD s/p CABG 2013, end stage CHF with ICD placement (on milrinone 

pump), PVD, BPH here with sob.





sob, acute systolic CHF, end stage systolic heart failure s/p ICD, on home 

milrinone infusion:


- here with sob/wright


- cxr seems similar to priors with chronic congestive changes


- bnp lower than prior admits


- cont milrinone at patient's home maintenance dose (on chronic infusion 

destination therapy)--0.35 mcg/kg/min.  


- cont home coreg. adverse effects to acei. 


- recent office icd check with stable function


- 6/23 unclear if sxs from chf or some URI/copd (productive cough).  Will give 

trial of iv lasix 80 x1 today and monitor sxs, cr trend tomorrow to see if 

improving.


- 6/24.  Symptoms improving, creatinine improved after lasix 80 mg IV x 1 

yesterday.  Will continue daily dosing. 


- 6/25   Ongoing weight gain, will increase lasix to bid dosing.  








afib


- currently rate controlled on coreg


- cont coumadin per INR





PVD


- con't AC, statin.





CAD s/p CABG


- No anginal symptoms, no signs acs.  Con't home ac, plavix, atorvastatin, coreg





HTN: controlled on coreg





HLD: con't statin. 





master/ CKD:


- improving/stable on IV lasix.  con't to monitor.





copd


- per pulm/pmd

## 2017-06-26 LAB
ALBUMIN SERPL-MCNC: 3.3 G/DL (ref 3.4–5)
ALP SERPL-CCNC: 175 U/L (ref 45–117)
ALT SERPL-CCNC: 26 U/L (ref 12–78)
ANION GAP SERPL CALC-SCNC: 8 MMOL/L (ref 8–16)
AST SERPL-CCNC: 29 U/L (ref 15–37)
BASOPHILS # BLD: 0.6 % (ref 0–2)
BILIRUB CONJ SERPL-MCNC: 0.3 MG/DL (ref 0–0.2)
BILIRUB SERPL-MCNC: 1 MG/DL (ref 0.2–1)
CALCIUM SERPL-MCNC: 9.2 MG/DL (ref 8.5–10.1)
CO2 SERPL-SCNC: 34 MMOL/L (ref 21–32)
CREAT SERPL-MCNC: 1.8 MG/DL (ref 0.7–1.3)
DEPRECATED RDW RBC AUTO: 18.4 % (ref 11.9–15.9)
EOSINOPHIL # BLD: 1.5 % (ref 0–4.5)
GLUCOSE SERPL-MCNC: 138 MG/DL (ref 74–106)
INR BLD: 2.1 (ref 0.82–1.09)
MAGNESIUM SERPL-MCNC: 2.6 MG/DL (ref 1.8–2.4)
MCH RBC QN AUTO: 26.8 PG (ref 25.7–33.7)
MCHC RBC AUTO-ENTMCNC: 32.6 G/DL (ref 32–35.9)
MCV RBC: 82 FL (ref 80–96)
NEUTROPHILS # BLD: 75.6 % (ref 42.8–82.8)
PLATELET # BLD AUTO: 179 K/MM3 (ref 134–434)
PMV BLD: 8.5 FL (ref 7.5–11.1)
PROT SERPL-MCNC: 7.3 G/DL (ref 6.4–8.2)
PT PNL PPP: 23.5 SEC (ref 9.98–11.88)
WBC # BLD AUTO: 7.6 K/MM3 (ref 4–10)

## 2017-06-26 RX ADMIN — ALBUTEROL SULFATE PRN AMP: 2.5 SOLUTION RESPIRATORY (INHALATION) at 19:51

## 2017-06-26 RX ADMIN — CLOPIDOGREL BISULFATE SCH MG: 75 TABLET, FILM COATED ORAL at 09:06

## 2017-06-26 RX ADMIN — FUROSEMIDE SCH MG: 10 INJECTION, SOLUTION INTRAVENOUS at 14:45

## 2017-06-26 RX ADMIN — DOCUSATE SODIUM SCH MG: 100 CAPSULE, LIQUID FILLED ORAL at 09:06

## 2017-06-26 RX ADMIN — INSULIN ASPART SCH: 100 INJECTION, SOLUTION INTRAVENOUS; SUBCUTANEOUS at 17:14

## 2017-06-26 RX ADMIN — ATORVASTATIN CALCIUM SCH MG: 40 TABLET, FILM COATED ORAL at 21:23

## 2017-06-26 RX ADMIN — FUROSEMIDE SCH MG: 10 INJECTION, SOLUTION INTRAVENOUS at 05:57

## 2017-06-26 RX ADMIN — ALBUTEROL SULFATE PRN AMP: 2.5 SOLUTION RESPIRATORY (INHALATION) at 23:18

## 2017-06-26 RX ADMIN — CARVEDILOL SCH MG: 6.25 TABLET, FILM COATED ORAL at 09:06

## 2017-06-26 RX ADMIN — OXYCODONE HYDROCHLORIDE AND ACETAMINOPHEN SCH MG: 500 TABLET ORAL at 09:06

## 2017-06-26 RX ADMIN — VITAMIN D, TAB 1000IU (100/BT) SCH UNIT: 25 TAB at 09:06

## 2017-06-26 RX ADMIN — INSULIN ASPART SCH UNITS: 100 INJECTION, SOLUTION INTRAVENOUS; SUBCUTANEOUS at 21:24

## 2017-06-26 RX ADMIN — RANITIDINE SCH MG: 150 TABLET ORAL at 09:06

## 2017-06-26 RX ADMIN — TIOTROPIUM BROMIDE SCH PUFF: 18 CAPSULE ORAL; RESPIRATORY (INHALATION) at 09:07

## 2017-06-26 RX ADMIN — MILRINONE LACTATE SCH MLS/HR: 200 INJECTION, SOLUTION INTRAVENOUS at 11:09

## 2017-06-26 RX ADMIN — ALLOPURINOL SCH MG: 100 TABLET ORAL at 09:07

## 2017-06-26 RX ADMIN — DOCUSATE SODIUM SCH MG: 100 CAPSULE, LIQUID FILLED ORAL at 21:23

## 2017-06-26 RX ADMIN — INSULIN ASPART SCH UNITS: 100 INJECTION, SOLUTION INTRAVENOUS; SUBCUTANEOUS at 11:09

## 2017-06-26 RX ADMIN — PHENYLEPHRINE HYDROCHLORIDE AND FAT, HARD SCH EACH: .00525; 1.86 SUPPOSITORY RECTAL at 21:23

## 2017-06-26 RX ADMIN — BUDESONIDE AND FORMOTEROL FUMARATE DIHYDRATE SCH PUFF: 160; 4.5 AEROSOL RESPIRATORY (INHALATION) at 21:26

## 2017-06-26 RX ADMIN — INSULIN ASPART SCH UNITS: 100 INJECTION, SOLUTION INTRAVENOUS; SUBCUTANEOUS at 06:03

## 2017-06-26 RX ADMIN — CARVEDILOL SCH MG: 6.25 TABLET, FILM COATED ORAL at 21:23

## 2017-06-26 RX ADMIN — BUDESONIDE AND FORMOTEROL FUMARATE DIHYDRATE SCH PUFF: 160; 4.5 AEROSOL RESPIRATORY (INHALATION) at 09:07

## 2017-06-26 RX ADMIN — WARFARIN SCH MG: 2 TABLET ORAL at 18:18

## 2017-06-26 RX ADMIN — ALBUTEROL SULFATE PRN AMP: 2.5 SOLUTION RESPIRATORY (INHALATION) at 06:03

## 2017-06-26 RX ADMIN — MULTIVITAMIN TABLET SCH TAB: TABLET at 09:06

## 2017-06-26 NOTE — PN
Progress Note, Physician


History of Present Illness: 


PULMONARY





ALERT,STILL DYSPNEIC AT REST ON O2, .2LBS





- Current Medication List


Current Medications: 


Active Medications





Acetaminophen (Tylenol -)  650 mg PO Q4H PRN


   PRN Reason: FEVER OR PAIN


   Last Admin: 06/24/17 11:48 Dose:  650 mg


Albuterol Sulfate (Ventolin 0.083% Nebulizer Soln -)  1 amp NEB Q6H PRN


   PRN Reason: SHORT OF BREATH/WHEEZING


   Last Admin: 06/26/17 06:03 Dose:  1 amp


Allopurinol (Zyloprim -)  100 mg PO DAILY [facility]


   Last Admin: 06/26/17 09:07 Dose:  100 mg


Ascorbic Acid (Vitamin C -)  1,000 mg PO DAILY [facility]


   Last Admin: 06/26/17 09:06 Dose:  1,000 mg


Atorvastatin Calcium (Lipitor -)  40 mg PO HS [facility]


   Last Admin: 06/25/17 21:37 Dose:  40 mg


Budesonide/Formoterol Fumarate (Symbicort 160/4.5mcg -)  1 puff IH BID [facility]


   Last Admin: 06/26/17 09:07 Dose:  1 puff


Bupropion HCl (Wellbutrin -)  75 mg PO DAILY [facility]


   Last Admin: 06/26/17 09:06 Dose:  75 mg


Carvedilol (Coreg -)  6.25 mg PO BID [facility]


   Last Admin: 06/26/17 09:06 Dose:  6.25 mg


Cholecalciferol (Vitamin D3 -)  1,000 unit PO DAILY [facility]


   Last Admin: 06/26/17 09:06 Dose:  1,000 unit


Clopidogrel Bisulfate (Plavix -)  75 mg PO DAILY [facility]


   Last Admin: 06/26/17 09:06 Dose:  75 mg


Docusate Sodium (Colace -)  100 mg PO BID [facility]


   Last Admin: 06/26/17 09:06 Dose:  100 mg


Ferrous Sulfate (Feosol -)  325 mg PO Q48H [facility]


   Last Admin: 06/25/17 10:53 Dose:  325 mg


Furosemide (Lasix Injection -)  80 mg IVPUSH BID@0600,1400 [facility]


   Last Admin: 06/26/17 05:57 Dose:  80 mg


Milrinone Lactate/Dextrose (Milrinone 20mg/100ml Ivpb -)  100 mls @ 9.716 mls/

hr IVPB TITR JARROD


   PRN Reason: 0.35 MCG/KG/MIN


   Last Admin: 06/25/17 20:32 Dose:  9.716 mls/hr


Insulin Aspart (Novolog Vial Sliding Scale -)  1 vial SQ ACHS [facility]


   PRN Reason: Protocol


   Last Admin: 06/26/17 06:03 Dose:  2 units


Multivitamins/Minerals/Vitamin C (Tab-A-Vit -)  1 tab PO DAILY [facility]


   Last Admin: 06/26/17 09:06 Dose:  1 tab


Ondansetron HCl (Zofran Injection)  4 mg IVPB Q6H PRN


   PRN Reason: NAUSEA


Ranitidine HCl (Zantac -)  75 mg PO DAILY [facility]


   Last Admin: 06/26/17 09:06 Dose:  75 mg


Starch (Anusol Suppository -)  1 each RC HS [facility]


   Last Admin: 06/25/17 21:36 Dose:  1 each


Tiotropium Bromide (Spiriva -)  1 puff IH DAILY [facility]


   Last Admin: 06/26/17 09:07 Dose:  1 puff


Warfarin Sodium (Coumadin -)  4 mg PO DAILY@1800 [facility]


   Last Admin: 06/25/17 17:19 Dose:  4 mg











- Objective


Vital Signs: 


 Vital Signs











Temperature  97.6 F   06/26/17 06:00


 


Pulse Rate  76   06/26/17 06:00


 


Respiratory Rate  20   06/26/17 06:00


 


Blood Pressure  122/77   06/26/17 06:00


 


O2 Sat by Pulse Oximetry (%)  94 L  06/25/17 21:00











Constitutional: Yes: Well Nourished, Calm, Other (DYSPNEIC)


Eyes: Yes: WNL


HENT: Yes: WNL


Neck: Yes: WNL


Cardiovascular: Yes: Pulse Irregular, S1, S2


Respiratory: Yes: Rales (BILATERAL RALES 1/2 UP)


Gastrointestinal: Yes: Normal Bowel Sounds, Soft


Extremities: Yes: WNL, Internal Rotation


Edema: LLE: Trace, RLE: Trace


Labs: 


 CBC, BMP





 06/26/17 05:57 





 06/26/17 05:57 





 INR, PTT











INR  1.96  (0.82-1.09)  H  06/25/17  05:35    














Problem List





- Problems


(1) CHF (congestive heart failure)


Code(s): I50.9 - HEART FAILURE, UNSPECIFIED   Qualifiers: 


     Congestive heart failure type: systolic     Congestive heart failure 

chronicity: acute on chronic        Qualified Code(s): I50.23 - Acute on 

chronic systolic (congestive) heart failure  





(2) CKD (chronic kidney disease)


Code(s): N18.9 - CHRONIC KIDNEY DISEASE, UNSPECIFIED   Qualifiers: 


   





(3) AICD (automatic cardioverter/defibrillator) present


Code(s): Z95.810 - PRESENCE OF AUTOMATIC (IMPLANTABLE) CARDIAC DEFIBRILLATOR





(4) Acute and chronic respiratory failure (acute-on-chronic)


Code(s): J96.20 - ACUTE AND CHR RESP FAILURE, UNSP W HYPOXIA OR HYPERCAPNIA   

Qualifiers: 


     Respiratory failure complication: hypoxia        Qualified Code(s): J96.21 

- Acute and chronic respiratory failure with hypoxia  





(5) Acute on chronic renal insufficiency


Code(s): N28.9 - DISORDER OF KIDNEY AND URETER, UNSPECIFIED


N18.9 - CHRONIC KIDNEY DISEASE, UNSPECIFIED





(6) Atrial fibrillation


Code(s): I48.91 - UNSPECIFIED ATRIAL FIBRILLATION   Qualifiers: 


     Atrial fibrillation type: chronic     Qualified Code(s): I48.2 - Chronic 

atrial fibrillation  





(7) CAD (coronary artery disease)


Code(s): I25.10 - ATHSCL HEART DISEASE OF NATIVE CORONARY ARTERY W/O ANG PCTRS 

  





(8) COPD (chronic obstructive pulmonary disease)


Code(s): J44.9 - CHRONIC OBSTRUCTIVE PULMONARY DISEASE, UNSPECIFIED   





(9) Cardiomyopathy


Code(s): I42.9 - CARDIOMYOPATHY, UNSPECIFIED   





(10) Diabetes


Code(s): E11.9 - TYPE 2 DIABETES MELLITUS WITHOUT COMPLICATIONS   Qualifiers: 


     Diabetes mellitus type: type 2     Diabetes mellitus complication status: 

with kidney complications     Diabetes mellitus complication detail: with 

chronic kidney disease     Chronic kidney disease stage: stage 4 (severe)





(11) Dyspnea on exertion


Code(s): R06.09 - OTHER FORMS OF DYSPNEA





(12) Presence of permanent cardiac pacemaker


Code(s): Z95.0 - PRESENCE OF CARDIAC PACEMAKER








Assessment/Plan


IMP ACUTE ON CHRONIC RESPIRATORY FAILURE


      DECOMPENASTED CHF


      SEVERE LV DYSFUNCTION S/P ICD ,ON MILRINONE DRIP


      ASHD S/P CABG


      ADVANCED COPD ON O2


      AFIB


      ACUTE ON CKD


      PVD


      BPH








PLAN  CONTINUE IV LASIX


        O2


        MILRINONE 


        INHALED BRONCHODILATORS


        MONITOR LYTES ,RENAL FUNCTION


        DAILY WTS


        F/U CHEST X-RAY TODAY


         AC


        MONITOR INR





        





DR EASTMAN





 Problem List 





- Problems


(1) CHF (congestive heart failure)


Code(s): I50.9 - HEART FAILURE, UNSPECIFIED   Qualifiers: 


   





(2) CKD (chronic kidney disease)


Code(s): N18.9 - CHRONIC KIDNEY DISEASE, UNSPECIFIED   Qualifiers: 


   





(3) AICD (automatic cardioverter/defibrillator) present


Code(s): Z95.810 - PRESENCE OF AUTOMATIC (IMPLANTABLE) CARDIAC DEFIBRILLATOR





(4) Acute and chronic respiratory failure (acute-on-chronic)


Code(s): J96.20 - ACUTE AND CHR RESP FAILURE, UNSP W HYPOXIA OR HYPERCAPNIA   

Qualifiers: 


     Respiratory failure complication: hypoxia        Qualified Code(s): J96.21 

- Acute and chronic respiratory failure with hypoxia  





(5) Acute on chronic renal insufficiency


Code(s): N28.9 - DISORDER OF KIDNEY AND URETER, UNSPECIFIED


N18.9 - CHRONIC KIDNEY DISEASE, UNSPECIFIED





(6) Atrial fibrillation


Code(s): I48.91 - UNSPECIFIED ATRIAL FIBRILLATION   Qualifiers: 


     Atrial fibrillation type: chronic     Qualified Code(s): I48.2 - Chronic 

atrial fibrillation  





(7) CAD (coronary artery disease)


Code(s): I25.10 - ATHSCL HEART DISEASE OF NATIVE CORONARY ARTERY W/O ANG PCTRS 

  





(8) COPD (chronic obstructive pulmonary disease)


Code(s): J44.9 - CHRONIC OBSTRUCTIVE PULMONARY DISEASE, UNSPECIFIED   





(9) Cardiomyopathy


Code(s): I42.9 - CARDIOMYOPATHY, UNSPECIFIED   





(10) Diabetes


Code(s): E11.9 - TYPE 2 DIABETES MELLITUS WITHOUT COMPLICATIONS   Qualifiers: 


     Diabetes mellitus type: type 2     Diabetes mellitus complication status: 

with kidney complications     Diabetes mellitus complication detail: with 

chronic kidney disease     Chronic kidney disease stage: stage 4 (severe)





(11) Dyspnea on exertion


Code(s): R06.09 - OTHER FORMS OF DYSPNEA





(12) Presence of permanent cardiac pacemaker


Code(s): Z95.0 - PRESENCE OF CARDIAC PACEMAKER

## 2017-06-26 NOTE — PN
Progress Note, Physician


Chief Complaint: 


sob/cough


History of Present Illness: 


sob and cough slightly improved


no signif leg swelling


no cp, palpit





ex cigs





- Current Medication List


Current Medications: 


Active Medications





Acetaminophen (Tylenol -)  650 mg PO Q4H PRN


   PRN Reason: FEVER OR PAIN


   Last Admin: 06/24/17 11:48 Dose:  650 mg


Albuterol Sulfate (Ventolin 0.083% Nebulizer Soln -)  1 amp NEB Q6H PRN


   PRN Reason: SHORT OF BREATH/WHEEZING


   Last Admin: 06/26/17 06:03 Dose:  1 amp


Allopurinol (Zyloprim -)  100 mg PO DAILY [facility]


   Last Admin: 06/26/17 09:07 Dose:  100 mg


Ascorbic Acid (Vitamin C -)  1,000 mg PO DAILY [facility]


   Last Admin: 06/26/17 09:06 Dose:  1,000 mg


Atorvastatin Calcium (Lipitor -)  40 mg PO HS [facility]


   Last Admin: 06/25/17 21:37 Dose:  40 mg


Budesonide/Formoterol Fumarate (Symbicort 160/4.5mcg -)  1 puff IH BID [facility]


   Last Admin: 06/26/17 09:07 Dose:  1 puff


Bupropion HCl (Wellbutrin -)  75 mg PO DAILY [facility]


   Last Admin: 06/26/17 09:06 Dose:  75 mg


Carvedilol (Coreg -)  6.25 mg PO BID [facility]


   Last Admin: 06/26/17 09:06 Dose:  6.25 mg


Cholecalciferol (Vitamin D3 -)  1,000 unit PO DAILY [facility]


   Last Admin: 06/26/17 09:06 Dose:  1,000 unit


Clopidogrel Bisulfate (Plavix -)  75 mg PO DAILY [facility]


   Last Admin: 06/26/17 09:06 Dose:  75 mg


Docusate Sodium (Colace -)  100 mg PO BID [facility]


   Last Admin: 06/26/17 09:06 Dose:  100 mg


Ferrous Sulfate (Feosol -)  325 mg PO Q48H [facility]


   Last Admin: 06/25/17 10:53 Dose:  325 mg


Furosemide (Lasix Injection -)  80 mg IVPUSH BID@0600,1400 [facility]


   Last Admin: 06/26/17 05:57 Dose:  80 mg


Milrinone Lactate/Dextrose (Milrinone 20mg/100ml Ivpb -)  100 mls @ 9.716 mls/

hr IVPB TITR [facility]


   PRN Reason: 0.35 MCG/KG/MIN


   Last Admin: 06/25/17 20:32 Dose:  9.716 mls/hr


Insulin Aspart (Novolog Vial Sliding Scale -)  1 vial SQ ACHS [facility]


   PRN Reason: Protocol


   Last Admin: 06/26/17 06:03 Dose:  2 units


Multivitamins/Minerals/Vitamin C (Tab-A-Vit -)  1 tab PO DAILY [facility]


   Last Admin: 06/26/17 09:06 Dose:  1 tab


Ondansetron HCl (Zofran Injection)  4 mg IVPB Q6H PRN


   PRN Reason: NAUSEA


Ranitidine HCl (Zantac -)  75 mg PO DAILY [facility]


   Last Admin: 06/26/17 09:06 Dose:  75 mg


Starch (Anusol Suppository -)  1 each RC HS [facility]


   Last Admin: 06/25/17 21:36 Dose:  1 each


Tiotropium Bromide (Spiriva -)  1 puff IH DAILY [facility]


   Last Admin: 06/26/17 09:07 Dose:  1 puff


Warfarin Sodium (Coumadin -)  4 mg PO DAILY@1800 [facility]


   Last Admin: 06/25/17 17:19 Dose:  4 mg











- Objective


Vital Signs: 


 Vital Signs











Temperature  97.6 F   06/26/17 06:00


 


Pulse Rate  76   06/26/17 06:00


 


Respiratory Rate  20   06/26/17 06:00


 


Blood Pressure  122/77   06/26/17 06:00


 


O2 Sat by Pulse Oximetry (%)  94 L  06/25/17 21:00











Constitutional: Yes: No Distress, Calm


Eyes: No: Sclera Icterus


HENT: No: Nasal Congestion


Cardiovascular: Yes: Regular Rate and Rhythm, JVD (EJ massively distended at 90 

degr (no IJ seen)), S1, S2, Other (PMI non diplaced).  No: Gallop, Murmur


Respiratory: Yes: CTA Bilaterally.  No: Accessory Muscle Use, Rales, Wheezes


Gastrointestinal: Yes: Normal Bowel Sounds, Soft.  No: Tenderness


Musculoskeletal: Yes: Other (No kyphosis)


Extremities: No: Cold


Edema: No


Integumentary: No: Jaundice


Neurological: Yes: Alert, Oriented (x3)


Psychiatric: No: Agitated


Labs: 


 CBC, BMP





 06/26/17 05:57 





 06/26/17 05:57 





 INR, PTT











INR  1.96  (0.82-1.09)  H  06/25/17  05:35    














- ....Imaging


EKG: Other (tele: AF, V-P; 19b run VT)





Assessment/Plan


ecg 6/22/17: 





echo 10/2016: sev lve, global hk, sev dec lvef, mild rve, nl rv fcn, patty, mild-

mod mr, mv ring, tv ring, mild tr, mild pr, mild phtn, ivc mild dil





cxr: chf





tele: , occasional pvc's  








a/p:  76 year old man with PMH of IDDM, COPD (O2 dependant) HTN, HLD, Afib (on 

coumadin), CAD s/p CABG 2013, end stage CHF with ICD placement (on milrinone 

pump), PVD, BPH here with sob.





sob, acute systolic CHF, end stage systolic heart failure s/p ICD, on home 

milrinone infusion:


- here with sob/wright


- was well diuresed at home to wt 202-204 with creat 2.1 and Na 151--torsemide 

80 bid gradually downtitrated back to 40 bid


- saw me last week with sob at baseline, but ongoing phlegmy cough--likely sec 

to advanced copd and NOT chf


- developed acute incr sob in afternoon, after left my office for last appt, 

thinks wt was 207 that day


- cxr here worsened, with perihilar edema pattern and RLL infiltrates likely 

chf (no other clinical signs of PNA)


- initial wt 204 standing, up to 207 on HD 2


- suspect diet/fluid noncompliance may be trigger of chf here


- cont milrinone at patient's home maintenance dose (on chronic infusion 

destination therapy)--0.35 mcg/kg/min.  


- cont home coreg. (no ACE due to low bp's and labile creatinines; no Entresto 

for same reason) 


- recent office icd check with stable function


- 6/23 unclear if sxs from chf or some URI/copd (productive cough).  Will give 

trial of iv lasix 80 x1 today and monitor sxs, cr trend tomorrow to see if 

improving.


- 6/24.  Symptoms improving, creatinine improved after lasix 80 mg IV x 1 

yesterday.  Will continue daily dosing. 


- 6/25   Ongoing weight gain, will increase lasix to bid dosing--though pt 

refused 2nd dose due to late in day timing


- note: pt gains wt rapidly at home when non-compliant with 1.5L daily fluid 

restriction (wife supervises) and same here when fluid restriction not ordered/

implemented


- 6/26: fluid restriction ordered, importance d/w'd RN (to inform assistant); 

wt up to 212 (target wt is 207 or less). bid lasix today. responds to 

metolazone reliably at home when wt rises so low threshold to give this if wt 

rises further tomorrow (hopefully he is not becoming IV diuretic dependent)


- suspect loss of muscle mass over time, with new dry wt closer to 202-204





afib


- currently rate controlled on coreg


- cont coumadin per INR





VT:


- 19 beat run NSVT on tele


- has ICD


- on max tolerated coreg dose


- K/Mag optimized--routine lyte mgmt





PVD


- con't AC, statin.





CAD s/p CABG


- No anginal symptoms, no signs acs.  Con't home ac, plavix, atorvastatin, coreg





HTN: controlled on coreg





HLD: con't statin. 





master/CKD:


- suspect cardiorenal syndrome, with renal fxn improving due to IV diuresis


- renal fxn presently at baseline





copd


- per pulm/pmd

## 2017-06-26 NOTE — PN
Progress Note, Physician


Chief Complaint: 


Mr Jerome says he is still short of breath, both at rest and on exertion. 

Coughing has resolved. No cp or n/v.





- Current Medication List


Current Medications: 


Active Medications





Acetaminophen (Tylenol -)  650 mg PO Q4H PRN


   PRN Reason: FEVER OR PAIN


   Last Admin: 06/24/17 11:48 Dose:  650 mg


Albuterol Sulfate (Ventolin 0.083% Nebulizer Soln -)  1 amp NEB Q6H PRN


   PRN Reason: SHORT OF BREATH/WHEEZING


   Last Admin: 06/26/17 06:03 Dose:  1 amp


Allopurinol (Zyloprim -)  100 mg PO DAILY [facility]


   Last Admin: 06/26/17 09:07 Dose:  100 mg


Ascorbic Acid (Vitamin C -)  1,000 mg PO DAILY [facility]


   Last Admin: 06/26/17 09:06 Dose:  1,000 mg


Atorvastatin Calcium (Lipitor -)  40 mg PO HS [facility]


   Last Admin: 06/25/17 21:37 Dose:  40 mg


Budesonide/Formoterol Fumarate (Symbicort 160/4.5mcg -)  1 puff IH BID [facility]


   Last Admin: 06/26/17 09:07 Dose:  1 puff


Bupropion HCl (Wellbutrin -)  75 mg PO DAILY [facility]


   Last Admin: 06/26/17 09:06 Dose:  75 mg


Carvedilol (Coreg -)  6.25 mg PO BID [facility]


   Last Admin: 06/26/17 09:06 Dose:  6.25 mg


Cholecalciferol (Vitamin D3 -)  1,000 unit PO DAILY [facility]


   Last Admin: 06/26/17 09:06 Dose:  1,000 unit


Clopidogrel Bisulfate (Plavix -)  75 mg PO DAILY [facility]


   Last Admin: 06/26/17 09:06 Dose:  75 mg


Docusate Sodium (Colace -)  100 mg PO BID [facility]


   Last Admin: 06/26/17 09:06 Dose:  100 mg


Ferrous Sulfate (Feosol -)  325 mg PO Q48H [facility]


   Last Admin: 06/25/17 10:53 Dose:  325 mg


Furosemide (Lasix Injection -)  100 mg IVPUSH BID@0600,1400 [facility]


Milrinone Lactate/Dextrose (Milrinone 20mg/100ml Ivpb -)  100 mls @ 9.716 mls/

hr IVPB TITR [facility]


   PRN Reason: 0.35 MCG/KG/MIN


   Last Admin: 06/26/17 11:09 Dose:  9.716 mls/hr


Insulin Aspart (Novolog Vial Sliding Scale -)  1 vial SQ ACHS [facility]


   PRN Reason: Protocol


   Last Admin: 06/26/17 11:09 Dose:  4 units


Multivitamins/Minerals/Vitamin C (Tab-A-Vit -)  1 tab PO DAILY [facility]


   Last Admin: 06/26/17 09:06 Dose:  1 tab


Ondansetron HCl (Zofran Injection)  4 mg IVPB Q6H PRN


   PRN Reason: NAUSEA


Ranitidine HCl (Zantac -)  75 mg PO DAILY [facility]


   Last Admin: 06/26/17 09:06 Dose:  75 mg


Starch (Anusol Suppository -)  1 each RC HS [facility]


   Last Admin: 06/25/17 21:36 Dose:  1 each


Tiotropium Bromide (Spiriva -)  1 puff IH DAILY [facility]


   Last Admin: 06/26/17 09:07 Dose:  1 puff


Warfarin Sodium (Coumadin -)  4 mg PO DAILY@1800 [facility]


   Last Admin: 06/25/17 17:19 Dose:  4 mg











- Objective


Vital Signs: 


 Vital Signs











Temperature  97.6 F   06/26/17 06:00


 


Pulse Rate  83   06/26/17 11:50


 


Respiratory Rate  20   06/26/17 06:00


 


Blood Pressure  122/77   06/26/17 06:00


 


O2 Sat by Pulse Oximetry (%)  89 L  06/26/17 11:50











Constitutional: Yes: Well Nourished, No Distress, Calm


Cardiovascular: Yes: Pulse Irregular.  No: Gallop, Murmur, Rub


Respiratory: Yes: Regular, On Nasal O2, Rhonchi.  No: Rales, Wheezes


Gastrointestinal: Yes: Normal Bowel Sounds, Soft.  No: Distention, Tenderness


Extremities: Yes: Erythema (chronic)


Edema: Yes


Edema: LLE: 1+, RLE: 1+


Labs: 


 CBC, BMP





 06/26/17 05:57 





 06/26/17 05:57 





 INR, PTT











INR  1.96  (0.82-1.09)  H  06/25/17  05:35    














Problem List





- Problems


(1) CHF (congestive heart failure)


Code(s): I50.9 - HEART FAILURE, UNSPECIFIED   Qualifiers: 


     Congestive heart failure type: systolic     Congestive heart failure 

chronicity: acute on chronic        Qualified Code(s): I50.23 - Acute on 

chronic systolic (congestive) heart failure  





(2) Acute and chronic respiratory failure (acute-on-chronic)


Code(s): J96.20 - ACUTE AND CHR RESP FAILURE, UNSP W HYPOXIA OR HYPERCAPNIA   

Qualifiers: 


     Respiratory failure complication: hypoxia        Qualified Code(s): J96.21 

- Acute and chronic respiratory failure with hypoxia  





(3) Acute on chronic renal insufficiency


Code(s): N28.9 - DISORDER OF KIDNEY AND URETER, UNSPECIFIED


N18.9 - CHRONIC KIDNEY DISEASE, UNSPECIFIED





(4) Atrial fibrillation


Code(s): I48.91 - UNSPECIFIED ATRIAL FIBRILLATION   Qualifiers: 


     Atrial fibrillation type: chronic     Qualified Code(s): I48.2 - Chronic 

atrial fibrillation  





(5) CAD (coronary artery disease)


Code(s): I25.10 - ATHSCL HEART DISEASE OF NATIVE CORONARY ARTERY W/O ANG PCTRS 

  





(6) COPD (chronic obstructive pulmonary disease)


Code(s): J44.9 - CHRONIC OBSTRUCTIVE PULMONARY DISEASE, UNSPECIFIED   





(7) Diabetes


Code(s): E11.9 - TYPE 2 DIABETES MELLITUS WITHOUT COMPLICATIONS   Qualifiers: 


     Diabetes mellitus type: type 2     Diabetes mellitus complication status: 

with kidney complications     Diabetes mellitus complication detail: with 

chronic kidney disease     Chronic kidney disease stage: stage 4 (severe)








Assessment/Plan


(1) CHF (congestive heart failure)


Assessment/Plan: 


-end stage CHF, on milrinone


-weight increased from yesterday


-still with swelling and dyspnea


-cardiology following, diuresing with IV lasix


-? if dependent on IV diuretics now


Code(s): I50.9 - HEART FAILURE, UNSPECIFIED   Qualifiers: 


     Congestive heart failure type: systolic     Congestive heart failure 

chronicity: acute on chronic        Qualified Code(s): I50.23 - Acute on 

chronic systolic (congestive) heart failure  





(2) Acute and chronic respiratory failure (acute-on-chronic)


Assessment/Plan: 


-secondary to CHF exacerbation


-continue diuresis


-on supplemental oxygen


Code(s): J96.20 - ACUTE AND CHR RESP FAILURE, UNSP W HYPOXIA OR HYPERCAPNIA   

Qualifiers: 


     Respiratory failure complication: hypoxia        Qualified Code(s): J96.21 

- Acute and chronic respiratory failure with hypoxia  





(3) Acute on chronic renal insufficiency


Assessment/Plan: 


-cardiorenal


-at baseline with diuresis


Code(s): N28.9 - DISORDER OF KIDNEY AND URETER, UNSPECIFIED


N18.9 - CHRONIC KIDNEY DISEASE, UNSPECIFIED





(4) Atrial fibrillation


Assessment/Plan: 


-rate controlled


-check INR today


-continue coumadin


Code(s): I48.91 - UNSPECIFIED ATRIAL FIBRILLATION   Qualifiers: 


     Atrial fibrillation type: chronic     Qualified Code(s): I48.2 - Chronic 

atrial fibrillation  





(5) CAD (coronary artery disease)


Assessment/Plan: 


-quiescent


-cardiology following


-continue home regimen


Code(s): I25.10 - ATHSCL HEART DISEASE OF NATIVE CORONARY ARTERY W/O ANG PCTRS 

  





(6) COPD (chronic obstructive pulmonary disease)


Assessment/Plan: 


-case d/w Dr Gama


-dyspnea secondary to CHF


-continue current management


Code(s): J44.9 - CHRONIC OBSTRUCTIVE PULMONARY DISEASE, UNSPECIFIED   





(7) Diabetes


Assessment/Plan: 


-diabetic diet


-continue SSI


Code(s): E11.9 - TYPE 2 DIABETES MELLITUS WITHOUT COMPLICATIONS   Qualifiers: 


     Diabetes mellitus type: type 2     Diabetes mellitus complication status: 

with kidney complications     Diabetes mellitus complication detail: with 

chronic kidney disease     Chronic kidney disease stage: stage 4 (severe)

## 2017-06-27 LAB
ANION GAP SERPL CALC-SCNC: 7 MMOL/L (ref 8–16)
BASOPHILS # BLD: 0.4 % (ref 0–2)
CALCIUM SERPL-MCNC: 8.8 MG/DL (ref 8.5–10.1)
CO2 SERPL-SCNC: 34 MMOL/L (ref 21–32)
CREAT SERPL-MCNC: 1.9 MG/DL (ref 0.7–1.3)
DEPRECATED RDW RBC AUTO: 18.5 % (ref 11.9–15.9)
EOSINOPHIL # BLD: 2.3 % (ref 0–4.5)
GLUCOSE SERPL-MCNC: 167 MG/DL (ref 74–106)
INR BLD: 2.18 (ref 0.82–1.09)
MAGNESIUM SERPL-MCNC: 2.6 MG/DL (ref 1.8–2.4)
MCH RBC QN AUTO: 26.6 PG (ref 25.7–33.7)
MCHC RBC AUTO-ENTMCNC: 32 G/DL (ref 32–35.9)
MCV RBC: 83.2 FL (ref 80–96)
NEUTROPHILS # BLD: 73.4 % (ref 42.8–82.8)
PHOSPHATE SERPL-MCNC: 3.7 MG/DL (ref 2.5–4.9)
PLATELET # BLD AUTO: 183 K/MM3 (ref 134–434)
PMV BLD: 9.6 FL (ref 7.5–11.1)
PT PNL PPP: 24.4 SEC (ref 9.98–11.88)
WBC # BLD AUTO: 7.1 K/MM3 (ref 4–10)

## 2017-06-27 RX ADMIN — BUDESONIDE AND FORMOTEROL FUMARATE DIHYDRATE SCH PUFF: 160; 4.5 AEROSOL RESPIRATORY (INHALATION) at 22:13

## 2017-06-27 RX ADMIN — INSULIN ASPART SCH UNITS: 100 INJECTION, SOLUTION INTRAVENOUS; SUBCUTANEOUS at 06:10

## 2017-06-27 RX ADMIN — ALBUTEROL SULFATE PRN AMP: 2.5 SOLUTION RESPIRATORY (INHALATION) at 06:28

## 2017-06-27 RX ADMIN — BUDESONIDE AND FORMOTEROL FUMARATE DIHYDRATE SCH PUFF: 160; 4.5 AEROSOL RESPIRATORY (INHALATION) at 10:05

## 2017-06-27 RX ADMIN — ALLOPURINOL SCH MG: 100 TABLET ORAL at 10:04

## 2017-06-27 RX ADMIN — VITAMIN D, TAB 1000IU (100/BT) SCH UNIT: 25 TAB at 10:04

## 2017-06-27 RX ADMIN — RANITIDINE SCH MG: 150 TABLET ORAL at 10:04

## 2017-06-27 RX ADMIN — DOCUSATE SODIUM SCH MG: 100 CAPSULE, LIQUID FILLED ORAL at 10:05

## 2017-06-27 RX ADMIN — INSULIN ASPART SCH UNITS: 100 INJECTION, SOLUTION INTRAVENOUS; SUBCUTANEOUS at 22:12

## 2017-06-27 RX ADMIN — MULTIVITAMIN TABLET SCH TAB: TABLET at 10:05

## 2017-06-27 RX ADMIN — TIOTROPIUM BROMIDE SCH PUFF: 18 CAPSULE ORAL; RESPIRATORY (INHALATION) at 10:05

## 2017-06-27 RX ADMIN — MILRINONE LACTATE SCH MLS/HR: 200 INJECTION, SOLUTION INTRAVENOUS at 02:35

## 2017-06-27 RX ADMIN — CLOPIDOGREL BISULFATE SCH MG: 75 TABLET, FILM COATED ORAL at 10:05

## 2017-06-27 RX ADMIN — FUROSEMIDE SCH MG: 10 INJECTION, SOLUTION INTRAVENOUS at 08:15

## 2017-06-27 RX ADMIN — MILRINONE LACTATE SCH MLS/HR: 200 INJECTION, SOLUTION INTRAVENOUS at 12:50

## 2017-06-27 RX ADMIN — FUROSEMIDE SCH: 10 INJECTION, SOLUTION INTRAVENOUS at 07:07

## 2017-06-27 RX ADMIN — DOCUSATE SODIUM SCH MG: 100 CAPSULE, LIQUID FILLED ORAL at 21:51

## 2017-06-27 RX ADMIN — FUROSEMIDE SCH MG: 10 INJECTION, SOLUTION INTRAVENOUS at 14:53

## 2017-06-27 RX ADMIN — ATORVASTATIN CALCIUM SCH MG: 40 TABLET, FILM COATED ORAL at 21:51

## 2017-06-27 RX ADMIN — MILRINONE LACTATE SCH MLS/HR: 200 INJECTION, SOLUTION INTRAVENOUS at 12:54

## 2017-06-27 RX ADMIN — PHENYLEPHRINE HYDROCHLORIDE AND FAT, HARD SCH EACH: .00525; 1.86 SUPPOSITORY RECTAL at 23:24

## 2017-06-27 RX ADMIN — INSULIN ASPART SCH: 100 INJECTION, SOLUTION INTRAVENOUS; SUBCUTANEOUS at 17:40

## 2017-06-27 RX ADMIN — WARFARIN SCH MG: 2 TABLET ORAL at 17:40

## 2017-06-27 RX ADMIN — ALBUTEROL SULFATE PRN AMP: 2.5 SOLUTION RESPIRATORY (INHALATION) at 19:30

## 2017-06-27 RX ADMIN — FERROUS SULFATE TAB EC 324 MG (65 MG FE EQUIVALENT) SCH MG: 324 (65 FE) TABLET DELAYED RESPONSE at 11:36

## 2017-06-27 RX ADMIN — INSULIN ASPART SCH UNITS: 100 INJECTION, SOLUTION INTRAVENOUS; SUBCUTANEOUS at 11:39

## 2017-06-27 RX ADMIN — CARVEDILOL SCH MG: 6.25 TABLET, FILM COATED ORAL at 21:51

## 2017-06-27 RX ADMIN — CARVEDILOL SCH MG: 6.25 TABLET, FILM COATED ORAL at 10:05

## 2017-06-27 RX ADMIN — OXYCODONE HYDROCHLORIDE AND ACETAMINOPHEN SCH MG: 500 TABLET ORAL at 10:05

## 2017-06-27 NOTE — PN
Progress Note (short form)





- Note


Progress Note: 


Chief Complaint: 


sob/cough


History of Present Illness: 


sob and cough persist


no signif leg swelling


no cp, palpit





 


ex cigs











 Current Medications





Acetaminophen (Tylenol -)  650 mg PO Q4H PRN


   PRN Reason: FEVER OR PAIN


   Last Admin: 06/24/17 11:48 Dose:  650 mg


Albuterol Sulfate (Ventolin 0.083% Nebulizer Soln -)  1 amp NEB Q6H PRN


   PRN Reason: SHORT OF BREATH/WHEEZING


   Last Admin: 06/27/17 06:28 Dose:  1 amp


Allopurinol (Zyloprim -)  100 mg PO DAILY [facility]


   Last Admin: 06/26/17 09:07 Dose:  100 mg


Ascorbic Acid (Vitamin C -)  1,000 mg PO DAILY [facility]


   Last Admin: 06/26/17 09:06 Dose:  1,000 mg


Atorvastatin Calcium (Lipitor -)  40 mg PO HS [facility]


   Last Admin: 06/26/17 21:23 Dose:  40 mg


Budesonide/Formoterol Fumarate (Symbicort 160/4.5mcg -)  1 puff IH BID [facility]


   Last Admin: 06/26/17 21:26 Dose:  1 puff


Bupropion HCl (Wellbutrin -)  75 mg PO DAILY [facility]


   Last Admin: 06/26/17 09:06 Dose:  75 mg


Carvedilol (Coreg -)  6.25 mg PO BID [facility]


   Last Admin: 06/26/17 21:23 Dose:  6.25 mg


Cholecalciferol (Vitamin D3 -)  1,000 unit PO DAILY [facility]


   Last Admin: 06/26/17 09:06 Dose:  1,000 unit


Clopidogrel Bisulfate (Plavix -)  75 mg PO DAILY [facility]


   Last Admin: 06/26/17 09:06 Dose:  75 mg


Docusate Sodium (Colace -)  100 mg PO BID [facility]


   Last Admin: 06/26/17 21:23 Dose:  100 mg


Ferrous Sulfate (Feosol -)  325 mg PO Q48H [facility]


   Last Admin: 06/25/17 10:53 Dose:  325 mg


Furosemide (Lasix Injection -)  100 mg IVPUSH BID@0600,1400 [facility]


   Last Admin: 06/27/17 07:07 Dose:  Not Given


Milrinone Lactate/Dextrose (Milrinone 20mg/100ml Ivpb -)  100 mls @ 9.716 mls/

hr IVPB TITR [facility]


   PRN Reason: 0.35 MCG/KG/MIN


   Last Admin: 06/27/17 02:35 Dose:  9.716 mls/hr


Insulin Aspart (Novolog Vial Sliding Scale -)  1 vial SQ ACHS [facility]


   PRN Reason: Protocol


   Last Admin: 06/27/17 06:10 Dose:  2 units


Multivitamins/Minerals/Vitamin C (Tab-A-Vit -)  1 tab PO DAILY [facility]


   Last Admin: 06/26/17 09:06 Dose:  1 tab


Ondansetron HCl (Zofran Injection)  4 mg IVPB Q6H PRN


   PRN Reason: NAUSEA


Ranitidine HCl (Zantac -)  75 mg PO DAILY [facility]


   Last Admin: 06/26/17 09:06 Dose:  75 mg


Starch (Anusol Suppository -)  1 each RC HS [facility]


   Last Admin: 06/26/17 21:23 Dose:  1 each


Tiotropium Bromide (Spiriva -)  1 puff IH DAILY [facility]


   Last Admin: 06/26/17 09:07 Dose:  1 puff


Warfarin Sodium (Coumadin -)  4 mg PO DAILY@1800 [facility]


   Last Admin: 06/26/17 18:18 Dose:  4 mg





 Vital Signs - 24 hr











  06/26/17 06/26/17 06/26/17





  10:00 11:50 14:45


 


Temperature 98.0 F  


 


Pulse Rate 77 83 83


 


Respiratory 18  22





Rate   


 


Blood Pressure 115/75  114/68


 


O2 Sat by Pulse  89 L 





Oximetry (%)   














  06/26/17 06/26/17 06/26/17





  18:00 20:48 20:51


 


Temperature 97.5 F L 97.8 F 


 


Pulse Rate 74 75 


 


Respiratory 19 18 





Rate   


 


Blood Pressure 123/63 122/74 


 


O2 Sat by Pulse   94 L





Oximetry (%)   














  06/27/17 06/27/17 06/27/17





  02:00 06:00 08:00


 


Temperature 97.7 F 97.4 F L 98.7 F


 


Pulse Rate 75 72 78


 


Respiratory 24 22 22





Rate   


 


Blood Pressure 117/66 118/82 113/67


 


O2 Sat by Pulse   





Oximetry (%)   








 Intake & Output











 06/25/17 06/26/17 06/27/17 06/28/17





 07:59 07:59 07:59 07:59


 


Intake Total 1036 998.7 167 


 


Output Total  400  


 


Balance 1036 598.7 167 


 


Weight 210 lb 6 oz 212 lb 2 oz 211 lb 6.4 oz 











Constitutional: Yes: No Distress, Calm


Eyes: No: Sclera Icterus


HENT: No: Nasal Congestion


Cardiovascular: Yes: Regular Rate and Rhythm, JVD (EJ massively distended at 90 

degr (no IJ seen)), S1, S2, Other (PMI non diplaced).  No: Gallop, Murmur


Respiratory: Yes: bibasilar rales.  No: Accessory Muscle Use, Rales, Wheezes


Gastrointestinal: Yes: Normal Bowel Sounds, Soft.  No: Tenderness


Musculoskeletal: Yes: Other (No kyphosis)


Extremities: No: Cold


Edema: trace -1+


Integumentary: No: Jaundice


Neurological: Yes: Alert, Oriented (x3)


Psychiatric: No: Agitated


Labs: 


 








 CBC, BMP





 06/27/17 05:30 





 06/27/17 05:30 








 Laboratory Tests











  06/27/17 06/27/17





  05:30 05:30


 


INR   2.18 H


 


Magnesium  2.6 H 














- ....Imaging


EKG: Other (tele: AF, V-P; rare nsvt)





Assessment/Plan


ecg 6/22/17: 





echo 10/2016: sev lve, global hk, sev dec lvef, mild rve, nl rv fcn, patty, mild-

mod mr, mv ring, tv ring, mild tr, mild pr, mild phtn, ivc mild dil





cxr: chf











a/p:  76 year old man with PMH of IDDM, COPD (O2 dependant) HTN, HLD, Afib (on 

coumadin), CAD s/p CABG 2013, end stage CHF with ICD placement (on milrinone 

pump), PVD, BPH here with sob.





sob, acute systolic CHF, end stage systolic heart failure s/p ICD, on home 

milrinone infusion:


- here with sob/wright


- was well diuresed at home to wt 202-204 with creat 2.1 and Na 151--torsemide 

80 bid gradually downtitrated back to 40 bid


- saw me last week with sob at baseline, but ongoing phlegmy cough--likely sec 

to advanced copd and NOT chf


- developed acute incr sob in afternoon, after left my office for last appt, 

thinks wt was 207 that day


- cxr here worsened, with perihilar edema pattern and RLL infiltrates likely 

chf (no other clinical signs of PNA)


- initial wt 204 standing, up to 207 on HD 2


- suspect diet/fluid noncompliance may be trigger of chf here


- cont milrinone at patient's home maintenance dose (on chronic infusion 

destination therapy)--0.35 mcg/kg/min.  


- cont home coreg. (no ACE due to low bp's and labile creatinines; no Entresto 

for same reason) 


- recent office icd check with stable function


- 6/23 unclear if sxs from chf or some URI/copd (productive cough).  Will give 

trial of iv lasix 80 x1 today and monitor sxs, cr trend tomorrow to see if 

improving.


- 6/24.  Symptoms improving, creatinine improved after lasix 80 mg IV x 1 

yesterday.  Will continue daily dosing. 


- 6/25   Ongoing weight gain, will increase lasix to bid dosing--though pt 

refused 2nd dose due to late in day timing


- note: pt gains wt rapidly at home when non-compliant with 1.5L daily fluid 

restriction (wife supervises) and same here when fluid restriction not ordered/

implemented


- 6/26: fluid restriction ordered, importance d/w'd RN (to inform assistant); 

wt up to 212 (target wt is 207 or less). bid lasix today. responds to 

metolazone reliably at home when wt rises so low threshold to give this if wt 

rises further tomorrow (hopefully he is not becoming IV diuretic dependent)


- suspect loss of muscle mass over time, with new dry wt closer to 202-204


- 6/27 Significantly bothered by frequent urination.  declines connolly.  Still 

symptomatic, minimal weight loss this morning. will give extra dose of 

metolazone this afternoon with pm lasix.  Multiple recent admits, poor 

prognosis. 





afib


- currently rate controlled on coreg


- cont coumadin per INR





VT:


- 19 beat run NSVT on tele earlier in admit


- has ICD


- on max tolerated coreg dose


- K/Mag optimized--routine lyte mgmt





PVD


- con't AC, statin.





CAD s/p CABG


- No anginal symptoms, no signs acs.  Con't home ac, plavix, atorvastatin, coreg





HTN: controlled on coreg





HLD: con't statin. 





master/CKD:


- suspect cardiorenal syndrome, with renal fxn improving due to IV diuresis


- renal fxn presently at baseline





copd


- per pulm/pmd

## 2017-06-27 NOTE — PN
Progress Note (short form)





- Note


Progress Note: 


PULMONARY





Breathing about the same. No chest pain or palpitations. On milrinone and lasix.





 Last Vital Signs











Temp Pulse Resp BP Pulse Ox


 


 98.7 F   82   18   113/67   97 


 


 06/27/17 08:00  06/27/17 10:30  06/27/17 08:00  06/27/17 08:00  06/27/17 10:30








 Intake & Output











 06/24/17 06/25/17 06/26/17 06/27/17





 23:59 23:59 23:59 23:59


 


Intake Total 1036 849.9 318.8 357


 


Output Total   400 150


 


Balance 1036 849.9 -81.2 207


 


Weight 207 lb 6 oz 210 lb 6 oz 212 lb 2 oz 211 lb 6.4 oz








Gen:  NAD at rest


Heart:  RRR


Lung: basilar rales


Abd: soft, nontender


Ext: + edema





 CBC, BMP





 06/27/17 05:30 





 06/27/17 05:30 





Active Medications





Acetaminophen (Tylenol -)  650 mg PO Q4H PRN


   PRN Reason: FEVER OR PAIN


   Last Admin: 06/24/17 11:48 Dose:  650 mg


Albuterol Sulfate (Ventolin 0.083% Nebulizer Soln -)  1 amp NEB Q6H PRN


   PRN Reason: SHORT OF BREATH/WHEEZING


   Last Admin: 06/27/17 06:28 Dose:  1 amp


Allopurinol (Zyloprim -)  100 mg PO DAILY [facility]


   Last Admin: 06/27/17 10:04 Dose:  100 mg


Ascorbic Acid (Vitamin C -)  1,000 mg PO DAILY [facility]


   Last Admin: 06/27/17 10:05 Dose:  1,000 mg


Atorvastatin Calcium (Lipitor -)  40 mg PO HS [facility]


   Last Admin: 06/26/17 21:23 Dose:  40 mg


Budesonide/Formoterol Fumarate (Symbicort 160/4.5mcg -)  1 puff IH BID [facility]


   Last Admin: 06/27/17 10:05 Dose:  1 puff


Bupropion HCl (Wellbutrin -)  75 mg PO DAILY [facility]


   Last Admin: 06/27/17 10:05 Dose:  75 mg


Carvedilol (Coreg -)  6.25 mg PO BID [facility]


   Last Admin: 06/27/17 10:05 Dose:  6.25 mg


Cholecalciferol (Vitamin D3 -)  1,000 unit PO DAILY [facility]


   Last Admin: 06/27/17 10:04 Dose:  1,000 unit


Clopidogrel Bisulfate (Plavix -)  75 mg PO DAILY [facility]


   Last Admin: 06/27/17 10:05 Dose:  75 mg


Docusate Sodium (Colace -)  100 mg PO BID [facility]


   Last Admin: 06/27/17 10:05 Dose:  100 mg


Ferrous Sulfate (Feosol -)  325 mg PO Q48H [facility]


   Last Admin: 06/27/17 11:36 Dose:  325 mg


Furosemide (Lasix Injection -)  100 mg IVPUSH BID@0600,1400 [facility]


   Last Admin: 06/27/17 08:15 Dose:  100 mg


Milrinone Lactate/Dextrose (Milrinone 20mg/100ml Ivpb -)  100 mls @ 9.716 mls/

hr IVPB TITR [facility]


   PRN Reason: 0.35 MCG/KG/MIN


   Last Admin: 06/27/17 12:50 Dose:  9.716 mls/hr


Insulin Aspart (Novolog Vial Sliding Scale -)  1 vial SQ ACHS [facility]


   PRN Reason: Protocol


   Last Admin: 06/27/17 11:39 Dose:  4 units


Multivitamins/Minerals/Vitamin C (Tab-A-Vit -)  1 tab PO DAILY [facility]


   Last Admin: 06/27/17 10:05 Dose:  1 tab


Ondansetron HCl (Zofran Injection)  4 mg IVPB Q6H PRN


   PRN Reason: NAUSEA


Ranitidine HCl (Zantac -)  75 mg PO DAILY [facility]


   Last Admin: 06/27/17 10:04 Dose:  75 mg


Starch (Anusol Suppository -)  1 each RC HS [facility]


   Last Admin: 06/26/17 21:23 Dose:  1 each


Tiotropium Bromide (Spiriva -)  1 puff IH DAILY [facility]


   Last Admin: 06/27/17 10:05 Dose:  1 puff


Warfarin Sodium (Coumadin -)  4 mg PO DAILY@1800 [facility]


   Last Admin: 06/26/17 18:18 Dose:  4 mg





A/P


Acute on Chronic Systolic Heart Failure


CAD s/p CABG


COPD


Chronic Hypoxic Respiratory Failure


Atrial Fibrillation


Acute on Chronic Renal Failure


PAD


BPH





-  continue lasix, milrinone


-  monitor urine output, creatinine


-  daily weights, I/Os


-  O2 to keep SpO2 >90%


-  rate controlled


-  continue anticoagulation

## 2017-06-27 NOTE — PN
Progress Note, Physician


Chief Complaint: 


Mr Jerome says his breathing is unchanged, still short. No cp or n/v.





- Current Medication List


Current Medications: 


Active Medications





Acetaminophen (Tylenol -)  650 mg PO Q4H PRN


   PRN Reason: FEVER OR PAIN


   Last Admin: 06/24/17 11:48 Dose:  650 mg


Albuterol Sulfate (Ventolin 0.083% Nebulizer Soln -)  1 amp NEB Q6H PRN


   PRN Reason: SHORT OF BREATH/WHEEZING


   Last Admin: 06/27/17 06:28 Dose:  1 amp


Allopurinol (Zyloprim -)  100 mg PO DAILY [facility]


   Last Admin: 06/27/17 10:04 Dose:  100 mg


Ascorbic Acid (Vitamin C -)  1,000 mg PO DAILY [facility]


   Last Admin: 06/27/17 10:05 Dose:  1,000 mg


Atorvastatin Calcium (Lipitor -)  40 mg PO HS [facility]


   Last Admin: 06/26/17 21:23 Dose:  40 mg


Budesonide/Formoterol Fumarate (Symbicort 160/4.5mcg -)  1 puff IH BID [facility]


   Last Admin: 06/27/17 10:05 Dose:  1 puff


Bupropion HCl (Wellbutrin -)  75 mg PO DAILY [facility]


   Last Admin: 06/27/17 10:05 Dose:  75 mg


Carvedilol (Coreg -)  6.25 mg PO BID [facility]


   Last Admin: 06/27/17 10:05 Dose:  6.25 mg


Cholecalciferol (Vitamin D3 -)  1,000 unit PO DAILY [facility]


   Last Admin: 06/27/17 10:04 Dose:  1,000 unit


Clopidogrel Bisulfate (Plavix -)  75 mg PO DAILY [facility]


   Last Admin: 06/27/17 10:05 Dose:  75 mg


Docusate Sodium (Colace -)  100 mg PO BID [facility]


   Last Admin: 06/27/17 10:05 Dose:  100 mg


Ferrous Sulfate (Feosol -)  325 mg PO Q48H [facility]


   Last Admin: 06/27/17 11:36 Dose:  325 mg


Furosemide (Lasix Injection -)  100 mg IVPUSH BID@0600,1400 [facility]


   Last Admin: 06/27/17 08:15 Dose:  100 mg


Milrinone Lactate/Dextrose (Milrinone 20mg/100ml Ivpb -)  100 mls @ 9.716 mls/

hr IVPB TITR [facility]


   PRN Reason: 0.35 MCG/KG/MIN


   Last Admin: 06/27/17 02:35 Dose:  9.716 mls/hr


Insulin Aspart (Novolog Vial Sliding Scale -)  1 vial SQ ACHS [facility]


   PRN Reason: Protocol


   Last Admin: 06/27/17 11:39 Dose:  4 units


Metolazone (Zaroxolyn -)  5 mg PO ONCE ONE


   Stop: 06/27/17 13:51


Multivitamins/Minerals/Vitamin C (Tab-A-Vit -)  1 tab PO DAILY [facility]


   Last Admin: 06/27/17 10:05 Dose:  1 tab


Ondansetron HCl (Zofran Injection)  4 mg IVPB Q6H PRN


   PRN Reason: NAUSEA


Ranitidine HCl (Zantac -)  75 mg PO DAILY [facility]


   Last Admin: 06/27/17 10:04 Dose:  75 mg


Starch (Anusol Suppository -)  1 each RC HS [facility]


   Last Admin: 06/26/17 21:23 Dose:  1 each


Tiotropium Bromide (Spiriva -)  1 puff IH DAILY [facility]


   Last Admin: 06/27/17 10:05 Dose:  1 puff


Warfarin Sodium (Coumadin -)  4 mg PO DAILY@1800 [facility]


   Last Admin: 06/26/17 18:18 Dose:  4 mg











- Objective


Vital Signs: 


 Vital Signs











Temperature  98.7 F   06/27/17 08:00


 


Pulse Rate  82   06/27/17 10:30


 


Respiratory Rate  18   06/27/17 08:00


 


Blood Pressure  113/67   06/27/17 08:00


 


O2 Sat by Pulse Oximetry (%)  97   06/27/17 10:30











Constitutional: Yes: Well Nourished, No Distress, Calm


Cardiovascular: Yes: Pulse Irregular.  No: Tachycardia, Gallop, Murmur, Rub


Respiratory: Yes: Regular, On Nasal O2, Rhonchi.  No: Rales, Wheezes


Gastrointestinal: Yes: Normal Bowel Sounds, Soft.  No: Distention, Tenderness


Extremities: Yes: WNL


Edema: Yes


Edema: LLE: 1+, RLE: 1+


Labs: 


 CBC, BMP





 06/27/17 05:30 





 06/27/17 05:30 





 INR, PTT











INR  2.18  (0.82-1.09)  H  06/27/17  05:30    














Problem List





- Problems


(1) CHF (congestive heart failure)


Code(s): I50.9 - HEART FAILURE, UNSPECIFIED   Qualifiers: 


     Congestive heart failure type: systolic     Congestive heart failure 

chronicity: acute on chronic        Qualified Code(s): I50.23 - Acute on 

chronic systolic (congestive) heart failure  





(2) Acute and chronic respiratory failure (acute-on-chronic)


Code(s): J96.20 - ACUTE AND CHR RESP FAILURE, UNSP W HYPOXIA OR HYPERCAPNIA   

Qualifiers: 


     Respiratory failure complication: hypoxia        Qualified Code(s): J96.21 

- Acute and chronic respiratory failure with hypoxia  





(3) Acute on chronic renal insufficiency


Code(s): N28.9 - DISORDER OF KIDNEY AND URETER, UNSPECIFIED


N18.9 - CHRONIC KIDNEY DISEASE, UNSPECIFIED





(4) Atrial fibrillation


Code(s): I48.91 - UNSPECIFIED ATRIAL FIBRILLATION   Qualifiers: 


     Atrial fibrillation type: chronic     Qualified Code(s): I48.2 - Chronic 

atrial fibrillation  





(5) CAD (coronary artery disease)


Code(s): I25.10 - ATHSCL HEART DISEASE OF NATIVE CORONARY ARTERY W/O ANG PCTRS 

  





(6) COPD (chronic obstructive pulmonary disease)


Code(s): J44.9 - CHRONIC OBSTRUCTIVE PULMONARY DISEASE, UNSPECIFIED   





(7) Diabetes


Code(s): E11.9 - TYPE 2 DIABETES MELLITUS WITHOUT COMPLICATIONS   Qualifiers: 


     Diabetes mellitus type: type 2     Diabetes mellitus complication status: 

with kidney complications     Diabetes mellitus complication detail: with 

chronic kidney disease     Chronic kidney disease stage: stage 4 (severe)








Assessment/Plan


(1) CHF (congestive heart failure)


Assessment/Plan: 


-end stage CHF, on milrinone


-weight decreased by 1 pound today


-still with swelling and dyspnea


-cardiology following, diuresing with IV lasix


-? if dependent on IV diuretics now


Code(s): I50.9 - HEART FAILURE, UNSPECIFIED   Qualifiers: 


     Congestive heart failure type: systolic     Congestive heart failure 

chronicity: acute on chronic        Qualified Code(s): I50.23 - Acute on 

chronic systolic (congestive) heart failure  





(2) Acute and chronic respiratory failure (acute-on-chronic)


Assessment/Plan: 


-secondary to CHF exacerbation


-continue diuresis


-on supplemental oxygen


Code(s): J96.20 - ACUTE AND CHR RESP FAILURE, UNSP W HYPOXIA OR HYPERCAPNIA   

Qualifiers: 


     Respiratory failure complication: hypoxia        Qualified Code(s): J96.21 

- Acute and chronic respiratory failure with hypoxia  





(3) Acute on chronic renal insufficiency


Assessment/Plan: 


-cardiorenal


-at baseline with diuresis


Code(s): N28.9 - DISORDER OF KIDNEY AND URETER, UNSPECIFIED


N18.9 - CHRONIC KIDNEY DISEASE, UNSPECIFIED





(4) Atrial fibrillation


Assessment/Plan: 


-rate controlled


-continue coumadin, therapeutic


Code(s): I48.91 - UNSPECIFIED ATRIAL FIBRILLATION   Qualifiers: 


     Atrial fibrillation type: chronic     Qualified Code(s): I48.2 - Chronic 

atrial fibrillation  





(5) CAD (coronary artery disease)


Assessment/Plan: 


-quiescent


-cardiology following


-continue home regimen


Code(s): I25.10 - ATHSCL HEART DISEASE OF NATIVE CORONARY ARTERY W/O ANG PCTRS 

  





(6) COPD (chronic obstructive pulmonary disease)


Assessment/Plan: 


-case d/w Dr Gama


-dyspnea secondary to CHF


-continue current management


Code(s): J44.9 - CHRONIC OBSTRUCTIVE PULMONARY DISEASE, UNSPECIFIED   





(7) Diabetes


Assessment/Plan: 


-diabetic diet


-continue SSI


Code(s): E11.9 - TYPE 2 DIABETES MELLITUS WITHOUT COMPLICATIONS   Qualifiers: 


     Diabetes mellitus type: type 2     Diabetes mellitus complication status: 

with kidney complications     Diabetes mellitus complication detail: with 

chronic kidney disease     Chronic kidney disease stage: stage 4 (severe)

## 2017-06-28 LAB
ANION GAP SERPL CALC-SCNC: 8 MMOL/L (ref 8–16)
BASOPHILS # BLD: 0.7 % (ref 0–2)
CALCIUM SERPL-MCNC: 8.9 MG/DL (ref 8.5–10.1)
CO2 SERPL-SCNC: 31 MMOL/L (ref 21–32)
CREAT SERPL-MCNC: 1.7 MG/DL (ref 0.7–1.3)
DEPRECATED RDW RBC AUTO: 18.3 % (ref 11.9–15.9)
EOSINOPHIL # BLD: 2.1 % (ref 0–4.5)
GLUCOSE SERPL-MCNC: 141 MG/DL (ref 74–106)
INR BLD: 2.65 (ref 0.82–1.09)
MAGNESIUM SERPL-MCNC: 2.5 MG/DL (ref 1.8–2.4)
MCH RBC QN AUTO: 26.3 PG (ref 25.7–33.7)
MCHC RBC AUTO-ENTMCNC: 31.8 G/DL (ref 32–35.9)
MCV RBC: 82.8 FL (ref 80–96)
NEUTROPHILS # BLD: 70.9 % (ref 42.8–82.8)
PHOSPHATE SERPL-MCNC: 3.4 MG/DL (ref 2.5–4.9)
PLATELET # BLD AUTO: 161 K/MM3 (ref 134–434)
PMV BLD: 8.7 FL (ref 7.5–11.1)
PT PNL PPP: 29.7 SEC (ref 9.98–11.88)
WBC # BLD AUTO: 6.7 K/MM3 (ref 4–10)

## 2017-06-28 RX ADMIN — WARFARIN SCH MG: 2 TABLET ORAL at 17:46

## 2017-06-28 RX ADMIN — PHENYLEPHRINE HYDROCHLORIDE AND FAT, HARD SCH: .00525; 1.86 SUPPOSITORY RECTAL at 22:24

## 2017-06-28 RX ADMIN — BUDESONIDE AND FORMOTEROL FUMARATE DIHYDRATE SCH PUFF: 160; 4.5 AEROSOL RESPIRATORY (INHALATION) at 11:29

## 2017-06-28 RX ADMIN — INSULIN ASPART SCH UNITS: 100 INJECTION, SOLUTION INTRAVENOUS; SUBCUTANEOUS at 06:18

## 2017-06-28 RX ADMIN — FUROSEMIDE SCH MG: 10 INJECTION, SOLUTION INTRAVENOUS at 16:26

## 2017-06-28 RX ADMIN — CARVEDILOL SCH MG: 6.25 TABLET, FILM COATED ORAL at 22:24

## 2017-06-28 RX ADMIN — RANITIDINE SCH MG: 150 TABLET ORAL at 11:28

## 2017-06-28 RX ADMIN — ALBUTEROL SULFATE PRN AMP: 2.5 SOLUTION RESPIRATORY (INHALATION) at 16:06

## 2017-06-28 RX ADMIN — ALBUTEROL SULFATE PRN AMP: 2.5 SOLUTION RESPIRATORY (INHALATION) at 06:35

## 2017-06-28 RX ADMIN — CLOPIDOGREL BISULFATE SCH MG: 75 TABLET, FILM COATED ORAL at 11:29

## 2017-06-28 RX ADMIN — Medication SCH APPLIC: at 18:29

## 2017-06-28 RX ADMIN — INSULIN ASPART SCH: 100 INJECTION, SOLUTION INTRAVENOUS; SUBCUTANEOUS at 11:29

## 2017-06-28 RX ADMIN — INSULIN ASPART SCH: 100 INJECTION, SOLUTION INTRAVENOUS; SUBCUTANEOUS at 16:30

## 2017-06-28 RX ADMIN — DOCUSATE SODIUM SCH MG: 100 CAPSULE, LIQUID FILLED ORAL at 11:29

## 2017-06-28 RX ADMIN — Medication SCH: at 22:28

## 2017-06-28 RX ADMIN — VITAMIN D, TAB 1000IU (100/BT) SCH UNIT: 25 TAB at 11:29

## 2017-06-28 RX ADMIN — MULTIVITAMIN TABLET SCH TAB: TABLET at 11:29

## 2017-06-28 RX ADMIN — TIOTROPIUM BROMIDE SCH PUFF: 18 CAPSULE ORAL; RESPIRATORY (INHALATION) at 11:29

## 2017-06-28 RX ADMIN — BUDESONIDE AND FORMOTEROL FUMARATE DIHYDRATE SCH PUFF: 160; 4.5 AEROSOL RESPIRATORY (INHALATION) at 22:32

## 2017-06-28 RX ADMIN — OXYCODONE HYDROCHLORIDE AND ACETAMINOPHEN SCH MG: 500 TABLET ORAL at 11:28

## 2017-06-28 RX ADMIN — ALBUTEROL SULFATE PRN AMP: 2.5 SOLUTION RESPIRATORY (INHALATION) at 22:00

## 2017-06-28 RX ADMIN — FUROSEMIDE SCH MG: 10 INJECTION, SOLUTION INTRAVENOUS at 09:30

## 2017-06-28 RX ADMIN — INSULIN ASPART SCH UNITS: 100 INJECTION, SOLUTION INTRAVENOUS; SUBCUTANEOUS at 22:25

## 2017-06-28 RX ADMIN — FUROSEMIDE SCH: 10 INJECTION, SOLUTION INTRAVENOUS at 06:17

## 2017-06-28 RX ADMIN — ALLOPURINOL SCH MG: 100 TABLET ORAL at 11:28

## 2017-06-28 RX ADMIN — MILRINONE LACTATE SCH MLS/HR: 200 INJECTION, SOLUTION INTRAVENOUS at 11:30

## 2017-06-28 RX ADMIN — CARVEDILOL SCH MG: 6.25 TABLET, FILM COATED ORAL at 11:29

## 2017-06-28 RX ADMIN — DOCUSATE SODIUM SCH MG: 100 CAPSULE, LIQUID FILLED ORAL at 22:24

## 2017-06-28 RX ADMIN — ATORVASTATIN CALCIUM SCH MG: 40 TABLET, FILM COATED ORAL at 22:24

## 2017-06-28 NOTE — PN
Progress Note (short form)





- Note


Progress Note: 


PULMONARY





APPEARS TERMINAL 


AFEBRILE





DISTENDED NECK VEINS


DISTANT BREATH SOUNDS


S1S2


OBESE


EDEMA LOWER EXT





LABS/MEDS/NOTES/IMAGING/MICRO REVIEWED








Acute on Chronic Systolic Heart Failure end stage


CAD s/p CABG


COPD


Chronic Hypoxic Respiratory Failure


Atrial Fibrillation


Acute on Chronic Renal Failure


PAD


BPH





-  continue lasix, milrinone


-  monitor urine output, creatinine


-  daily weights, I/Os


-  O2 to keep SpO2 >90%


-  rate controlled


-  continue anticoagulation


-  discussion with family regarding prognosis





LUZ SAEZ MD

## 2017-06-28 NOTE — PN
Progress Note (short form)





- Note


Progress Note: 


Chief Complaint: 


sob/cough


History of Present Illness: 


sob and cough persist


no signif leg swelling


no cp, palpit


no loc/dizzy








ex cigs











 


 Current Medications











Generic Name Dose Route Start Last Admin





  Trade Name Freq  PRN Reason Stop Dose Admin


 


Acetaminophen  650 mg 06/23/17 11:09 06/24/17 11:48





  Tylenol -  PO   650 mg





  Q4H PRN   Administration





  FEVER OR PAIN   


 


Allopurinol  100 mg 06/24/17 10:00 06/28/17 11:28





  Zyloprim -  PO   100 mg





  DAILY JARROD   Administration


 


Ascorbic Acid  1,000 mg 06/24/17 10:00 06/28/17 11:28





  Vitamin C -  PO   1,000 mg





  DAILY JARROD   Administration


 


Atorvastatin Calcium  40 mg 06/23/17 22:00 06/27/17 21:51





  Lipitor -  PO   40 mg





  HS JARROD   Administration


 


Budesonide/Formoterol Fumarate  1 puff 06/23/17 11:15 06/28/17 11:29





  Symbicort 160/4.5mcg -  IH   1 puff





  BID JARROD   Administration


 


Bupropion HCl  75 mg 06/23/17 11:15 06/28/17 11:28





  Wellbutrin -  PO   75 mg





  DAILY JARROD   Administration


 


Carvedilol  6.25 mg 06/23/17 11:15 06/28/17 11:29





  Coreg -  PO   6.25 mg





  BID JARROD   Administration


 


Cholecalciferol  1,000 unit 06/23/17 11:15 06/28/17 11:29





  Vitamin D3 -  PO   1,000 unit





  DAILY JARROD   Administration


 


Clopidogrel Bisulfate  75 mg 06/23/17 11:15 06/28/17 11:29





  Plavix -  PO   75 mg





  DAILY JARROD   Administration


 


Docusate Sodium  100 mg 06/23/17 22:00 06/28/17 11:29





  Colace -  PO   100 mg





  BID JARROD   Administration


 


Ferrous Sulfate  325 mg 06/23/17 11:15 06/27/17 11:36





  Feosol -  PO   325 mg





  Q48H JARROD   Administration


 


Furosemide  100 mg 06/26/17 11:08 06/28/17 09:30





  Lasix Injection -  IVPUSH   100 mg





  BID@0600,1400 JARROD   Administration


 


Milrinone Lactate/Dextrose  100 mls @ 9.716 mls/hr 06/23/17 11:00 06/28/17 11:30





  Milrinone 20mg/100ml Ivpb -  IVPB   9.716 mls/hr





  TITR JARROD   Administration





  0.35 MCG/KG/MIN   


 


Insulin Aspart  1 vial 06/23/17 16:30 06/28/17 06:18





  Novolog Vial Sliding Scale -  SQ   2 units





  ACHS JARROD   Administration





  Protocol   


 


Metolazone  5 mg 06/28/17 13:00  





  Zaroxolyn -  PO 06/28/17 13:01  





  ONCE ONE   


 


Multivitamins/Minerals/Vitamin C  1 tab 06/24/17 10:00 06/28/17 11:29





  Tab-A-Vit -  PO   1 tab





  DAILY JARROD   Administration


 


Ondansetron HCl  4 mg 06/23/17 11:09  





  Zofran Injection  IVPB   





  Q6H PRN   





  NAUSEA   


 


Ranitidine HCl  75 mg 06/23/17 11:15 06/28/17 11:28





  Zantac -  PO   75 mg





  DAILY JARROD   Administration


 


Starch  1 each 06/23/17 22:00 06/27/17 23:24





  Anusol Suppository -  RC   1 each





  HS JARROD   Administration


 


Tiotropium Bromide  1 puff 06/25/17 17:15 06/28/17 11:29





  Spiriva -  IH   1 puff





  DAILY JARROD   Administration


 


Warfarin Sodium  4 mg 06/23/17 18:00 06/27/17 17:40





  Coumadin -  PO   4 mg





  DAILY@1800 JARROD   Administration








 Vital Signs











Temp  97.6 F   06/28/17 05:00


 


Pulse  78   06/28/17 05:00


 


Resp  22   06/28/17 05:00


 


BP  122/67   06/28/17 05:00


 


Pulse Ox  97   06/27/17 21:00








 Intake & Output











 06/27/17 06/27/17 06/28/17





 11:59 23:59 11:59


 


Intake Total 117 650 116.4


 


Output Total  775 


 


Balance 117 -125 116.4


 


Weight 211 lb 6.4 oz  210 lb


 


Intake:   


 


    116.4


 


    R Chest PICC 117  116.4


 


  Oral  650 


 


Output:   


 


  Urine  775 


 


    Void  775 


 


Other:   


 


  Voiding Method Urinal Toilet Urinal


 


  # Unmeasured Voids   


 


    Void  2 


 


  Weight Measurement Method Standing Scale  Standing Scale














Constitutional: Yes: No Distress, Calm


Eyes: No: Sclera Icterus


HENT: No: Nasal Congestion


Cardiovascular: Yes: Regular Rate and Rhythm, JVD (EJ massively distended at 90 

degr (no IJ seen)), S1, S2, Other (PMI non diplaced).  No: Gallop, Murmur


Respiratory: Yes: bibasilar rales.  No: Accessory Muscle Use, Rales, Wheezes


Gastrointestinal: Yes: Normal Bowel Sounds, Soft.  No: Tenderness


Musculoskeletal: Yes: Other (No kyphosis)


Extremities: No: Cold


Edema: trace -1+


Integumentary: No: Jaundice


Neurological: Yes: Alert, Oriented (x3)


Psychiatric: No: Agitated


Labs: 


 








 


 Laboratory Last Values











WBC  6.7 K/mm3 (4.0-10.0)   06/28/17  06:02    


 


RBC  4.20 M/mm3 (4.00-5.60)   06/28/17  06:02    


 


Hgb  11.1 GM/dL (11.7-16.9)  L  06/28/17  06:02    


 


Hct  34.8 % (35.4-49)  L  06/28/17  06:02    


 


MCV  82.8 fl (80-96)   06/28/17  06:02    


 


MCHC  31.8 g/dl (32.0-35.9)  L  06/28/17  06:02    


 


RDW  18.3 % (11.9-15.9)  H  06/28/17  06:02    


 


Plt Count  161 K/MM3 (134-434)   06/28/17  06:02    


 


MPV  8.7 fl (7.5-11.1)   06/28/17  06:02    


 


Neutrophils %  70.9 % (42.8-82.8)   06/28/17  06:02    


 


Lymphocytes %  13.8 % (8-40)   06/28/17  06:02    


 


Monocytes %  12.5 % (3.8-10.2)  H  06/28/17  06:02    


 


Eosinophils %  2.1 % (0-4.5)   06/28/17  06:02    


 


Basophils %  0.7 % (0-2.0)   06/28/17  06:02    


 


Platelet Estimate  Adequate  (NORMAL)   06/25/17  05:35    


 


Platelet Comment  No clumping noted   06/25/17  05:35    


 


INR  2.65  (0.82-1.09)  H  06/28/17  06:02    


 


Sodium  140 mmol/L (136-145)   06/28/17  06:02    


 


Potassium  3.5 mmol/L (3.5-5.1)   06/28/17  06:02    


 


Chloride  101 mmol/L ()   06/28/17  06:02    


 


Carbon Dioxide  31 mmol/L (21-32)   06/28/17  06:02    


 


Anion Gap  8  (8-16)   06/28/17  06:02    


 


BUN  51 mg/dL (7-18)  H  06/28/17  06:02    


 


Creatinine  1.7 mg/dL (0.7-1.3)  H  06/28/17  06:02    


 


Creat Clearance w eGFR  34.46  (>60)   06/25/17  05:35    


 


POC Glucometer  178 UNITS (())   06/28/17  05:21    


 


Random Glucose  141 mg/dL ()  H  06/28/17  06:02    


 


Calcium  8.9 mg/dL (8.5-10.1)   06/28/17  06:02    


 


Phosphorus  3.4 mg/dL (2.5-4.9)   06/28/17  06:02    


 


Magnesium  2.5 mg/dL (1.8-2.4)  H  06/28/17  06:02    


 


Total Bilirubin  1.0 mg/dL (0.2-1.0)   06/26/17  05:57    


 


Direct Bilirubin  0.3 mg/dL (0.0-0.2)  H  06/26/17  05:57    


 


AST  29 U/L (15-37)   06/26/17  05:57    


 


ALT  26 U/L (12-78)   06/26/17  05:57    


 


Alkaline Phosphatase  175 U/L ()  H  06/26/17  05:57    


 


Creatine Kinase  59 IU/L ()   06/23/17  00:15    


 


Troponin I  0.04 ng/ml (0.00-0.05)   06/23/17  00:15    


 


B-Natriuretic Peptide  1720.46 pg/ml (5-450)  H  06/23/17  02:10    


 


Total Protein  7.3 g/dl (6.4-8.2)   06/26/17  05:57    


 


Albumin  3.3 g/dl (3.4-5.0)  L  06/26/17  05:57    


 


Urine Color  Ltyellow   06/23/17  05:15    


 


Urine Appearance  Clear   06/23/17  05:15    


 


Urine pH  6.0  (5.0-8.0)   06/23/17  05:15    


 


Ur Specific Gravity  1.010  (1.005-1.025)   06/23/17  05:15    


 


Urine Protein  Negative  (NEGATIVE)   06/23/17  05:15    


 


Urine Glucose (UA)  Negative  (NEGATIVE)   06/23/17  05:15    


 


Urine Ketones  Negative  (NEGATIVE)   06/23/17  05:15    


 


Urine Blood  Negative  (NEGATIVE)   06/23/17  05:15    


 


Urine Nitrite  Negative  (NEGATIVE)   06/23/17  05:15    


 


Urine Bilirubin  Negative  (NEGATIVE)   06/23/17  05:15    


 


Urine Urobilinogen  Negative E.U./dl (0.2-1.0)   06/23/17  05:15    


 


Ur Leukocyte Esterase  Trace  (NEGATIVE)  H  06/23/17  05:15    


 


Urine RBC  1 /hpf (0-3)   06/23/17  05:15    


 


Urine WBC  3 /hpf (3-5)   06/23/17  05:15    


 


Hyaline Casts  1 /lpf  06/23/17  05:15    


 


Urine Mucus  Rare   06/23/17  05:15    











- ....Imaging


EKG: Other (tele: AF, V-P; rare brief nsvt)








ecg 6/22/17: 





echo 10/2016: sev lve, global hk, sev dec lvef, mild rve, nl rv fcn, patty, mild-

mod mr, mv ring, tv ring, mild tr, mild pr, mild phtn, ivc mild dil





cxr: chf











a/p:  76 year old man with PMH of IDDM, COPD (O2 dependant) HTN, HLD, Afib (on 

coumadin), CAD s/p CABG 2013, end stage CHF with ICD placement (on milrinone 

pump), PVD, BPH here with sob.





sob, acute systolic CHF, end stage systolic heart failure s/p ICD, on home 

milrinone infusion:


- here with sob/wright


- was well diuresed at home to wt 202-204 with creat 2.1 and Na 151--torsemide 

80 bid gradually downtitrated back to 40 bid


- saw me last week with sob at baseline, but ongoing phlegmy cough--likely sec 

to advanced copd and NOT chf


- developed acute incr sob in afternoon, after left my office for last appt, 

thinks wt was 207 that day


- cxr here worsened, with perihilar edema pattern and RLL infiltrates likely 

chf (no other clinical signs of PNA)


- initial wt 204 standing, up to 207 on HD 2


- suspect diet/fluid noncompliance may be trigger of chf here


- cont milrinone at patient's home maintenance dose (on chronic infusion 

destination therapy)--0.35 mcg/kg/min.  


- cont home coreg. (no ACE due to low bp's and labile creatinines; no Entresto 

for same reason) 


- recent office icd check with stable function


- 6/23 unclear if sxs from chf or some URI/copd (productive cough).  Will give 

trial of iv lasix 80 x1 today and monitor sxs, cr trend tomorrow to see if 

improving.


- 6/24.  Symptoms improving, creatinine improved after lasix 80 mg IV x 1 

yesterday.  Will continue daily dosing. 


- 6/25   Ongoing weight gain, will increase lasix to bid dosing--though pt 

refused 2nd dose due to late in day timing


- note: pt gains wt rapidly at home when non-compliant with 1.5L daily fluid 

restriction (wife supervises) and same here when fluid restriction not ordered/

implemented


- 6/26: fluid restriction ordered, importance d/w'd RN (to inform assistant); 

wt up to 212 (target wt is 207 or less). bid lasix today. responds to 

metolazone reliably at home when wt rises so low threshold to give this if wt 

rises further tomorrow (hopefully he is not becoming IV diuretic dependent)


- suspect loss of muscle mass over time, with new dry wt closer to 202-204


- 6/27 Significantly bothered by frequent urination.  declines connolly.  Still 

symptomatic, minimal weight loss this morning. will give extra dose of 

metolazone this afternoon with pm lasix. 


-6/28: Cr stable, wt down a pound.  Will cont iv lasix 100 bid and give 

metolazone 5 today as well.





afib


- currently rate controlled on coreg


- cont coumadin per INR





VT:


- intermittent brief runs of NSVT on tele


- has ICD


- on max tolerated coreg dose


- K/Mag optimized--routine lyte mgmt





PVD


- con't AC, statin.





CAD s/p CABG


- No anginal symptoms, no signs acs.  Con't home ac, plavix, atorvastatin, coreg





HTN: controlled on coreg





HLD: con't statin. 





master/CKD:


- suspect cardiorenal syndrome, with renal fxn improving due to IV diuresis


- renal fxn presently at baseline





copd


- per pulm/pmd

## 2017-06-28 NOTE — PN
Progress Note, Physician


Chief Complaint: 


Mr Jerome says he feels unchanged. Still with trouble breathing that is not 

worsened or improved. No cp or n/v.





- Current Medication List


Current Medications: 


Active Medications





Acetaminophen (Tylenol -)  650 mg PO Q4H PRN


   PRN Reason: FEVER OR PAIN


   Last Admin: 06/24/17 11:48 Dose:  650 mg


Albuterol Sulfate (Ventolin 0.083% Nebulizer Soln -)  1 amp NEB Q6H PRN


   PRN Reason: SHORT OF BREATH/WHEEZING


   Last Admin: 06/28/17 06:35 Dose:  1 amp


Allopurinol (Zyloprim -)  100 mg PO DAILY [facility]


   Last Admin: 06/27/17 10:04 Dose:  100 mg


Ascorbic Acid (Vitamin C -)  1,000 mg PO DAILY [facility]


   Last Admin: 06/27/17 10:05 Dose:  1,000 mg


Atorvastatin Calcium (Lipitor -)  40 mg PO HS [facility]


   Last Admin: 06/27/17 21:51 Dose:  40 mg


Budesonide/Formoterol Fumarate (Symbicort 160/4.5mcg -)  1 puff IH BID [facility]


   Last Admin: 06/27/17 22:13 Dose:  1 puff


Bupropion HCl (Wellbutrin -)  75 mg PO DAILY [facility]


   Last Admin: 06/27/17 10:05 Dose:  75 mg


Carvedilol (Coreg -)  6.25 mg PO BID [facility]


   Last Admin: 06/27/17 21:51 Dose:  6.25 mg


Cholecalciferol (Vitamin D3 -)  1,000 unit PO DAILY [facility]


   Last Admin: 06/27/17 10:04 Dose:  1,000 unit


Clopidogrel Bisulfate (Plavix -)  75 mg PO DAILY [facility]


   Last Admin: 06/27/17 10:05 Dose:  75 mg


Docusate Sodium (Colace -)  100 mg PO BID [facility]


   Last Admin: 06/27/17 21:51 Dose:  100 mg


Ferrous Sulfate (Feosol -)  325 mg PO Q48H JARROD


   Last Admin: 06/27/17 11:36 Dose:  325 mg


Furosemide (Lasix Injection -)  100 mg IVPUSH BID@0600,1400 [facility]


   Last Admin: 06/28/17 06:17 Dose:  Not Given


Milrinone Lactate/Dextrose (Milrinone 20mg/100ml Ivpb -)  100 mls @ 9.716 mls/

hr IVPB TITR [facility]


   PRN Reason: 0.35 MCG/KG/MIN


   Last Admin: 06/27/17 12:50 Dose:  9.716 mls/hr


Insulin Aspart (Novolog Vial Sliding Scale -)  1 vial SQ ACHS JARROD


   PRN Reason: Protocol


   Last Admin: 06/28/17 06:18 Dose:  2 units


Multivitamins/Minerals/Vitamin C (Tab-A-Vit -)  1 tab PO DAILY [facility]


   Last Admin: 06/27/17 10:05 Dose:  1 tab


Ondansetron HCl (Zofran Injection)  4 mg IVPB Q6H PRN


   PRN Reason: NAUSEA


Ranitidine HCl (Zantac -)  75 mg PO DAILY [facility]


   Last Admin: 06/27/17 10:04 Dose:  75 mg


Starch (Anusol Suppository -)  1 each RC HS [facility]


   Last Admin: 06/27/17 23:24 Dose:  1 each


Tiotropium Bromide (Spiriva -)  1 puff IH DAILY [facility]


   Last Admin: 06/27/17 10:05 Dose:  1 puff


Warfarin Sodium (Coumadin -)  4 mg PO DAILY@1800 [facility]


   Last Admin: 06/27/17 17:40 Dose:  4 mg











- Objective


Vital Signs: 


 Vital Signs











Temperature  97.6 F   06/28/17 05:00


 


Pulse Rate  78   06/28/17 05:00


 


Respiratory Rate  22   06/28/17 05:00


 


Blood Pressure  122/67   06/28/17 05:00


 


O2 Sat by Pulse Oximetry (%)  97   06/27/17 21:00











Constitutional: Yes: No Distress, Calm


Cardiovascular: Yes: Pulse Irregular.  No: Gallop, Murmur, Rub


Respiratory: Yes: Regular, On Nasal O2, Rhonchi.  No: Rales, Wheezes


Gastrointestinal: Yes: Normal Bowel Sounds, Soft.  No: Distention, Tenderness


Extremities: Yes: WNL


Edema: Yes


Edema: LLE: 1+, RLE: 1+ ( )


Labs: 


 CBC, BMP





 06/28/17 06:02 





 06/28/17 06:02 





 INR, PTT











INR  2.65  (0.82-1.09)  H  06/28/17  06:02    














Problem List





- Problems


(1) CHF (congestive heart failure)


Code(s): I50.9 - HEART FAILURE, UNSPECIFIED   Qualifiers: 


     Congestive heart failure type: systolic     Congestive heart failure 

chronicity: acute on chronic        Qualified Code(s): I50.23 - Acute on 

chronic systolic (congestive) heart failure  





(2) Acute and chronic respiratory failure (acute-on-chronic)


Code(s): J96.20 - ACUTE AND CHR RESP FAILURE, UNSP W HYPOXIA OR HYPERCAPNIA   

Qualifiers: 


     Respiratory failure complication: hypoxia        Qualified Code(s): J96.21 

- Acute and chronic respiratory failure with hypoxia  





(3) Acute on chronic renal insufficiency


Code(s): N28.9 - DISORDER OF KIDNEY AND URETER, UNSPECIFIED


N18.9 - CHRONIC KIDNEY DISEASE, UNSPECIFIED





(4) Atrial fibrillation


Code(s): I48.91 - UNSPECIFIED ATRIAL FIBRILLATION   Qualifiers: 


     Atrial fibrillation type: chronic     Qualified Code(s): I48.2 - Chronic 

atrial fibrillation  





(5) CAD (coronary artery disease)


Code(s): I25.10 - ATHSCL HEART DISEASE OF NATIVE CORONARY ARTERY W/O ANG PCTRS 

  





(6) COPD (chronic obstructive pulmonary disease)


Code(s): J44.9 - CHRONIC OBSTRUCTIVE PULMONARY DISEASE, UNSPECIFIED   





(7) Diabetes


Code(s): E11.9 - TYPE 2 DIABETES MELLITUS WITHOUT COMPLICATIONS   Qualifiers: 


     Diabetes mellitus type: type 2     Diabetes mellitus complication status: 

with kidney complications     Diabetes mellitus complication detail: with 

chronic kidney disease     Chronic kidney disease stage: stage 4 (severe)








Assessment/Plan


(1) CHF (congestive heart failure)


Assessment/Plan: 


-end stage CHF, on milrinone


-cardiology following


-weight decreased by 2lbs from max here, 212 -> 210


-continue lasix 100mg IV bid


-? if entering stage where hospice/comfort measures are appropriate


Code(s): I50.9 - HEART FAILURE, UNSPECIFIED   Qualifiers: 


     Congestive heart failure type: systolic     Congestive heart failure 

chronicity: acute on chronic        Qualified Code(s): I50.23 - Acute on 

chronic systolic (congestive) heart failure  





(2) Acute and chronic respiratory failure (acute-on-chronic)


Assessment/Plan: 


-secondary to CHF exacerbation


-continue diuresis


-on supplemental oxygen


Code(s): J96.20 - ACUTE AND CHR RESP FAILURE, UNSP W HYPOXIA OR HYPERCAPNIA   

Qualifiers: 


     Respiratory failure complication: hypoxia        Qualified Code(s): J96.21 

- Acute and chronic respiratory failure with hypoxia  





(3) Acute on chronic renal insufficiency


Assessment/Plan: 


-cardiorenal


-at baseline with diuresis


Code(s): N28.9 - DISORDER OF KIDNEY AND URETER, UNSPECIFIED


N18.9 - CHRONIC KIDNEY DISEASE, UNSPECIFIED





(4) Atrial fibrillation


Assessment/Plan: 


-rate controlled


-continue coumadin, therapeutic


-daily INR checks


Code(s): I48.91 - UNSPECIFIED ATRIAL FIBRILLATION   Qualifiers: 


     Atrial fibrillation type: chronic     Qualified Code(s): I48.2 - Chronic 

atrial fibrillation  





(5) CAD (coronary artery disease)


Assessment/Plan: 


-quiescent


-cardiology following


-continue home regimen


Code(s): I25.10 - ATHSCL HEART DISEASE OF NATIVE CORONARY ARTERY W/O ANG PCTRS 

  





(6) COPD (chronic obstructive pulmonary disease)


Assessment/Plan: 


-continue current management


-pulmonary following


-chronic respiratory failure secondary to CHF


Code(s): J44.9 - CHRONIC OBSTRUCTIVE PULMONARY DISEASE, UNSPECIFIED   





(7) Diabetes


Assessment/Plan: 


-diabetic diet


-continue SSI


Code(s): E11.9 - TYPE 2 DIABETES MELLITUS WITHOUT COMPLICATIONS   Qualifiers: 


     Diabetes mellitus type: type 2     Diabetes mellitus complication status: 

with kidney complications     Diabetes mellitus complication detail: with 

chronic kidney disease     Chronic kidney disease stage: stage 4 (severe)

## 2017-06-29 LAB
ANION GAP SERPL CALC-SCNC: 8 MMOL/L (ref 8–16)
CALCIUM SERPL-MCNC: 8.7 MG/DL (ref 8.5–10.1)
CO2 SERPL-SCNC: 34 MMOL/L (ref 21–32)
CREAT SERPL-MCNC: 1.9 MG/DL (ref 0.7–1.3)
GLUCOSE SERPL-MCNC: 99 MG/DL (ref 74–106)
INR BLD: 3.05 (ref 0.82–1.09)
PT PNL PPP: 34.3 SEC (ref 9.98–11.88)

## 2017-06-29 RX ADMIN — RANITIDINE SCH MG: 150 TABLET ORAL at 11:15

## 2017-06-29 RX ADMIN — INSULIN ASPART SCH: 100 INJECTION, SOLUTION INTRAVENOUS; SUBCUTANEOUS at 21:16

## 2017-06-29 RX ADMIN — INSULIN ASPART SCH UNITS: 100 INJECTION, SOLUTION INTRAVENOUS; SUBCUTANEOUS at 17:20

## 2017-06-29 RX ADMIN — CARVEDILOL SCH MG: 6.25 TABLET, FILM COATED ORAL at 21:10

## 2017-06-29 RX ADMIN — DOCUSATE SODIUM SCH MG: 100 CAPSULE, LIQUID FILLED ORAL at 21:10

## 2017-06-29 RX ADMIN — FUROSEMIDE SCH MG: 10 INJECTION, SOLUTION INTRAVENOUS at 14:53

## 2017-06-29 RX ADMIN — FUROSEMIDE SCH: 10 INJECTION, SOLUTION INTRAVENOUS at 06:12

## 2017-06-29 RX ADMIN — CLOPIDOGREL BISULFATE SCH MG: 75 TABLET, FILM COATED ORAL at 11:12

## 2017-06-29 RX ADMIN — WARFARIN SCH MG: 2 TABLET ORAL at 17:15

## 2017-06-29 RX ADMIN — TIOTROPIUM BROMIDE SCH PUFF: 18 CAPSULE ORAL; RESPIRATORY (INHALATION) at 11:31

## 2017-06-29 RX ADMIN — CARVEDILOL SCH MG: 6.25 TABLET, FILM COATED ORAL at 11:12

## 2017-06-29 RX ADMIN — ATORVASTATIN CALCIUM SCH MG: 40 TABLET, FILM COATED ORAL at 21:10

## 2017-06-29 RX ADMIN — MILRINONE LACTATE SCH MLS/HR: 200 INJECTION, SOLUTION INTRAVENOUS at 11:15

## 2017-06-29 RX ADMIN — INSULIN ASPART SCH UNITS: 100 INJECTION, SOLUTION INTRAVENOUS; SUBCUTANEOUS at 11:56

## 2017-06-29 RX ADMIN — PHENYLEPHRINE HYDROCHLORIDE AND FAT, HARD SCH EACH: .00525; 1.86 SUPPOSITORY RECTAL at 21:10

## 2017-06-29 RX ADMIN — INSULIN ASPART SCH: 100 INJECTION, SOLUTION INTRAVENOUS; SUBCUTANEOUS at 06:12

## 2017-06-29 RX ADMIN — OXYCODONE HYDROCHLORIDE AND ACETAMINOPHEN SCH MG: 500 TABLET ORAL at 11:12

## 2017-06-29 RX ADMIN — DOCUSATE SODIUM SCH MG: 100 CAPSULE, LIQUID FILLED ORAL at 11:15

## 2017-06-29 RX ADMIN — FERROUS SULFATE TAB EC 324 MG (65 MG FE EQUIVALENT) SCH MG: 324 (65 FE) TABLET DELAYED RESPONSE at 11:11

## 2017-06-29 RX ADMIN — BUDESONIDE AND FORMOTEROL FUMARATE DIHYDRATE SCH PUFF: 160; 4.5 AEROSOL RESPIRATORY (INHALATION) at 11:31

## 2017-06-29 RX ADMIN — ALLOPURINOL SCH MG: 100 TABLET ORAL at 11:13

## 2017-06-29 RX ADMIN — ALBUTEROL SULFATE PRN AMP: 2.5 SOLUTION RESPIRATORY (INHALATION) at 10:05

## 2017-06-29 RX ADMIN — FUROSEMIDE SCH MG: 10 INJECTION, SOLUTION INTRAVENOUS at 11:00

## 2017-06-29 RX ADMIN — VITAMIN D, TAB 1000IU (100/BT) SCH UNIT: 25 TAB at 11:14

## 2017-06-29 RX ADMIN — BUDESONIDE AND FORMOTEROL FUMARATE DIHYDRATE SCH PUFF: 160; 4.5 AEROSOL RESPIRATORY (INHALATION) at 21:04

## 2017-06-29 RX ADMIN — Medication SCH APPLIC: at 11:31

## 2017-06-29 RX ADMIN — MULTIVITAMIN TABLET SCH TAB: TABLET at 11:13

## 2017-06-29 RX ADMIN — WARFARIN SCH: 2 TABLET ORAL at 18:54

## 2017-06-29 RX ADMIN — Medication SCH APPLIC: at 21:05

## 2017-06-29 NOTE — PN
Progress Note (short form)





- Note


Progress Note: 


Patient seen and examined in the Telemetry unit. 


Breathing is overall getting better. 


NAD, on NC O2. 


No CP. 


 





 Intake & Output











 06/26/17 06/27/17 06/28/17 06/29/17





 23:59 23:59 23:59 23:59


 


Intake Total 318.8 767 635.2 197.6


 


Output Total  1000


 


Balance -81.2 -8 -514.8 -802.4


 


Weight 212 lb 2 oz 211 lb 6.4 oz 210 lb 210 lb 3.2 oz








 Last Vital Signs











Temp Pulse Resp BP Pulse Ox


 


 98.7 F   75   22   105/72   97 


 


 06/29/17 10:00  06/29/17 10:50  06/29/17 10:00  06/29/17 10:00  06/29/17 10:50








Active Medications





Acetaminophen (Tylenol -)  650 mg PO Q4H PRN


   PRN Reason: FEVER OR PAIN


   Last Admin: 06/24/17 11:48 Dose:  650 mg


Albuterol Sulfate (Ventolin 0.083% Nebulizer Soln -)  1 amp NEB Q4H PRN


   PRN Reason: SHORT OF BREATH/WHEEZING


   Last Admin: 06/29/17 10:05 Dose:  1 amp


Allopurinol (Zyloprim -)  100 mg PO DAILY [facility]


   Last Admin: 06/29/17 11:13 Dose:  100 mg


Ascorbic Acid (Vitamin C -)  1,000 mg PO DAILY [facility]


   Last Admin: 06/29/17 11:12 Dose:  1,000 mg


Atorvastatin Calcium (Lipitor -)  40 mg PO HS [facility]


   Last Admin: 06/28/17 22:24 Dose:  40 mg


Bacitracin/Polymyxin B Sulfate (Polysporin Ointment -)  1 applic TP BID [facility]


   Last Admin: 06/29/17 11:31 Dose:  1 applic


Budesonide/Formoterol Fumarate (Symbicort 160/4.5mcg -)  1 puff IH BID [facility]


   Last Admin: 06/29/17 11:31 Dose:  1 puff


Bupropion HCl (Wellbutrin -)  75 mg PO DAILY [facility]


   Last Admin: 06/29/17 11:14 Dose:  75 mg


Carvedilol (Coreg -)  6.25 mg PO BID [facility]


   Last Admin: 06/29/17 11:12 Dose:  6.25 mg


Cholecalciferol (Vitamin D3 -)  1,000 unit PO DAILY [facility]


   Last Admin: 06/29/17 11:14 Dose:  1,000 unit


Clopidogrel Bisulfate (Plavix -)  75 mg PO DAILY [facility]


   Last Admin: 06/29/17 11:12 Dose:  75 mg


Docusate Sodium (Colace -)  100 mg PO BID [facility]


   Last Admin: 06/29/17 11:15 Dose:  100 mg


Ferrous Sulfate (Feosol -)  325 mg PO Q48H [facility]


   Last Admin: 06/29/17 11:11 Dose:  325 mg


Furosemide (Lasix Injection -)  100 mg IVPUSH BID@0600,1400 [facility]


   Last Admin: 06/29/17 11:00 Dose:  100 mg


Milrinone Lactate/Dextrose (Milrinone 20mg/100ml Ivpb -)  100 mls @ 9.716 mls/

hr IVPB TITR [facility]


   PRN Reason: 0.35 MCG/KG/MIN


   Last Admin: 06/29/17 11:15 Dose:  9.716 mls/hr


Insulin Aspart (Novolog Vial Sliding Scale -)  1 vial SQ ACHS [facility]


   PRN Reason: Protocol


   Last Admin: 06/29/17 11:56 Dose:  6 units


Multivitamins/Minerals/Vitamin C (Tab-A-Vit -)  1 tab PO DAILY [facility]


   Last Admin: 06/29/17 11:13 Dose:  1 tab


Ondansetron HCl (Zofran Injection)  4 mg IVPB Q6H PRN


   PRN Reason: NAUSEA


Ranitidine HCl (Zantac -)  75 mg PO DAILY [facility]


   Last Admin: 06/29/17 11:15 Dose:  75 mg


Starch (Anusol Suppository -)  1 each RC HS [facility]


   Last Admin: 06/28/17 22:24 Dose:  Not Given


Tiotropium Bromide (Spiriva -)  1 puff IH DAILY [facility]


   Last Admin: 06/29/17 11:31 Dose:  1 puff


Warfarin Sodium (Coumadin -)  2 mg PO DAILY@1800 [facility]











GENERAL: Awake, alert, NAD 


HEAD: Normal with no signs of trauma.


NECK: Trachea midline, full range of motion, supple. 


LUNGS: Bibasilar rales


HEART: irregular 


ABDOMEN: Soft, NT, ND, (+) BS 


EXTREMITIES: 2+ pulses, warm, well-perfused, no edema. 


SKIN: Warm, dry, normal turgor, no rashes or lesions noted





 


 Laboratory Results - last 24 hr











  06/28/17 06/29/17 06/29/17





  22:23 05:37 05:37


 


INR    3.05 H


 


Sodium   141 


 


Potassium   3.2 L 


 


Chloride   99 


 


Carbon Dioxide   34 H 


 


Anion Gap   8 


 


BUN   52 H 


 


Creatinine   1.9 H 


 


POC Glucometer  266  


 


Random Glucose   99  D 


 


Calcium   8.7 














  06/29/17





  05:46


 


INR 


 


Sodium 


 


Potassium 


 


Chloride 


 


Carbon Dioxide 


 


Anion Gap 


 


BUN 


 


Creatinine 


 


POC Glucometer  111


 


Random Glucose 


 


Calcium 

















 Problem List 





- Problems


(1) CHF (congestive heart failure)


Code(s): I50.9 - HEART FAILURE, UNSPECIFIED   Qualifiers: 


   





(2) CKD (chronic kidney disease)


Code(s): N18.9 - CHRONIC KIDNEY DISEASE, UNSPECIFIED   Qualifiers: 


   





(3) AICD (automatic cardioverter/defibrillator) present


Code(s): Z95.810 - PRESENCE OF AUTOMATIC (IMPLANTABLE) CARDIAC DEFIBRILLATOR





(4) Acute and chronic respiratory failure (acute-on-chronic)


Code(s): J96.20 - ACUTE AND CHR RESP FAILURE, UNSP W HYPOXIA OR HYPERCAPNIA   

Qualifiers: 


     Respiratory failure complication: hypoxia        Qualified Code(s): J96.21 

- Acute and chronic respiratory failure with hypoxia  





(5) Acute on chronic renal insufficiency


Code(s): N28.9 - DISORDER OF KIDNEY AND URETER, UNSPECIFIED


N18.9 - CHRONIC KIDNEY DISEASE, UNSPECIFIED





(6) Atrial fibrillation


Code(s): I48.91 - UNSPECIFIED ATRIAL FIBRILLATION   Qualifiers: 


     Atrial fibrillation type: chronic     Qualified Code(s): I48.2 - Chronic 

atrial fibrillation  





(7) CAD (coronary artery disease)


Code(s): I25.10 - ATHSCL HEART DISEASE OF NATIVE CORONARY ARTERY W/O ANG PCTRS 

  





(8) COPD (chronic obstructive pulmonary disease)


Code(s): J44.9 - CHRONIC OBSTRUCTIVE PULMONARY DISEASE, UNSPECIFIED   





(9) Cardiomyopathy


Code(s): I42.9 - CARDIOMYOPATHY, UNSPECIFIED   





(10) Diabetes


Code(s): E11.9 - TYPE 2 DIABETES MELLITUS WITHOUT COMPLICATIONS   Qualifiers: 


     Diabetes mellitus type: type 2     Diabetes mellitus complication status: 

with kidney complications     Diabetes mellitus complication detail: with 

chronic kidney disease     Chronic kidney disease stage: stage 4 (severe)





(11) Dyspnea on exertion


Code(s): R06.09 - OTHER FORMS OF DYSPNEA





(12) Presence of permanent cardiac pacemaker


Code(s): Z95.0 - PRESENCE OF CARDIAC PACEMAKER











PLAN  LASIX


        O2


        MILRINONE 


        INHALED BRONCHODILATORS


        MONITOR LYTES ,RENAL FUNCTION


        DAILY WTS


        AC





DR AUGUSTIN

## 2017-06-29 NOTE — PN
Progress Note (short form)





- Note


Progress Note: 


Chief Complaint: 


sob/cough


History of Present Illness: 


still with sob worse than baseline but a little better today


no signif leg swelling


no cp, palpit


no loc/dizzy








ex cigs











 


 


 Current Medications











Generic Name Dose Route Start Last Admin





  Trade Name Freq  PRN Reason Stop Dose Admin


 


Acetaminophen  650 mg 06/23/17 11:09 06/24/17 11:48





  Tylenol -  PO   650 mg





  Q4H PRN   Administration





  FEVER OR PAIN   


 


Albuterol Sulfate  1 amp 06/28/17 15:54 06/28/17 22:00





  Ventolin 0.083% Nebulizer Soln -  NEB   1 amp





  Q4H PRN   Administration





  SHORT OF BREATH/WHEEZING   


 


Allopurinol  100 mg 06/24/17 10:00 06/28/17 11:28





  Zyloprim -  PO   100 mg





  DAILY JARROD   Administration


 


Ascorbic Acid  1,000 mg 06/24/17 10:00 06/28/17 11:28





  Vitamin C -  PO   1,000 mg





  DAILY JARROD   Administration


 


Atorvastatin Calcium  40 mg 06/23/17 22:00 06/28/17 22:24





  Lipitor -  PO   40 mg





  HS JARROD   Administration


 


Bacitracin/Polymyxin B Sulfate  1 applic 06/28/17 16:15 06/28/17 22:28





  Polysporin Ointment -  TP   Not Given





  BID JARROD   


 


Budesonide/Formoterol Fumarate  1 puff 06/23/17 11:15 06/28/17 22:32





  Symbicort 160/4.5mcg -  IH   1 puff





  BID JARROD   Administration


 


Bupropion HCl  75 mg 06/23/17 11:15 06/28/17 11:28





  Wellbutrin -  PO   75 mg





  DAILY JARROD   Administration


 


Carvedilol  6.25 mg 06/23/17 11:15 06/28/17 22:24





  Coreg -  PO   6.25 mg





  BID JARROD   Administration


 


Cholecalciferol  1,000 unit 06/23/17 11:15 06/28/17 11:29





  Vitamin D3 -  PO   1,000 unit





  DAILY JARROD   Administration


 


Clopidogrel Bisulfate  75 mg 06/23/17 11:15 06/28/17 11:29





  Plavix -  PO   75 mg





  DAILY JARROD   Administration


 


Docusate Sodium  100 mg 06/23/17 22:00 06/28/17 22:24





  Colace -  PO   100 mg





  BID JARROD   Administration


 


Ferrous Sulfate  325 mg 06/23/17 11:15 06/27/17 11:36





  Feosol -  PO   325 mg





  Q48H JARROD   Administration


 


Furosemide  100 mg 06/26/17 11:08 06/29/17 06:12





  Lasix Injection -  IVPUSH   Not Given





  BID@0600,1400 Replaced by [facility]   


 


Milrinone Lactate/Dextrose  100 mls @ 9.716 mls/hr 06/23/17 11:00 06/28/17 11:30





  Milrinone 20mg/100ml Ivpb -  IVPB   9.716 mls/hr





  TITR JARROD   Administration





  0.35 MCG/KG/MIN   


 


Insulin Aspart  1 vial 06/23/17 16:30 06/29/17 06:12





  Novolog Vial Sliding Scale -  SQ   Not Given





  [facility] Replaced by [facility]   





  Protocol   


 


Multivitamins/Minerals/Vitamin C  1 tab 06/24/17 10:00 06/28/17 11:29





  Tab-A-Vit -  PO   1 tab





  DAILY JARROD   Administration


 


Ondansetron HCl  4 mg 06/23/17 11:09  





  Zofran Injection  IVPB   





  Q6H PRN   





  NAUSEA   


 


Ranitidine HCl  75 mg 06/23/17 11:15 06/28/17 11:28





  Zantac -  PO   75 mg





  DAILY JARROD   Administration


 


Starch  1 each 06/23/17 22:00 06/28/17 22:24





  Anusol Suppository -  RC   Not Given





  HS Replaced by [facility]   


 


Tiotropium Bromide  1 puff 06/25/17 17:15 06/28/17 11:29





  Spiriva -  IH   1 puff





  DAILY JARROD   Administration


 


Warfarin Sodium  4 mg 06/23/17 18:00 06/28/17 17:46





  Coumadin -  PO   4 mg





  DAILY@1800 JARROD   Administration











 Vital Signs











Temp  98 F   06/29/17 05:00


 


Pulse  75   06/29/17 05:00


 


Resp  20   06/29/17 05:00


 


BP  118/67   06/29/17 05:00


 


Pulse Ox  90 L  06/29/17 05:00








 Intake & Output











 06/28/17 06/28/17 06/29/17





 11:59 23:59 11:59


 


Intake Total 116.4 518.8 197.6


 


Output Total  1150 1000


 


Balance 116.4 -631.2 -802.4


 


Weight 210 lb  210 lb 3.2 oz


 


Intake:   


 


  .4 38.8 77.6


 


    R Chest PICC 116.4 38.8 77.6


 


  Oral  480 120


 


Output:   


 


  Urine  1150 1000


 


    Void  1150 1000


 


Other:   


 


  Voiding Method Urinal Urinal Urinal


 


  # Unmeasured Voids   


 


    Void   3


 


  Bowel Movement  No Yes: 2


 


  # Bowel Movements   1


 


  Weight Measurement Method Standing Scale  Standing Scale











Constitutional: Yes: No Distress, Calm


Eyes: No: Sclera Icterus


HENT: No: Nasal Congestion


Cardiovascular: Yes: Regular Rate and Rhythm, JVD (EJ massively distended at 90 

degr (no IJ seen)), S1, S2, Other (PMI non diplaced).  No: Gallop, Murmur


Respiratory: Yes: bibasilar rales.  No: Accessory Muscle Use, Rales, Wheezes


Gastrointestinal: Yes: Normal Bowel Sounds, Soft.  No: Tenderness


Musculoskeletal: Yes: Other (No kyphosis)


Extremities: No: Cold


Edema: trace


Integumentary: No: Jaundice


Neurological: Yes: Alert, Oriented (x3)


Psychiatric: No: Agitated


Labs: 


 








 


 


 Laboratory Last Values











WBC  6.7 K/mm3 (4.0-10.0)   06/28/17  06:02    


 


RBC  4.20 M/mm3 (4.00-5.60)   06/28/17  06:02    


 


Hgb  11.1 GM/dL (11.7-16.9)  L  06/28/17  06:02    


 


Hct  34.8 % (35.4-49)  L  06/28/17  06:02    


 


MCV  82.8 fl (80-96)   06/28/17  06:02    


 


MCHC  31.8 g/dl (32.0-35.9)  L  06/28/17  06:02    


 


RDW  18.3 % (11.9-15.9)  H  06/28/17  06:02    


 


Plt Count  161 K/MM3 (134-434)   06/28/17  06:02    


 


MPV  8.7 fl (7.5-11.1)   06/28/17  06:02    


 


Neutrophils %  70.9 % (42.8-82.8)   06/28/17  06:02    


 


Lymphocytes %  13.8 % (8-40)   06/28/17  06:02    


 


Monocytes %  12.5 % (3.8-10.2)  H  06/28/17  06:02    


 


Eosinophils %  2.1 % (0-4.5)   06/28/17  06:02    


 


Basophils %  0.7 % (0-2.0)   06/28/17  06:02    


 


Platelet Estimate  Adequate  (NORMAL)   06/25/17  05:35    


 


Platelet Comment  No clumping noted   06/25/17  05:35    


 


INR  3.05  (0.82-1.09)  H  06/29/17  05:37    


 


Sodium  141 mmol/L (136-145)   06/29/17  05:37    


 


Potassium  3.2 mmol/L (3.5-5.1)  L  06/29/17  05:37    


 


Chloride  99 mmol/L ()   06/29/17  05:37    


 


Carbon Dioxide  34 mmol/L (21-32)  H  06/29/17  05:37    


 


Anion Gap  8  (8-16)   06/29/17  05:37    


 


BUN  52 mg/dL (7-18)  H  06/29/17  05:37    


 


Creatinine  1.9 mg/dL (0.7-1.3)  H  06/29/17  05:37    


 


Creat Clearance w eGFR  34.46  (>60)   06/25/17  05:35    


 


POC Glucometer  111 UNITS (())   06/29/17  05:46    


 


Random Glucose  99 mg/dL ()  D 06/29/17  05:37    


 


Calcium  8.7 mg/dL (8.5-10.1)   06/29/17  05:37    


 


Phosphorus  3.4 mg/dL (2.5-4.9)   06/28/17  06:02    


 


Magnesium  2.5 mg/dL (1.8-2.4)  H  06/28/17  06:02    


 


Total Bilirubin  1.0 mg/dL (0.2-1.0)   06/26/17  05:57    


 


Direct Bilirubin  0.3 mg/dL (0.0-0.2)  H  06/26/17  05:57    


 


AST  29 U/L (15-37)   06/26/17  05:57    


 


ALT  26 U/L (12-78)   06/26/17  05:57    


 


Alkaline Phosphatase  175 U/L ()  H  06/26/17  05:57    


 


Creatine Kinase  59 IU/L ()   06/23/17  00:15    


 


Troponin I  0.04 ng/ml (0.00-0.05)   06/23/17  00:15    


 


B-Natriuretic Peptide  1720.46 pg/ml (5-450)  H  06/23/17  02:10    


 


Total Protein  7.3 g/dl (6.4-8.2)   06/26/17  05:57    


 


Albumin  3.3 g/dl (3.4-5.0)  L  06/26/17  05:57    


 


Urine Color  Ltyellow   06/23/17  05:15    


 


Urine Appearance  Clear   06/23/17  05:15    


 


Urine pH  6.0  (5.0-8.0)   06/23/17  05:15    


 


Ur Specific Gravity  1.010  (1.005-1.025)   06/23/17  05:15    


 


Urine Protein  Negative  (NEGATIVE)   06/23/17  05:15    


 


Urine Glucose (UA)  Negative  (NEGATIVE)   06/23/17  05:15    


 


Urine Ketones  Negative  (NEGATIVE)   06/23/17  05:15    


 


Urine Blood  Negative  (NEGATIVE)   06/23/17  05:15    


 


Urine Nitrite  Negative  (NEGATIVE)   06/23/17  05:15    


 


Urine Bilirubin  Negative  (NEGATIVE)   06/23/17  05:15    


 


Urine Urobilinogen  Negative E.U./dl (0.2-1.0)   06/23/17  05:15    


 


Ur Leukocyte Esterase  Trace  (NEGATIVE)  H  06/23/17  05:15    


 


Urine RBC  1 /hpf (0-3)   06/23/17  05:15    


 


Urine WBC  3 /hpf (3-5)   06/23/17  05:15    


 


Hyaline Casts  1 /lpf  06/23/17  05:15    


 


Urine Mucus  Rare   06/23/17  05:15    











- ....Imaging


EKG: Other (tele: AF, V-P)








ecg 6/22/17: 





echo 10/2016: sev lve, global hk, sev dec lvef, mild rve, nl rv fcn, patty, mild-

mod mr, mv ring, tv ring, mild tr, mild pr, mild phtn, ivc mild dil





cxr: chf











a/p:  76 year old man with PMH of IDDM, COPD (O2 dependant) HTN, HLD, Afib (on 

coumadin), CAD s/p CABG 2013, end stage CHF with ICD placement (on milrinone 

pump), PVD, BPH here with sob.





sob, acute systolic CHF, end stage systolic heart failure s/p ICD, on home 

milrinone infusion:


- here with sob/wright


- was well diuresed at home to wt 202-204 with creat 2.1 and Na 151--torsemide 

80 bid gradually downtitrated back to 40 bid


- saw me last week with sob at baseline, but ongoing phlegmy cough--likely sec 

to advanced copd and NOT chf


- developed acute incr sob in afternoon, after left my office for last appt, 

thinks wt was 207 that day


- cxr here worsened, with perihilar edema pattern and RLL infiltrates likely 

chf (no other clinical signs of PNA)


- initial wt 204 standing, up to 207 on HD 2


- suspect diet/fluid noncompliance may be trigger of chf here


- cont milrinone at patient's home maintenance dose (on chronic infusion 

destination therapy)--0.35 mcg/kg/min.  


- cont home coreg. (no ACE due to low bp's and labile creatinines; no Entresto 

for same reason) 


- recent office icd check with stable function


- 6/23 unclear if sxs from chf or some URI/copd (productive cough).  Will give 

trial of iv lasix 80 x1 today and monitor sxs, cr trend tomorrow to see if 

improving.


- 6/24.  Symptoms improving, creatinine improved after lasix 80 mg IV x 1 

yesterday.  Will continue daily dosing. 


- 6/25   Ongoing weight gain, will increase lasix to bid dosing--though pt 

refused 2nd dose due to late in day timing


- note: pt gains wt rapidly at home when non-compliant with 1.5L daily fluid 

restriction (wife supervises) and same here when fluid restriction not ordered/

implemented


- 6/26: fluid restriction ordered, importance d/w'd RN (to inform assistant); 

wt up to 212 (target wt is 207 or less). bid lasix today. responds to 

metolazone reliably at home when wt rises so low threshold to give this if wt 

rises further tomorrow (hopefully he is not becoming IV diuretic dependent)


- suspect loss of muscle mass over time, with new dry wt closer to 202-204


- 6/27 Significantly bothered by frequent urination.  declines connolly.  Still 

symptomatic, minimal weight loss this morning. will give extra dose of 

metolazone this afternoon with pm lasix. 


-6/28: Cr stable, wt down a pound.  Will cont iv lasix 100 bid and give 

metolazone 5 today as well.


-6/29: cr stable, wt unchanged but pt reports slight improvement in sob.  Will 

replete K.  Cont iv lasix. 





afib


- currently rate controlled on coreg


- cont coumadin per INR





VT:


- intermittent brief runs of NSVT on tele


- has ICD


- on max tolerated coreg dose


- replete lytes prn





PVD


- con't AC, statin.





CAD s/p CABG


- No anginal symptoms, no signs acs.  Con't home ac, plavix, atorvastatin, coreg





HTN: controlled on coreg





HLD: con't statin. 





master/CKD:


- suspect cardiorenal syndrome, with renal fxn improving due to IV diuresis


- renal fxn presently at baseline





copd


- per pulm/pmd

## 2017-06-29 NOTE — PN
Progress Note, Physician


Chief Complaint: 


Mr Jerome says he feels his breathing is a little better today. No cp or n/v.





- Current Medication List


Current Medications: 


Active Medications





Acetaminophen (Tylenol -)  650 mg PO Q4H PRN


   PRN Reason: FEVER OR PAIN


   Last Admin: 06/24/17 11:48 Dose:  650 mg


Albuterol Sulfate (Ventolin 0.083% Nebulizer Soln -)  1 amp NEB Q4H PRN


   PRN Reason: SHORT OF BREATH/WHEEZING


   Last Admin: 06/29/17 10:05 Dose:  1 amp


Allopurinol (Zyloprim -)  100 mg PO DAILY [facility]


   Last Admin: 06/28/17 11:28 Dose:  100 mg


Ascorbic Acid (Vitamin C -)  1,000 mg PO DAILY [facility]


   Last Admin: 06/28/17 11:28 Dose:  1,000 mg


Atorvastatin Calcium (Lipitor -)  40 mg PO HS [facility]


   Last Admin: 06/28/17 22:24 Dose:  40 mg


Bacitracin/Polymyxin B Sulfate (Polysporin Ointment -)  1 applic TP BID [facility]


   Last Admin: 06/28/17 22:28 Dose:  Not Given


Budesonide/Formoterol Fumarate (Symbicort 160/4.5mcg -)  1 puff IH BID [facility]


   Last Admin: 06/28/17 22:32 Dose:  1 puff


Bupropion HCl (Wellbutrin -)  75 mg PO DAILY [facility]


   Last Admin: 06/28/17 11:28 Dose:  75 mg


Carvedilol (Coreg -)  6.25 mg PO BID [facility]


   Last Admin: 06/28/17 22:24 Dose:  6.25 mg


Cholecalciferol (Vitamin D3 -)  1,000 unit PO DAILY [facility]


   Last Admin: 06/28/17 11:29 Dose:  1,000 unit


Clopidogrel Bisulfate (Plavix -)  75 mg PO DAILY [facility]


   Last Admin: 06/28/17 11:29 Dose:  75 mg


Docusate Sodium (Colace -)  100 mg PO BID [facility]


   Last Admin: 06/28/17 22:24 Dose:  100 mg


Ferrous Sulfate (Feosol -)  325 mg PO Q48H [facility]


   Last Admin: 06/27/17 11:36 Dose:  325 mg


Furosemide (Lasix Injection -)  100 mg IVPUSH BID@0600,1400 [facility]


   Last Admin: 06/29/17 06:12 Dose:  Not Given


Milrinone Lactate/Dextrose (Milrinone 20mg/100ml Ivpb -)  100 mls @ 9.716 mls/

hr IVPB TITR [facility]


   PRN Reason: 0.35 MCG/KG/MIN


   Last Admin: 06/28/17 11:30 Dose:  9.716 mls/hr


Insulin Aspart (Novolog Vial Sliding Scale -)  1 vial SQ ACHS [facility]


   PRN Reason: Protocol


   Last Admin: 06/29/17 06:12 Dose:  Not Given


Multivitamins/Minerals/Vitamin C (Tab-A-Vit -)  1 tab PO DAILY [facility]


   Last Admin: 06/28/17 11:29 Dose:  1 tab


Ondansetron HCl (Zofran Injection)  4 mg IVPB Q6H PRN


   PRN Reason: NAUSEA


Ranitidine HCl (Zantac -)  75 mg PO DAILY [facility]


   Last Admin: 06/28/17 11:28 Dose:  75 mg


Starch (Anusol Suppository -)  1 each RC HS [facility]


   Last Admin: 06/28/17 22:24 Dose:  Not Given


Tiotropium Bromide (Spiriva -)  1 puff IH DAILY [facility]


   Last Admin: 06/28/17 11:29 Dose:  1 puff


Warfarin Sodium (Coumadin -)  2 mg PO DAILY@1800 [facility]











- Objective


Vital Signs: 


 Vital Signs











Temperature  98 F   06/29/17 05:00


 


Pulse Rate  75   06/29/17 10:50


 


Respiratory Rate  20   06/29/17 05:00


 


Blood Pressure  118/67   06/29/17 05:00


 


O2 Sat by Pulse Oximetry (%)  97   06/29/17 10:50











Constitutional: Yes: Well Nourished, No Distress, Calm


Cardiovascular: Yes: Pulse Irregular, Murmur.  No: Tachycardia, Gallop, Rub


Respiratory: Yes: Regular, On Nasal O2, Rhonchi.  No: Wheezes


Gastrointestinal: Yes: Normal Bowel Sounds, Soft.  No: Distention, Tenderness


Extremities: Yes: WNL


Edema: Yes


Edema: LLE: 1+, RLE: 1+


Labs: 


 CBC, BMP





 06/28/17 06:02 





 06/29/17 05:37 





 INR, PTT











INR  3.05  (0.82-1.09)  H  06/29/17  05:37    














Problem List





- Problems


(1) CHF (congestive heart failure)


Code(s): I50.9 - HEART FAILURE, UNSPECIFIED   Qualifiers: 


     Congestive heart failure type: systolic     Congestive heart failure 

chronicity: acute on chronic        Qualified Code(s): I50.23 - Acute on 

chronic systolic (congestive) heart failure  





(2) Acute and chronic respiratory failure (acute-on-chronic)


Code(s): J96.20 - ACUTE AND CHR RESP FAILURE, UNSP W HYPOXIA OR HYPERCAPNIA   

Qualifiers: 


     Respiratory failure complication: hypoxia        Qualified Code(s): J96.21 

- Acute and chronic respiratory failure with hypoxia  





(3) Acute on chronic renal insufficiency


Code(s): N28.9 - DISORDER OF KIDNEY AND URETER, UNSPECIFIED


N18.9 - CHRONIC KIDNEY DISEASE, UNSPECIFIED





(4) Atrial fibrillation


Code(s): I48.91 - UNSPECIFIED ATRIAL FIBRILLATION   Qualifiers: 


     Atrial fibrillation type: chronic     Qualified Code(s): I48.2 - Chronic 

atrial fibrillation  





(5) CAD (coronary artery disease)


Code(s): I25.10 - ATHSCL HEART DISEASE OF NATIVE CORONARY ARTERY W/O ANG PCTRS 

  





(6) COPD (chronic obstructive pulmonary disease)


Code(s): J44.9 - CHRONIC OBSTRUCTIVE PULMONARY DISEASE, UNSPECIFIED   





(7) Diabetes


Code(s): E11.9 - TYPE 2 DIABETES MELLITUS WITHOUT COMPLICATIONS   Qualifiers: 


     Diabetes mellitus type: type 2     Diabetes mellitus complication status: 

with kidney complications     Diabetes mellitus complication detail: with 

chronic kidney disease     Chronic kidney disease stage: stage 4 (severe)








Assessment/Plan


(1) CHF (congestive heart failure)


Assessment/Plan: 


-end stage CHF, on milrinone


-cardiology following


-210lbs today, weight stable


-continue lasix 100mg IV bid


-case d/w wife, defer to cardiology to discuss prognosis and expected with 

patient and wife


Code(s): I50.9 - HEART FAILURE, UNSPECIFIED   Qualifiers: 


     Congestive heart failure type: systolic     Congestive heart failure 

chronicity: acute on chronic        Qualified Code(s): I50.23 - Acute on 

chronic systolic (congestive) heart failure  





(2) Acute and chronic respiratory failure (acute-on-chronic)


Assessment/Plan: 


-secondary to CHF exacerbation


-continue diuresis


-on supplemental oxygen


Code(s): J96.20 - ACUTE AND CHR RESP FAILURE, UNSP W HYPOXIA OR HYPERCAPNIA   

Qualifiers: 


     Respiratory failure complication: hypoxia        Qualified Code(s): J96.21 

- Acute and chronic respiratory failure with hypoxia  





(3) Acute on chronic renal insufficiency


Assessment/Plan: 


-cardiorenal


-at baseline with diuresis


Code(s): N28.9 - DISORDER OF KIDNEY AND URETER, UNSPECIFIED


N18.9 - CHRONIC KIDNEY DISEASE, UNSPECIFIED





(4) Atrial fibrillation


Assessment/Plan: 


-rate controlled


-INR supratherapeutic


-decrease coumadin today


-daily INR checks


Code(s): I48.91 - UNSPECIFIED ATRIAL FIBRILLATION   Qualifiers: 


     Atrial fibrillation type: chronic     Qualified Code(s): I48.2 - Chronic 

atrial fibrillation  





(5) CAD (coronary artery disease)


Assessment/Plan: 


-quiescent


-cardiology following


-continue home regimen


Code(s): I25.10 - ATHSCL HEART DISEASE OF NATIVE CORONARY ARTERY W/O ANG PCTRS 

  





(6) COPD (chronic obstructive pulmonary disease)


Assessment/Plan: 


-continue current management


-pulmonary following


-chronic respiratory failure secondary to CHF


Code(s): J44.9 - CHRONIC OBSTRUCTIVE PULMONARY DISEASE, UNSPECIFIED   





(7) Diabetes


Assessment/Plan: 


-diabetic diet


-continue SSI


Code(s): E11.9 - TYPE 2 DIABETES MELLITUS WITHOUT COMPLICATIONS   Qualifiers: 


     Diabetes mellitus type: type 2     Diabetes mellitus complication status: 

with kidney complications     Diabetes mellitus complication detail: with 

chronic kidney disease     Chronic kidney disease stage: stage 4 (severe)

## 2017-06-30 LAB
ANION GAP SERPL CALC-SCNC: 9 MMOL/L (ref 8–16)
BASOPHILS # BLD: 0.5 % (ref 0–2)
CALCIUM SERPL-MCNC: 9 MG/DL (ref 8.5–10.1)
CO2 SERPL-SCNC: 37 MMOL/L (ref 21–32)
CREAT SERPL-MCNC: 1.8 MG/DL (ref 0.7–1.3)
DEPRECATED RDW RBC AUTO: 18.8 % (ref 11.9–15.9)
EOSINOPHIL # BLD: 1.6 % (ref 0–4.5)
GLUCOSE SERPL-MCNC: 189 MG/DL (ref 74–106)
INR BLD: 2.71 (ref 0.82–1.09)
MAGNESIUM SERPL-MCNC: 2.4 MG/DL (ref 1.8–2.4)
MCH RBC QN AUTO: 26.6 PG (ref 25.7–33.7)
MCHC RBC AUTO-ENTMCNC: 32.4 G/DL (ref 32–35.9)
MCV RBC: 82 FL (ref 80–96)
NEUTROPHILS # BLD: 71.2 % (ref 42.8–82.8)
PHOSPHATE SERPL-MCNC: 3.3 MG/DL (ref 2.5–4.9)
PLATELET # BLD AUTO: 182 K/MM3 (ref 134–434)
PMV BLD: 8.7 FL (ref 7.5–11.1)
PT PNL PPP: 30.4 SEC (ref 9.98–11.88)
WBC # BLD AUTO: 7.2 K/MM3 (ref 4–10)

## 2017-06-30 RX ADMIN — DOCUSATE SODIUM SCH MG: 100 CAPSULE, LIQUID FILLED ORAL at 22:33

## 2017-06-30 RX ADMIN — VITAMIN D, TAB 1000IU (100/BT) SCH UNIT: 25 TAB at 09:56

## 2017-06-30 RX ADMIN — CARVEDILOL SCH MG: 6.25 TABLET, FILM COATED ORAL at 22:32

## 2017-06-30 RX ADMIN — MILRINONE LACTATE SCH MLS/HR: 200 INJECTION, SOLUTION INTRAVENOUS at 18:11

## 2017-06-30 RX ADMIN — MULTIVITAMIN TABLET SCH TAB: TABLET at 10:08

## 2017-06-30 RX ADMIN — INSULIN ASPART SCH UNITS: 100 INJECTION, SOLUTION INTRAVENOUS; SUBCUTANEOUS at 12:07

## 2017-06-30 RX ADMIN — ALLOPURINOL SCH MG: 100 TABLET ORAL at 09:55

## 2017-06-30 RX ADMIN — INSULIN ASPART SCH: 100 INJECTION, SOLUTION INTRAVENOUS; SUBCUTANEOUS at 17:17

## 2017-06-30 RX ADMIN — WARFARIN SCH MG: 2 TABLET ORAL at 17:17

## 2017-06-30 RX ADMIN — RANITIDINE SCH MG: 150 TABLET ORAL at 09:54

## 2017-06-30 RX ADMIN — BUDESONIDE AND FORMOTEROL FUMARATE DIHYDRATE SCH PUFF: 160; 4.5 AEROSOL RESPIRATORY (INHALATION) at 09:58

## 2017-06-30 RX ADMIN — CLOPIDOGREL BISULFATE SCH MG: 75 TABLET, FILM COATED ORAL at 09:54

## 2017-06-30 RX ADMIN — POTASSIUM CHLORIDE SCH MLS/HR: 7.46 INJECTION, SOLUTION INTRAVENOUS at 18:12

## 2017-06-30 RX ADMIN — FUROSEMIDE SCH MG: 10 INJECTION, SOLUTION INTRAVENOUS at 14:42

## 2017-06-30 RX ADMIN — CARVEDILOL SCH MG: 6.25 TABLET, FILM COATED ORAL at 09:55

## 2017-06-30 RX ADMIN — Medication SCH APPLIC: at 09:56

## 2017-06-30 RX ADMIN — PHENYLEPHRINE HYDROCHLORIDE AND FAT, HARD SCH EACH: .00525; 1.86 SUPPOSITORY RECTAL at 22:56

## 2017-06-30 RX ADMIN — INSULIN ASPART SCH UNITS: 100 INJECTION, SOLUTION INTRAVENOUS; SUBCUTANEOUS at 22:36

## 2017-06-30 RX ADMIN — ALBUTEROL SULFATE PRN AMP: 2.5 SOLUTION RESPIRATORY (INHALATION) at 10:50

## 2017-06-30 RX ADMIN — OXYCODONE HYDROCHLORIDE AND ACETAMINOPHEN SCH MG: 500 TABLET ORAL at 09:55

## 2017-06-30 RX ADMIN — FUROSEMIDE SCH MG: 10 INJECTION, SOLUTION INTRAVENOUS at 06:47

## 2017-06-30 RX ADMIN — ATORVASTATIN CALCIUM SCH MG: 40 TABLET, FILM COATED ORAL at 22:33

## 2017-06-30 RX ADMIN — TIOTROPIUM BROMIDE SCH PUFF: 18 CAPSULE ORAL; RESPIRATORY (INHALATION) at 09:58

## 2017-06-30 RX ADMIN — Medication SCH APPLIC: at 22:37

## 2017-06-30 RX ADMIN — DOCUSATE SODIUM SCH MG: 100 CAPSULE, LIQUID FILLED ORAL at 09:56

## 2017-06-30 RX ADMIN — POTASSIUM CHLORIDE SCH MLS/HR: 7.46 INJECTION, SOLUTION INTRAVENOUS at 21:00

## 2017-06-30 RX ADMIN — BUDESONIDE AND FORMOTEROL FUMARATE DIHYDRATE SCH PUFF: 160; 4.5 AEROSOL RESPIRATORY (INHALATION) at 22:37

## 2017-06-30 RX ADMIN — INSULIN ASPART SCH: 100 INJECTION, SOLUTION INTRAVENOUS; SUBCUTANEOUS at 07:10

## 2017-06-30 NOTE — PN
Progress Note, Physician


Chief Complaint: 


Mr Jerome says he feels is feeling better, shortness of breath improved. No cp 

or n/v.





- Current Medication List


Current Medications: 


Active Medications





Acetaminophen (Tylenol -)  650 mg PO Q4H PRN


   PRN Reason: FEVER OR PAIN


   Last Admin: 06/24/17 11:48 Dose:  650 mg


Albuterol Sulfate (Ventolin 0.083% Nebulizer Soln -)  1 amp NEB Q4H PRN


   PRN Reason: SHORT OF BREATH/WHEEZING


   Last Admin: 06/30/17 10:50 Dose:  1 amp


Allopurinol (Zyloprim -)  [facility] mg [facility]ILY [facility]


   Last Admin: 06/30/17 09:55 Dose:  100 mg


Ascorbic Acid (Vitamin C -)  1,000 mg PO DAILY [facility]


   Last Admin: 06/30/17 09:55 Dose:  1,000 mg


Atorvastatin Calcium (Lipitor -)  40 mg PO HS [facility]


   Last Admin: 06/29/17 21:10 Dose:  40 mg


Bacitracin/Polymyxin B Sulfate (Polysporin Ointment -)  1 applic TP BID [facility]


   Last Admin: 06/30/17 09:56 Dose:  1 applic


Budesonide/Formoterol Fumarate (Symbicort 160/4.5mcg -)  1 puff IH BID [facility]


   Last Admin: 06/30/17 09:58 Dose:  1 puff


Bupropion HCl (Wellbutrin -)  75 mg PO DAILY [facility]


   Last Admin: 06/30/17 09:56 Dose:  75 mg


Carvedilol (Coreg -)  6.25 mg PO BID [facility]


   Last Admin: 06/30/17 09:55 Dose:  6.25 mg


Cholecalciferol (Vitamin D3 -)  1,000 unit PO DAILY [facility]


   Last Admin: 06/30/17 09:56 Dose:  1,000 unit


Clopidogrel Bisulfate (Plavix -)  75 mg PO DAILY [facility]


   Last Admin: 06/30/17 09:54 Dose:  75 mg


Docusate Sodium (Colace -)  100 mg PO BID [facility]


   Last Admin: 06/30/17 09:56 Dose:  100 mg


Ferrous Sulfate (Feosol -)  325 mg PO Q48H [facility]


   Last Admin: 06/29/17 11:11 Dose:  325 mg


Furosemide (Lasix Injection -)  100 mg IVPUSH BID@0600,1400 [facility]


   Last Admin: 06/30/17 06:47 Dose:  100 mg


Milrinone Lactate/Dextrose (Milrinone 20mg/100ml Ivpb -)  100 mls @ 9.716 mls/

hr IVPB TITR [facility]


   PRN Reason: 0.35 MCG/KG/MIN


   Last Admin: 06/29/17 11:15 Dose:  9.716 mls/hr


Potassium Chloride (Potassium Chloride 10 Meq Premix Ivpb -)  100 mls @ 100 mls/

hr IVPB Q60M [facility]


   Stop: 06/30/17 15:29


Insulin Aspart (Novolog Vial Sliding Scale -)  1 vial SQ ACHS [facility]


   PRN Reason: Protocol


   Last Admin: 06/30/17 12:07 Dose:  6 units


Multivitamins/Minerals/Vitamin C (Tab-A-Vit -)  1 tab PO DAILY [facility]


   Last Admin: 06/30/17 10:08 Dose:  1 tab


Ondansetron HCl (Zofran Injection)  4 mg IVPB Q6H PRN


   PRN Reason: NAUSEA


Potassium Chloride (K-Dur -)  40 meq PO DAILY [facility]


Ranitidine HCl (Zantac -)  75 mg PO DAILY [facility]


   Last Admin: 06/30/17 09:54 Dose:  75 mg


Starch (Anusol Suppository -)  1 each RC HS [facility]


   Last Admin: 06/29/17 21:10 Dose:  1 each


Tiotropium Bromide (Spiriva -)  1 puff IH DAILY [facility]


   Last Admin: 06/30/17 09:58 Dose:  1 puff


Warfarin Sodium (Coumadin -)  2 mg PO DAILY@1800 [facility]


   Last Admin: 06/29/17 18:54 Dose:  Not Given











- Objective


Vital Signs: 


 Vital Signs











Temperature  98.4 F   06/30/17 10:00


 


Pulse Rate  76   06/30/17 10:49


 


Respiratory Rate  20   06/30/17 10:00


 


Blood Pressure  116/78   06/30/17 10:00


 


O2 Sat by Pulse Oximetry (%)  91 L  06/30/17 10:49











Constitutional: Yes: Well Nourished, No Distress, Calm


Cardiovascular: Yes: Pulse Irregular.  No: Gallop, Murmur, Rub


Respiratory: Yes: Regular, On Nasal O2, Rhonchi.  No: Rales, Wheezes


Gastrointestinal: Yes: Normal Bowel Sounds, Soft.  No: Distention, Tenderness


Extremities: Yes: WNL


Edema: Yes


Edema: LLE: 1+, RLE: 1+


Labs: 


 CBC, BMP





 06/30/17 05:35 





 06/30/17 05:35 





 INR, PTT











INR  2.71  (0.82-1.09)  H  06/30/17  05:35    














Problem List





- Problems


(1) CHF (congestive heart failure)


Code(s): I50.9 - HEART FAILURE, UNSPECIFIED   Qualifiers: 


     Congestive heart failure type: systolic     Congestive heart failure 

chronicity: acute on chronic        Qualified Code(s): I50.23 - Acute on 

chronic systolic (congestive) heart failure  





(2) Acute and chronic respiratory failure (acute-on-chronic)


Code(s): J96.20 - ACUTE AND CHR RESP FAILURE, UNSP W HYPOXIA OR HYPERCAPNIA   

Qualifiers: 


     Respiratory failure complication: hypoxia        Qualified Code(s): J96.21 

- Acute and chronic respiratory failure with hypoxia  





(3) Acute on chronic renal insufficiency


Code(s): N28.9 - DISORDER OF KIDNEY AND URETER, UNSPECIFIED


N18.9 - CHRONIC KIDNEY DISEASE, UNSPECIFIED





(4) Atrial fibrillation


Code(s): I48.91 - UNSPECIFIED ATRIAL FIBRILLATION   Qualifiers: 


     Atrial fibrillation type: chronic     Qualified Code(s): I48.2 - Chronic 

atrial fibrillation  





(5) CAD (coronary artery disease)


Code(s): I25.10 - ATHSCL HEART DISEASE OF NATIVE CORONARY ARTERY W/O ANG PCTRS 

  





(6) COPD (chronic obstructive pulmonary disease)


Code(s): J44.9 - CHRONIC OBSTRUCTIVE PULMONARY DISEASE, UNSPECIFIED   





(7) Diabetes


Code(s): E11.9 - TYPE 2 DIABETES MELLITUS WITHOUT COMPLICATIONS   Qualifiers: 


     Diabetes mellitus type: type 2     Diabetes mellitus complication status: 

with kidney complications     Diabetes mellitus complication detail: with 

chronic kidney disease     Chronic kidney disease stage: stage 4 (severe)








Assessment/Plan


(1) CHF (congestive heart failure)


Assessment/Plan: 


-end stage CHF, on milrinone


-cardiology following and note reviewed


-207lbs today


-continue lasix 100mg IV bid


-case d/w wife at bedside


Code(s): I50.9 - HEART FAILURE, UNSPECIFIED   Qualifiers: 


     Congestive heart failure type: systolic     Congestive heart failure 

chronicity: acute on chronic        Qualified Code(s): I50.23 - Acute on 

chronic systolic (congestive) heart failure  





(2) Acute and chronic respiratory failure (acute-on-chronic)


Assessment/Plan: 


-secondary to CHF exacerbation


-continue diuresis


-on supplemental oxygen


Code(s): J96.20 - ACUTE AND CHR RESP FAILURE, UNSP W HYPOXIA OR HYPERCAPNIA   

Qualifiers: 


     Respiratory failure complication: hypoxia        Qualified Code(s): J96.21 

- Acute and chronic respiratory failure with hypoxia  





(3) Acute on chronic renal insufficiency


Assessment/Plan: 


-cardiorenal


-at baseline with diuresis


Code(s): N28.9 - DISORDER OF KIDNEY AND URETER, UNSPECIFIED


N18.9 - CHRONIC KIDNEY DISEASE, UNSPECIFIED





(4) Atrial fibrillation


Assessment/Plan: 


-rate controlled


-INR therapeutic


-continue coumadin at decreased dose currently


-daily INR checks


Code(s): I48.91 - UNSPECIFIED ATRIAL FIBRILLATION   Qualifiers: 


     Atrial fibrillation type: chronic     Qualified Code(s): I48.2 - Chronic 

atrial fibrillation  





(5) CAD (coronary artery disease)


Assessment/Plan: 


-quiescent


-cardiology following


-continue home regimen


Code(s): I25.10 - ATHSCL HEART DISEASE OF NATIVE CORONARY ARTERY W/O ANG PCTRS 

  





(6) COPD (chronic obstructive pulmonary disease)


Assessment/Plan: 


-continue current management


-pulmonary following


-chronic respiratory failure secondary to CHF


Code(s): J44.9 - CHRONIC OBSTRUCTIVE PULMONARY DISEASE, UNSPECIFIED   





(7) Diabetes


Assessment/Plan: 


-diabetic diet


-continue SSI


Code(s): E11.9 - TYPE 2 DIABETES MELLITUS WITHOUT COMPLICATIONS   Qualifiers: 


     Diabetes mellitus type: type 2     Diabetes mellitus complication status: 

with kidney complications     Diabetes mellitus complication detail: with 

chronic kidney disease     Chronic kidney disease stage: stage 4 (severe)

## 2017-06-30 NOTE — PN
Progress Note (short form)





- Note


Progress Note: 


PULMONARY





SUBJECTIVE IMPROVEMENT


AFEBRILE/VSS





DISTENDED NECK VEINS


DISTANT BREATH SOUNDS


S1S2


OBESE


EDEMA LOWER EXT





LABS/MEDS/NOTES/IMAGING/MICRO REVIEWED








Acute on Chronic Systolic Heart Failure end stage


CAD s/p CABG


COPD


Chronic Hypoxic Respiratory Failure


Atrial Fibrillation


Acute on Chronic Renal Failure


PAD


BPH





-  continue lasix, milrinone


-  monitor urine output, creatinine


-  daily weights, I/Os


-  O2 to keep SpO2 >90%


-  rate controlled


-  continue anticoagulation


-  long term prognosis is poor





LUZ SAEZ MD

## 2017-06-30 NOTE — PN
Progress Note (short form)





- Note


Progress Note: 


Chief Complaint: 


sob/cough


History of Present Illness: 


still with sob worse than baseline but again reports a little better today


no signif leg swelling


no cp, palpit


no loc/dizzy








ex cigs











 


 


 


 Current Medications











Generic Name Dose Route Start Last Admin





  Trade Name Freq  PRN Reason Stop Dose Admin


 


Acetaminophen  650 mg 06/23/17 11:09 06/24/17 11:48





  Tylenol -  PO   650 mg





  Q4H PRN   Administration





  FEVER OR PAIN   


 


Albuterol Sulfate  1 amp 06/28/17 15:54 06/30/17 10:50





  Ventolin 0.083% Nebulizer Soln -  NEB   1 amp





  Q4H PRN   Administration





  SHORT OF BREATH/WHEEZING   


 


Allopurinol  100 mg 06/24/17 10:00 06/30/17 09:55





  Zyloprim -  PO   100 mg





  DAILY JARROD   Administration


 


Ascorbic Acid  1,000 mg 06/24/17 10:00 06/30/17 09:55





  Vitamin C -  PO   1,000 mg





  DAILY JARROD   Administration


 


Atorvastatin Calcium  40 mg 06/23/17 22:00 06/29/17 21:10





  Lipitor -  PO   40 mg





  HS JARROD   Administration


 


Bacitracin/Polymyxin B Sulfate  1 applic 06/28/17 16:15 06/30/17 09:56





  Polysporin Ointment -  TP   1 applic





  BID JARROD   Administration


 


Budesonide/Formoterol Fumarate  1 puff 06/23/17 11:15 06/30/17 09:58





  Symbicort 160/4.5mcg -  IH   1 puff





  BID JARROD   Administration


 


Bupropion HCl  75 mg 06/23/17 11:15 06/30/17 09:56





  Wellbutrin -  PO   75 mg





  DAILY JARROD   Administration


 


Carvedilol  6.25 mg 06/23/17 11:15 06/30/17 09:55





  Coreg -  PO   6.25 mg





  BID JARROD   Administration


 


Cholecalciferol  1,000 unit 06/23/17 11:15 06/30/17 09:56





  Vitamin D3 -  PO   1,000 unit





  DAILY JARROD   Administration


 


Clopidogrel Bisulfate  75 mg 06/23/17 11:15 06/30/17 09:54





  Plavix -  PO   75 mg





  DAILY JARROD   Administration


 


Docusate Sodium  100 mg 06/23/17 22:00 06/30/17 09:56





  Colace -  PO   100 mg





  BID JARROD   Administration


 


Ferrous Sulfate  325 mg 06/23/17 11:15 06/29/17 11:11





  Feosol -  PO   325 mg





  Q48H JARROD   Administration


 


Furosemide  100 mg 06/26/17 11:08 06/30/17 06:47





  Lasix Injection -  IVPUSH   100 mg





  BID@0600,1400 JARROD   Administration


 


Milrinone Lactate/Dextrose  100 mls @ 9.716 mls/hr 06/23/17 11:00 06/29/17 11:15





  Milrinone 20mg/100ml Ivpb -  IVPB   9.716 mls/hr





  TITR JARROD   Administration





  0.35 MCG/KG/MIN   


 


Insulin Aspart  1 vial 06/23/17 16:30 06/30/17 07:10





  Novolog Vial Sliding Scale -  SQ   Not Given





  [facility]   





  Protocol   


 


Multivitamins/Minerals/Vitamin C  1 tab 06/24/17 10:00 06/29/17 11:13





  Tab-A-Vit -  PO   1 tab





  DAILY JARROD   Administration


 


Ondansetron HCl  4 mg 06/23/17 11:09  





  Zofran Injection  IVPB   





  Q6H PRN   





  NAUSEA   


 


Ranitidine HCl  75 mg 06/23/17 11:15 06/30/17 09:54





  Zantac -  PO   75 mg





  DAILY JARROD   Administration


 


Starch  1 each 06/23/17 22:00 06/29/17 21:10





  Anusol Suppository -  RC   1 each





  HS JARROD   Administration


 


Tiotropium Bromide  1 puff 06/25/17 17:15 06/30/17 09:58





  Spiriva -  IH   1 puff





  DAILY JARROD   Administration


 


Warfarin Sodium  2 mg 06/29/17 18:00 06/29/17 18:54





  Coumadin -  PO   Not Given





  DAILY@1800 [facility]   











 Vital Signs











Temp  97.6 F   06/30/17 05:55


 


Pulse  76   06/30/17 10:49


 


Resp  20   06/30/17 05:55


 


BP  125/81   06/30/17 05:55


 


Pulse Ox  91 L  06/30/17 10:49








 Intake & Output











 06/29/17 06/29/17 06/30/17





 11:59 23:59 11:59


 


Intake Total 197.6 120 


 


Output Total 1000 1200 300


 


Balance -802.4 -1080 -300


 


Weight 210 lb 3.2 oz  207 lb 3.2 oz


 


Intake:   


 


  IV 77.6  


 


    R Chest PICC 77.6  


 


  Oral 120 120 


 


Output:   


 


  Urine 1000 1200 300


 


    Void 1000 1200 300


 


Other:   


 


  Voiding Method Urinal Urinal Urinal


 


  # Unmeasured Voids   


 


    Void 3  2


 


  Bowel Movement Yes: 2  


 


  # Bowel Movements 1  


 


  Weight Measurement Method Standing Scale  Standing Scale














Constitutional: Yes: No Distress, Calm


Eyes: No: Sclera Icterus


HENT: No: Nasal Congestion


Cardiovascular: Yes: Regular Rate and Rhythm, JVD (EJ massively distended at 90 

degr (no IJ seen)), S1, S2, Other (PMI non diplaced).  No: Gallop, Murmur


Respiratory: Yes: bibasilar rales (improving).  No: Accessory Muscle Use, Rales

, Wheezes


Gastrointestinal: Yes: Normal Bowel Sounds, Soft.  No: Tenderness


Musculoskeletal: Yes: Other (No kyphosis)


Extremities: No: Cold


Edema: trace


Integumentary: No: Jaundice


Neurological: Yes: Alert, Oriented (x3)


Psychiatric: No: Agitated


Labs: 


 








 


 


 


 Laboratory Last Values











WBC  7.2 K/mm3 (4.0-10.0)   06/30/17  05:35    


 


RBC  4.26 M/mm3 (4.00-5.60)   06/30/17  05:35    


 


Hgb  11.3 GM/dL (11.7-16.9)  L  06/30/17  05:35    


 


Hct  35.0 % (35.4-49)  L  06/30/17  05:35    


 


MCV  82.0 fl (80-96)   06/30/17  05:35    


 


MCHC  32.4 g/dl (32.0-35.9)   06/30/17  05:35    


 


RDW  18.8 % (11.9-15.9)  H  06/30/17  05:35    


 


Plt Count  182 K/MM3 (134-434)   06/30/17  05:35    


 


MPV  8.7 fl (7.5-11.1)   06/30/17  05:35    


 


Neutrophils %  71.2 % (42.8-82.8)   06/30/17  05:35    


 


Lymphocytes %  13.9 % (8-40)   06/30/17  05:35    


 


Monocytes %  12.8 % (3.8-10.2)  H  06/30/17  05:35    


 


Eosinophils %  1.6 % (0-4.5)   06/30/17  05:35    


 


Basophils %  0.5 % (0-2.0)   06/30/17  05:35    


 


Platelet Estimate  Adequate  (NORMAL)   06/25/17  05:35    


 


Platelet Comment  No clumping noted   06/25/17  05:35    


 


INR  2.71  (0.82-1.09)  H  06/30/17  05:35    


 


Sodium  141 mmol/L (136-145)   06/30/17  05:35    


 


Potassium  2.9 mmol/L (3.5-5.1)  L*  06/30/17  05:35    


 


Chloride  95 mmol/L ()  L  06/30/17  05:35    


 


Carbon Dioxide  37 mmol/L (21-32)  H  06/30/17  05:35    


 


Anion Gap  9  (8-16)   06/30/17  05:35    


 


BUN  54 mg/dL (7-18)  H  06/30/17  05:35    


 


Creatinine  1.8 mg/dL (0.7-1.3)  H  06/30/17  05:35    


 


Creat Clearance w eGFR  34.46  (>60)   06/25/17  05:35    


 


POC Glucometer  199 UNITS (())   06/30/17  05:34    


 


Random Glucose  189 mg/dL ()  H D 06/30/17  05:35    


 


Calcium  9.0 mg/dL (8.5-10.1)   06/30/17  05:35    


 


Phosphorus  3.3 mg/dL (2.5-4.9)   06/30/17  05:35    


 


Magnesium  2.4 mg/dL (1.8-2.4)   06/30/17  05:35    


 


Total Bilirubin  1.0 mg/dL (0.2-1.0)   06/26/17  05:57    


 


Direct Bilirubin  0.3 mg/dL (0.0-0.2)  H  06/26/17  05:57    


 


AST  29 U/L (15-37)   06/26/17  05:57    


 


ALT  26 U/L (12-78)   06/26/17  05:57    


 


Alkaline Phosphatase  175 U/L ()  H  06/26/17  05:57    


 


Creatine Kinase  59 IU/L ()   06/23/17  00:15    


 


Troponin I  0.04 ng/ml (0.00-0.05)   06/23/17  00:15    


 


B-Natriuretic Peptide  1720.46 pg/ml (5-450)  H  06/23/17  02:10    


 


Total Protein  7.3 g/dl (6.4-8.2)   06/26/17  05:57    


 


Albumin  3.3 g/dl (3.4-5.0)  L  06/26/17  05:57    


 


Urine Color  Ltyellow   06/23/17  05:15    


 


Urine Appearance  Clear   06/23/17  05:15    


 


Urine pH  6.0  (5.0-8.0)   06/23/17  05:15    


 


Ur Specific Gravity  1.010  (1.005-1.025)   06/23/17  05:15    


 


Urine Protein  Negative  (NEGATIVE)   06/23/17  05:15    


 


Urine Glucose (UA)  Negative  (NEGATIVE)   06/23/17  05:15    


 


Urine Ketones  Negative  (NEGATIVE)   06/23/17  05:15    


 


Urine Blood  Negative  (NEGATIVE)   06/23/17  05:15    


 


Urine Nitrite  Negative  (NEGATIVE)   06/23/17  05:15    


 


Urine Bilirubin  Negative  (NEGATIVE)   06/23/17  05:15    


 


Urine Urobilinogen  Negative E.U./dl (0.2-1.0)   06/23/17  05:15    


 


Ur Leukocyte Esterase  Trace  (NEGATIVE)  H  06/23/17  05:15    


 


Urine RBC  1 /hpf (0-3)   06/23/17  05:15    


 


Urine WBC  3 /hpf (3-5)   06/23/17  05:15    


 


Hyaline Casts  1 /lpf  06/23/17  05:15    


 


Urine Mucus  Rare   06/23/17  05:15    











- ....Imaging


EKG: Other (tele: AF, V-P)








ecg 6/22/17: 





echo 10/2016: sev lve, global hk, sev dec lvef, mild rve, nl rv fcn, patty, mild-

mod mr, mv ring, tv ring, mild tr, mild pr, mild phtn, ivc mild dil





cxr: chf











a/p:  76 year old man with PMH of IDDM, COPD (O2 dependant) HTN, HLD, Afib (on 

coumadin), CAD s/p CABG 2013, end stage CHF with ICD placement (on milrinone 

pump), PVD, BPH here with sob.





sob, acute systolic CHF, end stage systolic heart failure s/p ICD, on home 

milrinone infusion:


- here with sob/wright


- was well diuresed at home to wt 202-204 with creat 2.1 and Na 151--torsemide 

80 bid gradually downtitrated back to 40 bid


- saw me last week with sob at baseline, but ongoing phlegmy cough--likely sec 

to advanced copd and NOT chf


- developed acute incr sob in afternoon, after left my office for last appt, 

thinks wt was 207 that day


- cxr here worsened, with perihilar edema pattern and RLL infiltrates likely 

chf (no other clinical signs of PNA)


- initial wt 204 standing, up to 207 on HD 2


- suspect diet/fluid noncompliance may be trigger of chf here


- cont milrinone at patient's home maintenance dose (on chronic infusion 

destination therapy)--0.35 mcg/kg/min.  


- cont home coreg. (no ACE due to low bp's and labile creatinines; no Entresto 

for same reason) 


- recent office icd check with stable function


- 6/23 unclear if sxs from chf or some URI/copd (productive cough).  Will give 

trial of iv lasix 80 x1 today and monitor sxs, cr trend tomorrow to see if 

improving.


- 6/24.  Symptoms improving, creatinine improved after lasix 80 mg IV x 1 

yesterday.  Will continue daily dosing. 


- 6/25   Ongoing weight gain, will increase lasix to bid dosing--though pt 

refused 2nd dose due to late in day timing


- note: pt gains wt rapidly at home when non-compliant with 1.5L daily fluid 

restriction (wife supervises) and same here when fluid restriction not ordered/

implemented


- 6/26: fluid restriction ordered, importance d/w'd RN (to inform assistant); 

wt up to 212 (target wt is 207 or less). bid lasix today. responds to 

metolazone reliably at home when wt rises so low threshold to give this if wt 

rises further tomorrow (hopefully he is not becoming IV diuretic dependent)


- suspect loss of muscle mass over time, with new dry wt closer to 202-204


- 6/27 Significantly bothered by frequent urination.  declines connolly.  Still 

symptomatic, minimal weight loss this morning. will give extra dose of 

metolazone this afternoon with pm lasix. 


-6/28: Cr stable, wt down a pound.  Will cont iv lasix 100 bid and give 

metolazone 5 today as well.


-6/29: cr stable, wt unchanged but pt reports slight improvement in sob.  Will 

replete K.  Cont iv lasix. 


-6/30: cr stable, wt improved today, less sob as well.  Cont with lasix 100 iv 

bid, goal wt close to 203lbs





afib


- currently rate controlled on coreg


- cont coumadin per INR





VT:


- intermittent brief runs of NSVT on tele


- has ICD


- on max tolerated coreg dose


- replete lytes prn





PVD


- con't AC, statin.





CAD s/p CABG


- No anginal symptoms, no signs acs.  Con't home ac, plavix, atorvastatin, coreg





HTN: controlled on coreg





HLD: con't statin. 





master/CKD:


- suspect cardiorenal syndrome, with renal fxn improving due to IV diuresis


- renal fxn presently at baseline





copd


- per pulm/pmd

## 2017-07-01 LAB
ANION GAP SERPL CALC-SCNC: 10 MMOL/L (ref 8–16)
BASOPHILS # BLD: 0.5 % (ref 0–2)
CALCIUM SERPL-MCNC: 9.1 MG/DL (ref 8.5–10.1)
CO2 SERPL-SCNC: 36 MMOL/L (ref 21–32)
CREAT SERPL-MCNC: 1.8 MG/DL (ref 0.7–1.3)
DEPRECATED RDW RBC AUTO: 18.9 % (ref 11.9–15.9)
EOSINOPHIL # BLD: 1.6 % (ref 0–4.5)
GLUCOSE SERPL-MCNC: 205 MG/DL (ref 74–106)
INR BLD: 2.78 (ref 0.82–1.09)
MAGNESIUM SERPL-MCNC: 2.2 MG/DL (ref 1.8–2.4)
MCH RBC QN AUTO: 26.5 PG (ref 25.7–33.7)
MCHC RBC AUTO-ENTMCNC: 32.2 G/DL (ref 32–35.9)
MCV RBC: 82.4 FL (ref 80–96)
NEUTROPHILS # BLD: 68.4 % (ref 42.8–82.8)
PHOSPHATE SERPL-MCNC: 3.3 MG/DL (ref 2.5–4.9)
PLATELET # BLD AUTO: 163 K/MM3 (ref 134–434)
PMV BLD: 8.6 FL (ref 7.5–11.1)
PT PNL PPP: 31.2 SEC (ref 9.98–11.88)
WBC # BLD AUTO: 6.6 K/MM3 (ref 4–10)

## 2017-07-01 RX ADMIN — POTASSIUM CHLORIDE SCH MEQ: 1500 TABLET, EXTENDED RELEASE ORAL at 08:49

## 2017-07-01 RX ADMIN — POTASSIUM CHLORIDE SCH MEQ: 1500 TABLET, EXTENDED RELEASE ORAL at 21:25

## 2017-07-01 RX ADMIN — CARVEDILOL SCH MG: 6.25 TABLET, FILM COATED ORAL at 21:25

## 2017-07-01 RX ADMIN — FERROUS SULFATE TAB EC 324 MG (65 MG FE EQUIVALENT) SCH MG: 324 (65 FE) TABLET DELAYED RESPONSE at 11:30

## 2017-07-01 RX ADMIN — DOCUSATE SODIUM SCH MG: 100 CAPSULE, LIQUID FILLED ORAL at 21:25

## 2017-07-01 RX ADMIN — BUDESONIDE AND FORMOTEROL FUMARATE DIHYDRATE SCH PUFF: 160; 4.5 AEROSOL RESPIRATORY (INHALATION) at 21:25

## 2017-07-01 RX ADMIN — CARVEDILOL SCH MG: 6.25 TABLET, FILM COATED ORAL at 10:08

## 2017-07-01 RX ADMIN — FUROSEMIDE SCH MG: 10 INJECTION, SOLUTION INTRAVENOUS at 14:30

## 2017-07-01 RX ADMIN — ALBUTEROL SULFATE PRN AMP: 2.5 SOLUTION RESPIRATORY (INHALATION) at 23:25

## 2017-07-01 RX ADMIN — ACETAMINOPHEN PRN MG: 325 TABLET ORAL at 11:31

## 2017-07-01 RX ADMIN — MULTIVITAMIN TABLET SCH TAB: TABLET at 10:07

## 2017-07-01 RX ADMIN — DOCUSATE SODIUM SCH MG: 100 CAPSULE, LIQUID FILLED ORAL at 10:07

## 2017-07-01 RX ADMIN — OXYCODONE HYDROCHLORIDE AND ACETAMINOPHEN SCH MG: 500 TABLET ORAL at 10:08

## 2017-07-01 RX ADMIN — ACETAMINOPHEN PRN MG: 325 TABLET ORAL at 17:50

## 2017-07-01 RX ADMIN — TIOTROPIUM BROMIDE SCH PUFF: 18 CAPSULE ORAL; RESPIRATORY (INHALATION) at 10:10

## 2017-07-01 RX ADMIN — INSULIN ASPART SCH UNITS: 100 INJECTION, SOLUTION INTRAVENOUS; SUBCUTANEOUS at 21:30

## 2017-07-01 RX ADMIN — BUDESONIDE AND FORMOTEROL FUMARATE DIHYDRATE SCH PUFF: 160; 4.5 AEROSOL RESPIRATORY (INHALATION) at 10:10

## 2017-07-01 RX ADMIN — RANITIDINE SCH MG: 150 TABLET ORAL at 10:08

## 2017-07-01 RX ADMIN — MILRINONE LACTATE SCH MLS/HR: 200 INJECTION, SOLUTION INTRAVENOUS at 15:35

## 2017-07-01 RX ADMIN — Medication SCH APPLIC: at 21:26

## 2017-07-01 RX ADMIN — FUROSEMIDE SCH MG: 10 INJECTION, SOLUTION INTRAVENOUS at 06:56

## 2017-07-01 RX ADMIN — Medication SCH APPLIC: at 10:10

## 2017-07-01 RX ADMIN — INSULIN ASPART SCH UNITS: 100 INJECTION, SOLUTION INTRAVENOUS; SUBCUTANEOUS at 17:14

## 2017-07-01 RX ADMIN — POTASSIUM CHLORIDE SCH MEQ: 1500 TABLET, EXTENDED RELEASE ORAL at 14:32

## 2017-07-01 RX ADMIN — VITAMIN D, TAB 1000IU (100/BT) SCH UNIT: 25 TAB at 10:07

## 2017-07-01 RX ADMIN — ALLOPURINOL SCH MG: 100 TABLET ORAL at 10:08

## 2017-07-01 RX ADMIN — WARFARIN SCH MG: 2 TABLET ORAL at 17:14

## 2017-07-01 RX ADMIN — ATORVASTATIN CALCIUM SCH MG: 40 TABLET, FILM COATED ORAL at 21:24

## 2017-07-01 RX ADMIN — INSULIN ASPART SCH UNITS: 100 INJECTION, SOLUTION INTRAVENOUS; SUBCUTANEOUS at 06:56

## 2017-07-01 RX ADMIN — CLOPIDOGREL BISULFATE SCH MG: 75 TABLET, FILM COATED ORAL at 10:07

## 2017-07-01 RX ADMIN — INSULIN ASPART SCH UNITS: 100 INJECTION, SOLUTION INTRAVENOUS; SUBCUTANEOUS at 11:46

## 2017-07-01 RX ADMIN — PHENYLEPHRINE HYDROCHLORIDE AND FAT, HARD SCH: .00525; 1.86 SUPPOSITORY RECTAL at 21:24

## 2017-07-01 NOTE — PN
Progress Note (short form)





- Note


Progress Note: 


PULMONARY





RESTING COMFORTABLY


AFEBRILE/VSS





DISTENDED NECK VEINS


DISTANT BREATH SOUNDS


S1S2


OBESE


EDEMA LOWER EXT





LABS/MEDS/NOTES/IMAGING/MICRO REVIEWED








Acute on Chronic Systolic Heart Failure end stage


CAD s/p CABG


COPD


Chronic Hypoxic Respiratory Failure


Atrial Fibrillation


Acute on Chronic Renal Failure


PAD


BPH





-  continue lasix, milrinone


-  replete K+


-  monitor urine output, creatinine


-  daily weights, I/Os


-  O2 to keep SpO2 >90%


-  rate controlled


-  continue anticoagulation


-  long term prognosis is poor





LUZ SAEZ MD

## 2017-07-01 NOTE — PN
Progress Note (short form)





- Note


Progress Note: 


C/o Fatigue


SOB is less


No chest pain





O/E





 Vital Signs











 Period  Temp  Pulse  Resp  BP Sys/Alonso  Pulse Ox


 


 Last 24 Hr  97.4 F-98.8 F  75-76  18-20  /46-75  92-92








Heart irregular


Lungs clear


Abd osft


Ext trace edema





 Current Medications





Acetaminophen (Tylenol -)  650 mg PO Q4H PRN


   PRN Reason: FEVER OR PAIN


   Last Admin: 07/01/17 11:31 Dose:  650 mg


Albuterol Sulfate (Ventolin 0.083% Nebulizer Soln -)  1 amp NEB Q4H PRN


   PRN Reason: SHORT OF BREATH/WHEEZING


   Last Admin: 06/30/17 10:50 Dose:  1 amp


Allopurinol (Zyloprim -)  100 mg PO DAILY [facility]


   Last Admin: 07/01/17 10:08 Dose:  100 mg


Ascorbic Acid (Vitamin C -)  1,000 mg PO DAILY [facility]


   Last Admin: 07/01/17 10:08 Dose:  1,000 mg


Atorvastatin Calcium (Lipitor -)  40 mg PO HS [facility]


   Last Admin: 06/30/17 22:33 Dose:  40 mg


Bacitracin/Polymyxin B Sulfate (Polysporin Ointment -)  1 applic TP BID [facility]


   Last Admin: 07/01/17 10:10 Dose:  1 applic


Budesonide/Formoterol Fumarate (Symbicort 160/4.5mcg -)  1 puff IH BID [facility]


   Last Admin: 07/01/17 10:10 Dose:  1 puff


Bupropion HCl (Wellbutrin -)  75 mg PO DAILY [facility]


   Last Admin: 07/01/17 10:07 Dose:  75 mg


Carvedilol (Coreg -)  6.25 mg PO BID [facility]


   Last Admin: 07/01/17 10:08 Dose:  6.25 mg


Cholecalciferol (Vitamin D3 -)  1,000 unit PO DAILY [facility]


   Last Admin: 07/01/17 10:07 Dose:  1,000 unit


Clopidogrel Bisulfate (Plavix -)  75 mg PO DAILY [facility]


   Last Admin: 07/01/17 10:07 Dose:  75 mg


Docusate Sodium (Colace -)  100 mg PO BID [facility]


   Last Admin: 07/01/17 10:07 Dose:  100 mg


Ferrous Sulfate (Feosol -)  325 mg PO Q48H [facility]


   Last Admin: 06/29/17 11:11 Dose:  325 mg


Furosemide (Lasix Injection -)  100 mg IVPUSH BID@0600,1400 [facility]


   Last Admin: 07/01/17 14:30 Dose:  100 mg


Milrinone Lactate/Dextrose (Milrinone 20mg/100ml Ivpb -)  100 mls @ 9.716 mls/

hr IVPB TITR JARROD


   PRN Reason: 0.35 MCG/KG/MIN


   Last Admin: 07/01/17 15:35 Dose:  9.716 mls/hr


Insulin Aspart (Novolog Vial Sliding Scale -)  1 vial SQ ACHS [facility]


   PRN Reason: Protocol


   Last Admin: 07/01/17 11:46 Dose:  4 units


Multivitamins/Minerals/Vitamin C (Tab-A-Vit -)  1 tab PO DAILY [facility]


   Last Admin: 07/01/17 10:07 Dose:  1 tab


Ondansetron HCl (Zofran Injection)  4 mg IVPB Q6H PRN


   PRN Reason: NAUSEA


Potassium Chloride (K-Dur -)  40 meq PO TID [facility]


   Last Admin: 07/01/17 14:32 Dose:  40 meq


Ranitidine HCl (Zantac -)  75 mg PO DAILY [facility]


   Last Admin: 07/01/17 10:08 Dose:  75 mg


Starch (Anusol Suppository -)  1 each RC HS [facility]


   Last Admin: 06/30/17 22:56 Dose:  1 each


Tiotropium Bromide (Spiriva -)  1 puff IH DAILY [facility]


   Last Admin: 07/01/17 10:10 Dose:  1 puff


Warfarin Sodium (Coumadin -)  2 mg PO DAILY@1800 [facility]


   Last Admin: 06/30/17 17:17 Dose:  2 mg





 Laboratory Results - last 24 hr











  06/30/17 06/30/17 07/01/17





  15:51 22:30 05:35


 


WBC   


 


RBC   


 


Hgb   


 


Hct   


 


MCV   


 


MCHC   


 


RDW   


 


Plt Count   


 


MPV   


 


Neutrophils %   


 


Lymphocytes %   


 


Monocytes %   


 


Eosinophils %   


 


Basophils %   


 


INR   


 


Sodium    141


 


Potassium    3.0 L


 


Chloride    95 L


 


Carbon Dioxide    36 H


 


Anion Gap    10


 


BUN    56 H


 


Creatinine    1.8 H


 


POC Glucometer  131  300 


 


Random Glucose    205 H


 


Calcium    9.1


 


Phosphorus    3.3


 


Magnesium    2.2














  07/01/17 07/01/17 07/01/17





  05:35 05:35 06:02


 


WBC  6.6  


 


RBC  4.23  


 


Hgb  11.2 L  


 


Hct  34.8 L  


 


MCV  82.4  


 


MCHC  32.2  


 


RDW  18.9 H  


 


Plt Count  163  


 


MPV  8.6  


 


Neutrophils %  68.4  


 


Lymphocytes %  15.7  


 


Monocytes %  13.8 H  


 


Eosinophils %  1.6  


 


Basophils %  0.5  


 


INR   2.78 H 


 


Sodium   


 


Potassium   


 


Chloride   


 


Carbon Dioxide   


 


Anion Gap   


 


BUN   


 


Creatinine   


 


POC Glucometer    168


 


Random Glucose   


 


Calcium   


 


Phosphorus   


 


Magnesium   














  07/01/17





  11:34


 


WBC 


 


RBC 


 


Hgb 


 


Hct 


 


MCV 


 


MCHC 


 


RDW 


 


Plt Count 


 


MPV 


 


Neutrophils % 


 


Lymphocytes % 


 


Monocytes % 


 


Eosinophils % 


 


Basophils % 


 


INR 


 


Sodium 


 


Potassium 


 


Chloride 


 


Carbon Dioxide 


 


Anion Gap 


 


BUN 


 


Creatinine 


 


POC Glucometer  225


 


Random Glucose 


 


Calcium 


 


Phosphorus 


 


Magnesium 














Assessment/Plan


(1) CHF (congestive heart failure)


Assessment/Plan: 


-end stage CHF, cont present care , prognosis is grim


Code(s): I50.9 - HEART FAILURE, UNSPECIFIED   Qualifiers: 


     Congestive heart failure type: systolic     Congestive heart failure 

chronicity: acute on chronic        Qualified Code(s): I50.23 - Acute on 

chronic systolic (congestive) heart failure  





(2) Acute and chronic respiratory failure (acute-on-chronic)


Assessment/Plan: 


-secondary to CHF stable at present


-on supplemental oxygen


Code(s): J96.20 - ACUTE AND CHR RESP FAILURE, UNSP W HYPOXIA OR HYPERCAPNIA   

Qualifiers: 


     Respiratory failure complication: hypoxia        Qualified Code(s): J96.21 

- Acute and chronic respiratory failure with hypoxia  





(3) Acute on chronic renal insufficiency


Assessment/Plan: 


-cardiorenal


Code(s): N28.9 - DISORDER OF KIDNEY AND URETER, UNSPECIFIED


N18.9 - CHRONIC KIDNEY DISEASE, UNSPECIFIED





(4) Atrial fibrillation


Assessment/Plan: 


-rate controlled


-INR therapeutic


-continue coumadin at decreased dose currently


-daily INR checks


Code(s): I48.91 - UNSPECIFIED ATRIAL FIBRILLATION   Qualifiers: 


     Atrial fibrillation type: chronic     Qualified Code(s): I48.2 - Chronic 

atrial fibrillation  





(5) CAD (coronary artery disease)


Assessment/Plan: 


-stable


Code(s): I25.10 - ATHSCL HEART DISEASE OF NATIVE CORONARY ARTERY W/O ANG PCTRS 

  





(6) Fatigue


Is made worse by hypokalemia, check MG and replace   





(7) Diabetes


Assessment/Plan: 


-diabetic diet


-continue SSI


Code(s): E11.9 - TYPE 2 DIABETES MELLITUS WITHOUT COMPLICATIONS   Qualifiers: 


     Diabetes mellitus type: type 2     Diabetes mellitus complication status: 

with kidney complications     Diabetes mellitus complication detail: with 

chronic kidney disease     Chronic kidney disease stage: stage 4 (severe)

## 2017-07-01 NOTE — PN
Progress Note, Physician


Chief Complaint: 


chf


History of Present Illness: 


sob in bed b/c just walked back from bathroom


no PND


coughing--no phlegm


no cp, palpit





ex-cig





- Current Medication List


Current Medications: 


Active Medications





Acetaminophen (Tylenol -)  650 mg PO Q4H PRN


   PRN Reason: FEVER OR PAIN


   Last Admin: 06/24/17 11:48 Dose:  650 mg


Albuterol Sulfate (Ventolin 0.083% Nebulizer Soln -)  1 amp NEB Q4H PRN


   PRN Reason: SHORT OF BREATH/WHEEZING


   Last Admin: 06/30/17 10:50 Dose:  1 amp


Allopurinol (Zyloprim -)  100 mg PO DAILY [facility]


   Last Admin: 06/30/17 09:55 Dose:  100 mg


Ascorbic Acid (Vitamin C -)  1,000 mg PO DAILY [facility]


   Last Admin: 06/30/17 09:55 Dose:  1,000 mg


Atorvastatin Calcium (Lipitor -)  40 mg PO HS [facility]


   Last Admin: 06/30/17 22:33 Dose:  40 mg


Bacitracin/Polymyxin B Sulfate (Polysporin Ointment -)  1 applic TP BID [facility]


   Last Admin: 06/30/17 22:37 Dose:  1 applic


Budesonide/Formoterol Fumarate (Symbicort 160/4.5mcg -)  1 puff IH BID [facility]


   Last Admin: 06/30/17 22:37 Dose:  1 puff


Bupropion HCl (Wellbutrin -)  75 mg PO DAILY [facility]


   Last Admin: 06/30/17 09:56 Dose:  75 mg


Carvedilol (Coreg -)  6.25 mg PO BID [facility]


   Last Admin: 06/30/17 22:32 Dose:  6.25 mg


Cholecalciferol (Vitamin D3 -)  1,000 unit PO DAILY [facility]


   Last Admin: 06/30/17 09:56 Dose:  1,000 unit


Clopidogrel Bisulfate (Plavix -)  75 mg PO DAILY [facility]


   Last Admin: 06/30/17 09:54 Dose:  75 mg


Docusate Sodium (Colace -)  100 mg PO BID [facility]


   Last Admin: 06/30/17 22:33 Dose:  100 mg


Ferrous Sulfate (Feosol -)  325 mg PO Q48H [facility]


   Last Admin: 06/29/17 11:11 Dose:  325 mg


Furosemide (Lasix Injection -)  100 mg IVPUSH BID@0600,1400 [facility]


   Last Admin: 07/01/17 06:56 Dose:  100 mg


Milrinone Lactate/Dextrose (Milrinone 20mg/100ml Ivpb -)  100 mls @ 9.716 mls/

hr IVPB TITR [facility]


   PRN Reason: 0.35 MCG/KG/MIN


   Last Admin: 06/30/17 18:11 Dose:  9.716 mls/hr


Insulin Aspart (Novolog Vial Sliding Scale -)  1 vial SQ ACHS [facility]


   PRN Reason: Protocol


   Last Admin: 07/01/17 06:56 Dose:  2 units


Metolazone (Zaroxolyn -)  5 mg PO ONCE ONE


   Stop: 07/01/17 08:36


Multivitamins/Minerals/Vitamin C (Tab-A-Vit -)  1 tab PO DAILY [facility]


   Last Admin: 06/30/17 10:08 Dose:  1 tab


Ondansetron HCl (Zofran Injection)  4 mg IVPB Q6H PRN


   PRN Reason: NAUSEA


Potassium Chloride (K-Dur -)  40 meq PO TID [facility]


Ranitidine HCl (Zantac -)  75 mg PO DAILY [facility]


   Last Admin: 06/30/17 09:54 Dose:  75 mg


Starch (Anusol Suppository -)  1 each RC HS [facility]


   Last Admin: 06/30/17 22:56 Dose:  1 each


Tiotropium Bromide (Spiriva -)  1 puff IH DAILY [facility]


   Last Admin: 06/30/17 09:58 Dose:  1 puff


Warfarin Sodium (Coumadin -)  2 mg PO DAILY@1800 [facility]


   Last Admin: 06/30/17 17:17 Dose:  2 mg











- Objective


Vital Signs: 


 Vital Signs











Temperature  97.5 F L  07/01/17 01:00


 


Pulse Rate  75   07/01/17 05:00


 


Respiratory Rate  20   07/01/17 05:00


 


Blood Pressure  113/59   07/01/17 05:00


 


O2 Sat by Pulse Oximetry (%)  92 L  06/30/17 21:00











Constitutional: Yes: No Distress, Calm


Eyes: No: Sclera Icterus


HENT: No: Nasal Congestion


Cardiovascular: Yes: Regular Rate and Rhythm, JVD (++), S1, S2, Other (PMI non 

diplaced).  No: Gallop, Murmur


Respiratory: Yes: CTA Bilaterally.  No: Accessory Muscle Use, Rales, Wheezes


Gastrointestinal: Yes: Normal Bowel Sounds, Soft.  No: Tenderness


Musculoskeletal: Yes: Other (No kyphosis)


Extremities: No: Cool


Edema: Yes (1-2+ L ankle)


Integumentary: No: Jaundice


Neurological: Yes: Alert, Oriented (x3)


Psychiatric: No: Agitated


Labs: 


 CBC, BMP





 07/01/17 05:35 





 07/01/17 05:35 





 INR, PTT











INR  2.78  (0.82-1.09)  H  07/01/17  05:35    














- ....Imaging


EKG: Other (tele: AF, V-paced; NSVT 7b)





Assessment/Plan


echo 10/2016: sev lve, global hk, sev dec lvef, mild rve, nl rv fcn, patty, mild-

mod mr, mv ring, tv ring, mild tr, mild pr, mild phtn, ivc mild dil





cxr: chf











a/p:  76 year old man with PMH of IDDM, COPD (O2 dependant) HTN, HLD, Afib (on 

coumadin), CAD s/p CABG 2013, end stage CHF with ICD placement (on milrinone 

pump), PVD, BPH here with sob.





acute on chronic systolic CHF, end stage systolic heart failure s/p ICD, on 

home milrinone infusion:


- here with sob/wright


- was well diuresed at home to wt 202-204 with creat 2.1 and Na 151--torsemide 

80 bid gradually downtitrated back to 40 bid


- saw me last week with sob at baseline, but ongoing phlegmy cough--likely sec 

to advanced copd and NOT chf


- developed acute incr sob in afternoon, after left my office for last appt, 

thinks wt was 207 that day


- cxr here worsened, with perihilar edema pattern and RLL infiltrates likely 

chf (no other clinical signs of PNA)


- initial wt 204 standing, up to 207 on HD 2


- suspect diet/fluid noncompliance may be trigger of chf here


- cont milrinone at patient's home maintenance dose (on chronic infusion 

destination therapy)--0.35 mcg/kg/min.  


- cont home coreg. (no ACE due to low bp's and labile creatinines; no Entresto 

for same reason) 


- recent office icd check with stable function


- 6/23 unclear if sxs from chf or some URI/copd (productive cough).  Will give 

trial of iv lasix 80 x1 today and monitor sxs, cr trend tomorrow to see if 

improving.


- 6/24.  Symptoms improving, creatinine improved after lasix 80 mg IV x 1 

yesterday.  Will continue daily dosing. 


- 6/25   Ongoing weight gain, will increase lasix to bid dosing--though pt 

refused 2nd dose due to late in day timing


- note: pt gains wt rapidly at home when non-compliant with 1.5L daily fluid 

restriction (wife supervises) and same here when fluid restriction not ordered/

implemented


- 6/26: fluid restriction ordered, importance d/w'd RN (to inform assistant); 

wt up to 212 (target wt is 207 or less). bid lasix today. responds to 

metolazone reliably at home when wt rises so low threshold to give this if wt 

rises further tomorrow (hopefully he is not becoming IV diuretic dependent)


- suspect loss of muscle mass over time, with new dry wt closer to 202-204


- 6/27 Significantly bothered by frequent urination.  declines connolly.  Still 

symptomatic, minimal weight loss this morning. will give extra dose of 

metolazone this afternoon with pm lasix. 


-6/28: Cr stable, wt down a pound.  Will cont iv lasix 100 bid and give 

metolazone 5 today as well.


-6/29: cr stable, wt unchanged but pt reports slight improvement in sob.  Will 

replete K.  Cont iv lasix. 


-6/30: cr stable, wt improved today, less sob as well.  Cont with lasix 100 iv 

bid, goal wt close to 203lbs


-7/1: wt stalled at 207. cont iv lasix today and rpt metolazone 5mg, bp 

permitting. will cont iv lasix until objective signs of vol depletion (

hypotension, elev bun/creat) to better define dry wt, in hopes of avoiding 

ongoing freq hospitalizations


-was transiently hypotensive to 80s this am after iv lasix, bp now back up--

observe closely (will attempt metolazone with lasix later today, if bp drops 

again will hold carvedilol temporarily to allow aggressive diuresis to proceed)


-d/w'd wife yest need to see chf specialist again, ? milrinone dosing can be 

adjusted--dr aguiar ([place]) has left so they will transfer care and do not 

want [facility] (she is to make appt with dr de la rosa at [facility]) 


-if no further options per CHF specialist eval, and cannot keep him out of the 

hospital with more aggressive weight target, will have to discuss palliative 

care/home hospice with pt and wife





afib


- currently rate controlled on coreg


- cont coumadin per INR





VT:


- intermittent brief runs of NSVT on tele, chronic finding on prior admits


- has ICD


- on max tolerated coreg dose


- replete lytes prn (aggressive K repletion ordered)





PVD


- con't AC, statin.





CAD s/p CABG


- No anginal symptoms, no signs acs.  Con't home ac, plavix, atorvastatin, coreg





HTN: controlled on coreg





HLD: con't statin. 





master/CKD:


- suspect cardiorenal syndrome, with renal fxn improving due to IV diuresis


- renal fxn presently at baseline





copd


- per pulm/pmd

## 2017-07-02 LAB
ALBUMIN SERPL-MCNC: 3.2 G/DL (ref 3.4–5)
ALP SERPL-CCNC: 149 U/L (ref 45–117)
ALT SERPL-CCNC: 26 U/L (ref 12–78)
ANION GAP SERPL CALC-SCNC: 9 MMOL/L (ref 8–16)
AST SERPL-CCNC: 27 U/L (ref 15–37)
BILIRUB CONJ SERPL-MCNC: 0.3 MG/DL (ref 0–0.2)
BILIRUB SERPL-MCNC: 0.7 MG/DL (ref 0.2–1)
CALCIUM SERPL-MCNC: 9.7 MG/DL (ref 8.5–10.1)
CO2 SERPL-SCNC: 37 MMOL/L (ref 21–32)
CREAT SERPL-MCNC: 1.7 MG/DL (ref 0.7–1.3)
GLUCOSE SERPL-MCNC: 143 MG/DL (ref 74–106)
INR BLD: 2.52 (ref 0.82–1.09)
MAGNESIUM SERPL-MCNC: 2.2 MG/DL (ref 1.8–2.4)
PROT SERPL-MCNC: 7.3 G/DL (ref 6.4–8.2)
PT PNL PPP: 28.2 SEC (ref 9.98–11.88)

## 2017-07-02 RX ADMIN — POTASSIUM CHLORIDE SCH MEQ: 1500 TABLET, EXTENDED RELEASE ORAL at 05:57

## 2017-07-02 RX ADMIN — POTASSIUM CHLORIDE SCH MEQ: 1500 TABLET, EXTENDED RELEASE ORAL at 21:55

## 2017-07-02 RX ADMIN — CLOPIDOGREL BISULFATE SCH MG: 75 TABLET, FILM COATED ORAL at 09:17

## 2017-07-02 RX ADMIN — Medication SCH APPLIC: at 22:00

## 2017-07-02 RX ADMIN — INSULIN ASPART SCH UNITS: 100 INJECTION, SOLUTION INTRAVENOUS; SUBCUTANEOUS at 17:07

## 2017-07-02 RX ADMIN — DOCUSATE SODIUM SCH MG: 100 CAPSULE, LIQUID FILLED ORAL at 09:17

## 2017-07-02 RX ADMIN — WARFARIN SCH MG: 2 TABLET ORAL at 17:09

## 2017-07-02 RX ADMIN — FUROSEMIDE SCH MG: 10 INJECTION, SOLUTION INTRAVENOUS at 05:58

## 2017-07-02 RX ADMIN — ATORVASTATIN CALCIUM SCH MG: 40 TABLET, FILM COATED ORAL at 21:54

## 2017-07-02 RX ADMIN — BUDESONIDE AND FORMOTEROL FUMARATE DIHYDRATE SCH PUFF: 160; 4.5 AEROSOL RESPIRATORY (INHALATION) at 21:54

## 2017-07-02 RX ADMIN — CARVEDILOL SCH MG: 6.25 TABLET, FILM COATED ORAL at 21:54

## 2017-07-02 RX ADMIN — Medication SCH: at 17:40

## 2017-07-02 RX ADMIN — Medication SCH APPLIC: at 17:50

## 2017-07-02 RX ADMIN — RANITIDINE SCH MG: 150 TABLET ORAL at 09:17

## 2017-07-02 RX ADMIN — MILRINONE LACTATE SCH MLS/HR: 200 INJECTION, SOLUTION INTRAVENOUS at 11:52

## 2017-07-02 RX ADMIN — BUDESONIDE AND FORMOTEROL FUMARATE DIHYDRATE SCH PUFF: 160; 4.5 AEROSOL RESPIRATORY (INHALATION) at 09:22

## 2017-07-02 RX ADMIN — ALBUTEROL SULFATE PRN AMP: 2.5 SOLUTION RESPIRATORY (INHALATION) at 22:35

## 2017-07-02 RX ADMIN — INSULIN ASPART SCH: 100 INJECTION, SOLUTION INTRAVENOUS; SUBCUTANEOUS at 06:02

## 2017-07-02 RX ADMIN — OXYCODONE HYDROCHLORIDE AND ACETAMINOPHEN SCH MG: 500 TABLET ORAL at 09:17

## 2017-07-02 RX ADMIN — VITAMIN D, TAB 1000IU (100/BT) SCH UNIT: 25 TAB at 09:17

## 2017-07-02 RX ADMIN — DOCUSATE SODIUM SCH MG: 100 CAPSULE, LIQUID FILLED ORAL at 21:54

## 2017-07-02 RX ADMIN — POTASSIUM CHLORIDE SCH MEQ: 1500 TABLET, EXTENDED RELEASE ORAL at 13:47

## 2017-07-02 RX ADMIN — INSULIN ASPART SCH UNITS: 100 INJECTION, SOLUTION INTRAVENOUS; SUBCUTANEOUS at 11:50

## 2017-07-02 RX ADMIN — INSULIN ASPART SCH UNITS: 100 INJECTION, SOLUTION INTRAVENOUS; SUBCUTANEOUS at 22:00

## 2017-07-02 RX ADMIN — FUROSEMIDE SCH MG: 10 INJECTION, SOLUTION INTRAVENOUS at 14:49

## 2017-07-02 RX ADMIN — CARVEDILOL SCH MG: 6.25 TABLET, FILM COATED ORAL at 09:17

## 2017-07-02 RX ADMIN — ALLOPURINOL SCH MG: 100 TABLET ORAL at 09:17

## 2017-07-02 RX ADMIN — POTASSIUM CHLORIDE SCH MEQ: 1500 TABLET, EXTENDED RELEASE ORAL at 17:12

## 2017-07-02 RX ADMIN — TIOTROPIUM BROMIDE SCH PUFF: 18 CAPSULE ORAL; RESPIRATORY (INHALATION) at 09:23

## 2017-07-02 RX ADMIN — PHENYLEPHRINE HYDROCHLORIDE AND FAT, HARD SCH: .00525; 1.86 SUPPOSITORY RECTAL at 22:00

## 2017-07-02 RX ADMIN — MULTIVITAMIN TABLET SCH TAB: TABLET at 09:17

## 2017-07-02 NOTE — PN
Progress Note (short form)





- Note


Progress Note: 


PULMONARY





SITTING ON SIDE OF BED EATING LUNCH


HAS SOME MIDEPIGASTRIC DISCOMFORT





AFEBRILE/VSS





DISTENDED NECK VEINS


DISTANT BREATH SOUNDS


S1S2


OBESE SOFT


EDEMA LOWER EXT





LABS/MEDS/NOTES/IMAGING/MICRO REVIEWED








Acute on Chronic Systolic Heart Failure end stage


CAD s/p CABG


COPD


Chronic Hypoxic Respiratory Failure


Atrial Fibrillation


Acute on Chronic Renal Failure


PAD


BPH





-  continue lasix, milrinone


-  replete K+


-  monitor urine output, creatinine


-  daily weights, I/Os


-  O2 to keep SpO2 >90%


-  rate controlled


-  continue anticoagulation


-  long term prognosis is poor





LUZ SAEZ MD

## 2017-07-02 NOTE — PN
Progress Note (short form)





- Note


Progress Note: 


SOB is improving


Has mild RUQ pain


No fever no constipation





O/E


 Vital Signs











 Period  Temp  Pulse  Resp  BP Sys/Alonso  Pulse Ox


 


 Last 24 Hr  97.1 F-98 F  74-77  18-20  111-127/64-80  93-95








Heart regular


Lungs clear


Abd soft


slight RUQ tenderness+


Ext trace edema+





 Current Medications





Acetaminophen (Tylenol -)  650 mg PO Q4H PRN


   PRN Reason: FEVER OR PAIN


   Last Admin: 07/01/17 17:50 Dose:  650 mg


Albuterol Sulfate (Ventolin 0.083% Nebulizer Soln -)  1 amp NEB Q4H PRN


   PRN Reason: SHORT OF BREATH/WHEEZING


   Last Admin: 07/01/17 23:25 Dose:  1 amp


Allopurinol (Zyloprim -)  100 mg PO DAILY [facility]


   Last Admin: 07/02/17 09:17 Dose:  100 mg


Ascorbic Acid (Vitamin C -)  1,000 mg PO DAILY [facility]


   Last Admin: 07/02/17 09:17 Dose:  1,000 mg


Atorvastatin Calcium (Lipitor -)  40 mg PO HS [facility]


   Last Admin: 07/01/17 21:24 Dose:  40 mg


Bacitracin/Polymyxin B Sulfate (Polysporin Ointment -)  1 applic TP BID [facility]


   Last Admin: 07/01/17 21:26 Dose:  1 applic


Budesonide/Formoterol Fumarate (Symbicort 160/4.5mcg -)  1 puff IH BID [facility]


   Last Admin: 07/02/17 09:22 Dose:  1 puff


Bupropion HCl (Wellbutrin -)  75 mg PO DAILY [facility]


   Last Admin: 07/02/17 09:17 Dose:  75 mg


Carvedilol (Coreg -)  6.25 mg PO BID [facility]


   Last Admin: 07/02/17 09:17 Dose:  6.25 mg


Cholecalciferol (Vitamin D3 -)  1,000 unit PO DAILY [facility]


   Last Admin: 07/02/17 09:17 Dose:  1,000 unit


Clopidogrel Bisulfate (Plavix -)  75 mg PO DAILY [facility]


   Last Admin: 07/02/17 09:17 Dose:  75 mg


Docusate Sodium (Colace -)  100 mg PO BID [facility]


   Last Admin: 07/02/17 09:17 Dose:  100 mg


Ferrous Sulfate (Feosol -)  325 mg PO Q48H [facility]


   Last Admin: 07/01/17 11:30 Dose:  325 mg


Furosemide (Lasix Injection -)  100 mg IVPUSH BID@0600,1400 [facility]


   Last Admin: 07/02/17 14:49 Dose:  100 mg


Milrinone Lactate/Dextrose (Milrinone 20mg/100ml Ivpb -)  100 mls @ 9.716 mls/

hr IVPB TITR JARROD


   PRN Reason: 0.35 MCG/KG/MIN


   Last Admin: 07/02/17 11:52 Dose:  9.716 mls/hr


Insulin Aspart (Novolog Vial Sliding Scale -)  1 vial SQ ACHS [facility]


   PRN Reason: Protocol


   Last Admin: 07/02/17 11:50 Dose:  4 units


Multivitamins/Minerals/Vitamin C (Tab-A-Vit -)  1 tab PO DAILY [facility]


   Last Admin: 07/02/17 09:17 Dose:  1 tab


Ondansetron HCl (Zofran Injection)  4 mg IVPB Q6H PRN


   PRN Reason: NAUSEA


Potassium Chloride (K-Dur -)  40 meq PO QID [facility]


   Last Admin: 07/02/17 13:47 Dose:  40 meq


Ranitidine HCl (Zantac -)  75 mg PO DAILY [facility]


   Last Admin: 07/02/17 09:17 Dose:  75 mg


Starch (Anusol Suppository -)  1 each RC HS [facility]


   Last Admin: 07/01/17 21:24 Dose:  Not Given


Tiotropium Bromide (Spiriva -)  1 puff IH DAILY [facility]


   Last Admin: 07/02/17 09:23 Dose:  1 puff


Warfarin Sodium (Coumadin -)  2 mg PO DAILY@1800 [facility]


   Last Admin: 07/01/17 17:14 Dose:  2 mg





 Laboratory Results - last 24 hr











  07/01/17 07/01/17 07/02/17





  17:08 21:31 05:35


 


INR   


 


Sodium    142


 


Potassium    3.5


 


Chloride    96 L


 


Carbon Dioxide    37 H


 


Anion Gap    9


 


BUN    56 H


 


Creatinine    1.7 H


 


POC Glucometer  273  214 


 


Random Glucose    143 H D


 


Calcium    9.7


 


Magnesium    2.2


 


Total Bilirubin   


 


Direct Bilirubin   


 


AST   


 


ALT   


 


Alkaline Phosphatase   


 


Total Protein   


 


Albumin   














  07/02/17 07/02/17 07/02/17





  05:56 11:45 12:00


 


INR   


 


Sodium   


 


Potassium   


 


Chloride   


 


Carbon Dioxide   


 


Anion Gap   


 


BUN   


 


Creatinine   


 


POC Glucometer  144  228 


 


Random Glucose   


 


Calcium   


 


Magnesium   


 


Total Bilirubin    0.7  D


 


Direct Bilirubin    0.3 H


 


AST    27


 


ALT    26


 


Alkaline Phosphatase    149 H


 


Total Protein    7.3


 


Albumin    3.2 L














  07/02/17





  12:00


 


INR  2.52 H


 


Sodium 


 


Potassium 


 


Chloride 


 


Carbon Dioxide 


 


Anion Gap 


 


BUN 


 


Creatinine 


 


POC Glucometer 


 


Random Glucose 


 


Calcium 


 


Magnesium 


 


Total Bilirubin 


 


Direct Bilirubin 


 


AST 


 


ALT 


 


Alkaline Phosphatase 


 


Total Protein 


 


Albumin 








Assessment/Plan


1.RUQ pain


check blood


(1) CHF (congestive heart failure)


Assessment/Plan: 


-end stage CHF, Has improved since yesterday , prognosis is grim


Code(s): I50.9 - HEART FAILURE, UNSPECIFIED   Qualifiers: 


     Congestive heart failure type: systolic     Congestive heart failure 

chronicity: acute on chronic        Qualified Code(s): I50.23 - Acute on 

chronic systolic (congestive) heart failure  





(2) Acute and chronic respiratory failure (acute-on-chronic)


Assessment/Plan: 


-secondary to CHF stable at present


-on supplemental oxygen


Code(s): J96.20 - ACUTE AND CHR RESP FAILURE, UNSP W HYPOXIA OR HYPERCAPNIA   

Qualifiers: 


     Respiratory failure complication: hypoxia        Qualified Code(s): J96.21 

- Acute and chronic respiratory failure with hypoxia  





(3) Acute on chronic renal insufficiency


Assessment/Plan: 


-cardiorenal


Code(s): N28.9 - DISORDER OF KIDNEY AND URETER, UNSPECIFIED


N18.9 - CHRONIC KIDNEY DISEASE, UNSPECIFIED





(4) Atrial fibrillation


Assessment/Plan: 


-rate controlled


-INR therapeutic


-continue coumadin at decreased dose currently


-daily INR checks


Code(s): I48.91 - UNSPECIFIED ATRIAL FIBRILLATION   Qualifiers: 


     Atrial fibrillation type: chronic     Qualified Code(s): I48.2 - Chronic 

atrial fibrillation  





(5) CAD (coronary artery disease)


Assessment/Plan: 


-stable


Code(s): I25.10 - ATHSCL HEART DISEASE OF NATIVE CORONARY ARTERY W/O ANG PCTRS 

  





(6) Fatigue


Is made worse by hypokalemia, check MG and replace   





(7) Diabetes


Assessment/Plan: 


-diabetic diet


-continue SSI


Code(s): E11.9 - TYPE 2 DIABETES MELLITUS WITHOUT COMPLICATIONS   Qualifiers: 


     Diabetes mellitus type: type 2     Diabetes mellitus complication status: 

with kidney complications     Diabetes mellitus complication detail: with 

chronic kidney disease     Chronic kidney disease stage: stage 4 (severe)

## 2017-07-02 NOTE — PN
Progress Note, Physician


Chief Complaint: 


chf


History of Present Illness: 


sob the same--if gets out of bed and does something


cough same


no cp


RUQ pain--denies exacerbation with meals, denies naus/vomiting





very sleepy/drowsy this am





ex cigs





- Current Medication List


Current Medications: 


Active Medications





Acetaminophen (Tylenol -)  650 mg PO Q4H PRN


   PRN Reason: FEVER OR PAIN


   Last Admin: 07/01/17 17:50 Dose:  650 mg


Albuterol Sulfate (Ventolin 0.083% Nebulizer Soln -)  1 amp NEB Q4H PRN


   PRN Reason: SHORT OF BREATH/WHEEZING


   Last Admin: 07/01/17 23:25 Dose:  1 amp


Allopurinol (Zyloprim -)  100 mg PO DAILY [facility]


   Last Admin: 07/01/17 10:08 Dose:  100 mg


Ascorbic Acid (Vitamin C -)  1,000 mg PO DAILY [facility]


   Last Admin: 07/01/17 10:08 Dose:  1,000 mg


Atorvastatin Calcium (Lipitor -)  40 mg PO HS [facility]


   Last Admin: 07/01/17 21:24 Dose:  40 mg


Bacitracin/Polymyxin B Sulfate (Polysporin Ointment -)  1 applic TP BID [facility]


   Last Admin: 07/01/17 21:26 Dose:  1 applic


Budesonide/Formoterol Fumarate (Symbicort 160/4.5mcg -)  1 puff IH BID [facility]


   Last Admin: 07/01/17 21:25 Dose:  1 puff


Bupropion HCl (Wellbutrin -)  75 mg PO DAILY [facility]


   Last Admin: 07/01/17 10:07 Dose:  75 mg


Carvedilol (Coreg -)  6.25 mg PO BID [facility]


   Last Admin: 07/01/17 21:25 Dose:  6.25 mg


Cholecalciferol (Vitamin D3 -)  1,000 unit PO DAILY [facility]


   Last Admin: 07/01/17 10:07 Dose:  1,000 unit


Clopidogrel Bisulfate (Plavix -)  75 mg PO DAILY [facility]


   Last Admin: 07/01/17 10:07 Dose:  75 mg


Docusate Sodium (Colace -)  100 mg PO BID [facility]


   Last Admin: 07/01/17 21:25 Dose:  100 mg


Ferrous Sulfate (Feosol -)  325 mg PO Q48H [facility]


   Last Admin: 07/01/17 11:30 Dose:  325 mg


Furosemide (Lasix Injection -)  100 mg IVPUSH BID@0600,1400 [facility]


   Last Admin: 07/02/17 05:58 Dose:  100 mg


Milrinone Lactate/Dextrose (Milrinone 20mg/100ml Ivpb -)  100 mls @ 9.716 mls/

hr IVPB TITR [facility]


   PRN Reason: 0.35 MCG/KG/MIN


   Last Admin: 07/01/17 15:35 Dose:  9.716 mls/hr


Insulin Aspart (Novolog Vial Sliding Scale -)  1 vial SQ ACHS [facility]


   PRN Reason: Protocol


   Last Admin: 07/02/17 06:02 Dose:  Not Given


Multivitamins/Minerals/Vitamin C (Tab-A-Vit -)  1 tab PO DAILY [facility]


   Last Admin: 07/01/17 10:07 Dose:  1 tab


Ondansetron HCl (Zofran Injection)  4 mg IVPB Q6H PRN


   PRN Reason: NAUSEA


Potassium Chloride (K-Dur -)  40 meq PO TID [facility]


   Last Admin: 07/02/17 05:57 Dose:  40 meq


Ranitidine HCl (Zantac -)  75 mg PO DAILY [facility]


   Last Admin: 07/01/17 10:08 Dose:  75 mg


Starch (Anusol Suppository -)  1 each RC HS [facility]


   Last Admin: 07/01/17 21:24 Dose:  Not Given


Tiotropium Bromide (Spiriva -)  1 puff IH DAILY [facility]


   Last Admin: 07/01/17 10:10 Dose:  1 puff


Warfarin Sodium (Coumadin -)  2 mg PO DAILY@1800 [facility]


   Last Admin: 07/01/17 17:14 Dose:  2 mg











- Objective


Vital Signs: 


 Vital Signs











Temperature  97.6 F   07/02/17 02:00


 


Pulse Rate  75   07/02/17 06:00


 


Respiratory Rate  20   07/02/17 06:00


 


Blood Pressure  127/73   07/02/17 06:00


 


O2 Sat by Pulse Oximetry (%)  93 L  07/01/17 22:00











Constitutional: Yes: No Distress, Calm


Eyes: No: Sclera Icterus


HENT: No: Nasal Congestion


Cardiovascular: Yes: Regular Rate and Rhythm, JVD, S1, S2, Other (PMI non 

diplaced).  No: Gallop, Murmur


Respiratory: Yes: Diminished (decr bases).  No: Accessory Muscle Use, Rales, 

Wheezes


Gastrointestinal: Yes: Normal Bowel Sounds, Soft.  No: Tenderness (incl RUQ, no 

rebound)


Musculoskeletal: Yes: Other (No kyphosis)


Extremities: No: Cold


Edema: Yes (1+ L ankle)


Integumentary: No: Jaundice


Neurological: Yes: Oriented, Lethargy


Psychiatric: No: Agitated


Labs: 


 CBC, BMP





 07/01/17 05:35 





 INR, PTT











INR  2.78  (0.82-1.09)  H  07/01/17  05:35    














- ....Imaging


EKG: Other (tele: AF, V-P, NSVT x 7b)





Assessment/Plan


echo 10/2016: sev lve, global hk, sev dec lvef, mild rve, nl rv fcn, patty, mild-

mod mr, mv ring, tv ring, mild tr, mild pr, mild phtn, ivc mild dil





cxr: chf











a/p:  76 year old man with PMH of IDDM, COPD (O2 dependant) HTN, HLD, Afib (on 

coumadin), CAD s/p CABG 2013, end stage CHF with ICD placement (on milrinone 

pump), PVD, BPH here with sob.





acute on chronic systolic CHF, end stage systolic heart failure s/p ICD, on 

home milrinone infusion:


- here with sob/wright


- was well diuresed at home to wt 202-204 with creat 2.1 and Na 151--torsemide 

80 bid gradually downtitrated back to 40 bid


- saw me last week with sob at baseline, but ongoing phlegmy cough--likely sec 

to advanced copd and NOT chf


- developed acute incr sob in afternoon, after left my office for last appt, 

thinks wt was 207 that day


- cxr here worsened, with perihilar edema pattern and RLL infiltrates likely 

chf (no other clinical signs of PNA)


- initial wt 204 standing, up to 207 on HD 2


- suspect diet/fluid noncompliance may be trigger of chf here


- cont milrinone at patient's home maintenance dose (on chronic infusion 

destination therapy)--0.35 mcg/kg/min.  


- cont home coreg. (no ACE due to low bp's and labile creatinines; no Entresto 

for same reason) 


- recent office icd check with stable function


- 6/23 unclear if sxs from chf or some URI/copd (productive cough).  Will give 

trial of iv lasix 80 x1 today and monitor sxs, cr trend tomorrow to see if 

improving.


- 6/24.  Symptoms improving, creatinine improved after lasix 80 mg IV x 1 

yesterday.  Will continue daily dosing. 


- 6/25   Ongoing weight gain, will increase lasix to bid dosing--though pt 

refused 2nd dose due to late in day timing


- note: pt gains wt rapidly at home when non-compliant with 1.5L daily fluid 

restriction (wife supervises) and same here when fluid restriction not ordered/

implemented


- 6/26: fluid restriction ordered, importance d/w'd RN (to inform assistant); 

wt up to 212 (target wt is 207 or less). bid lasix today. responds to 

metolazone reliably at home when wt rises so low threshold to give this if wt 

rises further tomorrow (hopefully he is not becoming IV diuretic dependent)


- suspect loss of muscle mass over time, with new dry wt closer to 202-204


- 6/27 Significantly bothered by frequent urination.  declines connolly.  Still 

symptomatic, minimal weight loss this morning. will give extra dose of 

metolazone this afternoon with pm lasix. 


-6/28: Cr stable, wt down a pound.  Will cont iv lasix 100 bid and give 

metolazone 5 today as well.


-6/29: cr stable, wt unchanged but pt reports slight improvement in sob.  Will 

replete K.  Cont iv lasix. 


-6/30: cr stable, wt improved today, less sob as well.  Cont with lasix 100 iv 

bid, goal wt close to 203lbs


-7/1: wt stalled at 207. cont iv lasix today and rpt metolazone 5mg, bp 

permitting. will cont iv lasix until objective signs of vol depletion (

hypotension, elev bun/creat) to better define dry wt, in hopes of avoiding 

ongoing freq hospitalizations


-7/2: wt down 1 lb; renal fxn stable. rpt metolazone with lasix 100 iv bid today

, incr KCL to aim for K 4.0


-d/w'd wife need to see chf specialist again, ? milrinone dosing can be adjusted

--dr aguiar ([place]) has left so they will transfer care and do not want [place] (

she is to make appt with dr de la rosa at [facility]) 


-if no further options per CHF specialist eval, and cannot keep him out of the 

hospital with more aggressive weight target, will have to discuss palliative 

care/home hospice with pt and wife





RUQ pain:


- no tenderness, no naus/vomiting, normal WBC count, no fever


- LFTs normal 6/26 last--reorder


- defer further evaluation, if indicated, to pmd





afib


- currently rate controlled on coreg


- cont coumadin per INR





VT:


- intermittent brief runs of NSVT on tele, chronic finding on prior admits


- has ICD


- on max tolerated coreg dose


- replete lytes prn (aggressive K repletion ordered)





PVD


- con't AC, statin.





CAD s/p CABG


- No anginal symptoms, no signs acs.  Con't home ac, plavix, atorvastatin, coreg





HTN: controlled on coreg





HLD: con't statin. 





master/CKD:


- suspect cardiorenal syndrome, with renal fxn improving due to IV diuresis


- renal fxn presently at baseline





copd


- per pulm/pmd

## 2017-07-03 LAB
ALBUMIN SERPL-MCNC: 3.3 G/DL (ref 3.4–5)
ALP SERPL-CCNC: 142 U/L (ref 45–117)
ALT SERPL-CCNC: 24 U/L (ref 12–78)
ANION GAP SERPL CALC-SCNC: 7 MMOL/L (ref 8–16)
AST SERPL-CCNC: 21 U/L (ref 15–37)
BILIRUB SERPL-MCNC: 0.9 MG/DL (ref 0.2–1)
CALCIUM SERPL-MCNC: 9.4 MG/DL (ref 8.5–10.1)
CO2 SERPL-SCNC: 38 MMOL/L (ref 21–32)
CREAT SERPL-MCNC: 1.8 MG/DL (ref 0.7–1.3)
GLUCOSE SERPL-MCNC: 162 MG/DL (ref 74–106)
INR BLD: 2.39 (ref 0.82–1.09)
PROT SERPL-MCNC: 7.3 G/DL (ref 6.4–8.2)
PT PNL PPP: 26.8 SEC (ref 9.98–11.88)

## 2017-07-03 RX ADMIN — BUDESONIDE AND FORMOTEROL FUMARATE DIHYDRATE SCH PUFF: 160; 4.5 AEROSOL RESPIRATORY (INHALATION) at 09:22

## 2017-07-03 RX ADMIN — MILRINONE LACTATE SCH MLS/HR: 200 INJECTION, SOLUTION INTRAVENOUS at 10:00

## 2017-07-03 RX ADMIN — DOCUSATE SODIUM SCH MG: 100 CAPSULE, LIQUID FILLED ORAL at 22:13

## 2017-07-03 RX ADMIN — POTASSIUM CHLORIDE SCH MEQ: 1500 TABLET, EXTENDED RELEASE ORAL at 09:21

## 2017-07-03 RX ADMIN — ALLOPURINOL SCH MG: 100 TABLET ORAL at 09:20

## 2017-07-03 RX ADMIN — FUROSEMIDE SCH MG: 10 INJECTION, SOLUTION INTRAVENOUS at 15:00

## 2017-07-03 RX ADMIN — OXYCODONE HYDROCHLORIDE AND ACETAMINOPHEN SCH MG: 500 TABLET ORAL at 09:20

## 2017-07-03 RX ADMIN — POTASSIUM CHLORIDE SCH MEQ: 1500 TABLET, EXTENDED RELEASE ORAL at 22:13

## 2017-07-03 RX ADMIN — DOCUSATE SODIUM SCH MG: 100 CAPSULE, LIQUID FILLED ORAL at 09:21

## 2017-07-03 RX ADMIN — BUDESONIDE AND FORMOTEROL FUMARATE DIHYDRATE SCH PUFF: 160; 4.5 AEROSOL RESPIRATORY (INHALATION) at 22:19

## 2017-07-03 RX ADMIN — CARVEDILOL SCH MG: 6.25 TABLET, FILM COATED ORAL at 09:20

## 2017-07-03 RX ADMIN — ATORVASTATIN CALCIUM SCH MG: 40 TABLET, FILM COATED ORAL at 22:13

## 2017-07-03 RX ADMIN — ALBUTEROL SULFATE PRN AMP: 2.5 SOLUTION RESPIRATORY (INHALATION) at 10:55

## 2017-07-03 RX ADMIN — POTASSIUM CHLORIDE SCH MEQ: 1500 TABLET, EXTENDED RELEASE ORAL at 17:29

## 2017-07-03 RX ADMIN — INSULIN ASPART SCH UNITS: 100 INJECTION, SOLUTION INTRAVENOUS; SUBCUTANEOUS at 06:06

## 2017-07-03 RX ADMIN — INSULIN ASPART SCH UNITS: 100 INJECTION, SOLUTION INTRAVENOUS; SUBCUTANEOUS at 11:24

## 2017-07-03 RX ADMIN — VITAMIN D, TAB 1000IU (100/BT) SCH UNIT: 25 TAB at 09:20

## 2017-07-03 RX ADMIN — MULTIVITAMIN TABLET SCH TAB: TABLET at 09:20

## 2017-07-03 RX ADMIN — INSULIN ASPART SCH UNITS: 100 INJECTION, SOLUTION INTRAVENOUS; SUBCUTANEOUS at 17:29

## 2017-07-03 RX ADMIN — PHENYLEPHRINE HYDROCHLORIDE AND FAT, HARD SCH: .00525; 1.86 SUPPOSITORY RECTAL at 22:12

## 2017-07-03 RX ADMIN — POLYETHYLENE GLYCOL 3350 SCH: 17 POWDER, FOR SOLUTION ORAL at 22:19

## 2017-07-03 RX ADMIN — Medication SCH APPLIC: at 09:23

## 2017-07-03 RX ADMIN — CLOPIDOGREL BISULFATE SCH MG: 75 TABLET, FILM COATED ORAL at 09:20

## 2017-07-03 RX ADMIN — INSULIN ASPART SCH UNITS: 100 INJECTION, SOLUTION INTRAVENOUS; SUBCUTANEOUS at 22:10

## 2017-07-03 RX ADMIN — Medication SCH: at 22:20

## 2017-07-03 RX ADMIN — CARVEDILOL SCH MG: 6.25 TABLET, FILM COATED ORAL at 22:13

## 2017-07-03 RX ADMIN — WARFARIN SCH MG: 2 TABLET ORAL at 17:29

## 2017-07-03 RX ADMIN — POTASSIUM CHLORIDE SCH MEQ: 1500 TABLET, EXTENDED RELEASE ORAL at 14:27

## 2017-07-03 RX ADMIN — RANITIDINE SCH MG: 150 TABLET ORAL at 09:21

## 2017-07-03 RX ADMIN — MILRINONE LACTATE SCH MLS/HR: 200 INJECTION, SOLUTION INTRAVENOUS at 19:47

## 2017-07-03 RX ADMIN — FERROUS SULFATE TAB EC 324 MG (65 MG FE EQUIVALENT) SCH MG: 324 (65 FE) TABLET DELAYED RESPONSE at 11:24

## 2017-07-03 RX ADMIN — FUROSEMIDE SCH MG: 10 INJECTION, SOLUTION INTRAVENOUS at 06:05

## 2017-07-03 RX ADMIN — TIOTROPIUM BROMIDE SCH PUFF: 18 CAPSULE ORAL; RESPIRATORY (INHALATION) at 09:23

## 2017-07-03 NOTE — PN
Progress Note, Physician


Chief Complaint: 


chf


History of Present Illness: 


says no more sob with oob to bathroom


cough improved


no cp, palpit, leg swelling





very upset at the prospect of remaining in the hospital longer to continue 

diuresing until dry weight defined with certainty





- Current Medication List


Current Medications: 


Active Medications





Acetaminophen (Tylenol -)  650 mg PO Q4H PRN


   PRN Reason: FEVER OR PAIN


   Last Admin: 07/01/17 17:50 Dose:  650 mg


Albuterol Sulfate (Ventolin 0.083% Nebulizer Soln -)  1 amp NEB Q4H PRN


   PRN Reason: SHORT OF BREATH/WHEEZING


   Last Admin: 07/02/17 22:35 Dose:  1 amp


Allopurinol (Zyloprim -)  100 mg PO DAILY [facility]


   Last Admin: 07/02/17 09:17 Dose:  100 mg


Ascorbic Acid (Vitamin C -)  1,000 mg PO DAILY [facility]


   Last Admin: 07/02/17 09:17 Dose:  1,000 mg


Atorvastatin Calcium (Lipitor -)  40 mg PO HS [facility]


   Last Admin: 07/02/17 21:54 Dose:  40 mg


Bacitracin/Polymyxin B Sulfate (Polysporin Ointment -)  1 applic TP BID [facility]


   Last Admin: 07/02/17 22:00 Dose:  1 applic


Budesonide/Formoterol Fumarate (Symbicort 160/4.5mcg -)  1 puff IH BID [facility]


   Last Admin: 07/02/17 21:54 Dose:  1 puff


Bupropion HCl (Wellbutrin -)  75 mg PO DAILY [facility]


   Last Admin: 07/02/17 09:17 Dose:  75 mg


Carvedilol (Coreg -)  6.25 mg PO BID [facility]


   Last Admin: 07/02/17 21:54 Dose:  6.25 mg


Cholecalciferol (Vitamin D3 -)  1,000 unit PO DAILY [facility]


   Last Admin: 07/02/17 09:17 Dose:  1,000 unit


Clopidogrel Bisulfate (Plavix -)  75 mg PO DAILY [facility]


   Last Admin: 07/02/17 09:17 Dose:  75 mg


Docusate Sodium (Colace -)  100 mg PO BID [facility]


   Last Admin: 07/02/17 21:54 Dose:  100 mg


Ferrous Sulfate (Feosol -)  325 mg PO Q48H [facility]


   Last Admin: 07/01/17 11:30 Dose:  325 mg


Furosemide (Lasix Injection -)  100 mg IVPUSH BID@0600,1400 [facility]


   Last Admin: 07/03/17 06:05 Dose:  100 mg


Milrinone Lactate/Dextrose (Milrinone 20mg/100ml Ivpb -)  100 mls @ 9.716 mls/

hr IVPB TITR [facility]


   PRN Reason: 0.35 MCG/KG/MIN


   Last Admin: 07/02/17 11:52 Dose:  9.716 mls/hr


Insulin Aspart (Novolog Vial Sliding Scale -)  1 vial SQ ACHS [facility]


   PRN Reason: Protocol


   Last Admin: 07/03/17 06:06 Dose:  2 units


Multivitamins/Minerals/Vitamin C (Tab-A-Vit -)  1 tab PO DAILY [facility]


   Last Admin: 07/02/17 09:17 Dose:  1 tab


Ondansetron HCl (Zofran Injection)  4 mg IVPB Q6H PRN


   PRN Reason: NAUSEA


Potassium Chloride (K-Dur -)  40 meq PO QID [facility]


   Last Admin: 07/02/17 21:55 Dose:  40 meq


Ranitidine HCl (Zantac -)  75 mg PO DAILY [facility]


   Last Admin: 07/02/17 09:17 Dose:  75 mg


Starch (Anusol Suppository -)  1 each RC HS [facility]


   Last Admin: 07/02/17 22:00 Dose:  Not Given


Tiotropium Bromide (Spiriva -)  1 puff IH DAILY [facility]


   Last Admin: 07/02/17 09:23 Dose:  1 puff


Warfarin Sodium (Coumadin -)  2 mg PO DAILY@1800 [facility]


   Last Admin: 07/02/17 17:09 Dose:  2 mg











- Objective


Vital Signs: 


 Vital Signs











Temperature  97 F L  07/03/17 06:00


 


Pulse Rate  78   07/03/17 06:00


 


Respiratory Rate  20   07/03/17 06:00


 


Blood Pressure  126/70   07/03/17 06:00


 


O2 Sat by Pulse Oximetry (%)  97   07/02/17 20:35











Constitutional: Yes: No Distress, Calm


Eyes: No: Sclera Icterus


HENT: No: Nasal Congestion


Cardiovascular: Yes: Regular Rate and Rhythm, JVD, S1, S2, Other (PMI non 

diplaced).  No: Gallop, Murmur


Respiratory: Yes: CTA Bilaterally.  No: Accessory Muscle Use, Rales, Wheezes


Gastrointestinal: Yes: Normal Bowel Sounds, Soft.  No: Tenderness


Musculoskeletal: Yes: Other (No kyphosis)


Extremities: No: Cool


Edema: Yes (1+ L ankle)


Integumentary: No: Jaundice


Neurological: Yes: Alert, Oriented (x3)


Psychiatric: No: Agitated


Labs: 


 CBC, BMP





 07/01/17 05:35 





 07/03/17 05:35 





 INR, PTT











INR  2.52  (0.82-1.09)  H  07/02/17  12:00    














- ....Imaging


EKG: Other (tele: AF, V-P; no VT)





Assessment/Plan


echo 10/2016: sev lve, global hk, sev dec lvef, mild rve, nl rv fcn, patty, mild-

mod mr, mv ring, tv ring, mild tr, mild pr, mild phtn, ivc mild dil





cxr: chf











a/p:  76 year old man with PMH of IDDM, COPD (O2 dependant) HTN, HLD, Afib (on 

coumadin), CAD s/p CABG 2013, end stage CHF with ICD placement (on milrinone 

pump), PVD, BPH here with sob.





acute on chronic systolic CHF, end stage systolic heart failure s/p ICD, on 

home milrinone infusion:


- here with sob/wright


- was well diuresed at home to wt 202-204 with creat 2.1 and Na 151--torsemide 

80 bid gradually downtitrated back to 40 bid


- saw me last week with sob at baseline, but ongoing phlegmy cough--likely sec 

to advanced copd and NOT chf


- developed acute incr sob in afternoon, after left my office for last appt, 

thinks wt was 207 that day


- cxr here worsened, with perihilar edema pattern and RLL infiltrates likely 

chf (no other clinical signs of PNA)


- initial wt 204 standing, up to 207 on HD 2


- suspect diet/fluid noncompliance may be trigger of chf here


- cont milrinone at patient's home maintenance dose (on chronic infusion 

destination therapy)--0.35 mcg/kg/min.  


- cont home coreg. (no ACE due to low bp's and labile creatinines; no Entresto 

for same reason) 


- recent office icd check with stable function


- 6/23 unclear if sxs from chf or some URI/copd (productive cough).  Will give 

trial of iv lasix 80 x1 today and monitor sxs, cr trend tomorrow to see if 

improving.


- 6/24.  Symptoms improving, creatinine improved after lasix 80 mg IV x 1 

yesterday.  Will continue daily dosing. 


- 6/25   Ongoing weight gain, will increase lasix to bid dosing--though pt 

refused 2nd dose due to late in day timing


- note: pt gains wt rapidly at home when non-compliant with 1.5L daily fluid 

restriction (wife supervises) and same here when fluid restriction not ordered/

implemented


- 6/26: fluid restriction ordered, importance d/w'd RN (to inform assistant); 

wt up to 212 (target wt is 207 or less). bid lasix today. responds to 

metolazone reliably at home when wt rises so low threshold to give this if wt 

rises further tomorrow (hopefully he is not becoming IV diuretic dependent)


- suspect loss of muscle mass over time, with new dry wt closer to 202-204


- 6/27 Significantly bothered by frequent urination.  declines connolly.  Still 

symptomatic, minimal weight loss this morning. will give extra dose of 

metolazone this afternoon with pm lasix. 


-6/28: Cr stable, wt down a pound.  Will cont iv lasix 100 bid and give 

metolazone 5 today as well.


-6/29: cr stable, wt unchanged but pt reports slight improvement in sob.  Will 

replete K.  Cont iv lasix. 


-6/30: cr stable, wt improved today, less sob as well.  Cont with lasix 100 iv 

bid, goal wt close to 203lbs


-7/1: wt stalled at 207. cont iv lasix today and rpt metolazone 5mg, bp 

permitting. will cont iv lasix until objective signs of vol depletion (

hypotension, elev bun/creat) to better define dry wt, in hopes of avoiding 

ongoing freq hospitalizations


-7/2: wt down 1 lb; renal fxn stable. rpt metolazone with lasix 100 iv bid today

, incr KCL to aim for K 4.0


-7/3: wt continues to decline 1 lb per day, (204 today), renal fxn stable--

advised same plan (lasix plus metolazone) to confidently define dry wt prior to 

discharge. pt very upset about staying here for this, even though i explained 

that he continues to frequently bounce back with chf despite best attempts to 

do this process with po torsemide as outpt.


he states he will think about whether he is willing to stay, once wife comes 

later and he speaks with her.


-d/w'd wife need to see chf specialist again, ? milrinone dosing can be adjusted

--dr aguiar ([place]) has left so they will transfer care and do not want [facility] (

she is to make appt with dr de la rosa at [facility]) 


-if no further options per CHF specialist eval, and cannot keep him out of the 

hospital with more aggressive weight target, will have to discuss palliative 

care/home hospice with pt and wife





RUQ pain:


- no tenderness, no naus/vomiting, normal WBC count, no fever


- LFTs normal 6/26 last--reorder


- defer further evaluation, if indicated, to pmd





afib


- currently rate controlled on coreg


- cont coumadin per INR





VT:


- intermittent brief runs of NSVT on tele, chronic finding on prior admits


- has ICD


- on max tolerated coreg dose


- replete lytes prn (aggressive K repletion ordered)





PVD


- con't AC, statin.





CAD s/p CABG


- No anginal symptoms, no signs acs.  Con't home ac, plavix, atorvastatin, coreg





HTN: controlled on coreg





HLD: con't statin. 





master/CKD:


- suspect cardiorenal syndrome, with renal fxn improving due to IV diuresis


- renal fxn presently at baseline





copd


- per pulm/pmd

## 2017-07-03 NOTE — PN
Progress Note, Physician


History of Present Illness: 


pulmonary





alert,sitting up in bed,-resp distress





- Current Medication List


Current Medications: 


Active Medications





Acetaminophen (Tylenol -)  650 mg PO Q4H PRN


   PRN Reason: FEVER OR PAIN


   Last Admin: 07/01/17 17:50 Dose:  650 mg


Albuterol Sulfate (Ventolin 0.083% Nebulizer Soln -)  1 amp NEB Q4H PRN


   PRN Reason: SHORT OF BREATH/WHEEZING


   Last Admin: 07/02/17 22:35 Dose:  1 amp


Allopurinol (Zyloprim -)  100 mg PO DAILY [facility]


   Last Admin: 07/03/17 09:20 Dose:  100 mg


Ascorbic Acid (Vitamin C -)  1,000 mg PO DAILY [facility]


   Last Admin: 07/03/17 09:20 Dose:  1,000 mg


Atorvastatin Calcium (Lipitor -)  40 mg PO HS [facility]


   Last Admin: 07/02/17 21:54 Dose:  40 mg


Bacitracin/Polymyxin B Sulfate (Polysporin Ointment -)  1 applic TP BID [facility]


   Last Admin: 07/03/17 09:23 Dose:  1 applic


Budesonide/Formoterol Fumarate (Symbicort 160/4.5mcg -)  1 puff IH BID [facility]


   Last Admin: 07/03/17 09:22 Dose:  1 puff


Bupropion HCl (Wellbutrin -)  75 mg PO DAILY [facility]


   Last Admin: 07/03/17 09:20 Dose:  75 mg


Carvedilol (Coreg -)  6.25 mg PO BID [facility]


   Last Admin: 07/03/17 09:20 Dose:  6.25 mg


Cholecalciferol (Vitamin D3 -)  1,000 unit PO DAILY [facility]


   Last Admin: 07/03/17 09:20 Dose:  1,000 unit


Clopidogrel Bisulfate (Plavix -)  75 mg PO DAILY [facility]


   Last Admin: 07/03/17 09:20 Dose:  75 mg


Docusate Sodium (Colace -)  100 mg PO BID [facility]


   Last Admin: 07/03/17 09:21 Dose:  100 mg


Ferrous Sulfate (Feosol -)  325 mg PO Q48H [facility]


   Last Admin: 07/01/17 11:30 Dose:  325 mg


Furosemide (Lasix Injection -)  100 mg IVPUSH BID@0600,1400 [facility]


   Last Admin: 07/03/17 06:05 Dose:  100 mg


Milrinone Lactate/Dextrose (Milrinone 20mg/100ml Ivpb -)  100 mls @ 9.716 mls/

hr IVPB TITR [facility]


   PRN Reason: 0.35 MCG/KG/MIN


   Last Admin: 07/02/17 11:52 Dose:  9.716 mls/hr


Insulin Aspart (Novolog Vial Sliding Scale -)  1 vial SQ ACHS [facility]


   PRN Reason: Protocol


   Last Admin: 07/03/17 06:06 Dose:  2 units


Multivitamins/Minerals/Vitamin C (Tab-A-Vit -)  1 tab PO DAILY [facility]


   Last Admin: 07/03/17 09:20 Dose:  1 tab


Ondansetron HCl (Zofran Injection)  4 mg IVPB Q6H PRN


   PRN Reason: NAUSEA


Potassium Chloride (K-Dur -)  40 meq PO QID [facility]


   Last Admin: 07/03/17 09:21 Dose:  40 meq


Ranitidine HCl (Zantac -)  75 mg PO DAILY [facility]


   Last Admin: 07/03/17 09:21 Dose:  75 mg


Starch (Anusol Suppository -)  1 each RC HS [facility]


   Last Admin: 07/02/17 22:00 Dose:  Not Given


Tiotropium Bromide (Spiriva -)  1 puff IH DAILY [facility]


   Last Admin: 07/03/17 09:23 Dose:  1 puff


Warfarin Sodium (Coumadin -)  2 mg PO DAILY@1800 [facility]


   Last Admin: 07/02/17 17:09 Dose:  2 mg











- Objective


Vital Signs: 


 Vital Signs











Temperature  98.2 F   07/03/17 08:00


 


Pulse Rate  72   07/03/17 08:00


 


Respiratory Rate  18   07/03/17 08:00


 


Blood Pressure  108/66   07/03/17 08:00


 


O2 Sat by Pulse Oximetry (%)  97   07/02/17 20:35











Constitutional: Yes: Well Nourished


Eyes: Yes: WNL


HENT: Yes: WNL


Neck: Yes: WNL


Cardiovascular: Yes: Pulse Irregular, S1, S2


Respiratory: Yes: Rales (farhad rales 1/3 up)


Gastrointestinal: Yes: Normal Bowel Sounds, Soft


Extremities: Yes: WNL


Edema: LLE: Trace, RLE: Trace


Labs: 


 CBC, BMP





 07/01/17 05:35 





 07/03/17 05:35 





 INR, PTT











INR  2.52  (0.82-1.09)  H  07/02/17  12:00    














Problem List





- Problems


(1) CHF (congestive heart failure)


Code(s): I50.9 - HEART FAILURE, UNSPECIFIED   Qualifiers: 


     Congestive heart failure type: systolic     Congestive heart failure 

chronicity: acute on chronic        Qualified Code(s): I50.23 - Acute on 

chronic systolic (congestive) heart failure  





(2) CKD (chronic kidney disease)


Code(s): N18.9 - CHRONIC KIDNEY DISEASE, UNSPECIFIED   Qualifiers: 


   





(3) AICD (automatic cardioverter/defibrillator) present


Code(s): Z95.810 - PRESENCE OF AUTOMATIC (IMPLANTABLE) CARDIAC DEFIBRILLATOR





(4) Acute and chronic respiratory failure (acute-on-chronic)


Code(s): J96.20 - ACUTE AND CHR RESP FAILURE, UNSP W HYPOXIA OR HYPERCAPNIA   

Qualifiers: 


     Respiratory failure complication: hypoxia        Qualified Code(s): J96.21 

- Acute and chronic respiratory failure with hypoxia  





(5) Acute on chronic renal insufficiency


Code(s): N28.9 - DISORDER OF KIDNEY AND URETER, UNSPECIFIED


N18.9 - CHRONIC KIDNEY DISEASE, UNSPECIFIED





(6) Atrial fibrillation


Code(s): I48.91 - UNSPECIFIED ATRIAL FIBRILLATION   Qualifiers: 


     Atrial fibrillation type: chronic     Qualified Code(s): I48.2 - Chronic 

atrial fibrillation  





(7) CAD (coronary artery disease)


Code(s): I25.10 - ATHSCL HEART DISEASE OF NATIVE CORONARY ARTERY W/O ANG PCTRS 

  





(8) COPD (chronic obstructive pulmonary disease)


Code(s): J44.9 - CHRONIC OBSTRUCTIVE PULMONARY DISEASE, UNSPECIFIED   





(9) Cardiomyopathy


Code(s): I42.9 - CARDIOMYOPATHY, UNSPECIFIED   





(10) Diabetes


Code(s): E11.9 - TYPE 2 DIABETES MELLITUS WITHOUT COMPLICATIONS   Qualifiers: 


     Diabetes mellitus type: type 2     Diabetes mellitus complication status: 

with kidney complications     Diabetes mellitus complication detail: with 

chronic kidney disease     Chronic kidney disease stage: stage 4 (severe)





(11) Dyspnea on exertion


Code(s): R06.09 - OTHER FORMS OF DYSPNEA





(12) Presence of permanent cardiac pacemaker


Code(s): Z95.0 - PRESENCE OF CARDIAC PACEMAKER








Assessment/Plan


IMP ACUTE ON CHRONIC RESPIRATORY FAILURE IMPROVING


      DECOMPENASTED CHFIMPROVING


      SEVERE LV DYSFUNCTION S/P ICD ,ON MILRINONE DRIP


      ASHD S/P CABG


      ADVANCED COPD ON O2


      AFIB


      ACUTE ON CKD


      PVD


      BPH








PLAN  CONTINUE IV LASIX


        O2


        MILRINONE 


        INHALED BRONCHODILATORS


        MONITOR LYTES ,RENAL FUNCTION


        DAILY WTS


         AC


        MONITOR INR





        





DR EASTMAN





 Problem List 





- Problems


(1) CHF (congestive heart failure)


Code(s): I50.9 - HEART FAILURE, UNSPECIFIED   Qualifiers: 


   





(2) CKD (chronic kidney disease)


Code(s): N18.9 - CHRONIC KIDNEY DISEASE, UNSPECIFIED   Qualifiers: 


   





(3) AICD (automatic cardioverter/defibrillator) present


Code(s): Z95.810 - PRESENCE OF AUTOMATIC (IMPLANTABLE) CARDIAC DEFIBRILLATOR





(4) Acute and chronic respiratory failure (acute-on-chronic)


Code(s): J96.20 - ACUTE AND CHR RESP FAILURE, UNSP W HYPOXIA OR HYPERCAPNIA   

Qualifiers: 


     Respiratory failure complication: hypoxia        Qualified Code(s): J96.21 

- Acute and chronic respiratory failure with hypoxia  





(5) Acute on chronic renal insufficiency


Code(s): N28.9 - DISORDER OF KIDNEY AND URETER, UNSPECIFIED


N18.9 - CHRONIC KIDNEY DISEASE, UNSPECIFIED





(6) Atrial fibrillation


Code(s): I48.91 - UNSPECIFIED ATRIAL FIBRILLATION   Qualifiers: 


     Atrial fibrillation type: chronic     Qualified Code(s): I48.2 - Chronic 

atrial fibrillation  





(7) CAD (coronary artery disease)


Code(s): I25.10 - ATHSCL HEART DISEASE OF NATIVE CORONARY ARTERY W/O ANG PCTRS 

  





(8) COPD (chronic obstructive pulmonary disease)


Code(s): J44.9 - CHRONIC OBSTRUCTIVE PULMONARY DISEASE, UNSPECIFIED   





(9) Cardiomyopathy


Code(s): I42.9 - CARDIOMYOPATHY, UNSPECIFIED   





(10) Diabetes


Code(s): E11.9 - TYPE 2 DIABETES MELLITUS WITHOUT COMPLICATIONS   Qualifiers: 


     Diabetes mellitus type: type 2     Diabetes mellitus complication status: 

with kidney complications     Diabetes mellitus complication detail: with 

chronic kidney disease     Chronic kidney disease stage: stage 4 (severe)





(11) Dyspnea on exertion


Code(s): R06.09 - OTHER FORMS OF DYSPNEA





(12) Presence of permanent cardiac pacemaker


Code(s): Z95.0 - PRESENCE OF CARDIAC PACEMAKER

## 2017-07-03 NOTE — PN
Progress Note, Physician


Chief Complaint: 


Mr Jerome complains of being constipated but otherwise is without complaint. 

No cp, sob, n/v. Walking without dyspnea.





- Current Medication List


Current Medications: 


Active Medications





Acetaminophen (Tylenol -)  650 mg PO Q4H PRN


   PRN Reason: FEVER OR PAIN


   Last Admin: 07/01/17 17:50 Dose:  650 mg


Albuterol Sulfate (Ventolin 0.083% Nebulizer Soln -)  1 amp NEB Q4H PRN


   PRN Reason: SHORT OF BREATH/WHEEZING


   Last Admin: 07/03/17 10:55 Dose:  1 amp


Allopurinol (Zyloprim -)  100 mg PO DAILY [facility]


   Last Admin: 07/03/17 09:20 Dose:  100 mg


Ascorbic Acid (Vitamin C -)  1,000 mg PO DAILY [facility]


   Last Admin: 07/03/17 09:20 Dose:  1,000 mg


Atorvastatin Calcium (Lipitor -)  40 mg PO HS [facility]


   Last Admin: 07/02/17 21:54 Dose:  40 mg


Bacitracin/Polymyxin B Sulfate (Polysporin Ointment -)  1 applic TP BID [facility]


   Last Admin: 07/03/17 09:23 Dose:  1 applic


Budesonide/Formoterol Fumarate (Symbicort 160/4.5mcg -)  1 puff IH BID [facility]


   Last Admin: 07/03/17 09:22 Dose:  1 puff


Bupropion HCl (Wellbutrin -)  75 mg PO DAILY [facility]


   Last Admin: 07/03/17 09:20 Dose:  75 mg


Carvedilol (Coreg -)  6.25 mg PO BID [facility]


   Last Admin: 07/03/17 09:20 Dose:  6.25 mg


Cholecalciferol (Vitamin D3 -)  1,000 unit PO DAILY [facility]


   Last Admin: 07/03/17 09:20 Dose:  1,000 unit


Clopidogrel Bisulfate (Plavix -)  75 mg PO DAILY [facility]


   Last Admin: 07/03/17 09:20 Dose:  75 mg


Docusate Sodium (Colace -)  100 mg PO BID [facility]


   Last Admin: 07/03/17 09:21 Dose:  100 mg


Ferrous Sulfate (Feosol -)  325 mg PO Q48H [facility]


   Last Admin: 07/03/17 11:24 Dose:  325 mg


Furosemide (Lasix Injection -)  100 mg IVPUSH BID@0600,1400 [facility]


   Last Admin: 07/03/17 06:05 Dose:  100 mg


Milrinone Lactate/Dextrose (Milrinone 20mg/100ml Ivpb -)  100 mls @ 9.716 mls/

hr IVPB TITR [facility]


   PRN Reason: 0.35 MCG/KG/MIN


   Last Admin: 07/03/17 10:00 Dose:  9.716 mls/hr


Insulin Aspart (Novolog Vial Sliding Scale -)  1 vial SQ ACHS [facility]


   PRN Reason: Protocol


   Last Admin: 07/03/17 11:24 Dose:  2 units


Multivitamins/Minerals/Vitamin C (Tab-A-Vit -)  1 tab PO DAILY [facility]


   Last Admin: 07/03/17 09:20 Dose:  1 tab


Ondansetron HCl (Zofran Injection)  4 mg IVPB Q6H PRN


   PRN Reason: NAUSEA


Potassium Chloride (K-Dur -)  40 meq PO QID [facility]


   Last Admin: 07/03/17 14:27 Dose:  40 meq


Ranitidine HCl (Zantac -)  75 mg PO DAILY [facility]


   Last Admin: 07/03/17 09:21 Dose:  75 mg


Starch (Anusol Suppository -)  1 each RC HS [facility]


   Last Admin: 07/02/17 22:00 Dose:  Not Given


Tiotropium Bromide (Spiriva -)  1 puff IH DAILY [facility]


   Last Admin: 07/03/17 09:23 Dose:  1 puff


Warfarin Sodium (Coumadin -)  3 mg PO DAILY@1800 [facility]











- Objective


Vital Signs: 


 Vital Signs











Temperature  98.2 F   07/03/17 08:00


 


Pulse Rate  75   07/03/17 10:55


 


Respiratory Rate  18   07/03/17 08:00


 


Blood Pressure  108/66   07/03/17 08:00


 


O2 Sat by Pulse Oximetry (%)  91 L  07/03/17 10:55











Constitutional: Yes: Well Nourished, No Distress, Calm


Cardiovascular: Yes: Pulse Irregular.  No: Tachycardia, Gallop, Murmur, Rub


Respiratory: Yes: Regular, CTA Bilaterally, On Nasal O2.  No: Rales, Rhonchi, 

Wheezes


Gastrointestinal: Yes: Normal Bowel Sounds, Soft.  No: Distention, Tenderness


Extremities: Yes: Erythema


Edema: Yes


Edema: LLE: 1+, RLE: 1+


Labs: 


 CBC, BMP





 07/01/17 05:35 





 07/03/17 05:35 





 INR, PTT











INR  2.39  (0.82-1.09)  H  07/03/17  09:53    














Problem List





- Problems


(1) CHF (congestive heart failure)


Code(s): I50.9 - HEART FAILURE, UNSPECIFIED   Qualifiers: 


     Congestive heart failure type: systolic     Congestive heart failure 

chronicity: acute on chronic        Qualified Code(s): I50.23 - Acute on 

chronic systolic (congestive) heart failure  





(2) Acute and chronic respiratory failure (acute-on-chronic)


Code(s): J96.20 - ACUTE AND CHR RESP FAILURE, UNSP W HYPOXIA OR HYPERCAPNIA   

Qualifiers: 


     Respiratory failure complication: hypoxia        Qualified Code(s): J96.21 

- Acute and chronic respiratory failure with hypoxia  





(3) Acute on chronic renal insufficiency


Code(s): N28.9 - DISORDER OF KIDNEY AND URETER, UNSPECIFIED


N18.9 - CHRONIC KIDNEY DISEASE, UNSPECIFIED





(4) Atrial fibrillation


Code(s): I48.91 - UNSPECIFIED ATRIAL FIBRILLATION   Qualifiers: 


     Atrial fibrillation type: chronic     Qualified Code(s): I48.2 - Chronic 

atrial fibrillation  





(5) CAD (coronary artery disease)


Code(s): I25.10 - ATHSCL HEART DISEASE OF NATIVE CORONARY ARTERY W/O ANG PCTRS 

  





(6) COPD (chronic obstructive pulmonary disease)


Code(s): J44.9 - CHRONIC OBSTRUCTIVE PULMONARY DISEASE, UNSPECIFIED   





(7) Diabetes


Code(s): E11.9 - TYPE 2 DIABETES MELLITUS WITHOUT COMPLICATIONS   Qualifiers: 


     Diabetes mellitus type: type 2     Diabetes mellitus complication status: 

with kidney complications     Diabetes mellitus complication detail: with 

chronic kidney disease     Chronic kidney disease stage: stage 4 (severe)








Assessment/Plan


(1) CHF (congestive heart failure)


Assessment/Plan: 


-end stage CHF, on milrinone


-cardiology following and note reviewed


-204 lbs today


-continue lasix 100mg IV bid


-given metolazone today


Code(s): I50.9 - HEART FAILURE, UNSPECIFIED   Qualifiers: 


     Congestive heart failure type: systolic     Congestive heart failure 

chronicity: acute on chronic        Qualified Code(s): I50.23 - Acute on 

chronic systolic (congestive) heart failure  





(2) Acute and chronic respiratory failure (acute-on-chronic)


Assessment/Plan: 


-secondary to CHF exacerbation


-continue diuresis


-on supplemental oxygen


Code(s): J96.20 - ACUTE AND CHR RESP FAILURE, UNSP W HYPOXIA OR HYPERCAPNIA   

Qualifiers: 


     Respiratory failure complication: hypoxia        Qualified Code(s): J96.21 

- Acute and chronic respiratory failure with hypoxia  





(3) Acute on chronic renal insufficiency


Assessment/Plan: 


-cardiorenal


-at baseline with diuresis


Code(s): N28.9 - DISORDER OF KIDNEY AND URETER, UNSPECIFIED


N18.9 - CHRONIC KIDNEY DISEASE, UNSPECIFIED





(4) Atrial fibrillation


Assessment/Plan: 


-rate controlled


-INR therapeutic


-will increase to 3mg so it does not drop too low


Code(s): I48.91 - UNSPECIFIED ATRIAL FIBRILLATION   Qualifiers: 


     Atrial fibrillation type: chronic     Qualified Code(s): I48.2 - Chronic 

atrial fibrillation  





(5) CAD (coronary artery disease)


Assessment/Plan: 


-quiescent


-cardiology following


-continue home regimen


Code(s): I25.10 - ATHSCL HEART DISEASE OF NATIVE CORONARY ARTERY W/O ANG PCTRS 

  





(6) COPD (chronic obstructive pulmonary disease)


Assessment/Plan: 


-continue current management


-pulmonary following


-chronic respiratory failure secondary to CHF


Code(s): J44.9 - CHRONIC OBSTRUCTIVE PULMONARY DISEASE, UNSPECIFIED   





(7) Diabetes


Assessment/Plan: 


-diabetic diet


-continue SSI


Code(s): E11.9 - TYPE 2 DIABETES MELLITUS WITHOUT COMPLICATIONS   Qualifiers: 


     Diabetes mellitus type: type 2     Diabetes mellitus complication status: 

with kidney complications     Diabetes mellitus complication detail: with 

chronic kidney disease     Chronic kidney disease stage: stage 4 (severe)

## 2017-07-04 LAB
ANION GAP SERPL CALC-SCNC: 8 MMOL/L (ref 8–16)
BASOPHILS # BLD: 0.4 % (ref 0–2)
CALCIUM SERPL-MCNC: 9.6 MG/DL (ref 8.5–10.1)
CO2 SERPL-SCNC: 39 MMOL/L (ref 21–32)
CREAT SERPL-MCNC: 1.8 MG/DL (ref 0.7–1.3)
DEPRECATED RDW RBC AUTO: 18.7 % (ref 11.9–15.9)
EOSINOPHIL # BLD: 1.5 % (ref 0–4.5)
GLUCOSE SERPL-MCNC: 216 MG/DL (ref 74–106)
INR BLD: 2.1 (ref 0.82–1.09)
MAGNESIUM SERPL-MCNC: 2.3 MG/DL (ref 1.8–2.4)
MCH RBC QN AUTO: 26.5 PG (ref 25.7–33.7)
MCHC RBC AUTO-ENTMCNC: 32.4 G/DL (ref 32–35.9)
MCV RBC: 81.8 FL (ref 80–96)
NEUTROPHILS # BLD: 72.5 % (ref 42.8–82.8)
PHOSPHATE SERPL-MCNC: 2.8 MG/DL (ref 2.5–4.9)
PLATELET # BLD AUTO: 168 K/MM3 (ref 134–434)
PMV BLD: 8.2 FL (ref 7.5–11.1)
PT PNL PPP: 23.4 SEC (ref 9.98–11.88)
WBC # BLD AUTO: 7 K/MM3 (ref 4–10)

## 2017-07-04 RX ADMIN — BUDESONIDE AND FORMOTEROL FUMARATE DIHYDRATE SCH PUFF: 160; 4.5 AEROSOL RESPIRATORY (INHALATION) at 21:35

## 2017-07-04 RX ADMIN — VITAMIN D, TAB 1000IU (100/BT) SCH UNIT: 25 TAB at 09:32

## 2017-07-04 RX ADMIN — FUROSEMIDE SCH MG: 10 INJECTION, SOLUTION INTRAVENOUS at 14:29

## 2017-07-04 RX ADMIN — WARFARIN SCH MG: 2 TABLET ORAL at 17:17

## 2017-07-04 RX ADMIN — MULTIVITAMIN TABLET SCH TAB: TABLET at 09:32

## 2017-07-04 RX ADMIN — INSULIN ASPART SCH UNITS: 100 INJECTION, SOLUTION INTRAVENOUS; SUBCUTANEOUS at 17:16

## 2017-07-04 RX ADMIN — INSULIN ASPART SCH UNITS: 100 INJECTION, SOLUTION INTRAVENOUS; SUBCUTANEOUS at 06:13

## 2017-07-04 RX ADMIN — CARVEDILOL SCH MG: 6.25 TABLET, FILM COATED ORAL at 21:33

## 2017-07-04 RX ADMIN — MILRINONE LACTATE SCH MLS/HR: 200 INJECTION, SOLUTION INTRAVENOUS at 17:16

## 2017-07-04 RX ADMIN — RANITIDINE SCH MG: 150 TABLET ORAL at 09:32

## 2017-07-04 RX ADMIN — ALLOPURINOL SCH MG: 100 TABLET ORAL at 09:33

## 2017-07-04 RX ADMIN — INSULIN ASPART SCH UNITS: 100 INJECTION, SOLUTION INTRAVENOUS; SUBCUTANEOUS at 12:24

## 2017-07-04 RX ADMIN — CARVEDILOL SCH MG: 6.25 TABLET, FILM COATED ORAL at 09:33

## 2017-07-04 RX ADMIN — BUDESONIDE AND FORMOTEROL FUMARATE DIHYDRATE SCH PUFF: 160; 4.5 AEROSOL RESPIRATORY (INHALATION) at 09:50

## 2017-07-04 RX ADMIN — SILVER SULFADIAZINE SCH: 10 CREAM TOPICAL at 13:41

## 2017-07-04 RX ADMIN — POTASSIUM CHLORIDE SCH MEQ: 1500 TABLET, EXTENDED RELEASE ORAL at 17:15

## 2017-07-04 RX ADMIN — DOCUSATE SODIUM SCH MG: 100 CAPSULE, LIQUID FILLED ORAL at 21:32

## 2017-07-04 RX ADMIN — CLOPIDOGREL BISULFATE SCH MG: 75 TABLET, FILM COATED ORAL at 09:32

## 2017-07-04 RX ADMIN — POLYETHYLENE GLYCOL 3350 SCH GM: 17 POWDER, FOR SOLUTION ORAL at 09:51

## 2017-07-04 RX ADMIN — Medication SCH: at 09:50

## 2017-07-04 RX ADMIN — TIOTROPIUM BROMIDE SCH PUFF: 18 CAPSULE ORAL; RESPIRATORY (INHALATION) at 09:50

## 2017-07-04 RX ADMIN — POTASSIUM CHLORIDE SCH MEQ: 1500 TABLET, EXTENDED RELEASE ORAL at 21:33

## 2017-07-04 RX ADMIN — PHENYLEPHRINE HYDROCHLORIDE AND FAT, HARD SCH: .00525; 1.86 SUPPOSITORY RECTAL at 21:33

## 2017-07-04 RX ADMIN — POTASSIUM CHLORIDE SCH MEQ: 1500 TABLET, EXTENDED RELEASE ORAL at 09:33

## 2017-07-04 RX ADMIN — INSULIN ASPART SCH UNITS: 100 INJECTION, SOLUTION INTRAVENOUS; SUBCUTANEOUS at 21:33

## 2017-07-04 RX ADMIN — POTASSIUM CHLORIDE SCH MEQ: 1500 TABLET, EXTENDED RELEASE ORAL at 14:28

## 2017-07-04 RX ADMIN — MILRINONE LACTATE SCH MLS/HR: 200 INJECTION, SOLUTION INTRAVENOUS at 06:52

## 2017-07-04 RX ADMIN — DOCUSATE SODIUM SCH MG: 100 CAPSULE, LIQUID FILLED ORAL at 09:33

## 2017-07-04 RX ADMIN — POLYETHYLENE GLYCOL 3350 SCH: 17 POWDER, FOR SOLUTION ORAL at 21:34

## 2017-07-04 RX ADMIN — FUROSEMIDE SCH MG: 10 INJECTION, SOLUTION INTRAVENOUS at 06:14

## 2017-07-04 RX ADMIN — OXYCODONE HYDROCHLORIDE AND ACETAMINOPHEN SCH MG: 500 TABLET ORAL at 09:32

## 2017-07-04 RX ADMIN — ATORVASTATIN CALCIUM SCH MG: 40 TABLET, FILM COATED ORAL at 21:32

## 2017-07-04 NOTE — PN
Progress Note, Physician


History of Present Illness: 


Notes breathing feeling OK today, but has a skin tear on left leg.  Continues 

to receive bid lasix IV





- Current Medication List


Current Medications: 


Active Medications





Acetaminophen (Tylenol -)  650 mg PO Q4H PRN


   PRN Reason: FEVER OR PAIN


   Last Admin: 07/01/17 17:50 Dose:  650 mg


Allopurinol (Zyloprim -)  100 mg PO DAILY [facility]


   Last Admin: 07/04/17 09:33 Dose:  100 mg


Ascorbic Acid (Vitamin C -)  1,000 mg PO DAILY [facility]


   Last Admin: 07/04/17 09:32 Dose:  1,000 mg


Atorvastatin Calcium (Lipitor -)  40 mg PO HS [facility]


   Last Admin: 07/03/17 22:13 Dose:  40 mg


Budesonide/Formoterol Fumarate (Symbicort 160/4.5mcg -)  1 puff IH BID [facility]


   Last Admin: 07/04/17 09:50 Dose:  1 puff


Bupropion HCl (Wellbutrin -)  75 mg PO DAILY [facility]


   Last Admin: 07/04/17 09:32 Dose:  75 mg


Carvedilol (Coreg -)  6.25 mg PO BID [facility]


   Last Admin: 07/04/17 09:33 Dose:  6.25 mg


Cholecalciferol (Vitamin D3 -)  1,000 unit PO DAILY [facility]


   Last Admin: 07/04/17 09:32 Dose:  1,000 unit


Clopidogrel Bisulfate (Plavix -)  75 mg PO DAILY [facility]


   Last Admin: 07/04/17 09:32 Dose:  75 mg


Docusate Sodium (Colace -)  100 mg PO BID [facility]


   Last Admin: 07/04/17 09:33 Dose:  100 mg


Ferrous Sulfate (Feosol -)  325 mg PO Q48H [facility]


   Last Admin: 07/03/17 11:24 Dose:  325 mg


Furosemide (Lasix Injection -)  100 mg IVPUSH BID@0600,1400 [facility]


   Last Admin: 07/04/17 06:14 Dose:  100 mg


Milrinone Lactate/Dextrose (Milrinone 20mg/100ml Ivpb -)  100 mls @ 9.716 mls/

hr IVPB TITR [facility]


   PRN Reason: 0.35 MCG/KG/MIN


   Last Admin: 07/04/17 06:52 Dose:  9.716 mls/hr


Insulin Aspart (Novolog Vial Sliding Scale -)  1 vial SQ ACHS [facility]


   PRN Reason: Protocol


   Last Admin: 07/04/17 06:13 Dose:  2 units


Multivitamins/Minerals/Vitamin C (Tab-A-Vit -)  1 tab PO DAILY [facility]


   Last Admin: 07/04/17 09:32 Dose:  1 tab


Ondansetron HCl (Zofran Injection)  4 mg IVPB Q6H PRN


   PRN Reason: NAUSEA


Polyethylene Glycol (Miralax (For Daily Use) -)  17 gm PO BID [facility]


   Last Admin: 07/04/17 09:51 Dose:  17 gm


Potassium Chloride (K-Dur -)  40 meq PO QID [facility]


   Last Admin: 07/04/17 09:33 Dose:  40 meq


Ranitidine HCl (Zantac -)  75 mg PO DAILY [facility]


   Last Admin: 07/04/17 09:32 Dose:  75 mg


Silver Sulfadiazine (Silvadene -)  1 applic TP DAILY [facility]


Starch (Anusol Suppository -)  1 each RC HS [facility]


   Last Admin: 07/03/17 22:12 Dose:  Not Given


Tiotropium Bromide (Spiriva -)  1 puff IH DAILY [facility]


   Last Admin: 07/04/17 09:50 Dose:  1 puff


Warfarin Sodium (Coumadin -)  3 mg PO DAILY@1800 [facility]


   Last Admin: 07/03/17 17:29 Dose:  3 mg











- Objective


Vital Signs: 


 Vital Signs











Temperature  98.4 F   07/04/17 08:15


 


Pulse Rate  74   07/04/17 08:15


 


Respiratory Rate  18   07/04/17 08:15


 


Blood Pressure  122/68   07/04/17 08:15


 


O2 Sat by Pulse Oximetry (%)  95   07/03/17 21:00











Constitutional: Yes: No Distress, Calm


Eyes: Yes: Conjunctiva Clear, EOM Intact


Cardiovascular: Yes: Pulse Irregular, S1, S2


Respiratory: Yes: Diminished (at bases)


Gastrointestinal: Yes: Normal Bowel Sounds, Soft.  No: Distention, Tenderness


Extremities: Yes: Other (left lower leg with 2 skin tears, no surrounding 

erythema (chronic venous stasis changes in legs))


Edema: Yes


Edema: LLE: 1+, RLE: 1+


Neurological: Yes: Alert, Oriented


Labs: 


 CBC, BMP





 07/04/17 05:48 





 07/04/17 05:48 





 INR, PTT











INR  2.10  (0.82-1.09)  H  07/04/17  05:48    














Assessment/Plan


Current Active Problems





CHF (congestive heart failure) (Acute) 


End stage CHF on Milrinone Pump


CKD (chronic kidney disease) (Acute) 


CAD


HTN


Hyperlipidemia


s/p MI


pulm HTN


AICD


COPD


BPH


Diabetes Mellitus


skin tears





-to continue on Lasix for now


-change wound cream to silvadene

## 2017-07-04 NOTE — PN
Progress Note, Physician


History of Present Illness: 


pulmonary





alert,sitting up in bed comfortable,-resp distress. wt 203.8





- Current Medication List


Current Medications: 


Active Medications





Acetaminophen (Tylenol -)  650 mg PO Q4H PRN


   PRN Reason: FEVER OR PAIN


   Last Admin: 07/01/17 17:50 Dose:  650 mg


Allopurinol (Zyloprim -)  100 mg PO DAILY [facility]


   Last Admin: 07/03/17 09:20 Dose:  100 mg


Ascorbic Acid (Vitamin C -)  1,000 mg PO DAILY [facility]


   Last Admin: 07/03/17 09:20 Dose:  1,000 mg


Atorvastatin Calcium (Lipitor -)  40 mg PO HS [facility]


   Last Admin: 07/03/17 22:13 Dose:  40 mg


Bacitracin/Polymyxin B Sulfate (Polysporin Ointment -)  1 applic TP BID [facility]


   Last Admin: 07/03/17 22:20 Dose:  Not Given


Budesonide/Formoterol Fumarate (Symbicort 160/4.5mcg -)  1 puff IH BID [facility]


   Last Admin: 07/03/17 22:19 Dose:  1 puff


Bupropion HCl (Wellbutrin -)  75 mg PO DAILY [facility]


   Last Admin: 07/03/17 09:20 Dose:  75 mg


Carvedilol (Coreg -)  6.25 mg PO BID [facility]


   Last Admin: 07/03/17 22:13 Dose:  6.25 mg


Cholecalciferol (Vitamin D3 -)  1,000 unit PO DAILY [facility]


   Last Admin: 07/03/17 09:20 Dose:  1,000 unit


Clopidogrel Bisulfate (Plavix -)  75 mg PO DAILY [facility]


   Last Admin: 07/03/17 09:20 Dose:  75 mg


Docusate Sodium (Colace -)  100 mg PO BID [facility]


   Last Admin: 07/03/17 22:13 Dose:  100 mg


Ferrous Sulfate (Feosol -)  325 mg PO Q48H [facility]


   Last Admin: 07/03/17 11:24 Dose:  325 mg


Furosemide (Lasix Injection -)  100 mg IVPUSH BID@0600,1400 [facility]


   Last Admin: 07/04/17 06:14 Dose:  100 mg


Milrinone Lactate/Dextrose (Milrinone 20mg/100ml Ivpb -)  100 mls @ 9.716 mls/

hr IVPB TITR [facility]


   PRN Reason: 0.35 MCG/KG/MIN


   Last Admin: 07/04/17 06:52 Dose:  9.716 mls/hr


Insulin Aspart (Novolog Vial Sliding Scale -)  1 vial SQ ACHS [facility]


   PRN Reason: Protocol


   Last Admin: 07/04/17 06:13 Dose:  2 units


Multivitamins/Minerals/Vitamin C (Tab-A-Vit -)  1 tab PO DAILY [facility]


   Last Admin: 07/03/17 09:20 Dose:  1 tab


Ondansetron HCl (Zofran Injection)  4 mg IVPB Q6H PRN


   PRN Reason: NAUSEA


Polyethylene Glycol (Miralax (For Daily Use) -)  17 gm PO BID [facility]


   Last Admin: 07/03/17 22:19 Dose:  Not Given


Potassium Chloride (K-Dur -)  40 meq PO QID [facility]


   Last Admin: 07/03/17 22:13 Dose:  40 meq


Ranitidine HCl (Zantac -)  75 mg PO DAILY [facility]


   Last Admin: 07/03/17 09:21 Dose:  75 mg


Starch (Anusol Suppository -)  1 each RC HS [facility]


   Last Admin: 07/03/17 22:12 Dose:  Not Given


Tiotropium Bromide (Spiriva -)  1 puff IH DAILY [facility]


   Last Admin: 07/03/17 09:23 Dose:  1 puff


Warfarin Sodium (Coumadin -)  3 mg PO DAILY@1800 [facility]


   Last Admin: 07/03/17 17:29 Dose:  3 mg











- Objective


Vital Signs: 


 Vital Signs











Temperature  98.4 F   07/04/17 08:15


 


Pulse Rate  74   07/04/17 08:15


 


Respiratory Rate  18   07/04/17 08:15


 


Blood Pressure  122/68   07/04/17 08:15


 


O2 Sat by Pulse Oximetry (%)  95   07/03/17 21:00











Constitutional: Yes: Well Nourished, Calm


Eyes: Yes: WNL


HENT: Yes: WNL


Neck: Yes: WNL


Cardiovascular: Yes: Pulse Irregular, S1, S2


Respiratory: Yes: Rales (bibasilar rales)


Gastrointestinal: Yes: Normal Bowel Sounds, Soft


Extremities: Yes: WNL


Edema: Yes


Labs: 


 CBC, BMP





 07/04/17 05:48 





 07/04/17 05:48 





 INR, PTT











INR  2.10  (0.82-1.09)  H  07/04/17  05:48    














Problem List





- Problems


(1) CHF (congestive heart failure)


Code(s): I50.9 - HEART FAILURE, UNSPECIFIED   Qualifiers: 


     Congestive heart failure type: systolic     Congestive heart failure 

chronicity: acute on chronic        Qualified Code(s): I50.23 - Acute on 

chronic systolic (congestive) heart failure  





(2) CKD (chronic kidney disease)


Code(s): N18.9 - CHRONIC KIDNEY DISEASE, UNSPECIFIED   Qualifiers: 


   





(3) AICD (automatic cardioverter/defibrillator) present


Code(s): Z95.810 - PRESENCE OF AUTOMATIC (IMPLANTABLE) CARDIAC DEFIBRILLATOR





(4) Acute and chronic respiratory failure (acute-on-chronic)


Code(s): J96.20 - ACUTE AND CHR RESP FAILURE, UNSP W HYPOXIA OR HYPERCAPNIA   

Qualifiers: 


     Respiratory failure complication: hypoxia        Qualified Code(s): J96.21 

- Acute and chronic respiratory failure with hypoxia  





(5) Acute on chronic renal insufficiency


Code(s): N28.9 - DISORDER OF KIDNEY AND URETER, UNSPECIFIED


N18.9 - CHRONIC KIDNEY DISEASE, UNSPECIFIED





(6) Atrial fibrillation


Code(s): I48.91 - UNSPECIFIED ATRIAL FIBRILLATION   Qualifiers: 


     Atrial fibrillation type: chronic     Qualified Code(s): I48.2 - Chronic 

atrial fibrillation  





(7) CAD (coronary artery disease)


Code(s): I25.10 - ATHSCL HEART DISEASE OF NATIVE CORONARY ARTERY W/O ANG PCTRS 

  





(8) COPD (chronic obstructive pulmonary disease)


Code(s): J44.9 - CHRONIC OBSTRUCTIVE PULMONARY DISEASE, UNSPECIFIED   





(9) Cardiomyopathy


Code(s): I42.9 - CARDIOMYOPATHY, UNSPECIFIED   





(10) Diabetes


Code(s): E11.9 - TYPE 2 DIABETES MELLITUS WITHOUT COMPLICATIONS   Qualifiers: 


     Diabetes mellitus type: type 2     Diabetes mellitus complication status: 

with kidney complications     Diabetes mellitus complication detail: with 

chronic kidney disease     Chronic kidney disease stage: stage 4 (severe)





(11) Dyspnea on exertion


Code(s): R06.09 - OTHER FORMS OF DYSPNEA





(12) Presence of permanent cardiac pacemaker


Code(s): Z95.0 - PRESENCE OF CARDIAC PACEMAKER








Assessment/Plan


IMP ACUTE ON CHRONIC RESPIRATORY FAILURE IMPROVING


      DECOMPENSATED CHF IMPROVING


      SEVERE LV DYSFUNCTION S/P ICD ,ON MILRINONE DRIP


      ASHD S/P CABG


      ADVANCED COPD ON O2


      AFIB


      ACUTE ON CKD


      PVD


      BPH








PLAN  CONTINUE IV LASIX S PER CARDIOLOGY


        O2


        MILRINONE 


        INHALED BRONCHODILATORS


        MONITOR LYTES ,RENAL FUNCTION


        DAILY WTS


         AC


        MONITOR INR





        





DR EASTMAN





 Problem List 





- Problems


(1) CHF (congestive heart failure)


Code(s): I50.9 - HEART FAILURE, UNSPECIFIED   Qualifiers: 


   





(2) CKD (chronic kidney disease)


Code(s): N18.9 - CHRONIC KIDNEY DISEASE, UNSPECIFIED   Qualifiers: 


   





(3) AICD (automatic cardioverter/defibrillator) present


Code(s): Z95.810 - PRESENCE OF AUTOMATIC (IMPLANTABLE) CARDIAC DEFIBRILLATOR





(4) Acute and chronic respiratory failure (acute-on-chronic)


Code(s): J96.20 - ACUTE AND CHR RESP FAILURE, UNSP W HYPOXIA OR HYPERCAPNIA   

Qualifiers: 


     Respiratory failure complication: hypoxia        Qualified Code(s): J96.21 

- Acute and chronic respiratory failure with hypoxia  





(5) Acute on chronic renal insufficiency


Code(s): N28.9 - DISORDER OF KIDNEY AND URETER, UNSPECIFIED


N18.9 - CHRONIC KIDNEY DISEASE, UNSPECIFIED





(6) Atrial fibrillation


Code(s): I48.91 - UNSPECIFIED ATRIAL FIBRILLATION   Qualifiers: 


     Atrial fibrillation type: chronic     Qualified Code(s): I48.2 - Chronic 

atrial fibrillation  





(7) CAD (coronary artery disease)


Code(s): I25.10 - ATHSCL HEART DISEASE OF NATIVE CORONARY ARTERY W/O ANG PCTRS 

  





(8) COPD (chronic obstructive pulmonary disease)


Code(s): J44.9 - CHRONIC OBSTRUCTIVE PULMONARY DISEASE, UNSPECIFIED   





(9) Cardiomyopathy


Code(s): I42.9 - CARDIOMYOPATHY, UNSPECIFIED   





(10) Diabetes


Code(s): E11.9 - TYPE 2 DIABETES MELLITUS WITHOUT COMPLICATIONS   Qualifiers: 


     Diabetes mellitus type: type 2     Diabetes mellitus complication status: 

with kidney complications     Diabetes mellitus complication detail: with 

chronic kidney disease     Chronic kidney disease stage: stage 4 (severe)





(11) Dyspnea on exertion


Code(s): R06.09 - OTHER FORMS OF DYSPNEA





(12) Presence of permanent cardiac pacemaker


Code(s): Z95.0 - PRESENCE OF CARDIAC PACEMAKER

## 2017-07-04 NOTE — PN
Progress Note, Physician


Chief Complaint: 


chf


History of Present Illness: 


no sob oob to BR.


no more cough


no leg swelling


no cp, palpit





ex cigs





- Current Medication List


Current Medications: 


Active Medications





Acetaminophen (Tylenol -)  650 mg PO Q4H PRN


   PRN Reason: FEVER OR PAIN


   Last Admin: 07/01/17 17:50 Dose:  650 mg


Allopurinol (Zyloprim -)  100 mg PO DAILY [facility]


   Last Admin: 07/03/17 09:20 Dose:  100 mg


Ascorbic Acid (Vitamin C -)  1,000 mg PO DAILY [facility]


   Last Admin: 07/03/17 09:20 Dose:  1,000 mg


Atorvastatin Calcium (Lipitor -)  40 mg PO HS [facility]


   Last Admin: 07/03/17 22:13 Dose:  40 mg


Bacitracin/Polymyxin B Sulfate (Polysporin Ointment -)  1 applic TP BID [facility]


   Last Admin: 07/03/17 22:20 Dose:  Not Given


Budesonide/Formoterol Fumarate (Symbicort 160/4.5mcg -)  1 puff IH BID [facility]


   Last Admin: 07/03/17 22:19 Dose:  1 puff


Bupropion HCl (Wellbutrin -)  75 mg PO DAILY [facility]


   Last Admin: 07/03/17 09:20 Dose:  75 mg


Carvedilol (Coreg -)  6.25 mg PO BID [facility]


   Last Admin: 07/03/17 22:13 Dose:  6.25 mg


Cholecalciferol (Vitamin D3 -)  1,000 unit PO DAILY [facility]


   Last Admin: 07/03/17 09:20 Dose:  1,000 unit


Clopidogrel Bisulfate (Plavix -)  75 mg PO DAILY [facility]


   Last Admin: 07/03/17 09:20 Dose:  75 mg


Docusate Sodium (Colace -)  100 mg PO BID [facility]


   Last Admin: 07/03/17 22:13 Dose:  100 mg


Ferrous Sulfate (Feosol -)  325 mg PO Q48H [facility]


   Last Admin: 07/03/17 11:24 Dose:  325 mg


Furosemide (Lasix Injection -)  100 mg IVPUSH BID@0600,1400 [facility]


   Last Admin: 07/04/17 06:14 Dose:  100 mg


Milrinone Lactate/Dextrose (Milrinone 20mg/100ml Ivpb -)  100 mls @ 9.716 mls/

hr IVPB TITR [facility]


   PRN Reason: 0.35 MCG/KG/MIN


   Last Admin: 07/04/17 06:52 Dose:  9.716 mls/hr


Insulin Aspart (Novolog Vial Sliding Scale -)  1 vial SQ ACHS [facility]


   PRN Reason: Protocol


   Last Admin: 07/04/17 06:13 Dose:  2 units


Multivitamins/Minerals/Vitamin C (Tab-A-Vit -)  1 tab PO DAILY [facility]


   Last Admin: 07/03/17 09:20 Dose:  1 tab


Ondansetron HCl (Zofran Injection)  4 mg IVPB Q6H PRN


   PRN Reason: NAUSEA


Polyethylene Glycol (Miralax (For Daily Use) -)  17 gm PO BID [facility]


   Last Admin: 07/03/17 22:19 Dose:  Not Given


Potassium Chloride (K-Dur -)  40 meq PO QID [facility]


   Last Admin: 07/03/17 22:13 Dose:  40 meq


Ranitidine HCl (Zantac -)  75 mg PO DAILY [facility]


   Last Admin: 07/03/17 09:21 Dose:  75 mg


Starch (Anusol Suppository -)  1 each RC HS [facility]


   Last Admin: 07/03/17 22:12 Dose:  Not Given


Tiotropium Bromide (Spiriva -)  1 puff IH DAILY [facility]


   Last Admin: 07/03/17 09:23 Dose:  1 puff


Warfarin Sodium (Coumadin -)  3 mg PO DAILY@1800 [facility]


   Last Admin: 07/03/17 17:29 Dose:  3 mg











- Objective


Vital Signs: 


 Vital Signs











Temperature  98.4 F   07/04/17 08:15


 


Pulse Rate  74   07/04/17 08:15


 


Respiratory Rate  18   07/04/17 08:15


 


Blood Pressure  122/68   07/04/17 08:15


 


O2 Sat by Pulse Oximetry (%)  95   07/03/17 21:00











Constitutional: Yes: No Distress, Calm


Eyes: No: Sclera Icterus


HENT: No: Nasal Congestion


Cardiovascular: Yes: Regular Rate and Rhythm, S1, S2, Other (PMI non diplaced).

  No: Gallop, Murmur


Respiratory: Yes: CTA Bilaterally, Wheezes (bases).  No: Accessory Muscle Use, 

Rales


Gastrointestinal: Yes: Normal Bowel Sounds, Soft.  No: Tenderness


Musculoskeletal: Yes: Other (No kyphosis)


Extremities: No: Cold, Cyanosis


Edema: No


Integumentary: No: Jaundice


Neurological: Yes: Alert, Oriented (x3)


Psychiatric: No: Agitated


Labs: 


 CBC, BMP





 07/04/17 05:48 





 07/04/17 05:48 





 INR, PTT











INR  2.10  (0.82-1.09)  H  07/04/17  05:48    














- ....Imaging


EKG: Other (tele: AF, , no VT)





Assessment/Plan


echo 10/2016: sev lve, global hk, sev dec lvef, mild rve, nl rv fcn, patty, mild-

mod mr, mv ring, tv ring, mild tr, mild pr, mild phtn, ivc mild dil





cxr: chf











a/p:  76 year old man with PMH of IDDM, COPD (O2 dependant) HTN, HLD, Afib (on 

coumadin), CAD s/p CABG 2013, end stage CHF with ICD placement (on milrinone 

pump), PVD, BPH here with sob.





acute on chronic systolic CHF, end stage systolic heart failure s/p ICD, on 

home milrinone infusion:


- here with sob/wright


- was well diuresed at home to wt 202-204 with creat 2.1 and Na 151--torsemide 

80 bid gradually downtitrated back to 40 bid


- saw me last week with sob at baseline, but ongoing phlegmy cough--likely sec 

to advanced copd and NOT chf


- developed acute incr sob in afternoon, after left my office for last appt, 

thinks wt was 207 that day


- cxr here worsened, with perihilar edema pattern and RLL infiltrates likely 

chf (no other clinical signs of PNA)


- initial wt 204 standing, up to 207 on HD 2


- suspect diet/fluid noncompliance may be trigger of chf here


- cont milrinone at patient's home maintenance dose (on chronic infusion 

destination therapy)--0.35 mcg/kg/min.  


- cont home coreg. (no ACE due to low bp's and labile creatinines; no Entresto 

for same reason) 


- recent office icd check with stable function


- 6/23 unclear if sxs from chf or some URI/copd (productive cough).  Will give 

trial of iv lasix 80 x1 today and monitor sxs, cr trend tomorrow to see if 

improving.


- 6/24.  Symptoms improving, creatinine improved after lasix 80 mg IV x 1 

yesterday.  Will continue daily dosing. 


- 6/25   Ongoing weight gain, will increase lasix to bid dosing--though pt 

refused 2nd dose due to late in day timing


- note: pt gains wt rapidly at home when non-compliant with 1.5L daily fluid 

restriction (wife supervises) and same here when fluid restriction not ordered/

implemented


- 6/26: fluid restriction ordered, importance d/w'd RN (to inform assistant); 

wt up to 212 (target wt is 207 or less). bid lasix today. responds to 

metolazone reliably at home when wt rises so low threshold to give this if wt 

rises further tomorrow (hopefully he is not becoming IV diuretic dependent)


- suspect loss of muscle mass over time, with new dry wt closer to 202-204


- 6/27 Significantly bothered by frequent urination.  declines connolly.  Still 

symptomatic, minimal weight loss this morning. will give extra dose of 

metolazone this afternoon with pm lasix. 


-6/28: Cr stable, wt down a pound.  Will cont iv lasix 100 bid and give 

metolazone 5 today as well.


-6/29: cr stable, wt unchanged but pt reports slight improvement in sob.  Will 

replete K.  Cont iv lasix. 


-6/30: cr stable, wt improved today, less sob as well.  Cont with lasix 100 iv 

bid, goal wt close to 203lbs


-7/1: wt stalled at 207. cont iv lasix today and rpt metolazone 5mg, bp 

permitting. will cont iv lasix until objective signs of vol depletion (

hypotension, elev bun/creat) to better define dry wt, in hopes of avoiding 

ongoing freq hospitalizations


-7/2: wt down 1 lb; renal fxn stable. rpt metolazone with lasix 100 iv bid today

, incr KCL to aim for K 4.0


-7/3: wt continues to decline 1 lb per day, (204 today), renal fxn stable--

advised same plan (lasix plus metolazone) to confidently define dry wt prior to 

discharge. pt very upset about staying here for this, even though i explained 

that he continues to frequently bounce back with chf despite best attempts to 

do this process with po torsemide as outpt.


-7/4: wt down 203, renal fxn stable. i do not feel we have defined his (new?) 

dry wt yet. however unclear how long pt is willing to stay in hospital. if 

willing, would repeat lasix 100mg IV bid today plus metolazone 5mg x 1


-d/w'd wife need to see chf specialist again, ? milrinone dosing can be adjusted

--dr aguiar ([place]) has left so they will transfer care and do not want [facility] (

she is to make appt with dr de la rosa at [facility]) 


-if no further options per CHF specialist eval, and cannot keep him out of the 

hospital with more aggressive weight target, will have to discuss palliative 

care/home hospice with pt and wife





RUQ pain:


- no tenderness, no naus/vomiting, normal WBC count, no fever


- LFTs normal 6/26 last--reorder


- defer further evaluation, if indicated, to pmd





afib


- currently rate controlled on coreg


- cont coumadin per INR





VT:


- intermittent brief runs of NSVT on tele, chronic finding on prior admits


- has ICD


- on max tolerated coreg dose


- replete lytes prn (aggressive K repletion ordered)





PVD


- con't AC, statin.





CAD s/p CABG


- No anginal symptoms, no signs acs.  Con't home ac, plavix, atorvastatin, coreg





HTN: controlled on coreg





HLD: con't statin. 





master/CKD:


- suspect cardiorenal syndrome, with renal fxn improving due to IV diuresis


- renal fxn presently at baseline





copd


- per pulm/pmd

## 2017-07-05 VITALS — DIASTOLIC BLOOD PRESSURE: 70 MMHG | TEMPERATURE: 97.2 F | SYSTOLIC BLOOD PRESSURE: 129 MMHG | HEART RATE: 75 BPM

## 2017-07-05 LAB
ANION GAP SERPL CALC-SCNC: 6 MMOL/L (ref 8–16)
CALCIUM SERPL-MCNC: 9.5 MG/DL (ref 8.5–10.1)
CO2 SERPL-SCNC: 39 MMOL/L (ref 21–32)
CREAT SERPL-MCNC: 1.8 MG/DL (ref 0.7–1.3)
GLUCOSE SERPL-MCNC: 167 MG/DL (ref 74–106)
INR BLD: 2.11 (ref 0.82–1.09)
PT PNL PPP: 23.6 SEC (ref 9.98–11.88)

## 2017-07-05 RX ADMIN — TIOTROPIUM BROMIDE SCH PUFF: 18 CAPSULE ORAL; RESPIRATORY (INHALATION) at 09:45

## 2017-07-05 RX ADMIN — INSULIN ASPART SCH UNITS: 100 INJECTION, SOLUTION INTRAVENOUS; SUBCUTANEOUS at 06:37

## 2017-07-05 RX ADMIN — SILVER SULFADIAZINE SCH: 10 CREAM TOPICAL at 13:16

## 2017-07-05 RX ADMIN — FUROSEMIDE SCH MG: 10 INJECTION, SOLUTION INTRAVENOUS at 06:15

## 2017-07-05 RX ADMIN — ALLOPURINOL SCH MG: 100 TABLET ORAL at 09:43

## 2017-07-05 RX ADMIN — INSULIN ASPART SCH UNITS: 100 INJECTION, SOLUTION INTRAVENOUS; SUBCUTANEOUS at 17:27

## 2017-07-05 RX ADMIN — CARVEDILOL SCH MG: 6.25 TABLET, FILM COATED ORAL at 09:43

## 2017-07-05 RX ADMIN — WARFARIN SCH MG: 2 TABLET ORAL at 17:27

## 2017-07-05 RX ADMIN — CLOPIDOGREL BISULFATE SCH MG: 75 TABLET, FILM COATED ORAL at 09:43

## 2017-07-05 RX ADMIN — MILRINONE LACTATE SCH MLS/HR: 200 INJECTION, SOLUTION INTRAVENOUS at 16:29

## 2017-07-05 RX ADMIN — OXYCODONE HYDROCHLORIDE AND ACETAMINOPHEN SCH MG: 500 TABLET ORAL at 09:43

## 2017-07-05 RX ADMIN — POLYETHYLENE GLYCOL 3350 SCH: 17 POWDER, FOR SOLUTION ORAL at 09:46

## 2017-07-05 RX ADMIN — POTASSIUM CHLORIDE SCH MEQ: 1500 TABLET, EXTENDED RELEASE ORAL at 17:28

## 2017-07-05 RX ADMIN — FUROSEMIDE SCH MG: 10 INJECTION, SOLUTION INTRAVENOUS at 13:34

## 2017-07-05 RX ADMIN — FERROUS SULFATE TAB EC 324 MG (65 MG FE EQUIVALENT) SCH MG: 324 (65 FE) TABLET DELAYED RESPONSE at 11:53

## 2017-07-05 RX ADMIN — MULTIVITAMIN TABLET SCH TAB: TABLET at 09:44

## 2017-07-05 RX ADMIN — DOCUSATE SODIUM SCH MG: 100 CAPSULE, LIQUID FILLED ORAL at 09:43

## 2017-07-05 RX ADMIN — VITAMIN D, TAB 1000IU (100/BT) SCH UNIT: 25 TAB at 09:44

## 2017-07-05 RX ADMIN — BUDESONIDE AND FORMOTEROL FUMARATE DIHYDRATE SCH PUFF: 160; 4.5 AEROSOL RESPIRATORY (INHALATION) at 09:45

## 2017-07-05 RX ADMIN — POTASSIUM CHLORIDE SCH MEQ: 1500 TABLET, EXTENDED RELEASE ORAL at 09:43

## 2017-07-05 RX ADMIN — INSULIN ASPART SCH UNITS: 100 INJECTION, SOLUTION INTRAVENOUS; SUBCUTANEOUS at 11:57

## 2017-07-05 RX ADMIN — MILRINONE LACTATE SCH MLS/HR: 200 INJECTION, SOLUTION INTRAVENOUS at 05:02

## 2017-07-05 RX ADMIN — RANITIDINE SCH MG: 150 TABLET ORAL at 09:44

## 2017-07-05 RX ADMIN — POTASSIUM CHLORIDE SCH MEQ: 1500 TABLET, EXTENDED RELEASE ORAL at 13:11

## 2017-07-05 RX ADMIN — SILVER SULFADIAZINE SCH APPLIC: 10 CREAM TOPICAL at 13:13

## 2017-07-05 NOTE — PN
Progress Note (short form)





- Note


Progress Note: 


Chief Complaint: 


sob/cough


History of Present Illness: 


still with sob worse than baseline but again reports a little better today


no signif leg swelling


no cp, palpit


no loc/dizzy








ex cigs











 


 


 


 Current Medications











Generic Name Dose Route Start Last Admin





  Trade Name Desi  PRN Reason Stop Dose Admin


 


Acetaminophen  650 mg 06/23/17 11:09 07/01/17 17:50





  Tylenol -  PO   650 mg





  Q4H PRN   Administration





  FEVER OR PAIN   


 


Allopurinol  100 mg 06/24/17 10:00 07/05/17 09:43





  Zyloprim -  PO   100 mg





  DAILY JARROD   Administration


 


Ascorbic Acid  1,000 mg 06/24/17 10:00 07/05/17 09:43





  Vitamin C -  PO   1,000 mg





  DAILY JARROD   Administration


 


Atorvastatin Calcium  40 mg 06/23/17 22:00 07/04/17 21:32





  Lipitor -  PO   40 mg





  HS JARROD   Administration


 


Budesonide/Formoterol Fumarate  1 puff 06/23/17 11:15 07/05/17 09:45





  Symbicort 160/4.5mcg -  IH   1 puff





  BID JARROD   Administration


 


Bupropion HCl  75 mg 06/23/17 11:15 07/05/17 09:44





  Wellbutrin -  PO   75 mg





  DAILY JARROD   Administration


 


Carvedilol  6.25 mg 06/23/17 11:15 07/05/17 09:43





  Coreg -  PO   6.25 mg





  BID JARROD   Administration


 


Cholecalciferol  1,000 unit 06/23/17 11:15 07/05/17 09:44





  Vitamin D3 -  PO   1,000 unit





  DAILY JARROD   Administration


 


Clopidogrel Bisulfate  75 mg 06/23/17 11:15 07/05/17 09:43





  Plavix -  PO   75 mg





  DAILY JARROD   Administration


 


Docusate Sodium  100 mg 06/23/17 22:00 07/05/17 09:43





  Colace -  PO   100 mg





  BID JARROD   Administration


 


Ferrous Sulfate  325 mg 06/23/17 11:15 07/03/17 11:24





  Feosol -  PO   325 mg





  Q48H JARROD   Administration


 


Furosemide  100 mg 06/26/17 11:08 07/05/17 06:15





  Lasix Injection -  IVPUSH   100 mg





  BID@0600,1400 JARROD   Administration


 


Milrinone Lactate/Dextrose  100 mls @ 9.716 mls/hr 06/23/17 11:00 07/05/17 05:02





  Milrinone 20mg/100ml Ivpb -  IVPB   9.716 mls/hr





  TITR JARROD   Administration





  0.35 MCG/KG/MIN   


 


Insulin Aspart  1 vial 06/23/17 16:30 07/05/17 06:37





  Novolog Vial Sliding Scale -  SQ   2 units





  ACHS JARROD   Administration





  Protocol   


 


Metolazone  5 mg 07/05/17 13:00  





  Zaroxolyn -  PO 07/05/17 13:01  





  ONCE ONE   


 


Multivitamins/Minerals/Vitamin C  1 tab 06/24/17 10:00 07/05/17 09:44





  Tab-A-Vit -  PO   1 tab





  DAILY JARROD   Administration


 


Ondansetron HCl  4 mg 06/23/17 11:09  





  Zofran Injection  IVPB   





  Q6H PRN   





  NAUSEA   


 


Polyethylene Glycol  17 gm 07/03/17 22:00 07/05/17 09:46





  Miralax (For Daily Use) -  PO   Not Given





  BID JARROD   


 


Potassium Chloride  40 meq 07/02/17 14:00 07/05/17 09:43





  K-Dur -  PO   40 meq





  QID JARROD   Administration


 


Ranitidine HCl  75 mg 06/23/17 11:15 07/05/17 09:44





  Zantac -  PO   75 mg





  DAILY JARROD   Administration


 


Silver Sulfadiazine  1 applic 07/04/17 10:00 07/04/17 13:41





  Silvadene -  TP   Not Given





  DAILY JARROD   


 


Starch  1 each 06/23/17 22:00 07/04/17 21:33





  Anusol Suppository -  RC   Not Given





  HS JARROD   


 


Tiotropium Bromide  1 puff 06/25/17 17:15 07/05/17 09:45





  Spiriva -  IH   1 puff





  DAILY JARROD   Administration


 


Warfarin Sodium  3 mg 07/03/17 14:05 07/04/17 17:17





  Coumadin -  PO   3 mg





  DAILY@1800 JARROD   Administration











 Vital Signs











Temp  97.5 F L  07/05/17 05:00


 


Pulse  79   07/05/17 05:00


 


Resp  18   07/05/17 05:00


 


BP  111/64   07/05/17 05:00


 


Pulse Ox  95   07/04/17 22:00








 Intake & Output











 07/04/17 07/04/17 07/05/17





 11:59 23:59 11:59


 


Intake Total  366.4 160


 


Output Total 600 2300 


 


Balance -600 -1933.6 160


 


Weight 203 lb 8 oz  202 lb 3 oz


 


Intake:   


 


  IV  116.4 110


 


    R Chest PICC  116.4 110


 


  Oral  250 50


 


Output:   


 


  Urine 600 2300 


 


    Void 600 2300 


 


Other:   


 


  Voiding Method Toilet Urinal 


 


  Weight Measurement Method Standing Scale  Standing Scale











Constitutional: Yes: No Distress, Calm


Eyes: No: Sclera Icterus


HENT: No: Nasal Congestion


Cardiovascular: Yes: Regular Rate and Rhythm, JVD (EJ massively distended at 90 

degr (no IJ seen)), S1, S2, Other (PMI non diplaced).  No: Gallop, Murmur


Respiratory: Yes: bibasilar rales (improving).  No: Accessory Muscle Use, Rales

, Wheezes


Gastrointestinal: Yes: Normal Bowel Sounds, Soft.  No: Tenderness


Musculoskeletal: Yes: Other (No kyphosis)


Extremities: No: Cold


Edema: trace


Integumentary: No: Jaundice


Neurological: Yes: Alert, Oriented (x3)


Psychiatric: No: Agitated


Labs: 


 








 


 


 CBC, BMP





 07/04/17 05:48 





 07/05/17 05:35 








- ....Imaging


EKG: Other (tele: AF, V-P, brief nsvt)








ecg 6/22/17: 





echo 10/2016: sev lve, global hk, sev dec lvef, mild rve, nl rv fcn, patty, mild-

mod mr, mv ring, tv ring, mild tr, mild pr, mild phtn, ivc mild dil

















a/p:  76 year old man with PMH of IDDM, COPD (O2 dependant) HTN, HLD, Afib (on 

coumadin), CAD s/p CABG 2013, end stage CHF with ICD placement (on milrinone 

pump), PVD, BPH here with sob.





acute on chronic systolic CHF, end stage systolic heart failure s/p ICD, on 

home milrinone infusion:


- here with sob/wright


- was well diuresed at home to wt 202-204 with creat 2.1 and Na 151--torsemide 

80 bid gradually downtitrated back to 40 bid


- saw me last week with sob at baseline, but ongoing phlegmy cough--likely sec 

to advanced copd and NOT chf


- developed acute incr sob in afternoon, after left my office for last appt, 

thinks wt was 207 that day


- cxr here worsened, with perihilar edema pattern and RLL infiltrates likely 

chf (no other clinical signs of PNA)


- initial wt 204 standing, up to 207 on HD 2


- suspect diet/fluid noncompliance may be trigger of chf here


- cont milrinone at patient's home maintenance dose (on chronic infusion 

destination therapy)--0.35 mcg/kg/min.  


- cont home coreg. (no ACE due to low bp's and labile creatinines; no Entresto 

for same reason) 


- recent office icd check with stable function


- 6/23 unclear if sxs from chf or some URI/copd (productive cough).  Will give 

trial of iv lasix 80 x1 today and monitor sxs, cr trend tomorrow to see if 

improving.


- 6/24.  Symptoms improving, creatinine improved after lasix 80 mg IV x 1 

yesterday.  Will continue daily dosing. 


- 6/25   Ongoing weight gain, will increase lasix to bid dosing--though pt 

refused 2nd dose due to late in day timing


- note: pt gains wt rapidly at home when non-compliant with 1.5L daily fluid 

restriction (wife supervises) and same here when fluid restriction not ordered/

implemented


- 6/26: fluid restriction ordered, importance d/w'd RN (to inform assistant); 

wt up to 212 (target wt is 207 or less). bid lasix today. responds to 

metolazone reliably at home when wt rises so low threshold to give this if wt 

rises further tomorrow (hopefully he is not becoming IV diuretic dependent)


- suspect loss of muscle mass over time, with new dry wt closer to 202-204


- 6/27 Significantly bothered by frequent urination.  declines connolly.  Still 

symptomatic, minimal weight loss this morning. will give extra dose of 

metolazone this afternoon with pm lasix. 


-6/28: Cr stable, wt down a pound.  Will cont iv lasix 100 bid and give 

metolazone 5 today as well.


-6/29: cr stable, wt unchanged but pt reports slight improvement in sob.  Will 

replete K.  Cont iv lasix. 


-6/30: cr stable, wt improved today, less sob as well.  Cont with lasix 100 iv 

bid, goal wt close to 203lbs


-7/1: wt stalled at 207. cont iv lasix today and rpt metolazone 5mg, bp 

permitting. will cont iv lasix until objective signs of vol depletion (

hypotension, elev bun/creat) to better define dry wt, in hopes of avoiding 

ongoing freq hospitalizations


-7/2: wt down 1 lb; renal fxn stable. rpt metolazone with lasix 100 iv bid today

, incr KCL to aim for K 4.0


-7/3: wt continues to decline 1 lb per day, (204 today), renal fxn stable--

advised same plan (lasix plus metolazone) to confidently define dry wt prior to 

discharge. pt very upset about staying here for this, even though i explained 

that he continues to frequently bounce back with chf despite best attempts to 

do this process with po torsemide as outpt.


-7/4: wt down 203, renal fxn stable. i do not feel we have defined his (new?) 

dry wt yet. however unclear how long pt is willing to stay in hospital. if 

willing, would repeat lasix 100mg IV bid today plus metolazone 5mg x 1


-7/5: wt continues to decrease, cr stable.  Would cont diuresis with iv lasix 

and metolazone today until have reached a dry wt (no further wt loss, bump in 

bun/cr)


-d/w'd wife need to see chf specialist again, ? milrinone dosing can be adjusted

--dr aguiar ([place]) has left so they will transfer care and do not want [facility] (

she is to make appt with dr de la rosa at [facility]) 


-if no further options per CHF specialist eval, and cannot keep him out of the 

hospital with more aggressive weight target, will have to discuss palliative 

care/home hospice with pt and wife





RUQ pain:


- no tenderness, no naus/vomiting, normal WBC count, no fever


- LFTs normal 6/26 last--reorder


- defer further evaluation, if indicated, to pmd





afib


- currently rate controlled on coreg


- cont coumadin per INR





VT:


- intermittent brief runs of NSVT on tele, chronic finding on prior admits


- has ICD


- on max tolerated coreg dose


- replete lytes prn





PVD


- con't AC, statin.





CAD s/p CABG


- No anginal symptoms, no signs acs.  Con't home ac, plavix, atorvastatin, coreg





HTN: controlled on coreg





HLD: con't statin. 





master/CKD:


- suspect cardiorenal syndrome, with renal fxn improving due to IV diuresis


- renal fxn presently at baseline





copd


- per pulm/pmd

## 2017-07-05 NOTE — DS
Physical Examination


Vital Signs: 


 Vital Signs











Temperature  98 F   07/05/17 10:00


 


Pulse Rate  77   07/05/17 10:00


 


Respiratory Rate  18   07/05/17 10:00


 


Blood Pressure  98/54   07/05/17 10:00


 


O2 Sat by Pulse Oximetry (%)  95   07/04/17 22:00











Constitutional: Yes: No Distress, Calm


Cardiovascular: Yes: Pulse Irregular.  No: Gallop, Murmur, Rub


Respiratory: Yes: Regular, On Nasal O2, Rhonchi.  No: CTA Bilaterally, Rales, 

Wheezes


Gastrointestinal: Yes: Normal Bowel Sounds, Soft.  No: Distention, Tenderness


Extremities: Yes: WNL


Edema: Yes


Edema: LLE: 1+, RLE: 1+


Labs: 


 CBC, BMP





 07/04/17 05:48 





 07/05/17 05:35 











Discharge Summary


Reason For Visit: CHF,CKD


Current Active Problems





CHF (congestive heart failure) (Acute) 


CKD (chronic kidney disease) (Acute) 








Hospital Course: 


(1) CHF (congestive heart failure)


Code(s): I50.9 - HEART FAILURE, UNSPECIFIED   Qualifiers: 


     Congestive heart failure type: systolic     Congestive heart failure 

chronicity: acute on chronic        Qualified Code(s): I50.23 - Acute on 

chronic systolic (congestive) heart failure  





(2) Acute and chronic respiratory failure (acute-on-chronic)


Code(s): J96.20 - ACUTE AND CHR RESP FAILURE, UNSP W HYPOXIA OR HYPERCAPNIA   

Qualifiers: 


     Respiratory failure complication: hypoxia        Qualified Code(s): J96.21 

- Acute and chronic respiratory failure with hypoxia  





(3) Acute on chronic renal insufficiency


Code(s): N28.9 - DISORDER OF KIDNEY AND URETER, UNSPECIFIED


N18.9 - CHRONIC KIDNEY DISEASE, UNSPECIFIED





(4) Atrial fibrillation


Code(s): I48.91 - UNSPECIFIED ATRIAL FIBRILLATION   Qualifiers: 


     Atrial fibrillation type: chronic     Qualified Code(s): I48.2 - Chronic 

atrial fibrillation  





(5) CAD (coronary artery disease)


Code(s): I25.10 - ATHSCL HEART DISEASE OF NATIVE CORONARY ARTERY W/O ANG PCTRS 

  





(6) COPD (chronic obstructive pulmonary disease)


Code(s): J44.9 - CHRONIC OBSTRUCTIVE PULMONARY DISEASE, UNSPECIFIED   





(7) Diabetes


Code(s): E11.9 - TYPE 2 DIABETES MELLITUS WITHOUT COMPLICATIONS   Qualifiers: 


     Diabetes mellitus type: type 2     Diabetes mellitus complication status: 

with kidney complications     Diabetes mellitus complication detail: with 

chronic kidney disease     Chronic kidney disease stage: stage 4 (severe)





Mr Jerome is a 76 year old male with end stage CHF coming in with exacerbation 

and acute on chronic respiratory failure. He was admitted and seen by 

cardiology and pulmonary. After evaluation he was found to be in CHF 

exacerbation and his COPD was controlled. He was diuresed with IV lasix and 

metolazone. He improved and is close to baseline. He is at his dry weight. Case 

was discussed with cardiology on day of discharge as noted patient may benefit 

from further IV diuresis but he was adamant on returning home. It was felt he 

was safe to be discharged with close follow up. He is discharged home with home 

oxygen and milrinone. He is to continue on his previous dose of torsemide, to 

be adjusted as an outpatient if needed.





37 minutes spent in discharge of this patient


Condition: Good





- Instructions


Diet, Activity, Other Instructions: 


1.5L fluid restriction. Low salt diet. Continue previous activity level.


Referrals: 


Sathya Flower MD [Primary Care Provider] - 


Deuce Titus MD [Staff Physician] - 


Disposition: VNS/HOME HEALTH CARE





- Home Medications


Comprehensive Discharge Medication List: 


Ambulatory Orders





Allopurinol [Zyloprim -] 100 mg PO DAILY 01/14/16 


Atorvastatin Ca [Lipitor] 40 mg PO HS 01/14/16 


Carvedilol 6.25 mg PO BID 01/14/16 


Docusate Sodium [Colace -] 100 mg PO BID 01/14/16 


Insulin NPL/Insulin Lispro [Humalog Mix 75-25 Vial] 0 unit SQ PRN 01/14/16 


Milrinone Lactate/D5w [Milrinone 0.2 mg/ml in D5w] 0 mg IV DAILY 01/14/16 


Ranitidine [Zantac -] 75 mg PO DAILY 01/14/16 


Albuterol 0.083% Nebulizer Sol [Ventolin 0.083% Nebulizer Soln -] 1 amp NEB Q6H 

PRN #120 amp 01/18/16 


Ferrous Sulfate [Feosol] 325 mg PO Q48H  ud 08/27/16 


Warfarin Na [Coumadin -] 4 mg PO DAILY@1800 01/10/17 


Bupropion HCl [Wellbutrin -] 75 mg DAILY 01/12/17 


Ascorbic Acid [Vitamin C -] 1,000 mg PO DAILY 03/30/17 


Cholecalciferol (Vitamin D3) [Vitamin D -] 1,000 unit PO DAILY 03/30/17 


Clopidogrel Bisulfate [Plavix -] 75 mg PO DAILY 03/30/17 


Multivitamin [Poly-Vitamin] 1 each PO DAILY 03/30/17 


Budesonide/Formeterol Fumarate [SYMBICORT 160/4.5mcg -] 1 inh PO BID 06/23/17 


Potassium Chloride [K-Dur -] 40 meq PO TID 06/23/17 


Tiotropium Bromide [Spiriva] 1 inh IH DAILY 06/23/17 


Torsemide [Demadex -] 40 mg PO BID 06/23/17

## 2017-07-05 NOTE — PN
Progress Note, Physician


History of Present Illness: 


PULMONARY





ALERT,COMFORTABLE AT REST ,-RESP DISTRESS,+ CARTAGENA





- Current Medication List


Current Medications: 


Active Medications





Acetaminophen (Tylenol -)  650 mg PO Q4H PRN


   PRN Reason: FEVER OR PAIN


   Last Admin: 07/01/17 17:50 Dose:  650 mg


Allopurinol (Zyloprim -)  100 mg PO DAILY [facility]


   Last Admin: 07/05/17 09:43 Dose:  100 mg


Ascorbic Acid (Vitamin C -)  1,000 mg PO DAILY [facility]


   Last Admin: 07/05/17 09:43 Dose:  1,000 mg


Atorvastatin Calcium (Lipitor -)  40 mg PO HS [facility]


   Last Admin: 07/04/17 21:32 Dose:  40 mg


Budesonide/Formoterol Fumarate (Symbicort 160/4.5mcg -)  1 puff IH BID [facility]


   Last Admin: 07/05/17 09:45 Dose:  1 puff


Bupropion HCl (Wellbutrin -)  75 mg PO DAILY [facility]


   Last Admin: 07/05/17 09:44 Dose:  75 mg


Carvedilol (Coreg -)  6.25 mg PO BID [facility]


   Last Admin: 07/05/17 09:43 Dose:  6.25 mg


Cholecalciferol (Vitamin D3 -)  1,000 unit PO DAILY [facility]


   Last Admin: 07/05/17 09:44 Dose:  1,000 unit


Clopidogrel Bisulfate (Plavix -)  75 mg PO DAILY [facility]


   Last Admin: 07/05/17 09:43 Dose:  75 mg


Docusate Sodium (Colace -)  100 mg PO BID [facility]


   Last Admin: 07/05/17 09:43 Dose:  100 mg


Ferrous Sulfate (Feosol -)  325 mg PO Q48H [facility]


   Last Admin: 07/03/17 11:24 Dose:  325 mg


Furosemide (Lasix Injection -)  100 mg IVPUSH BID@0600,1400 [facility]


   Last Admin: 07/05/17 06:15 Dose:  100 mg


Milrinone Lactate/Dextrose (Milrinone 20mg/100ml Ivpb -)  100 mls @ 9.716 mls/

hr IVPB TITR [facility]


   PRN Reason: 0.35 MCG/KG/MIN


   Last Admin: 07/05/17 05:02 Dose:  9.716 mls/hr


Insulin Aspart (Novolog Vial Sliding Scale -)  1 vial SQ ACHS [facility]


   PRN Reason: Protocol


   Last Admin: 07/05/17 06:37 Dose:  2 units


Metolazone (Zaroxolyn -)  5 mg PO ONCE ONE


   Stop: 07/05/17 13:31


Multivitamins/Minerals/Vitamin C (Tab-A-Vit -)  1 tab PO DAILY [facility]


   Last Admin: 07/05/17 09:44 Dose:  1 tab


Ondansetron HCl (Zofran Injection)  4 mg IVPB Q6H PRN


   PRN Reason: NAUSEA


Polyethylene Glycol (Miralax (For Daily Use) -)  17 gm PO BID [facility]


   Last Admin: 07/05/17 09:46 Dose:  Not Given


Potassium Chloride (K-Dur -)  40 meq PO QID [facility]


   Last Admin: 07/05/17 09:43 Dose:  40 meq


Ranitidine HCl (Zantac -)  75 mg PO DAILY [facility]


   Last Admin: 07/05/17 09:44 Dose:  75 mg


Silver Sulfadiazine (Silvadene -)  1 applic TP DAILY [facility]


   Last Admin: 07/04/17 13:41 Dose:  Not Given


Starch (Anusol Suppository -)  1 each RC HS [facility]


   Last Admin: 07/04/17 21:33 Dose:  Not Given


Tiotropium Bromide (Spiriva -)  1 puff IH DAILY [facility]


   Last Admin: 07/05/17 09:45 Dose:  1 puff


Warfarin Sodium (Coumadin -)  3 mg PO DAILY@1800 [facility]


   Last Admin: 07/04/17 17:17 Dose:  3 mg











- Objective


Vital Signs: 


 Vital Signs











Temperature  97.5 F L  07/05/17 05:00


 


Pulse Rate  79   07/05/17 05:00


 


Respiratory Rate  18   07/05/17 05:00


 


Blood Pressure  111/64   07/05/17 05:00


 


O2 Sat by Pulse Oximetry (%)  95   07/04/17 22:00











Constitutional: Yes: Well Nourished, Calm


Eyes: Yes: WNL, Occular Prosthesis


Neck: Yes: WNL


Cardiovascular: Yes: Pulse Irregular, S1, S2


Respiratory: Yes: Rales (FEW BIBASILAR CRACKLES)


Gastrointestinal: Yes: Normal Bowel Sounds, Soft


Extremities: Yes: WNL


Edema: Yes


Edema: LLE: Trace, RLE: Trace


Labs: 


 CBC, BMP





 07/04/17 05:48 





 07/05/17 05:35 





 INR, PTT











INR  2.11  (0.82-1.09)  H  07/05/17  05:35    














Problem List





- Problems


(1) CHF (congestive heart failure)


Code(s): I50.9 - HEART FAILURE, UNSPECIFIED   Qualifiers: 


     Congestive heart failure type: systolic     Congestive heart failure 

chronicity: acute on chronic        Qualified Code(s): I50.23 - Acute on 

chronic systolic (congestive) heart failure  





(2) CKD (chronic kidney disease)


Code(s): N18.9 - CHRONIC KIDNEY DISEASE, UNSPECIFIED   Qualifiers: 


   





(3) AICD (automatic cardioverter/defibrillator) present


Code(s): Z95.810 - PRESENCE OF AUTOMATIC (IMPLANTABLE) CARDIAC DEFIBRILLATOR





(4) Acute and chronic respiratory failure (acute-on-chronic)


Code(s): J96.20 - ACUTE AND CHR RESP FAILURE, UNSP W HYPOXIA OR HYPERCAPNIA   

Qualifiers: 


     Respiratory failure complication: hypoxia        Qualified Code(s): J96.21 

- Acute and chronic respiratory failure with hypoxia  





(5) Acute on chronic renal insufficiency


Code(s): N28.9 - DISORDER OF KIDNEY AND URETER, UNSPECIFIED


N18.9 - CHRONIC KIDNEY DISEASE, UNSPECIFIED





(6) Atrial fibrillation


Code(s): I48.91 - UNSPECIFIED ATRIAL FIBRILLATION   Qualifiers: 


     Atrial fibrillation type: chronic     Qualified Code(s): I48.2 - Chronic 

atrial fibrillation  





(7) CAD (coronary artery disease)


Code(s): I25.10 - ATHSCL HEART DISEASE OF NATIVE CORONARY ARTERY W/O ANG PCTRS 

  





(8) COPD (chronic obstructive pulmonary disease)


Code(s): J44.9 - CHRONIC OBSTRUCTIVE PULMONARY DISEASE, UNSPECIFIED   





(9) Cardiomyopathy


Code(s): I42.9 - CARDIOMYOPATHY, UNSPECIFIED   





(10) Diabetes


Code(s): E11.9 - TYPE 2 DIABETES MELLITUS WITHOUT COMPLICATIONS   Qualifiers: 


     Diabetes mellitus type: type 2     Diabetes mellitus complication status: 

with kidney complications     Diabetes mellitus complication detail: with 

chronic kidney disease     Chronic kidney disease stage: stage 4 (severe)





(11) Dyspnea on exertion


Code(s): R06.09 - OTHER FORMS OF DYSPNEA





(12) Presence of permanent cardiac pacemaker


Code(s): Z95.0 - PRESENCE OF CARDIAC PACEMAKER








Assessment/Plan


IMP ACUTE ON CHRONIC RESPIRATORY FAILURE IMPROVING


      DECOMPENSATED CHF IMPROVING


      SEVERE LV DYSFUNCTION S/P ICD ,ON MILRINONE DRIP


      ASHD S/P CABG


      ADVANCED COPD ON O2


      AFIB


      ACUTE ON CKD


      PVD


      BPH








PLAN  CONTINUE IV LASIX AS PER CARDIOLOGY


        O2


        MILRINONE 


        INHALED BRONCHODILATORS


        MONITOR LYTES ,RENAL FUNCTION


        DAILY WTS


         AC


        MONITOR INR





        





DR EASTMAN





 Problem List 





- Problems


(1) CHF (congestive heart failure)


Code(s): I50.9 - HEART FAILURE, UNSPECIFIED   Qualifiers: 


   





(2) CKD (chronic kidney disease)


Code(s): N18.9 - CHRONIC KIDNEY DISEASE, UNSPECIFIED   Qualifiers: 


   





(3) AICD (automatic cardioverter/defibrillator) present


Code(s): Z95.810 - PRESENCE OF AUTOMATIC (IMPLANTABLE) CARDIAC DEFIBRILLATOR





(4) Acute and chronic respiratory failure (acute-on-chronic)


Code(s): J96.20 - ACUTE AND CHR RESP FAILURE, UNSP W HYPOXIA OR HYPERCAPNIA   

Qualifiers: 


     Respiratory failure complication: hypoxia        Qualified Code(s): J96.21 

- Acute and chronic respiratory failure with hypoxia  





(5) Acute on chronic renal insufficiency


Code(s): N28.9 - DISORDER OF KIDNEY AND URETER, UNSPECIFIED


N18.9 - CHRONIC KIDNEY DISEASE, UNSPECIFIED





(6) Atrial fibrillation


Code(s): I48.91 - UNSPECIFIED ATRIAL FIBRILLATION   Qualifiers: 


     Atrial fibrillation type: chronic     Qualified Code(s): I48.2 - Chronic 

atrial fibrillation  





(7) CAD (coronary artery disease)


Code(s): I25.10 - ATHSCL HEART DISEASE OF NATIVE CORONARY ARTERY W/O ANG PCTRS 

  





(8) COPD (chronic obstructive pulmonary disease)


Code(s): J44.9 - CHRONIC OBSTRUCTIVE PULMONARY DISEASE, UNSPECIFIED   





(9) Cardiomyopathy


Code(s): I42.9 - CARDIOMYOPATHY, UNSPECIFIED   





(10) Diabetes


Code(s): E11.9 - TYPE 2 DIABETES MELLITUS WITHOUT COMPLICATIONS   Qualifiers: 


     Diabetes mellitus type: type 2     Diabetes mellitus complication status: 

with kidney complications     Diabetes mellitus complication detail: with 

chronic kidney disease     Chronic kidney disease stage: stage 4 (severe)





(11) Dyspnea on exertion


Code(s): R06.09 - OTHER FORMS OF DYSPNEA





(12) Presence of permanent cardiac pacemaker


Code(s): Z95.0 - PRESENCE OF CARDIAC PACEMAKER

## 2018-02-03 ENCOUNTER — HOSPITAL ENCOUNTER (INPATIENT)
Dept: HOSPITAL 74 - JER | Age: 78
LOS: 5 days | Discharge: HOME HEALTH | DRG: 194 | End: 2018-02-08
Attending: INTERNAL MEDICINE | Admitting: INTERNAL MEDICINE
Payer: COMMERCIAL

## 2018-02-03 VITALS — BODY MASS INDEX: 25.1 KG/M2

## 2018-02-03 DIAGNOSIS — D64.9: ICD-10-CM

## 2018-02-03 DIAGNOSIS — J44.9: ICD-10-CM

## 2018-02-03 DIAGNOSIS — I50.84: ICD-10-CM

## 2018-02-03 DIAGNOSIS — I25.10: ICD-10-CM

## 2018-02-03 DIAGNOSIS — I73.89: ICD-10-CM

## 2018-02-03 DIAGNOSIS — J96.11: ICD-10-CM

## 2018-02-03 DIAGNOSIS — E11.22: ICD-10-CM

## 2018-02-03 DIAGNOSIS — N40.0: ICD-10-CM

## 2018-02-03 DIAGNOSIS — Z95.2: ICD-10-CM

## 2018-02-03 DIAGNOSIS — Z87.891: ICD-10-CM

## 2018-02-03 DIAGNOSIS — E78.00: ICD-10-CM

## 2018-02-03 DIAGNOSIS — N39.0: ICD-10-CM

## 2018-02-03 DIAGNOSIS — I50.22: ICD-10-CM

## 2018-02-03 DIAGNOSIS — Z79.01: ICD-10-CM

## 2018-02-03 DIAGNOSIS — N17.9: ICD-10-CM

## 2018-02-03 DIAGNOSIS — Z99.81: ICD-10-CM

## 2018-02-03 DIAGNOSIS — N18.4: ICD-10-CM

## 2018-02-03 DIAGNOSIS — R41.82: ICD-10-CM

## 2018-02-03 DIAGNOSIS — J18.9: Primary | ICD-10-CM

## 2018-02-03 DIAGNOSIS — I13.0: ICD-10-CM

## 2018-02-03 DIAGNOSIS — Z95.810: ICD-10-CM

## 2018-02-03 DIAGNOSIS — I48.91: ICD-10-CM

## 2018-02-03 DIAGNOSIS — I47.2: ICD-10-CM

## 2018-02-03 DIAGNOSIS — J06.9: ICD-10-CM

## 2018-02-03 DIAGNOSIS — I27.20: ICD-10-CM

## 2018-02-03 DIAGNOSIS — Z79.4: ICD-10-CM

## 2018-02-03 LAB
ALBUMIN SERPL-MCNC: 3.4 G/DL (ref 3.4–5)
ALP SERPL-CCNC: 211 U/L (ref 45–117)
ALT SERPL-CCNC: 20 U/L (ref 12–78)
ANION GAP SERPL CALC-SCNC: 10 MMOL/L (ref 8–16)
APPEARANCE UR: (no result)
APTT BLD: 47.8 SECONDS (ref 26.9–34.4)
AST SERPL-CCNC: 26 U/L (ref 15–37)
BACTERIA #/AREA URNS HPF: (no result) /HPF
BASOPHILS # BLD: 0.6 % (ref 0–2)
BILIRUB SERPL-MCNC: 0.5 MG/DL (ref 0.2–1)
BILIRUB UR STRIP.AUTO-MCNC: NEGATIVE MG/DL
BUN SERPL-MCNC: 97 MG/DL (ref 7–18)
CALCIUM SERPL-MCNC: 8.2 MG/DL (ref 8.5–10.1)
CHLORIDE SERPL-SCNC: 94 MMOL/L (ref 98–107)
CO2 SERPL-SCNC: 30 MMOL/L (ref 21–32)
COLOR UR: (no result)
CREAT SERPL-MCNC: 3 MG/DL (ref 0.7–1.3)
DEPRECATED RDW RBC AUTO: 20.2 % (ref 11.9–15.9)
EOSINOPHIL # BLD: 0.7 % (ref 0–4.5)
EPITH CASTS URNS QL MICRO: (no result) /HPF
GLUCOSE SERPL-MCNC: 218 MG/DL (ref 74–106)
HCT VFR BLD CALC: 39.2 % (ref 35.4–49)
HGB BLD-MCNC: 12.4 GM/DL (ref 11.7–16.9)
INR BLD: 2.81 (ref 0.82–1.09)
KETONES UR QL STRIP: NEGATIVE
LEUKOCYTE ESTERASE UR QL STRIP.AUTO: (no result)
LYMPHOCYTES # BLD: 21.1 % (ref 8–40)
MCH RBC QN AUTO: 26.3 PG (ref 25.7–33.7)
MCHC RBC AUTO-ENTMCNC: 31.6 G/DL (ref 32–35.9)
MCV RBC: 83.2 FL (ref 80–96)
MONOCYTES # BLD AUTO: 12 % (ref 3.8–10.2)
NEUTROPHILS # BLD: 65.6 % (ref 42.8–82.8)
NITRITE UR QL STRIP: NEGATIVE
PH BLDV: 7.39 [PH] (ref 7.32–7.42)
PH UR: 5 [PH] (ref 5–8)
PLATELET # BLD AUTO: 179 K/MM3 (ref 134–434)
PMV BLD: 8.2 FL (ref 7.5–11.1)
POTASSIUM SERPLBLD-SCNC: 4 MMOL/L (ref 3.5–5.1)
PROT SERPL-MCNC: 8.3 G/DL (ref 6.4–8.2)
PROT UR QL STRIP: (no result)
PROT UR QL STRIP: NEGATIVE
PT PNL PPP: 31.7 SEC (ref 9.98–11.88)
RBC # BLD AUTO: 4.71 M/MM3 (ref 4–5.6)
RBC # UR STRIP: (no result) /UL
SODIUM SERPL-SCNC: 134 MMOL/L (ref 136–145)
SP GR UR: 1.01 (ref 1–1.03)
UROBILINOGEN UR STRIP-MCNC: NEGATIVE MG/DL (ref 0.2–1)
VENOUS PC02: 53.3 MMHG (ref 38–52)
VENOUS PO2: 44.7 MMHG (ref 28–48)
WBC # BLD AUTO: 6.7 K/MM3 (ref 4–10)

## 2018-02-03 RX ADMIN — TORSEMIDE SCH MG: 20 TABLET ORAL at 21:33

## 2018-02-03 RX ADMIN — BUDESONIDE AND FORMOTEROL FUMARATE DIHYDRATE SCH PUFF: 160; 4.5 AEROSOL RESPIRATORY (INHALATION) at 21:48

## 2018-02-03 RX ADMIN — INSULIN ASPART SCH: 100 INJECTION, SOLUTION INTRAVENOUS; SUBCUTANEOUS at 21:38

## 2018-02-03 RX ADMIN — WARFARIN SCH MG: 5 TABLET ORAL at 19:00

## 2018-02-03 RX ADMIN — CARVEDILOL SCH MG: 6.25 TABLET, FILM COATED ORAL at 21:33

## 2018-02-03 RX ADMIN — ATORVASTATIN CALCIUM SCH MG: 40 TABLET, FILM COATED ORAL at 21:33

## 2018-02-03 RX ADMIN — MILRINONE LACTATE SCH MLS/HR: 200 INJECTION, SOLUTION INTRAVENOUS at 12:23

## 2018-02-03 RX ADMIN — FERROUS SULFATE TAB EC 324 MG (65 MG FE EQUIVALENT) SCH: 324 (65 FE) TABLET DELAYED RESPONSE at 17:24

## 2018-02-03 RX ADMIN — DOCUSATE SODIUM SCH MG: 100 CAPSULE, LIQUID FILLED ORAL at 21:33

## 2018-02-03 RX ADMIN — INSULIN ASPART SCH UNITS: 100 INJECTION, SOLUTION INTRAVENOUS; SUBCUTANEOUS at 17:56

## 2018-02-03 NOTE — PDOC
History of Present Illness





- General


History Source: Patient


Exam Limitations: No Limitations





- History of Present Illness


Initial Comments: 





02/03/18 12:37


The patient is a 77-year-old male with a significant past medical history of 

anemia, asthma, a-fib, CHF, (MI 2011, defibrillator placement, pacemaker), CAD (

2 cardiac stents), aortic valve replacement, COPD (O2 dependent), HTN, 

hypercholesterolemia, who presents to the emergency department with SOB, 

constant cough, and congestion for 1 week. He reports of a non-productive dry 

cough and mild rhinorrhea. He states he has been using his inhaler with no 

significant relief. Patient states he received a flu shot this year. 





The patient denies chest pain, leg swelling, headache and dizziness. The 

patient denies fever, chills, abdominal pain, nausea, vomit, diarrhea and 

constipation. The patient denies dysuria, frequency, urgency and hematuria.





Allergies: shellfish derived


Social History: former smoker


PCP: Dr. Flower


Cardiologist: Dr. Ron








<Jessica Navarro - Last Filed: 02/03/18 12:37>





<Pamella Zhao - Last Filed: 02/03/18 14:29>





- General


Chief Complaint: Cold Symptoms


Stated Complaint: CONGESTIVE HEART FAILURE


Time Seen by Provider: 02/03/18 11:12





Past History





<Jessica Navarro - Last Filed: 02/03/18 12:37>





- Past Medical History


Anemia: Yes


Asthma: No


Cancer: No


Cardiac Disorders: Yes (DEFRIBILLATOR)


CVA: No


COPD: Yes (O2 DEP - 2L)


CHF: Yes


Dementia: No


Diabetes: Yes


GI Disorders: No


 Disorders: Yes (BPH)


HTN: Yes


Hypercholesterolemia: Yes


Liver Disease: No


Seizures: No


Thyroid Disease: No





- Surgical History


Abdominal Surgery: Yes (HERNIA REPAIR 2003)


Appendectomy: No


Cardiac Surgery: Yes (2 OPEN HEART SURGERIES; TRACH PLACEMENT,PACEMAKER PLACED 7 /19/2013)


Cholecystectomy: Yes


Lung Surgery: No


Neurologic Surgery: No


Orthopedic Surgery: No





- Immunization History


Td Vaccination: Yes


TDAP Vaccination: Yes


Immunization Up to Date: Yes





- Suicide/Smoking/Psychosocial Hx


Smoking Status: No


Smoking History: Former smoker


Years of Tobacco Use: 40


Have you smoked in the past 12 months: No


Number of Cigarettes Smoked Daily: 40


If you are a former smoker, when did you quit?: 2011


Cigars Per Day: 0


Information on smoking cessation initiated: No


'Breaking Loose' booklet given: 06/11/12


Hx Alcohol Use: No


Drug/Substance Use Hx: No


Substance Use Type: None


Hx Substance Use Treatment: No





<Pamella Zhao - Last Filed: 02/03/18 14:29>





- Past Medical History


Allergies/Adverse Reactions: 


 Allergies











Allergy/AdvReac Type Severity Reaction Status Date / Time


 


No Known Drug Allergies Allergy   Verified 02/03/18 10:37


 


shellfish derived Allergy   Verified 02/03/18 10:37











Home Medications: 


Ambulatory Orders





Allopurinol [Zyloprim -] 100 mg PO DAILY 01/14/16 


Atorvastatin Ca [Lipitor] 40 mg PO HS 01/14/16 


Carvedilol 6.25 mg PO BID 01/14/16 


Docusate Sodium [Colace -] 100 mg PO BID 01/14/16 


Insulin NPL/Insulin Lispro [Humalog Mix 75-25 Vial] 0 unit SQ PRN 01/14/16 


Milrinone Lactate/D5w [Milrinone 0.2 mg/ml in D5w] 0 mg IV DAILY 01/14/16 


Ranitidine [Zantac -] 75 mg PO DAILY 01/14/16 


Albuterol 0.083% Nebulizer Sol [Ventolin 0.083% Nebulizer Soln -] 1 amp NEB Q6H 

PRN #120 amp 01/18/16 


Ferrous Sulfate [Feosol] 325 mg PO Q48H  ud 08/27/16 


Warfarin Na [Coumadin -] 2 mg PO DAILY@1800 01/10/17 


Bupropion HCl [Wellbutrin -] 75 mg DAILY 01/12/17 


Ascorbic Acid [Vitamin C -] 1,000 mg PO DAILY 03/30/17 


Cholecalciferol (Vitamin D3) [Vitamin D -] 1,000 unit PO DAILY 03/30/17 


Multivitamin [Poly-Vitamin] 1 each PO DAILY 03/30/17 


Budesonide/Formeterol Fumarate [SYMBICORT 160/4.5mcg -] 1 inh PO BID 06/23/17 


Torsemide [Demadex -] 20 mg PO BID 06/23/17 











**Review of Systems





- Review of Systems


Able to Perform ROS?: Yes


Comments:: 





02/03/18 12:37


GENERAL/CONSTITUTIONAL: No fever or chills. No weakness.


HEAD, EYES, EARS, NOSE AND THROAT: No change in vision. No ear pain or 

discharge. No sore throat.


CARDIOVASCULAR: (+) shortness of breath. No chest pain.


RESPIRATORY: (+) Cough. No wheezing, or hemoptysis.


GASTROINTESTINAL: No nausea, vomiting, diarrhea or constipation.


GENITOURINARY: No dysuria, frequency, or change in urination.


MUSCULOSKELETAL: No joint or muscle swelling or pain. No neck or back pain.


SKIN: No rash


NEUROLOGIC: No headache, vertigo, loss of consciousness, or change in strength/

sensation.


ENDOCRINE: No increased thirst. No abnormal weight change.


HEMATOLOGIC/LYMPHATIC: No anemia, easy bleeding, or history of blood clots.


ALLERGIC/IMMUNOLOGIC: No hives or skin allergy.








<Jessica Navarro - Last Filed: 02/03/18 12:37>





*Physical Exam





- Vital Signs


 Last Vital Signs











Temp Pulse Resp BP Pulse Ox


 


 97.8 F   75   16   96/62   98 


 


 02/03/18 10:39  02/03/18 10:39  02/03/18 10:39  02/03/18 10:39  02/03/18 10:39














- Physical Exam


Comments: 





02/03/18 12:38


GENERAL: Awake, alert, and fully oriented, in no acute distress


HEAD: No signs of trauma


EYES: PERRLA, EOMI, sclera anicteric, conjunctiva clear


ENT: Auricles normal inspection, hearing grossly normal, nares patent, 

oropharynx clear without exudates. Moist mucosa


NECK: Normal ROM, supple, no lymphadenopathy, JVD, or masses


LUNGS: (+) Diminished breath sounds bilaterally. No wheezes, rales, and no 

crackles


HEART: Regular rate and rhythm, normal S1 and S2, no murmurs, rubs or gallops. (

+) PICC line in the right chest wall (milrinone).


ABDOMEN: Soft, nontender, normoactive bowel sounds.  No guarding, no rebound.  

No masses


EXTREMITIES: Normal range of motion, no edema.  No clubbing or cyanosis. No 

cords, erythema, or tenderness


NEUROLOGICAL: Cranial nerves II through XII grossly intact.  Normal speech, 

normal gait


SKIN: Warm, Dry, normal turgor, no rashes or lesions noted.








<Jessica Navarro - Last Filed: 02/03/18 12:37>





- Vital Signs


 Last Vital Signs











Temp Pulse Resp BP Pulse Ox


 


 97.8 F   75   16   96/62   98 


 


 02/03/18 10:39  02/03/18 10:39  02/03/18 10:39  02/03/18 10:39  02/03/18 10:39














<Pamella Zhao - Last Filed: 02/03/18 14:29>





**Heart Score/ECG Review





- ECG Intrepretation


Comment:: 





02/03/18 11:56


paced at 75, no acute findings





<Pamella Zhao - Last Filed: 02/03/18 14:29>





ED Treatment Course





- LABORATORY


CBC & Chemistry Diagram: 


 02/03/18 11:50





 02/03/18 11:35





- ADDITIONAL ORDERS


Additional order review: 


 Laboratory  Results











  02/03/18 02/03/18 02/03/18





  11:50 11:35 11:35


 


VBG pH    7.39


 


POC VBG pCO2    53.3 H


 


POC VBG pO2    44.7


 


Mixed VBG HCO3    31.3 H


 


Sodium   Cancelled 


 


Potassium   Cancelled 


 


Chloride   Cancelled 


 


Carbon Dioxide   Cancelled 


 


Anion Gap   Cancelled 


 


BUN   Cancelled 


 


Creatinine   Cancelled 


 


Creat Clearance w eGFR   Cancelled 


 


Random Glucose   Cancelled 


 


Calcium   Cancelled 


 


Magnesium   Cancelled 


 


Total Bilirubin   Cancelled 


 


AST   Cancelled 


 


ALT   Cancelled 


 


Alkaline Phosphatase   Cancelled 


 


Creatine Kinase   Cancelled 


 


Troponin I   Cancelled 


 


B-Natriuretic Peptide  Cancelled  


 


Total Protein   Cancelled 


 


Albumin   Cancelled 








 











  02/03/18





  11:50


 


RBC  4.71


 


MCV  83.2


 


MCHC  31.6 L


 


RDW  20.2 H


 


MPV  8.2


 


Neutrophils %  65.6


 


Lymphocytes %  21.1  D


 


Monocytes %  12.0 H


 


Eosinophils %  0.7


 


Basophils %  0.6














- Medications


Given in the ED: 


ED Medications














Discontinued Medications














Generic Name Dose Route Start Last Admin





  Trade Name Freq  PRN Reason Stop Dose Admin


 


Albuterol/Ipratropium  1 amp  02/03/18 11:35  02/03/18 12:13





  Duoneb -  NEB  02/03/18 11:36  1 amp





  ONCE ONE   Administration














<Jessica Navarro - Last Filed: 02/03/18 12:37>





- LABORATORY


CBC & Chemistry Diagram: 


 02/03/18 11:50





 02/03/18 11:35





<Pamella Zhao - Last Filed: 02/03/18 14:29>





Medical Decision Making





- Medical Decision Making





02/03/18 11:57


a/p: 76yo male with cough/sob/congestion x 2 weeks


-hx of copd and chf


-on milrinone gtt and pacer


-using inhalers with minimal relief


-diminished bs - suspect copd exacerbation more so than chf


-will check labs, ekg, cxr, vbg


-flu swab


-Cards - Dr. Merchant


-PULM - Dr. Gama





02/03/18 12:17


infiltrates on xray


blood cultures ordered


will start abx


will place consults to dr. merchant and dr. gama


will call Dr. Lagunas for admission


02/03/18 14:29


case discussed with Sal[facility] from [facility] accepts pt under Dr. Jim Pimentel covering for Dr. Lagunas





<Pamella Zhao - Last Filed: 02/03/18 14:29>





*DC/Admit/Observation/Transfer





- Attestations


Scribe Attestion: 





02/03/18 12:38


Documentation prepared by Jessica Navarro, acting as medical scribe for Pamella Zhao DO, MD/.





<Jessica Navarro - Last Filed: 02/03/18 12:37>





- Discharge Dispostion


Admit: Yes





- Attestations


Physician Attestion: 





02/03/18 12:19








I, Dr. Pamella Zhao DO, attest that this document has been prepared under 

my direction and personally reviewed by me in its entirety.   I further attest, 

that it accurately reflects all work, treatment, procedures and medical decision

-making performed by me.  





<Pamella Zhao - Last Filed: 02/03/18 14:29>


Diagnosis at time of Disposition: 


 COPD (chronic obstructive pulmonary disease), Pulmonary infiltrates on CXR, 

Shortness of breath, HARPREET (acute kidney injury), AICD (automatic cardioverter/

defibrillator) present, Dyspnea on exertion, CHF (congestive heart failure)








- Discharge Dispostion


Condition at time of disposition: Guarded





- Referrals


Referrals: 


Sathya Flower MD [Primary Care Provider] - 





- Patient Instructions





- Post Discharge Activity

## 2018-02-03 NOTE — HP
CHIEF COMPLAINT: cough x1 week





PCP: Dr. Flower





HISTORY OF PRESENT ILLNESS:


This is a 77 year old male with PMHx of anemia, asthma, a.fib, end stage 

systolic CHF (ICD placed 08/2017, on home milrinone), CAD (s/p MI 2011 and 

stenting x2), aortic valve replacement, COPD (on home O2), HTN, 

hypercholesterolemia, who presented to the ED with 1 week of cough and 

congestion. The patient reports he has had a non-productive cough x1 week and 

mild rhinorrhea. He denies any chest pain, palpitations, leg swelling, headache

, dizziness, fever, chills, abdominal pain, nausea, vomiting, diarrhea, urinary 

symptoms. 





ER course was notable for:


(1) Temp 97.8, pulse 75, BP 96/62, resp 16, O2 98% on 3L


(2) INR 2.81


(3) Na 135, Cr 3


(4) BNP 2861


(5) Chest x-ray with large heart, left sided pacemaker, sternal sutures with 

valve replacement, right jugular line and congestive and infiltrative changes





Recent Travel: denies





PAST MEDICAL HISTORY: as above





PAST SURGICAL HISTORY: as above





Social History:


Smoking: quit 7 years ago


Alcohol: denies


Drugs: denies





Family History:


Allergies





No Known Drug Allergies Allergy (Verified 02/03/18 10:37)


 


shellfish derived Allergy (Verified 02/03/18 10:37)


 


 [facility] ONLY 








HOME MEDICATIONS:


 Home Medications











 Medication  Instructions  Recorded


 


Allopurinol [Zyloprim -] 100 mg PO DAILY 01/14/16


 


Atorvastatin Ca [Lipitor] 40 mg PO HS 01/14/16


 


Carvedilol 6.25 mg PO BID 01/14/16


 


Docusate Sodium [Colace -] 100 mg PO BID 01/14/16


 


Insulin NPL/Insulin Lispro 0 unit SQ PRN 01/14/16





[Humalog Mix 75-25 Vial]  


 


Milrinone Lactate/D5w [Milrinone 0 mg IV DAILY 01/14/16





0.2 mg/ml in D5w]  


 


Ranitidine [Zantac -] 75 mg PO DAILY 01/14/16


 


Albuterol 0.083% Nebulizer Sol 1 amp NEB Q6H PRN #120 amp 01/18/16





[Ventolin 0.083% Nebulizer Soln -]  


 


Ferrous Sulfate [Feosol] 325 mg PO Q48H  ud 08/27/16


 


Warfarin Na [Coumadin -] 2 mg PO DAILY@1800 01/10/17


 


Bupropion HCl [Wellbutrin -] 75 mg DAILY 01/12/17


 


Ascorbic Acid [Vitamin C -] 1,000 mg PO DAILY 03/30/17


 


Cholecalciferol (Vitamin D3) 1,000 unit PO DAILY 03/30/17





[Vitamin D -]  


 


Multivitamin [Poly-Vitamin] 1 each PO DAILY 03/30/17


 


Budesonide/Formeterol Fumarate 1 inh PO BID 06/23/17





[SYMBICORT 160/4.5mcg -]  


 


Torsemide [Demadex -] 20 mg PO BID 06/23/17








REVIEW OF SYSTEMS


CONSTITUTIONAL: Absent:  fever, chills, diaphoresis, generalized weakness, 

malaise, loss of appetite, weight change


HEENT: Rhinorrhea x1 week. Absent: nasal congestion, throat pain, throat 

swelling, difficulty swallowing, mouth swelling, ear pain, eye pain, visual 

changes


CARDIOVASCULAR: Absent: chest pain, syncope, palpitations, irregular heart rate

, lightheadedness, peripheral edema


RESPIRATORY: Dry cough x1 week. Absent: shortness of breath, dyspnea with 

exertion, orthopnea, wheezing, stridor, hemoptysis


GASTROINTESTINAL:Absent: abdominal pain, abdominal distension, nausea, vomiting

, diarrhea, constipation, melena, hematochezia


GENITOURINARY: Absent: dysuria, frequency, urgency, hesitancy, hematuria, flank 

pain, genital pain


MUSCULOSKELETAL: Absent: myalgia, arthralgia, joint swelling, back pain, neck 

pain


SKIN: Absent: rash, itching, pallor


HEMATOLOGIC/IMMUNOLOGIC: Absent: easy bleeding, easy bruising, lymphadenopathy, 

frequent infections


ENDOCRINE:Absent: unexplained weight gain, unexplained weight loss, heat 

intolerance, cold intolerance


NEUROLOGIC: Absent: headache, focal weakness or paresthesias, dizziness, seizure

, mental status changes, bladder or bowel incontinence


PSYCHIATRIC: Absent: anxiety, depression, suicidal or homicidal ideation, 

hallucinations.








PHYSICAL EXAMINATION


 Vital Signs - 24 hr











  02/03/18





  10:39


 


Temperature 97.8 F


 


Pulse Rate 75


 


Respiratory 16





Rate 


 


Blood Pressure 96/62


 


O2 Sat by Pulse 98





Oximetry (%) 











GENERAL: Awake, alert, and fully oriented, in no acute distress. On O2 via 

nasal cannula


HEAD: Normal with no signs of trauma.


EYES: Pupils equal, round and reactive to light, extraocular movements intact, 

sclera anicteric, conjunctiva clear. No lid lag.


EARS, NOSE, THROAT: Ears normal, nares patent, oropharynx clear without 

exudates. Moist mucous membranes.


NECK: Normal range of motion, supple without lymphadenopathy


LUNGS: Breath sounds equal, clear to auscultation bilaterally.


HEART: Regular rate and rhythm, normal S1 and S2


ABDOMEN: Soft, nontender, not distended, normoactive bowel sounds, no guarding, 

no rebound, no masses.  No hepatomegaly or  splenomegaly. 


MUSCULOSKELETAL: Normal range of motion at all joints. No bony deformities or 

tenderness. No CVA tenderness.


UPPER EXTREMITIES: 2+ pulses, warm, well-perfused. No cyanosis. No clubbing. No 

peripheral edema.


LOWER EXTREMITIES: 2+ pulses, warm, well-perfused. No calf tenderness. No 

peripheral edema. 


NEUROLOGICAL:  Cranial nerves II-XII intact. Normal speech. Gait not observed


PSYCHIATRIC: Cooperative. Good eye contact. Appropriate mood and affect.


SKIN: Right chest wall catheter. Warm, dry, normal turgor, no rashes or lesions 

noted, normal capillary refill. 


 Laboratory Results - last 24 hr











  02/03/18 02/03/18 02/03/18





  10:25 10:25 11:35


 


WBC   


 


RBC   


 


Hgb   


 


Hct   


 


MCV   


 


MCH   


 


MCHC   


 


RDW   


 


Plt Count   


 


MPV   


 


Neutrophils %   


 


Lymphocytes %   


 


Monocytes %   


 


Eosinophils %   


 


Basophils %   


 


PT with INR    31.70 H


 


INR    2.81 H D


 


PTT (Actin FS)    47.8 H


 


VBG pH   


 


POC VBG pCO2   


 


POC VBG pO2   


 


Mixed VBG HCO3   


 


Sodium   134 L 


 


Potassium   4.0 


 


Chloride   94 L 


 


Carbon Dioxide   30  D 


 


Anion Gap   10 


 


BUN   97 H D 


 


Creatinine   3.0 H D 


 


Creat Clearance w eGFR   20.29 


 


Random Glucose   218 H D 


 


Lactic Acid   


 


Calcium   8.2 L 


 


Magnesium   


 


Total Bilirubin   0.5  D 


 


AST   26  D 


 


ALT   20 


 


Alkaline Phosphatase   211 H D 


 


Creatine Kinase   44 


 


Troponin I   0.05 


 


B-Natriuretic Peptide  2861.75 H  


 


Total Protein   8.3 H 


 


Albumin   3.4 














  02/03/18 02/03/18 02/03/18





  11:35 11:35 11:35


 


WBC   


 


RBC   


 


Hgb   


 


Hct   


 


MCV   


 


MCH   


 


MCHC   


 


RDW   


 


Plt Count   


 


MPV   


 


Neutrophils %   


 


Lymphocytes %   


 


Monocytes %   


 


Eosinophils %   


 


Basophils %   


 


PT with INR   


 


INR   


 


PTT (Actin FS)   


 


VBG pH  7.39  


 


POC VBG pCO2  53.3 H  


 


POC VBG pO2  44.7  


 


Mixed VBG HCO3  31.3 H  


 


Sodium   Cancelled 


 


Potassium   Cancelled 


 


Chloride   Cancelled 


 


Carbon Dioxide   Cancelled 


 


Anion Gap   Cancelled 


 


BUN   Cancelled 


 


Creatinine   Cancelled 


 


Creat Clearance w eGFR   Cancelled 


 


Random Glucose   Cancelled 


 


Lactic Acid    1.1


 


Calcium   Cancelled 


 


Magnesium   Cancelled 


 


Total Bilirubin   Cancelled 


 


AST   Cancelled 


 


ALT   Cancelled 


 


Alkaline Phosphatase   Cancelled 


 


Creatine Kinase   Cancelled 


 


Troponin I   Cancelled 


 


B-Natriuretic Peptide   


 


Total Protein   Cancelled 


 


Albumin   Cancelled 














  02/03/18 02/03/18





  11:50 11:50


 


WBC  6.7 


 


RBC  4.71 


 


Hgb  12.4 


 


Hct  39.2 


 


MCV  83.2 


 


MCH  26.3 


 


MCHC  31.6 L 


 


RDW  20.2 H 


 


Plt Count  179 


 


MPV  8.2 


 


Neutrophils %  65.6 


 


Lymphocytes %  21.1  D 


 


Monocytes %  12.0 H 


 


Eosinophils %  0.7 


 


Basophils %  0.6 


 


PT with INR  


 


INR  


 


PTT (Actin FS)  


 


VBG pH  


 


POC VBG pCO2  


 


POC VBG pO2  


 


Mixed VBG HCO3  


 


Sodium  


 


Potassium  


 


Chloride  


 


Carbon Dioxide  


 


Anion Gap  


 


BUN  


 


Creatinine  


 


Creat Clearance w eGFR  


 


Random Glucose  


 


Lactic Acid  


 


Calcium  


 


Magnesium  


 


Total Bilirubin  


 


AST  


 


ALT  


 


Alkaline Phosphatase  


 


Creatine Kinase  


 


Troponin I  


 


B-Natriuretic Peptide   Cancelled


 


Total Protein  


 


Albumin  











Assessment: This is a 77 year old male with PMHx of anemia, asthma, a.fib, end 

stage systolic CHF (ICD placed 08/2017, on home milrinone), CAD (s/p MI 2011 

and stenting x2), aortic valve replacement, COPD (on home O2), HTN, 

hypercholesterolemia, who presented to the ED with 1 week of cough and 

congestion. 





Plan: 


1) Community acquired pneumonia


- Chest X-ray with infiltrative changes


- Continue Azithromycin and Ceftriaxone


- Monitor WBC, fever curve


- Influenza A&B negative


- F/u urine legionella Ag





2) HARPREET


- Baseline Cr ~ 1.8


- F/u kidney/bladder ultrasound to r/o obstruction or hydronephrosis


- F/u urine electrolytes


- NS 250ml bolus given in the ED


- F/u nephrology consult





3) Chronic systolic CF, end stage heart failure s/p pacemaker


- On home Milrinone, continue here


- Weight down about 6kg from previous admission


- Strict I&O


- Daily weights


- 1.5L fluid restriction


- Continue home medications





4) A.fib


- Continue Coumadin





5) CAD s/p stenting


- Continue ASA





6) Aortic valve replacement





7) Chronic COPD


- Does not appear to be in exacerbation at this time


- Continue Albuterol nebs


- Continue Symbicort


- Continue O2 via NC 





8) DM


- BGM ACHS


- ISS ACHS





9) F/E/N:


- Sodium controlled, diabetic diet


- Monitor electrolytes





10) Prophylaxis:


- On Coumadin


- PT evaluation





11) Dispo:


- Requires continued inpatient care





CODE STATUS: FULL CODE














Visit type





- Emergency Visit


Emergency Visit: Yes


ED Registration Date: 02/03/18


Care time: The patient presented to the Emergency Department on the above date 

and was hospitalized for further evaluation of their emergent condition.





- New Patient


This patient is new to me today: Yes


Date on this admission: 02/03/18





- Critical Care


Critical Care patient: No

## 2018-02-03 NOTE — EKG
Test Reason : 

Blood Pressure : ***/*** mmHG

Vent. Rate : 075 BPM     Atrial Rate : 043 BPM

   P-R Int : 000 ms          QRS Dur : 178 ms

    QT Int : 516 ms       P-R-T Axes : 000 182 050 degrees

   QTc Int : 576 ms

 

Ventricular-paced rhythm

Biventricular pacemaker detected

ABNORMAL ECG

WHEN COMPARED WITH ECG OF 22-JUN-2017 23:47,

NO SIGNIFICANT CHANGE WAS FOUND

CLINICAL CORRELATION IS RECOMMENDED

Confirmed by MICHELLE LANDON, RUSS ([id number]) on 2/3/2018 12:56:03 PM

 

Referred By:             Confirmed By:RUSS MARTINEZ MD

## 2018-02-04 LAB
ALBUMIN SERPL-MCNC: 3.4 G/DL (ref 3.4–5)
ALP SERPL-CCNC: 207 U/L (ref 45–117)
ALT SERPL-CCNC: 18 U/L (ref 12–78)
ANION GAP SERPL CALC-SCNC: 12 MMOL/L (ref 8–16)
AST SERPL-CCNC: 27 U/L (ref 15–37)
BASOPHILS # BLD: 0.5 % (ref 0–2)
BILIRUB SERPL-MCNC: 0.8 MG/DL (ref 0.2–1)
BUN SERPL-MCNC: 93 MG/DL (ref 7–18)
CALCIUM SERPL-MCNC: 8.5 MG/DL (ref 8.5–10.1)
CHLORIDE SERPL-SCNC: 94 MMOL/L (ref 98–107)
CO2 SERPL-SCNC: 30 MMOL/L (ref 21–32)
CREAT SERPL-MCNC: 2.8 MG/DL (ref 0.7–1.3)
DEPRECATED RDW RBC AUTO: 20.6 % (ref 11.9–15.9)
EOSINOPHIL # BLD: 1.3 % (ref 0–4.5)
GLUCOSE SERPL-MCNC: 112 MG/DL (ref 74–106)
HCT VFR BLD CALC: 38.8 % (ref 35.4–49)
HGB BLD-MCNC: 12.4 GM/DL (ref 11.7–16.9)
INR BLD: 3.42 (ref 0.82–1.09)
LYMPHOCYTES # BLD: 31.9 % (ref 8–40)
MCH RBC QN AUTO: 26.5 PG (ref 25.7–33.7)
MCHC RBC AUTO-ENTMCNC: 32 G/DL (ref 32–35.9)
MCV RBC: 82.7 FL (ref 80–96)
MONOCYTES # BLD AUTO: 14.6 % (ref 3.8–10.2)
NEUTROPHILS # BLD: 51.7 % (ref 42.8–82.8)
PLATELET # BLD AUTO: 209 K/MM3 (ref 134–434)
PMV BLD: 7.9 FL (ref 7.5–11.1)
POTASSIUM SERPLBLD-SCNC: 3.9 MMOL/L (ref 3.5–5.1)
PROT SERPL-MCNC: 8.2 G/DL (ref 6.4–8.2)
PT PNL PPP: 38.7 SEC (ref 9.98–11.88)
RBC # BLD AUTO: 4.69 M/MM3 (ref 4–5.6)
SODIUM SERPL-SCNC: 136 MMOL/L (ref 136–145)
WBC # BLD AUTO: 5.8 K/MM3 (ref 4–10)

## 2018-02-04 RX ADMIN — TORSEMIDE SCH MG: 20 TABLET ORAL at 22:30

## 2018-02-04 RX ADMIN — BUDESONIDE AND FORMOTEROL FUMARATE DIHYDRATE SCH PUFF: 160; 4.5 AEROSOL RESPIRATORY (INHALATION) at 09:55

## 2018-02-04 RX ADMIN — ASPIRIN SCH MG: 81 TABLET, COATED ORAL at 09:54

## 2018-02-04 RX ADMIN — INSULIN ASPART SCH: 100 INJECTION, SOLUTION INTRAVENOUS; SUBCUTANEOUS at 06:14

## 2018-02-04 RX ADMIN — AZITHROMYCIN DIHYDRATE SCH MLS/HR: 500 INJECTION, POWDER, LYOPHILIZED, FOR SOLUTION INTRAVENOUS at 13:01

## 2018-02-04 RX ADMIN — SPIRONOLACTONE SCH MG: 25 TABLET, FILM COATED ORAL at 09:54

## 2018-02-04 RX ADMIN — ATORVASTATIN CALCIUM SCH MG: 40 TABLET, FILM COATED ORAL at 22:30

## 2018-02-04 RX ADMIN — BUDESONIDE AND FORMOTEROL FUMARATE DIHYDRATE SCH PUFF: 160; 4.5 AEROSOL RESPIRATORY (INHALATION) at 22:31

## 2018-02-04 RX ADMIN — RANITIDINE SCH MG: 150 TABLET ORAL at 09:53

## 2018-02-04 RX ADMIN — CEFTRIAXONE SCH MLS/HR: 1 INJECTION, SOLUTION INTRAVENOUS at 13:02

## 2018-02-04 RX ADMIN — INSULIN ASPART SCH UNITS: 100 INJECTION, SOLUTION INTRAVENOUS; SUBCUTANEOUS at 12:00

## 2018-02-04 RX ADMIN — MILRINONE LACTATE SCH MLS/HR: 200 INJECTION, SOLUTION INTRAVENOUS at 12:01

## 2018-02-04 RX ADMIN — INSULIN ASPART SCH: 100 INJECTION, SOLUTION INTRAVENOUS; SUBCUTANEOUS at 17:39

## 2018-02-04 RX ADMIN — TORSEMIDE SCH MG: 20 TABLET ORAL at 09:53

## 2018-02-04 RX ADMIN — INSULIN ASPART SCH UNITS: 100 INJECTION, SOLUTION INTRAVENOUS; SUBCUTANEOUS at 22:30

## 2018-02-04 RX ADMIN — ALLOPURINOL SCH MG: 100 TABLET ORAL at 09:54

## 2018-02-04 RX ADMIN — DOCUSATE SODIUM SCH MG: 100 CAPSULE, LIQUID FILLED ORAL at 22:30

## 2018-02-04 RX ADMIN — MULTIVITAMIN TABLET SCH TAB: TABLET at 09:54

## 2018-02-04 RX ADMIN — VITAMIN D, TAB 1000IU (100/BT) SCH UNIT: 25 TAB at 13:03

## 2018-02-04 RX ADMIN — MILRINONE LACTATE SCH MLS/HR: 200 INJECTION, SOLUTION INTRAVENOUS at 04:06

## 2018-02-04 RX ADMIN — OXYCODONE HYDROCHLORIDE AND ACETAMINOPHEN SCH MG: 500 TABLET ORAL at 09:53

## 2018-02-04 RX ADMIN — DOCUSATE SODIUM SCH MG: 100 CAPSULE, LIQUID FILLED ORAL at 09:54

## 2018-02-04 RX ADMIN — CARVEDILOL SCH MG: 6.25 TABLET, FILM COATED ORAL at 22:30

## 2018-02-04 RX ADMIN — CARVEDILOL SCH MG: 6.25 TABLET, FILM COATED ORAL at 09:54

## 2018-02-04 NOTE — PN
Progress Note (short form)





- Note


Progress Note: 





Subjective: The patient was seen and examined at the bedside, he reports 

feeling "the same" today. 


 Current Medications











Generic Name Dose Route Start Last Admin





  Trade Name Freq  PRN Reason Stop Dose Admin


 


Albuterol Sulfate  1 amp  02/03/18 15:34  





  Ventolin 0.083% Nebulizer Soln -  NEB   





  Q6H PRN   





  SHORT OF BREATH/WHEEZING   


 


Allopurinol  100 mg  02/04/18 10:00  02/04/18 09:54





  Zyloprim -  PO   100 mg





  DAILY JARROD   Administration


 


Ascorbic Acid  1,000 mg  02/04/18 10:00  02/04/18 09:53





  Vitamin C -  PO   1,000 mg





  DAILY JARROD   Administration


 


Aspirin  81 mg  02/04/18 10:00  02/04/18 09:54





  Ecotrin -  PO   81 mg





  DAILY JARROD   Administration


 


Atorvastatin Calcium  40 mg  02/03/18 22:00  02/03/18 21:33





  Lipitor -  PO   40 mg





  HS JARROD   Administration


 


Budesonide/Formoterol Fumarate  1 puff  02/03/18 22:00  02/04/18 09:55





  Symbicort 160/4.5mcg -  IH   1 puff





  BID JARROD   Administration


 


Bupropion HCl  75 mg  02/04/18 10:00  02/04/18 09:54





  Wellbutrin -  PO   75 mg





  DAILY JARROD   Administration


 


Carvedilol  6.25 mg  02/03/18 22:00  02/04/18 09:54





  Coreg -  PO   6.25 mg





  BID JARROD   Administration


 


Cholecalciferol  1,000 unit  02/04/18 11:36  





  Vitamin D3 -  PO   





  DAILY JARROD   


 


Docusate Sodium  100 mg  02/03/18 22:00  02/04/18 09:54





  Colace -  PO   100 mg





  BID JARROD   Administration


 


Ferrous Sulfate  325 mg  02/03/18 15:45  02/03/18 17:24





  Feosol -  PO   Not Given





  Q48H JARROD   


 


Milrinone Lactate/Dextrose  20,000 mcg in 100 mls @ 8.906 mls/hr  02/03/18 12:

00  02/04/18 12:01





  Milrinone 20mg/100ml Ivpb -  IVPB   0.35 mcg/kg/min





  TITR JARROD   8.906 mls/hr





  Protocol   Administration





  0.35 MCG/KG/MIN   


 


Azithromycin 250 mg/ Dextrose  250 mls @ 250 mls/hr  02/04/18 10:00  





  IVPB   





  DAILY JARROD   


 


CEFTRIAXONE 1 G/50 ML PREMIX  50 mls @ 100 mls/hr  02/04/18 11:16  





  Ceftriaxone 1 Gm-D5w Bag  IVPB   





  DAILY [facility]   


 


Insulin Aspart  1 vial  02/03/18 16:30  02/04/18 12:00





  Novolog Vial Sliding Scale -  SQ   4 units





  [facility] JARROD   Administration





  Protocol   


 


Multivitamins/Minerals/Vitamin C  1 tab  02/04/18 10:00  02/04/18 09:54





  Tab-A-Vit -  PO   1 tab





  DAILY JARROD   Administration


 


Ranitidine HCl  75 mg  02/04/18 10:00  02/04/18 09:53





  Zantac -  PO   75 mg





  DAILY JARROD   Administration


 


Spironolactone  25 mg  02/04/18 10:00  02/04/18 09:54





  Aldactone -  PO   25 mg





  DAILY JARROD   Administration


 


Torsemide  20 mg  02/03/18 22:00  02/04/18 09:53





  Demadex -  PO   20 mg





  BID JARROD   Administration


 


Warfarin Sodium  2 mg  02/03/18 18:00  02/03/18 19:00





  Coumadin -  PO   2 mg





  DAILY@1800 JARROD   Administration








Objective: 


 Vital Signs











 Period  Temp  Pulse  Resp  BP Sys/Alonso  Pulse Ox


 


 Last 24 Hr  98 F-98.7 F  60-76  18-22  /62-76  95-97








Physical Exam: 


General: 





 CBCD











WBC  5.8 K/mm3 (4.0-10.0)   02/04/18  06:07    


 


RBC  4.69 M/mm3 (4.00-5.60)   02/04/18  06:07    


 


Hgb  12.4 GM/dL (11.7-16.9)   02/04/18  06:07    


 


Hct  38.8 % (35.4-49)   02/04/18  06:07    


 


MCV  82.7 fl (80-96)   02/04/18  06:07    


 


MCHC  32.0 g/dl (32.0-35.9)   02/04/18  06:07    


 


RDW  20.6 % (11.9-15.9)  H  02/04/18  06:07    


 


Plt Count  209 K/MM3 (134-434)   02/04/18  06:07    


 


MPV  7.9 fl (7.5-11.1)   02/04/18  06:07    








 CMP











Sodium  136 mmol/L (136-145)   02/04/18  06:07    


 


Potassium  3.9 mmol/L (3.5-5.1)   02/04/18  06:07    


 


Chloride  94 mmol/L ()  L  02/04/18  06:07    


 


Carbon Dioxide  30 mmol/L (21-32)   02/04/18  06:07    


 


Anion Gap  12  (8-16)   02/04/18  06:07    


 


BUN  93 mg/dL (7-18)  H  02/04/18  06:07    


 


Creatinine  2.8 mg/dL (0.7-1.3)  H  02/04/18  06:07    


 


Creat Clearance w eGFR  21.97  (>60)   02/04/18  06:07    


 


Random Glucose  112 mg/dL ()  H D 02/04/18  06:07    


 


Calcium  8.5 mg/dL (8.5-10.1)   02/04/18  06:07    


 


Total Bilirubin  0.8 mg/dL (0.2-1.0)  D 02/04/18  06:07    


 


AST  27 U/L (15-37)   02/04/18  06:07    


 


ALT  18 U/L (12-78)   02/04/18  06:07    


 


Alkaline Phosphatase  207 U/L ()  H  02/04/18  06:07    


 


Total Protein  8.2 g/dl (6.4-8.2)   02/04/18  06:07    


 


Albumin  3.4 g/dl (3.4-5.0)   02/04/18  06:07    








 CARDIAC ENZYMES











Creatine Kinase  52 IU/L ()   02/03/18  21:05    


 


Troponin I  0.06 ng/ml (0.00-0.05)  H  02/03/18  21:05    








 Microbiology





02/03/18 11:35   Nasopharyngeal Swab   Influenza Types A,B Antigen (JOHAN) - Final


02/03/18 11:35   Nasopharyngeal Swab    - Final





Assessment: This is a 77 year old male with PMHx of anemia, asthma, a.fib, end 

stage systolic CHF (ICD placed 08/2017, on home milrinone), CAD (s/p MI 2011 

and stenting x2), aortic valve replacement?, COPD (on home O2), HTN, 

hypercholesterolemia, who presented to the ED with 1 week of cough and 

congestion. 





Plan: 


1) Community acquired pneumonia


- Chest X-ray with infiltrative changes


- Continue Azithromycin and Ceftriaxone


- Monitor WBC, fever curve


- Influenza A&B negative


- F/u urine legionella Ag





2) HARPREET


- Baseline Cr ~ 1.8


- F/u kidney/bladder ultrasound to r/o obstruction or hydronephrosis


- F/u urine electrolytes


- Avoid Ace/ARB


- Appreciate nephrology consult





3) Chronic systolic CF, end stage heart failure s/p pacemaker


- On home Milrinone, continue here


- Weight down about 6kg from previous admission


- Strict I&O


- Daily weights


- 1.5L fluid restriction


- Continue Toresemide for now at home dose. If worsening kidney function will 

consider holding


- Continue home medications





4) A.fib


- INR supratherapeutic, hold Coumadin tonight





5) CAD s/p stenting


- Continue ASA





6) Tricuspid valve repair


- aortic valve replacement? awaiting confirmation from wife


- Goal INR 2-3 per cards





7) Chronic COPD


- Does not appear to be in exacerbation at this time


- Continue Albuterol nebs


- Continue Symbicort


- Continue O2 via NC 





8) DM


- BGM ACHS


- ISS ACHS





9) F/E/N:


- Sodium controlled, diabetic diet


- Monitor electrolytes





10) Prophylaxis:


- On Coumadin


- PT evaluation





11) Dispo:


- Requires continued inpatient care





CODE STATUS: FULL CODE

## 2018-02-04 NOTE — CON.PULM
Consult


Consult Specialty:: PULMONARY


Referred by:: PATY Scott


Reason for Consultation:: shortness of breath





- History of Present Illness


Chief Complaint: shortness of breath


History of Present Illness: 





76yo male with h/o HTN, hypercholesterolemia, CAD, severe LV systolic 

dysfunction s/p ICD on home milrinone, atrial fibrillation, COPD, chronic 

hypoxic respiratory failure on home O2 who was admitted with worsening 

shortness of breath, cough and congestion x 1 week. Reports his wife sick with 

URI symptoms. +cough mostly nonproductive. No fevers, chills or sweats. No 

nausea or vomiting. No chest pain or palpitations. 





- History Source


History Provided By: Patient, Medical Record


Limitations to Obtaining History: No Limitations





- Past Medical History


Cardio/Vascular: Yes: AFIB, CAD, CHF, HTN, Hyperlipdemia, MI, Pulmonary 

Hypertension, Other (AICD placed)


Pulmonary: Yes: COPD, O2 Dependent


Renal/: Yes: Renal Inusuff, BPH


Endocrine: Yes: Diabetes Mellitus





- Past Surgical History


Past Surgical History: Yes: AICD, CABG, Hernia Repair





- Alcohol/Substance Use


Hx Alcohol Use: No


History of Substance Use: reports: None





- Smoking History


Smoking history: Former smoker


Have you smoked in the past 12 months: No


Aproximately how many cigarettes per day: 40


If you are a former smoker, when did you quit?: 2011





- Social History


Usual Living Arrangement: With Spouse


ADL: Independent


History of Recent Travel: No





Home Medications





- Allergies


Allergies/Adverse Reactions: 


 Allergies











Allergy/AdvReac Type Severity Reaction Status Date / Time


 


No Known Drug Allergies Allergy   Verified 02/03/18 10:37


 


shellfish derived Allergy   Verified 02/03/18 10:37














- Home Medications


Home Medications: 


Ambulatory Orders





Allopurinol [Zyloprim -] 100 mg PO DAILY 01/14/16 


Atorvastatin Ca [Lipitor] 40 mg PO HS 01/14/16 


Carvedilol 6.25 mg PO BID 01/14/16 


Docusate Sodium [Colace -] 100 mg PO BID 01/14/16 


Insulin NPL/Insulin Lispro [Humalog Mix 75-25 Vial] 0 unit SQ PRN 01/14/16 


Milrinone Lactate/D5w [Milrinone 0.2 mg/ml in D5w] 0 mg IV DAILY 01/14/16 


Ranitidine [Zantac -] 75 mg PO DAILY 01/14/16 


Albuterol 0.083% Nebulizer Sol [Ventolin 0.083% Nebulizer Soln -] 1 amp NEB Q6H 

PRN #120 amp 01/18/16 


Ferrous Sulfate [Feosol] 325 mg PO Q48H  ud 08/27/16 


Warfarin Na [Coumadin -] 2 mg PO DAILY@1800 01/10/17 


Bupropion HCl [Wellbutrin -] 75 mg DAILY 01/12/17 


Ascorbic Acid [Vitamin C -] 1,000 mg PO DAILY 03/30/17 


Cholecalciferol (Vitamin D3) [Vitamin D -] 1,000 unit PO DAILY 03/30/17 


Multivitamin [Poly-Vitamin] 1 each PO DAILY 03/30/17 


Budesonide/Formeterol Fumarate [SYMBICORT 160/4.5mcg -] 1 inh PO BID 06/23/17 


Torsemide [Demadex -] 20 mg PO BID 06/23/17 


Aspirin 81 mg PO DAILY 02/03/18 


Cholecalciferol (Vitamin D3) [Vitamin D3 -] 1,000 unit PO DAILY 02/03/18 


Levothyroxine [Synthroid -] 25 mcg PO DAILY 02/03/18 


Metolazone 2.5 mg PO DAILY 02/03/18 


Spironolactone 25 mg PO DAILY 02/03/18 


Warfarin Na [Coumadin] 2.5 mg PO ASDIR 02/03/18 











Family Disease History





- Family Disease History


Family Disease History: Diabetes: Father, Heart Disease: Father





Review of Systems





- Review of Systems


Constitutional: reports: Weakness.  denies: Chills, Fever


Eyes: denies: Recent Change in Vision


HENT: reports: Nasal Congestion, Throat Pain


Neck: denies: Stiffness, Tenderness


Cardiovascular: reports: Shortness of Breath.  denies: Chest Pain, Edema, 

Palpitations


Respiratory: reports: Cough, Exercise Intolerance, SOB.  denies: Hemoptysis, 

Wheezing


Gastrointestinal: denies: Abdominal Pain, Nausea, Vomiting


Genitourinary: denies: Dysuria, Hematuria


Neurological: denies: Headache





Physical Exam


Vital Sings: 


 Vital Signs











Temperature  98 F   02/04/18 10:00


 


Pulse Rate  75   02/04/18 10:00


 


Respiratory Rate  22   02/04/18 10:00


 


Blood Pressure  99/62   02/04/18 10:00


 


O2 Sat by Pulse Oximetry (%)  96   02/04/18 09:00











Constitutional: Yes: Calm


Eyes: Yes: Conjunctiva Clear, EOM Intact


HENT: Yes: Atraumatic, Normocephalic


Neck: Yes: Supple, Trachea Midline


Cardiovascular: Yes: Pulse Irregular


Respiratory: Yes: Rales (scattered), Rhonchi (scattered).  No: Wheezes


...Clubbing: No


Gastrointestinal: Yes: Normal Bowel Sounds, Soft.  No: Tenderness


Edema: No


Labs: 


 CBC, BMP





 02/04/18 06:07 





 02/04/18 06:07 











Imaging





- Results


Chest X-ray: Report Reviewed, Image Reviewed (congestive changes)





Assessment/Plan





URI


Severe LV Systolic Dysfunction s/p ICD


Pulmonary HTN


COPD


Chronic Hypoxic Respiratory Failure


Atrial Fibrillation


Acute on Chronic Renal Failure


HTN





-  on empiric antibiotics


-  f/u cultures


-  inhaled bronchodilators


-  can monitor off systemic steroids at this time


-  O2 to keep SpO2 >90%


-  CXR findings appear unchanged or slightly improved from last, appears 

relatively euvolemic


-  monitor urine output, creatinine


-  rate control


-  continue anticoagulation


-  continue home milrinone





Thank you for this consult


Giovany Acuna MD

## 2018-02-04 NOTE — CON.NEP
Consult


Consult Specialty:: Nephrology


Referred by:: PATY Scott


Reason for Consultation:: HARPREET on CKD in setting of HF





- History of Present Illness


Chief Complaint: Cough/SOB


History of Present Illness: 





This is a 77 year old gentleman with PMhx of End stage HF on Milrinone gtt, AICD

, CKD (baseline CR 1.7-2), CAD, AVR, COPD, Hypertension who presented to the ED 

with SOB and found to have infiltrative and congestive changes on CXR with Cr 

of 2.8. Pt reports good urine output at home, sometimes has urinary hesitency. 

No hematuira, flank pain, dysuria. + NSAID use at home (Motrin Q4h as needed to 

go to sleep). No recent contrast exposure. No N/V/D. No fever or chills. 

Reports that he feels about the same in the hospital as he did prior to getting 

here. 





- History Source


History Provided By: Patient


Limitations to Obtaining History: No Limitations





- Past Medical History


Cardio/Vascular: Yes: AFIB, CAD, CHF, HTN, Hyperlipdemia, MI, Pulmonary 

Hypertension, Other (AICD placed)


Pulmonary: Yes: COPD, O2 Dependent


Renal/: Yes: Renal Inusuff, BPH


Endocrine: Yes: Diabetes Mellitus





- Past Surgical History


Past Surgical History: Yes: AICD, CABG, Hernia Repair





- Alcohol/Substance Use


Hx Alcohol Use: No


History of Substance Use: reports: None





- Smoking History


Smoking history: Former smoker


Have you smoked in the past 12 months: No


Aproximately how many cigarettes per day: 40


If you are a former smoker, when did you quit?: 2011





- Social History


Usual Living Arrangement: With Spouse


ADL: Independent


History of Recent Travel: No





Home Medications





- Allergies


Allergies/Adverse Reactions: 


 Allergies











Allergy/AdvReac Type Severity Reaction Status Date / Time


 


No Known Drug Allergies Allergy   Verified 02/03/18 10:37


 


shellfish derived Allergy   Verified 02/03/18 10:37














- Home Medications


Home Medications: 


Ambulatory Orders





Allopurinol [Zyloprim -] 100 mg PO DAILY 01/14/16 


Atorvastatin Ca [Lipitor] 40 mg PO HS 01/14/16 


Carvedilol 6.25 mg PO BID 01/14/16 


Docusate Sodium [Colace -] 100 mg PO BID 01/14/16 


Insulin NPL/Insulin Lispro [Humalog Mix 75-25 Vial] 0 unit SQ PRN 01/14/16 


Milrinone Lactate/D5w [Milrinone 0.2 mg/ml in D5w] 0 mg IV DAILY 01/14/16 


Ranitidine [Zantac -] 75 mg PO DAILY 01/14/16 


Albuterol 0.083% Nebulizer Sol [Ventolin 0.083% Nebulizer Soln -] 1 amp NEB Q6H 

PRN #120 amp 01/18/16 


Ferrous Sulfate [Feosol] 325 mg PO Q48H  ud 08/27/16 


Warfarin Na [Coumadin -] 2 mg PO DAILY@1800 01/10/17 


Bupropion HCl [Wellbutrin -] 75 mg DAILY 01/12/17 


Ascorbic Acid [Vitamin C -] 1,000 mg PO DAILY 03/30/17 


Cholecalciferol (Vitamin D3) [Vitamin D -] 1,000 unit PO DAILY 03/30/17 


Multivitamin [Poly-Vitamin] 1 each PO DAILY 03/30/17 


Budesonide/Formeterol Fumarate [SYMBICORT 160/4.5mcg -] 1 inh PO BID 06/23/17 


Torsemide [Demadex -] 20 mg PO BID 06/23/17 


Aspirin 81 mg PO DAILY 02/03/18 


Cholecalciferol (Vitamin D3) [Vitamin D3 -] 1,000 unit PO DAILY 02/03/18 


Levothyroxine [Synthroid -] 25 mcg PO DAILY 02/03/18 


Metolazone 2.5 mg PO DAILY 02/03/18 


Spironolactone 25 mg PO DAILY 02/03/18 


Warfarin Na [Coumadin] 2.5 mg PO ASDIR 02/03/18 











Family Disease History





- Family Disease History


Family Disease History: Diabetes: Father, Heart Disease: Father





Review of Systems





- Review of Systems


Constitutional: denies: Chills, Fever, Loss of Appetite, Night Sweats


Eyes: reports: No Symptoms


HENT: reports: No Symptoms


Neck: reports: No Symptoms


Cardiovascular: reports: Edema, Shortness of Breath


Respiratory: reports: Cough, Orthopnea, SOB.  denies: SOB on Exertion


Gastrointestinal: reports: No Symptoms


Genitourinary: reports: No Symptoms


Musculoskeletal: reports: No Symptoms


Integumentary: reports: No Symptoms


Neurological: reports: No Symptoms





Nephrology Consult





- Height


Height: 6 ft





- Weight


Weight: 84.141 kg





- BMI


Body Mass Index (BMI): 25.1





- Lab Results


CBC,BMP: 


 CBC, BMP





 02/04/18 06:07 





 02/04/18 06:07 








Anion Gap: 


 Anion Gap











Anion Gap  12  (8-16)   02/04/18  06:07    














- Imaging


Chest X-ray: Report Reviewed





- Physical Examination


Vital Signs: 


 Vital Signs











Temperature  98.1 F   02/04/18 05:54


 


Pulse Rate  72   02/04/18 05:54


 


Respiratory Rate  18   02/04/18 05:54


 


Blood Pressure  118/67   02/04/18 05:54


 


O2 Sat by Pulse Oximetry (%)  95   02/04/18 05:55











Constitutional: Yes: No Distress


Eyes: Yes: Conjunctiva Clear


HENT: Yes: Atraumatic


Neck: Yes: Supple, Trachea Midline


Cardiovascular: Yes: Regular Rate and Rhythm


Respiratory: Yes: Regular, Diminished, On Nasal O2, Wheezes


Gastrointestinal: Yes: Normal Bowel Sounds, Soft


Renal/: No: Anuria, Bladder Distention, CVA Tenderness - Left, CVA Tenderness 

- Right, Hematuria


Extremities: No: Cold, Cool, Cyanosis


Edema: Yes


Edema: LLE: Trace, RLE: Trace


Neurological: Yes: Alert, Oriented





Assessment/Plan





77 year old gentleman with PMhx of End stage HF on Milrinone gtt, AICD, CKD (

baseline CR 1.7-2), CAD, AVR, COPD, Hypertension who presented to the ED with 

SOB and found to have infiltrative and congestive changes on CXR with Cr of 2.8.





#SOB/Cough secondary to PNA +/- acute HF


s/p IV Abx, on Ceftriaxone and Azithromax now


f/u cultures


Influenza negative


on Milirone gtt and Toresemide


Cardiology to eval





#HARPREET on CKD


? ATN from NSAIDs vs renal hypoprofusion 


check urine for FeURea


f/u imaging of bladder and kidney 


continue diurtics as needed for HF


Trend BUN/Cr


avoid any further nsaids


can continue aldactone for now


would avoid introduction of ACE/ARB for now 





Thank you 


Will follow








Michael East DO





 Current Medications





Albuterol Sulfate (Ventolin 0.083% Nebulizer Soln -)  1 amp NEB Q6H PRN


   PRN Reason: SHORT OF BREATH/WHEEZING


Allopurinol (Zyloprim -)  100 mg PO DAILY JARROD


Ascorbic Acid (Vitamin C -)  1,000 mg PO DAILY [facility]


Aspirin (Ecotrin -)  81 mg PO DAILY [facility]


Atorvastatin Calcium (Lipitor -)  40 mg PO HS [facility]


   Last Admin: 02/03/18 21:33 Dose:  40 mg


Budesonide/Formoterol Fumarate (Symbicort 160/4.5mcg -)  1 puff IH BID [facility]


   Last Admin: 02/03/18 21:48 Dose:  1 puff


Bupropion HCl (Wellbutrin -)  75 mg PO DAILY [facility]


Carvedilol (Coreg -)  6.25 mg PO BID [facility]


   Last Admin: 02/03/18 21:33 Dose:  6.25 mg


Cholecalciferol (Vitamin D3 -)  1,000 unit PO DAILY [facility]


Docusate Sodium (Colace -)  100 mg PO BID [facility]


   Last Admin: 02/03/18 21:33 Dose:  100 mg


Ferrous Sulfate (Feosol -)  325 mg PO Q48H [facility]


   Last Admin: 02/03/18 17:24 Dose:  Not Given


Milrinone Lactate/Dextrose (Milrinone 20mg/100ml Ivpb -)  20,000 mcg in 100 mls 

@ 8.906 mls/hr IVPB TITR JARROD; 0.35 MCG/KG/MIN


   PRN Reason: Protocol


   Last Admin: 02/04/18 04:06 Dose:  0.35 mcg/kg/min, 8.906 mls/hr


Azithromycin 250 mg/ Dextrose  250 mls @ 250 mls/hr IVPB DAILY [facility]


Ceftriaxone Sodium 1 gm/ (Dextrose)  50 mls @ 100 mls/hr IVPB DAILY [facility]


Insulin Aspart (Novolog Vial Sliding Scale -)  1 vial SQ ACHS [facility]


   PRN Reason: Protocol


   Last Admin: 02/04/18 06:14 Dose:  Not Given


Multivitamins/Minerals/Vitamin C (Tab-A-Vit -)  1 tab PO DAILY [facility]


Ranitidine HCl (Zantac -)  75 mg PO DAILY [facility]


Spironolactone (Aldactone -)  25 mg PO DAILY [facility]


Torsemide (Demadex -)  20 mg PO BID [facility]


   Last Admin: 02/03/18 21:33 Dose:  20 mg


Warfarin Sodium (Coumadin -)  2 mg PO DAILY@1800 [facility]


   Last Admin: 02/03/18 19:00 Dose:  2 mg

## 2018-02-04 NOTE — CON.CARD
Consult


Consult Specialty:: cardio


Referred by:: hospitalist (for maria teresa)





- History of Present Illness


Chief Complaint: sob, cough


History of Present Illness: 





76 yo male here with cough.





he reports frequent cough for approx 1 week.


usually dry, sometimes white phlegm.


he denies sob during this time.


no abd or leg swelling.


home wt stable around 184-185 per pt.


He states he has been using his inhaler with no significant relief.


denies sore throat, fever/chills, admits to rhinorrhea





no cp, palpitations








PMH:


syst CHF


CAD s/p MI and PCI


afib


CKD


copd





- Past Medical History


Cardio/Vascular: Yes: AFIB, CAD, CHF, HTN, Hyperlipdemia, MI, Pulmonary 

Hypertension, Other (AICD placed)


Pulmonary: Yes: COPD, O2 Dependent


Renal/: Yes: Renal Inusuff, BPH


Endocrine: Yes: Diabetes Mellitus





- Past Surgical History


Past Surgical History: Yes: AICD, CABG, Hernia Repair





- Alcohol/Substance Use


Hx Alcohol Use: No


History of Substance Use: reports: None





- Smoking History


Smoking history: Former smoker


Have you smoked in the past 12 months: No


Aproximately how many cigarettes per day: 40


If you are a former smoker, when did you quit?: 2011





- Social History


Usual Living Arrangement: With Spouse


ADL: Independent


History of Recent Travel: No





Home Medications





- Allergies


Allergies/Adverse Reactions: 


 Allergies











Allergy/AdvReac Type Severity Reaction Status Date / Time


 


No Known Drug Allergies Allergy   Verified 02/03/18 10:37


 


shellfish derived Allergy   Verified 02/03/18 10:37














- Home Medications


Home Medications: 


Ambulatory Orders





Allopurinol [Zyloprim -] 100 mg PO DAILY 01/14/16 


Atorvastatin Ca [Lipitor] 40 mg PO HS 01/14/16 


Carvedilol 6.25 mg PO BID 01/14/16 


Docusate Sodium [Colace -] 100 mg PO BID 01/14/16 


Insulin NPL/Insulin Lispro [Humalog Mix 75-25 Vial] 0 unit SQ PRN 01/14/16 


Milrinone Lactate/D5w [Milrinone 0.2 mg/ml in D5w] 0 mg IV DAILY 01/14/16 


Ranitidine [Zantac -] 75 mg PO DAILY 01/14/16 


Albuterol 0.083% Nebulizer Sol [Ventolin 0.083% Nebulizer Soln -] 1 amp NEB Q6H 

PRN #120 amp 01/18/16 


Ferrous Sulfate [Feosol] 325 mg PO Q48H  ud 08/27/16 


Warfarin Na [Coumadin -] 2 mg PO DAILY@1800 01/10/17 


Bupropion HCl [Wellbutrin -] 75 mg DAILY 01/12/17 


Ascorbic Acid [Vitamin C -] 1,000 mg PO DAILY 03/30/17 


Cholecalciferol (Vitamin D3) [Vitamin D -] 1,000 unit PO DAILY 03/30/17 


Multivitamin [Poly-Vitamin] 1 each PO DAILY 03/30/17 


Budesonide/Formeterol Fumarate [SYMBICORT 160/4.5mcg -] 1 inh PO BID 06/23/17 


Torsemide [Demadex -] 20 mg PO BID 06/23/17 


Aspirin 81 mg PO DAILY 02/03/18 


Cholecalciferol (Vitamin D3) [Vitamin D3 -] 1,000 unit PO DAILY 02/03/18 


Levothyroxine [Synthroid -] 25 mcg PO DAILY 02/03/18 


Metolazone 2.5 mg PO DAILY 02/03/18 


Spironolactone 25 mg PO DAILY 02/03/18 


Warfarin Na [Coumadin] 2.5 mg PO ASDIR 02/03/18 











Family Disease History





- Family Disease History


Family Disease History: Diabetes: Father, Heart Disease: Father





Review of Systems





- Review of Systems


Constitutional: denies: Chills, Fever


Eyes: denies: Eye Pain


HENT: denies: Nasal Congestion


Neck: denies: Stiffness


Cardiovascular: denies: Palpitations


Respiratory: denies: Orthopnea, PND


Gastrointestinal: denies: Diarrhea, Rectal Bleeding


Genitourinary: denies: Burning, Hematuria


Musculoskeletal: denies: Muscle Pain


Integumentary: denies: Rash


Neurological: denies: Numbness, Seizure, Syncope


Endocrine: denies: Excessive Sweating


Hematology/Lymphatic: denies: Excessive Bleeding


Vital Signs: 


 Vital Signs











Temperature  98.1 F   02/04/18 05:54


 


Pulse Rate  72   02/04/18 05:54


 


Respiratory Rate  18   02/04/18 05:54


 


Blood Pressure  118/67   02/04/18 05:54


 


O2 Sat by Pulse Oximetry (%)  95   02/04/18 05:55











Constitutional: Yes: Well Nourished, No Distress


Eyes: No: Sclera Icterus


HENT: No: Nasal Congestion


Neck: No: Decreased ROM


Respiratory: Yes: CTA Bilaterally (decr sounds diffusely (sec to copd)).  No: 

Accessory Muscle Use, Rales, Wheezes


Gastrointestinal: Yes: Normal Bowel Sounds.  No: Distention, Hepatomegaly, 

Palpable Mass, Tenderness


Cardiovascular: Yes: Regular Rate and Rhythm


JVD: Yes


Carotid Bruit: No


PMI: Non-Displaced


Heart Sounds: Yes: S1, S2.  No: Gallop


Murmur: No: Systolic Murmur, Diastolic Murmur


Musculoskeletal: Yes: Other (No kyphosis)


Extremities: No: Cold, Cyanosis


Edema: No


Peripheral Pulses: 2+ Left Carotid, 2+ Right Carotid, 2+ Left Doralis Pedis, 2+ 

Right Dorsalis Pedis


Integumentary: No: Jaundice


Neurological: Yes: Alert, Oriented (x3)


Psychiatric: No: Agitated





- Other Data


Labs, Other Data: 


 INR, PTT











INR  3.42  (0.82-1.09)  H  02/04/18  06:07    








 Troponin, BNP











  02/03/18 02/03/18 02/03/18





  10:25 10:25 11:35


 


Troponin I   0.05  Cancelled


 


B-Natriuretic Peptide  2861.75 H  














  02/03/18 02/03/18





  11:50 21:05


 


Troponin I   0.06 H


 


B-Natriuretic Peptide  Cancelled 








 Troponin, BNP











  02/03/18 02/03/18 02/03/18





  10:25 10:25 11:35


 


Troponin I   0.05  Cancelled


 


B-Natriuretic Peptide  2861.75 H  














  02/03/18 02/03/18





  11:50 21:05


 


Troponin I   0.06 H


 


B-Natriuretic Peptide  Cancelled 








 Laboratory Tests











  02/03/18 02/03/18 02/03/18





  10:25 10:25 11:50


 


WBC    6.7


 


Hgb    12.4


 


Plt Count    179


 


INR   


 


Sodium   134 L 


 


Potassium   4.0 


 


Carbon Dioxide   30  D 


 


BUN   97 H D 


 


Creatinine   3.0 H D 


 


AST   26  D 


 


ALT   20 


 


Troponin I   0.05 


 


B-Natriuretic Peptide  2861.75 H  














  02/04/18





  06:07


 


WBC 


 


Hgb 


 


Plt Count 


 


INR  3.42 H


 


Sodium 


 


Potassium 


 


Carbon Dioxide 


 


BUN 


 


Creatinine 


 


AST 


 


ALT 


 


Troponin I 


 


B-Natriuretic Peptide 














tele: v-paced





Assessment/Plan





CXR (images reviewed): trace-small bilat effusions; vasc redistribution 

pattern. alveolar and interstitial infiltrates bilat low/mid lung fields. no 

signif change vs 7/3 prior





ECG: NSR, bi-V paced





Echo 10/2016: sev lve, global hk, sev dec lvef, mild rve, nl rv fcn, patty, mild-

mod mr, mv ring, tv ring, mild tr, mild pr, mild phtn, ivc mild dil

















a/p:  76 year old man with PMH of IDDM, COPD (O2 dependant) HTN, HLD, Afib (on 

coumadin), CAD s/p CABG 2013, end stage CHF with ICD placement (on milrinone 

pump), PVD, BPH here with sob.





URI sx's (cough, rhinorrhea), h/o copd:


- flu swab negative


- ? if a.e. copd at present


- per hospitalist





chronic systolic CHF, end stage systolic heart failure s/p ICD, on home 

milrinone infusion:


- pt had cardiomems PAP monitor implant with dr de la rosa ([facility]) and has been 

managed by him since last admit here.


- on destination therapy home milrinone infusion, ? if dr de la rosa increased the 

dose.


- baseline renal fxn has worsened on recent labs (high 2's range) 


- wt 202 on discharge 7/17 following diuresis with lasix 100 iv bid, metolazone 

5mg. currently 185


- BNP 2K (prior range 2K-7K), low GFR confounds


- ER labs appear drier than recent baseline, but bun/creat today back down in 

his recent baseline range. CXR appears unchanged vs 7/17. suspect he is not 

decompensated despite JVD (chronic in him)--no periph edema which is 

consistently CHF finding in him, and no increase in wt.


- please confirm home torsemide regimen with wife.


- receiving torsemide 20 bid here. low threshold to hold diuretic if renal fxn 

worsens further.





HARPREET on CKD, h/o cardiorenal syndrome:


- recent outpt creat ranging 2.6-2.9 range


- bun/creat above baseline here





afib


- rate controlled on coreg


- cont coumadin per INR (target 2-3)





VT:


- intermittent brief runs of NSVT on tele, chronic finding on prior admits


- has ICD


- on max tolerated coreg dose


- replete lytes prn





CAD s/p MI with PCI (and ? TV repair at that time):


- No anginal symptoms, no signs acs.


- Con't home ac, plavix, atorvastatin, coreg





PVD


- con't AC, statin.





HTN: controlled on coreg





HLD: con't statin.

## 2018-02-05 LAB
ALBUMIN SERPL-MCNC: 3.4 G/DL (ref 3.4–5)
ALP SERPL-CCNC: 196 U/L (ref 45–117)
ALT SERPL-CCNC: 18 U/L (ref 12–78)
ANION GAP SERPL CALC-SCNC: 11 MMOL/L (ref 8–16)
AST SERPL-CCNC: 24 U/L (ref 15–37)
BASOPHILS # BLD: 0.3 % (ref 0–2)
BILIRUB SERPL-MCNC: 0.8 MG/DL (ref 0.2–1)
BUN SERPL-MCNC: 101 MG/DL (ref 7–18)
CALCIUM SERPL-MCNC: 8.4 MG/DL (ref 8.5–10.1)
CHLORIDE SERPL-SCNC: 95 MMOL/L (ref 98–107)
CO2 SERPL-SCNC: 29 MMOL/L (ref 21–32)
CREAT SERPL-MCNC: 2.8 MG/DL (ref 0.7–1.3)
DEPRECATED RDW RBC AUTO: 20.2 % (ref 11.9–15.9)
EOSINOPHIL # BLD: 1.7 % (ref 0–4.5)
GLUCOSE SERPL-MCNC: 121 MG/DL (ref 74–106)
HCT VFR BLD CALC: 36.9 % (ref 35.4–49)
HGB BLD-MCNC: 11.9 GM/DL (ref 11.7–16.9)
INR BLD: 3.56 (ref 0.82–1.09)
LYMPHOCYTES # BLD: 21.9 % (ref 8–40)
MAGNESIUM SERPL-MCNC: 3 MG/DL (ref 1.8–2.4)
MCH RBC QN AUTO: 26.6 PG (ref 25.7–33.7)
MCHC RBC AUTO-ENTMCNC: 32.2 G/DL (ref 32–35.9)
MCV RBC: 82.5 FL (ref 80–96)
MONOCYTES # BLD AUTO: 17.1 % (ref 3.8–10.2)
NEUTROPHILS # BLD: 59 % (ref 42.8–82.8)
PHOSPHATE SERPL-MCNC: 3.8 MG/DL (ref 2.5–4.9)
PLATELET # BLD AUTO: 226 K/MM3 (ref 134–434)
PMV BLD: 8.4 FL (ref 7.5–11.1)
POTASSIUM SERPLBLD-SCNC: 4.3 MMOL/L (ref 3.5–5.1)
PROT SERPL-MCNC: 8.2 G/DL (ref 6.4–8.2)
PT PNL PPP: 40.2 SEC (ref 9.98–11.88)
RBC # BLD AUTO: 4.48 M/MM3 (ref 4–5.6)
SODIUM SERPL-SCNC: 135 MMOL/L (ref 136–145)
WBC # BLD AUTO: 6.9 K/MM3 (ref 4–10)

## 2018-02-05 RX ADMIN — OXYCODONE HYDROCHLORIDE AND ACETAMINOPHEN SCH MG: 500 TABLET ORAL at 09:22

## 2018-02-05 RX ADMIN — INSULIN ASPART SCH UNITS: 100 INJECTION, SOLUTION INTRAVENOUS; SUBCUTANEOUS at 12:26

## 2018-02-05 RX ADMIN — INSULIN ASPART SCH: 100 INJECTION, SOLUTION INTRAVENOUS; SUBCUTANEOUS at 17:31

## 2018-02-05 RX ADMIN — DOCUSATE SODIUM SCH MG: 100 CAPSULE, LIQUID FILLED ORAL at 21:40

## 2018-02-05 RX ADMIN — BUDESONIDE AND FORMOTEROL FUMARATE DIHYDRATE SCH PUFF: 160; 4.5 AEROSOL RESPIRATORY (INHALATION) at 21:53

## 2018-02-05 RX ADMIN — SPIRONOLACTONE SCH MG: 25 TABLET, FILM COATED ORAL at 09:23

## 2018-02-05 RX ADMIN — CEFTRIAXONE SCH MLS/HR: 1 INJECTION, SOLUTION INTRAVENOUS at 09:22

## 2018-02-05 RX ADMIN — RANITIDINE SCH MG: 150 TABLET ORAL at 09:23

## 2018-02-05 RX ADMIN — TORSEMIDE SCH MG: 20 TABLET ORAL at 21:40

## 2018-02-05 RX ADMIN — ATORVASTATIN CALCIUM SCH MG: 40 TABLET, FILM COATED ORAL at 21:40

## 2018-02-05 RX ADMIN — MULTIVITAMIN TABLET SCH TAB: TABLET at 09:22

## 2018-02-05 RX ADMIN — CARVEDILOL SCH MG: 6.25 TABLET, FILM COATED ORAL at 21:40

## 2018-02-05 RX ADMIN — INSULIN ASPART SCH UNITS: 100 INJECTION, SOLUTION INTRAVENOUS; SUBCUTANEOUS at 21:40

## 2018-02-05 RX ADMIN — TORSEMIDE SCH MG: 20 TABLET ORAL at 09:23

## 2018-02-05 RX ADMIN — INSULIN ASPART SCH: 100 INJECTION, SOLUTION INTRAVENOUS; SUBCUTANEOUS at 06:01

## 2018-02-05 RX ADMIN — VITAMIN D, TAB 1000IU (100/BT) SCH UNIT: 25 TAB at 09:23

## 2018-02-05 RX ADMIN — AZITHROMYCIN DIHYDRATE SCH MLS/HR: 500 INJECTION, POWDER, LYOPHILIZED, FOR SOLUTION INTRAVENOUS at 09:24

## 2018-02-05 RX ADMIN — DOCUSATE SODIUM SCH MG: 100 CAPSULE, LIQUID FILLED ORAL at 09:23

## 2018-02-05 RX ADMIN — ALLOPURINOL SCH MG: 100 TABLET ORAL at 09:22

## 2018-02-05 RX ADMIN — MILRINONE LACTATE SCH MLS/HR: 200 INJECTION, SOLUTION INTRAVENOUS at 22:06

## 2018-02-05 RX ADMIN — MILRINONE LACTATE SCH MLS/HR: 200 INJECTION, SOLUTION INTRAVENOUS at 12:26

## 2018-02-05 RX ADMIN — FERROUS SULFATE TAB EC 324 MG (65 MG FE EQUIVALENT) SCH MG: 324 (65 FE) TABLET DELAYED RESPONSE at 15:19

## 2018-02-05 RX ADMIN — BUDESONIDE AND FORMOTEROL FUMARATE DIHYDRATE SCH PUFF: 160; 4.5 AEROSOL RESPIRATORY (INHALATION) at 09:23

## 2018-02-05 RX ADMIN — WARFARIN SCH MG: 5 TABLET ORAL at 18:22

## 2018-02-05 RX ADMIN — CARVEDILOL SCH MG: 6.25 TABLET, FILM COATED ORAL at 09:22

## 2018-02-05 RX ADMIN — ASPIRIN SCH MG: 81 TABLET, COATED ORAL at 09:23

## 2018-02-05 NOTE — PN
Progress Note, Physician


History of Present Illness: 





pulmonary





alert,feeling better,less dyspneic





- Current Medication List


Current Medications: 


Active Medications





Albuterol Sulfate (Ventolin 0.083% Nebulizer Soln -)  1 amp NEB Q6H PRN


   PRN Reason: SHORT OF BREATH/WHEEZING


Allopurinol (Zyloprim -)  100 mg PO DAILY [facility]


   Last Admin: 02/05/18 09:22 Dose:  100 mg


Ascorbic Acid (Vitamin C -)  1,000 mg PO DAILY [facility]


   Last Admin: 02/05/18 09:22 Dose:  1,000 mg


Aspirin (Ecotrin -)  81 mg PO DAILY [facility]


   Last Admin: 02/05/18 09:23 Dose:  81 mg


Atorvastatin Calcium (Lipitor -)  40 mg PO HS [facility]


   Last Admin: 02/04/18 22:30 Dose:  40 mg


Budesonide/Formoterol Fumarate (Symbicort 160/4.5mcg -)  1 puff IH BID [facility]


   Last Admin: 02/05/18 09:23 Dose:  1 puff


Bupropion HCl (Wellbutrin -)  75 mg PO DAILY [facility]


   Last Admin: 02/05/18 09:22 Dose:  75 mg


Carvedilol (Coreg -)  6.25 mg PO BID [facility]


   Last Admin: 02/05/18 09:22 Dose:  6.25 mg


Cholecalciferol (Vitamin D3 -)  1,000 unit PO DAILY [facility]


   Last Admin: 02/05/18 09:23 Dose:  1,000 unit


Docusate Sodium (Colace -)  100 mg PO BID [facility]


   Last Admin: 02/05/18 09:23 Dose:  100 mg


Ferrous Sulfate (Feosol -)  325 mg PO Q48H [facility]


   Last Admin: 02/03/18 17:24 Dose:  Not Given


Milrinone Lactate/Dextrose (Milrinone 20mg/100ml Ivpb -)  20,000 mcg in 100 mls 

@ 8.906 mls/hr IVPB TITR [facility]; 0.35 MCG/KG/MIN


   PRN Reason: Protocol


   Last Admin: 02/04/18 12:01 Dose:  0.35 mcg/kg/min, 8.906 mls/hr


Azithromycin 250 mg/ Dextrose  250 mls @ 250 mls/hr IVPB DAILY [facility]


   Last Admin: 02/05/18 09:24 Dose:  250 mls/hr


CEFTRIAXONE 1 G/50 ML PREMIX (Ceftriaxone 1 Gm-D5w Bag)  50 mls @ 100 mls/hr 

IVPB DAILY [facility]


   Last Admin: 02/05/18 09:22 Dose:  100 mls/hr


Insulin Aspart (Novolog Vial Sliding Scale -)  1 vial SQ ACHS [facility]


   PRN Reason: Protocol


   Last Admin: 02/05/18 06:01 Dose:  Not Given


Multivitamins/Minerals/Vitamin C (Tab-A-Vit -)  1 tab PO DAILY [facility]


   Last Admin: 02/05/18 09:22 Dose:  1 tab


Ranitidine HCl (Zantac -)  75 mg PO DAILY [facility]


   Last Admin: 02/05/18 09:23 Dose:  75 mg


Spironolactone (Aldactone -)  25 mg PO DAILY [facility]


   Last Admin: 02/05/18 09:23 Dose:  25 mg


Torsemide (Demadex -)  20 mg PO BID [facility]


   Last Admin: 02/05/18 09:23 Dose:  20 mg


Warfarin Sodium (Coumadin -)  2 mg PO DAILY@1800 [facility]


   Last Admin: 02/03/18 19:00 Dose:  2 mg











- Objective


Vital Signs: 


 Vital Signs











Temperature  97.4 F L  02/05/18 09:00


 


Pulse Rate  76   02/05/18 09:00


 


Respiratory Rate  24   02/05/18 09:00


 


Blood Pressure  114/65   02/05/18 09:00


 


O2 Sat by Pulse Oximetry (%)  97   02/04/18 21:00











Constitutional: Yes: Well Nourished, Calm


Eyes: Yes: WNL


HENT: Yes: WNL


Neck: Yes: WNL


Cardiovascular: Yes: Pulse Irregular, S1, S2


Respiratory: Yes: Rales (bibasilar rales,few scattered wheezes)


Gastrointestinal: Yes: Normal Bowel Sounds, Soft


Extremities: Yes: WNL


Edema: No


Labs: 


 CBC, BMP





 02/05/18 06:20 





 02/05/18 06:20 





 INR, PTT











INR  3.42  (0.82-1.09)  H  02/04/18  06:07    














Problem List





- Problems


(1) AICD (automatic cardioverter/defibrillator) present


Code(s): Z95.810 - PRESENCE OF AUTOMATIC (IMPLANTABLE) CARDIAC DEFIBRILLATOR   





(2) CHF (congestive heart failure)


Code(s): I50.9 - HEART FAILURE, UNSPECIFIED   


Qualifiers: 


 





(3) COPD (chronic obstructive pulmonary disease)


Code(s): J44.9 - CHRONIC OBSTRUCTIVE PULMONARY DISEASE, UNSPECIFIED   





(4) Dyspnea on exertion


Code(s): R06.09 - OTHER FORMS OF DYSPNEA   





(5) Pulmonary infiltrates on CXR


Code(s): R91.8 - OTHER NONSPECIFIC ABNORMAL FINDING OF LUNG FIELD   





(6) Shortness of breath


Code(s): R06.02 - SHORTNESS OF BREATH   





(7) Acute and chronic respiratory failure (acute-on-chronic)


Code(s): J96.20 - ACUTE AND CHR RESP FAILURE, UNSP W HYPOXIA OR HYPERCAPNIA   


Qualifiers: 


   Respiratory failure complication: hypoxia   Qualified Code(s): J96.21 - 

Acute and chronic respiratory failure with hypoxia   





(8) Acute bronchitis and bronchiolitis


Code(s): J20.9 - ACUTE BRONCHITIS, UNSPECIFIED; J21.9 - ACUTE BRONCHIOLITIS, 

UNSPECIFIED   





(9) Acute on chronic renal insufficiency


Code(s): N28.9 - DISORDER OF KIDNEY AND URETER, UNSPECIFIED; N18.9 - CHRONIC 

KIDNEY DISEASE, UNSPECIFIED   





(10) Atrial fibrillation


Code(s): I48.91 - UNSPECIFIED ATRIAL FIBRILLATION   


Qualifiers: 


   Atrial fibrillation type: chronic   Qualified Code(s): I48.2 - Chronic 

atrial fibrillation   





(11) CAD (coronary artery disease)


Code(s): I25.10 - ATHSCL HEART DISEASE OF NATIVE CORONARY ARTERY W/O ANG PCTRS 

  





(12) Cardiomyopathy


Code(s): I42.9 - CARDIOMYOPATHY, UNSPECIFIED   





(13) Diabetes


Code(s): E11.9 - TYPE 2 DIABETES MELLITUS WITHOUT COMPLICATIONS   


Qualifiers: 


   Diabetes mellitus type: type 2   Diabetes mellitus complication status: with 

kidney complications   Diabetes mellitus complication detail: with chronic 

kidney disease   Chronic kidney disease stage: stage 4 (severe) 





(14) Presence of permanent cardiac pacemaker


Code(s): Z95.0 - PRESENCE OF CARDIAC PACEMAKER   





Assessment/Plan





Assessment/Plan





URI


Severe LV Systolic Dysfunction s/p ICD


Pulmonary HTN


COPD


Chronic Hypoxic Respiratory Failure


Atrial Fibrillation


Acute on Chronic Renal Failure


HTN





-   antibiotics


-  inhaled bronchodilators


-  O2 to keep SpO2 >90%


-  CXR findings appear unchanged or slightly improved from last, appears 

relatively euvolemic


-  monitor urine output, creatinine


-  rate control


-  anticoagulation


-  milrinone








DR EASTMAN

## 2018-02-05 NOTE — DS
Physical Examination


Vital Signs: 


 Vital Signs











Temperature  36.3 C L  02/05/18 09:00


 


Pulse Rate  76   02/05/18 09:00


 


Respiratory Rate  24   02/05/18 09:00


 


Blood Pressure  114/65   02/05/18 09:00


 


O2 Sat by Pulse Oximetry (%)  96   02/05/18 09:00











Labs: 


 CBC, BMP





 02/05/18 06:20 





 02/05/18 06:20 











Discharge Summary


Reason For Visit: COPD SOB PULMONARY INFILTRATE ON XRAY


Current Active Problems





AICD (automatic cardioverter/defibrillator) present (Acute)


Acute kidney injury (Acute)


CHF (congestive heart failure) (Acute)


COPD (chronic obstructive pulmonary disease) (Acute)


Dyspnea on exertion (Acute)


Pulmonary infiltrates on CXR (Acute)


Shortness of breath (Acute)








Condition: Stable





- Instructions


Diet, Activity, Other Instructions: 


resume previous diet and activity


Referrals: 


Sathya Flower MD [Primary Care Provider] - 


Disposition: VNS/HOME HEALTH CARE





- Home Medications


Comprehensive Discharge Medication List: 


Ambulatory Orders





Allopurinol [Zyloprim -] 100 mg PO DAILY 01/14/16 


Atorvastatin Ca [Lipitor] 40 mg PO HS 01/14/16 


Carvedilol 6.25 mg PO BID 01/14/16 


Docusate Sodium [Colace -] 100 mg PO BID 01/14/16 


Insulin NPL/Insulin Lispro [Humalog Mix 75-25 Vial] 0 unit SQ PRN 01/14/16 


Milrinone Lactate/D5w [Milrinone 0.2 mg/ml in D5w] 0 mg IV DAILY 01/14/16 


Ranitidine [Zantac -] 75 mg PO DAILY 01/14/16 


Albuterol 0.083% Nebulizer Sol [Ventolin 0.083% Nebulizer Soln -] 1 amp NEB Q6H 

PRN #120 amp 01/18/16 


Ferrous Sulfate [Feosol] 325 mg PO Q48H  ud 08/27/16 


Warfarin Na [Coumadin -] 2 mg PO DAILY@1800 01/10/17 


Ascorbic Acid [Vitamin C -] 1,000 mg PO DAILY 03/30/17 


Cholecalciferol (Vitamin D3) [Vitamin D -] 1,000 unit PO DAILY 03/30/17 


Multivitamin [Poly-Vitamin] 1 each PO DAILY 03/30/17 


Budesonide/Formeterol Fumarate [SYMBICORT 160/4.5mcg -] 1 inh PO BID 06/23/17 


Torsemide [Demadex -] 20 mg PO BID 06/23/17 


Aspirin 81 mg PO DAILY 02/03/18 


Cholecalciferol (Vitamin D3) [Vitamin D -] 1,000 unit PO DAILY 02/03/18 


Levothyroxine [Synthroid -] 25 mcg PO DAILY 02/03/18 


Metolazone 2.5 mg PO DAILY 02/03/18 


Spironolactone 25 mg PO DAILY 02/03/18 


Warfarin Na [Coumadin -] 2.5 mg PO ASDIR 02/03/18 


Bupropion HCl [Wellbutrin -] 75 mg PO DAILY #0 tablet 02/05/18

## 2018-02-05 NOTE — PN
Progress Note (short form)





- Note


Progress Note: 





Renal follow up for HARPREET on CKD





Pt seen and examined at the bedside


no acute complaints


sob is improved 





 Vital Signs











Temperature  97.3 F L  02/05/18 14:36


 


Pulse Rate  75   02/05/18 14:36


 


Respiratory Rate  22   02/05/18 14:36


 


Blood Pressure  95/60   02/05/18 14:36


 


O2 Sat by Pulse Oximetry (%)  96   02/05/18 09:00








 Intake & Output











 02/02/18 02/03/18 02/04/18 02/05/18





 23:59 23:59 23:59 23:59


 


Intake Total  250 940 758


 


Output Total    750


 


Balance  250 940 8


 


Weight  84.141 kg 84.141 kg 83.915 kg








NAD


Dec BS lung bases


Trace edema in LE





 CBC, BMP





 02/05/18 06:20 





 02/05/18 06:20 





 Current Medications





Albuterol Sulfate (Ventolin 0.083% Nebulizer Soln -)  1 amp NEB Q6H PRN


   PRN Reason: SHORT OF BREATH/WHEEZING


Allopurinol (Zyloprim -)  100 mg PO DAILY [facility]


   Last Admin: 02/05/18 09:22 Dose:  100 mg


Ascorbic Acid (Vitamin C -)  1,000 mg PO DAILY [facility]


   Last Admin: 02/05/18 09:22 Dose:  1,000 mg


Aspirin (Ecotrin -)  81 mg PO DAILY [facility]


   Last Admin: 02/05/18 09:23 Dose:  81 mg


Atorvastatin Calcium (Lipitor -)  40 mg PO HS [facility]


   Last Admin: 02/04/18 22:30 Dose:  40 mg


Budesonide/Formoterol Fumarate (Symbicort 160/4.5mcg -)  1 puff IH BID [facility]


   Last Admin: 02/05/18 09:23 Dose:  1 puff


Bupropion HCl (Wellbutrin -)  75 mg PO DAILY [facility]


   Last Admin: 02/05/18 09:22 Dose:  75 mg


Carvedilol (Coreg -)  6.25 mg PO BID [facility]


   Last Admin: 02/05/18 09:22 Dose:  6.25 mg


Cholecalciferol (Vitamin D3 -)  1,000 unit PO DAILY [facility]


   Last Admin: 02/05/18 09:23 Dose:  1,000 unit


Docusate Sodium (Colace -)  100 mg PO BID [facility]


   Last Admin: 02/05/18 09:23 Dose:  100 mg


Ferrous Sulfate (Feosol -)  325 mg PO Q48H [facility]


   Last Admin: 02/05/18 15:19 Dose:  325 mg


Milrinone Lactate/Dextrose (Milrinone 20mg/100ml Ivpb -)  20,000 mcg in 100 mls 

@ 8.906 mls/hr IVPB TITR [facility]; 0.35 MCG/KG/MIN


   PRN Reason: Protocol


   Last Admin: 02/05/18 12:26 Dose:  0.35 mcg/kg/min, 8.906 mls/hr


Azithromycin 250 mg/ Dextrose  250 mls @ 250 mls/hr IVPB DAILY [facility]


   Last Admin: 02/05/18 09:24 Dose:  250 mls/hr


CEFTRIAXONE 1 G/50 ML PREMIX (Ceftriaxone 1 Gm-D5w Bag)  50 mls @ 100 mls/hr 

IVPB DAILY [facility]


   Last Admin: 02/05/18 09:22 Dose:  100 mls/hr


Insulin Aspart (Novolog Vial Sliding Scale -)  1 vial SQ ACHS [facility]


   PRN Reason: Protocol


   Last Admin: 02/05/18 12:26 Dose:  10 units


Multivitamins/Minerals/Vitamin C (Tab-A-Vit -)  1 tab PO DAILY [facility]


   Last Admin: 02/05/18 09:22 Dose:  1 tab


Ranitidine HCl (Zantac -)  75 mg PO DAILY [facility]


   Last Admin: 02/05/18 09:23 Dose:  75 mg


Spironolactone (Aldactone -)  25 mg PO DAILY [facility]


   Last Admin: 02/05/18 09:23 Dose:  25 mg


Torsemide (Demadex -)  20 mg PO BID [facility]


   Last Admin: 02/05/18 09:23 Dose:  20 mg


Warfarin Sodium (Coumadin -)  2 mg PO DAILY@1800 [facility]


   Last Admin: 02/03/18 19:00 Dose:  2 mg





77 year old gentleman with PMhx of End stage HF on Milrinone gtt, AICD, CKD (

baseline CR 1.7-2), CAD, AVR, COPD, Hypertension who presented to the ED with 

SOB and found to have infiltrative and congestive changes on CXR with Cr of 2.8.





#SOB/Cough secondary to PNA +/- acute HF


on Abx and diuretics


pt appears evolemic on examination


CXR showed congestion 





#HARPREET on CKD


? ATN from NSAIDs vs renal hypoprofusion 


US of kidneys showed no obstruction 


BUN/Cr remain above baseline, no uremic symptoms 


on diuretics per cardiology 


no indication for RRT 


Trend BUN/Cr 





Thank you 


Will follow

## 2018-02-05 NOTE — PN
Progress Note, Physician


Chief Complaint: 





cough


History of Present Illness: 





cough has improved. not started on steroids by pulm yesterday.


no sob.


no leg swelling.


no cp, palpitations





- Current Medication List


Current Medications: 


Active Medications





Albuterol Sulfate (Ventolin 0.083% Nebulizer Soln -)  1 amp NEB Q6H PRN


   PRN Reason: SHORT OF BREATH/WHEEZING


Allopurinol (Zyloprim -)  100 mg PO DAILY [facility]


   Last Admin: 02/04/18 09:54 Dose:  100 mg


Ascorbic Acid (Vitamin C -)  1,000 mg PO DAILY [facility]


   Last Admin: 02/04/18 09:53 Dose:  1,000 mg


Aspirin (Ecotrin -)  81 mg PO DAILY [facility]


   Last Admin: 02/04/18 09:54 Dose:  81 mg


Atorvastatin Calcium (Lipitor -)  40 mg PO HS [facility]


   Last Admin: 02/04/18 22:30 Dose:  40 mg


Budesonide/Formoterol Fumarate (Symbicort 160/4.5mcg -)  1 puff IH BID [facility]


   Last Admin: 02/04/18 22:31 Dose:  1 puff


Bupropion HCl (Wellbutrin -)  75 mg PO DAILY [facility]


   Last Admin: 02/04/18 09:54 Dose:  75 mg


Carvedilol (Coreg -)  6.25 mg PO BID [facility]


   Last Admin: 02/04/18 22:30 Dose:  6.25 mg


Cholecalciferol (Vitamin D3 -)  1,000 unit PO DAILY [facility]


   Last Admin: 02/04/18 13:03 Dose:  1,000 unit


Docusate Sodium (Colace -)  100 mg PO BID [facility]


   Last Admin: 02/04/18 22:30 Dose:  100 mg


Ferrous Sulfate (Feosol -)  325 mg PO Q48H [facility]


   Last Admin: 02/03/18 17:24 Dose:  Not Given


Milrinone Lactate/Dextrose (Milrinone 20mg/100ml Ivpb -)  20,000 mcg in 100 mls 

@ 8.906 mls/hr IVPB TITR [facility]; 0.35 MCG/KG/MIN


   PRN Reason: Protocol


   Last Admin: 02/04/18 12:01 Dose:  0.35 mcg/kg/min, 8.906 mls/hr


Azithromycin 250 mg/ Dextrose  250 mls @ 250 mls/hr IVPB DAILY [facility]


   Last Admin: 02/04/18 13:01 Dose:  250 mls/hr


CEFTRIAXONE 1 G/50 ML PREMIX (Ceftriaxone 1 Gm-D5w Bag)  50 mls @ 100 mls/hr 

IVPB DAILY [facility]


   Last Admin: 02/04/18 13:02 Dose:  100 mls/hr


Insulin Aspart (Novolog Vial Sliding Scale -)  1 vial SQ ACHS [facility]


   PRN Reason: Protocol


   Last Admin: 02/05/18 06:01 Dose:  Not Given


Multivitamins/Minerals/Vitamin C (Tab-A-Vit -)  1 tab PO DAILY [facility]


   Last Admin: 02/04/18 09:54 Dose:  1 tab


Ranitidine HCl (Zantac -)  75 mg PO DAILY [facility]


   Last Admin: 02/04/18 09:53 Dose:  75 mg


Spironolactone (Aldactone -)  25 mg PO DAILY [facility]


   Last Admin: 02/04/18 09:54 Dose:  25 mg


Torsemide (Demadex -)  20 mg PO BID [facility]


   Last Admin: 02/04/18 22:30 Dose:  20 mg


Warfarin Sodium (Coumadin -)  2 mg PO DAILY@1800 [facility]


   Last Admin: 02/03/18 19:00 Dose:  2 mg











- Objective


Vital Signs: 


 Vital Signs











Temperature  97.4 F L  02/05/18 01:00


 


Pulse Rate  75   02/05/18 05:00


 


Respiratory Rate  20   02/05/18 05:00


 


Blood Pressure  110/61   02/05/18 05:00


 


O2 Sat by Pulse Oximetry (%)  97   02/04/18 21:00











Constitutional: Yes: Well Nourished, No Distress, Calm


Cardiovascular: Yes: Regular Rate and Rhythm, JVD, S1, S2.  No: Gallop, Murmur


Respiratory: Yes: Regular, Rales (L base), Wheezes (faint bilat).  No: 

Accessory Muscle Use


Extremities: No: Cold


Edema: Yes (trace ankles)


Neurological: Yes: Alert, Oriented


Psychiatric: No: Agitated


Labs: 


 CBC, BMP





 02/05/18 06:20 





 02/05/18 06:20 





 INR, PTT











INR  3.42  (0.82-1.09)  H  02/04/18  06:07    














- ....Imaging


EKG: Other (tele: v-paced)





Assessment/Plan





CXR (images reviewed): trace-small bilat effusions; vasc redistribution 

pattern. alveolar and interstitial infiltrates bilat low/mid lung fields. no 

signif change vs 7/3 prior





ECG: NSR, bi-V paced





Echo 10/2016: sev lve, global hk, sev dec lvef, mild rve, nl rv fcn, patty, mild-

mod mr, mv ring, tv ring, mild tr, mild pr, mild phtn, ivc mild dil

















a/p:  76 year old man with PMH of IDDM, COPD (O2 dependant) HTN, HLD, Afib (on 

coumadin), CAD s/p CABG 2013, end stage CHF with ICD placement (on milrinone 

pump), PVD, BPH here with sob.





URI sx's (cough, rhinorrhea), h/o copd:


- flu swab negative


- ? if a.e. copd at present


- per hospitalist





chronic systolic CHF, end stage systolic heart failure s/p ICD, on home 

milrinone infusion:


- pt had cardiomems PAP monitor implant with dr de la rosa ([facility]) and has been 

managed by him since last admit here.


- on destination therapy home milrinone infusion, ? if dr de la rosa increased the 

dose.


- baseline renal fxn has worsened on recent labs (high 2's range) 


- wt 202 on discharge 7/17 following diuresis with lasix 100 iv bid, metolazone 

5mg. currently 185


- BNP 2K (prior range 2K-7K), low GFR confounds


- 2/4: ER labs appear drier than recent baseline, but bun/creat today back down 

in his recent baseline range. CXR appears unchanged vs 7/17. suspect he is not 

decompensated despite JVD (chronic in him)--no periph edema which is 

consistently CHF finding in him, and no increase in wt.


- please confirm home torsemide regimen with wife.


- receiving torsemide 20 bid here. low threshold to hold diuretic if renal fxn 

worsens further.


- 2/5: wt stable, creat stable, bun up slightly. remains well-compensated with 

likely no excess volume (chronic JVD here). cont home torsemide. routine weekly 

labs f/u as outpt as already scheduled (has home labs done by chf specialist dr de la rosa)





HARPREET on CKD, h/o cardiorenal syndrome:


- recent outpt creat ranging 2.6-2.9 range


- bun/creat above baseline here





afib


- rate controlled on coreg


- cont coumadin per INR (target 2-3)





VT:


- intermittent brief runs of NSVT on tele, chronic finding on prior admits


- has ICD


- on max tolerated coreg dose


- replete lytes prn





CAD s/p MI with PCI (and ? TV repair at that time):


- No anginal symptoms, no signs acs.


- Con't home ac, plavix, atorvastatin, coreg





PVD


- con't AC, statin.





HTN: controlled on coreg





HLD: con't statin. 





D/C TELEMETRY.


STABLE FOR D/C HOME FROM CV P.O.V.

## 2018-02-06 LAB
ANION GAP SERPL CALC-SCNC: 11 MMOL/L (ref 8–16)
ARTERIAL BLOOD GAS PCO2: 46.4 MMHG (ref 35–45)
ARTERIAL PATENCY WRIST A: POSITIVE
BASE EXCESS BLDA CALC-SCNC: 3.7 MEQ/L (ref -2–2)
BASOPHILS # BLD: 0.5 % (ref 0–2)
BUN SERPL-MCNC: 86 MG/DL (ref 7–18)
CALCIUM SERPL-MCNC: 8.2 MG/DL (ref 8.5–10.1)
CHLORIDE SERPL-SCNC: 97 MMOL/L (ref 98–107)
CO2 SERPL-SCNC: 27 MMOL/L (ref 21–32)
CREAT SERPL-MCNC: 2.4 MG/DL (ref 0.7–1.3)
DEPRECATED RDW RBC AUTO: 20.5 % (ref 11.9–15.9)
EOSINOPHIL # BLD: 1.1 % (ref 0–4.5)
GLUCOSE SERPL-MCNC: 167 MG/DL (ref 74–106)
HCT VFR BLD CALC: 37.1 % (ref 35.4–49)
HGB BLD-MCNC: 11.8 GM/DL (ref 11.7–16.9)
INR BLD: 3.63 (ref 0.82–1.09)
LYMPHOCYTES # BLD: 22.6 % (ref 8–40)
MAGNESIUM SERPL-MCNC: 2.9 MG/DL (ref 1.8–2.4)
MCH RBC QN AUTO: 26.2 PG (ref 25.7–33.7)
MCHC RBC AUTO-ENTMCNC: 31.7 G/DL (ref 32–35.9)
MCV RBC: 82.6 FL (ref 80–96)
MONOCYTES # BLD AUTO: 16.9 % (ref 3.8–10.2)
NEUTROPHILS # BLD: 58.9 % (ref 42.8–82.8)
PHOSPHATE SERPL-MCNC: 3.8 MG/DL (ref 2.5–4.9)
PLATELET # BLD AUTO: 201 K/MM3 (ref 134–434)
PMV BLD: 8.3 FL (ref 7.5–11.1)
PO2 BLDA: 67.8 MMHG (ref 70–100)
POTASSIUM SERPLBLD-SCNC: 4.3 MMOL/L (ref 3.5–5.1)
PT PNL PPP: 41 SEC (ref 9.98–11.88)
RBC # BLD AUTO: 4.49 M/MM3 (ref 4–5.6)
SAO2 % BLDA: 93.8 % (ref 90–98.9)
SODIUM SERPL-SCNC: 135 MMOL/L (ref 136–145)
WBC # BLD AUTO: 7 K/MM3 (ref 4–10)

## 2018-02-06 RX ADMIN — BUDESONIDE AND FORMOTEROL FUMARATE DIHYDRATE SCH PUFF: 160; 4.5 AEROSOL RESPIRATORY (INHALATION) at 10:43

## 2018-02-06 RX ADMIN — MULTIVITAMIN TABLET SCH TAB: TABLET at 10:43

## 2018-02-06 RX ADMIN — DOCUSATE SODIUM SCH MG: 100 CAPSULE, LIQUID FILLED ORAL at 10:41

## 2018-02-06 RX ADMIN — ATORVASTATIN CALCIUM SCH MG: 40 TABLET, FILM COATED ORAL at 22:37

## 2018-02-06 RX ADMIN — VITAMIN D, TAB 1000IU (100/BT) SCH UNIT: 25 TAB at 10:44

## 2018-02-06 RX ADMIN — MILRINONE LACTATE SCH MLS/HR: 200 INJECTION, SOLUTION INTRAVENOUS at 12:25

## 2018-02-06 RX ADMIN — CARVEDILOL SCH MG: 6.25 TABLET, FILM COATED ORAL at 22:37

## 2018-02-06 RX ADMIN — INSULIN ASPART SCH UNITS: 100 INJECTION, SOLUTION INTRAVENOUS; SUBCUTANEOUS at 17:15

## 2018-02-06 RX ADMIN — MILRINONE LACTATE SCH MLS/HR: 200 INJECTION, SOLUTION INTRAVENOUS at 03:30

## 2018-02-06 RX ADMIN — CARVEDILOL SCH MG: 6.25 TABLET, FILM COATED ORAL at 10:42

## 2018-02-06 RX ADMIN — BUDESONIDE AND FORMOTEROL FUMARATE DIHYDRATE SCH PUFF: 160; 4.5 AEROSOL RESPIRATORY (INHALATION) at 22:36

## 2018-02-06 RX ADMIN — DOCUSATE SODIUM SCH MG: 100 CAPSULE, LIQUID FILLED ORAL at 22:37

## 2018-02-06 RX ADMIN — ALLOPURINOL SCH MG: 100 TABLET ORAL at 10:45

## 2018-02-06 RX ADMIN — TORSEMIDE SCH MG: 20 TABLET ORAL at 10:42

## 2018-02-06 RX ADMIN — INSULIN ASPART SCH UNITS: 100 INJECTION, SOLUTION INTRAVENOUS; SUBCUTANEOUS at 22:39

## 2018-02-06 RX ADMIN — CEFTRIAXONE SCH MLS/HR: 1 INJECTION, SOLUTION INTRAVENOUS at 10:41

## 2018-02-06 RX ADMIN — MILRINONE LACTATE SCH MLS/HR: 200 INJECTION, SOLUTION INTRAVENOUS at 20:29

## 2018-02-06 RX ADMIN — INSULIN ASPART SCH UNITS: 100 INJECTION, SOLUTION INTRAVENOUS; SUBCUTANEOUS at 12:39

## 2018-02-06 RX ADMIN — AZITHROMYCIN DIHYDRATE SCH MLS/HR: 500 INJECTION, POWDER, LYOPHILIZED, FOR SOLUTION INTRAVENOUS at 11:19

## 2018-02-06 RX ADMIN — ASPIRIN SCH MG: 81 TABLET, COATED ORAL at 10:43

## 2018-02-06 RX ADMIN — RANITIDINE SCH MG: 150 TABLET ORAL at 10:44

## 2018-02-06 RX ADMIN — INSULIN ASPART SCH UNITS: 100 INJECTION, SOLUTION INTRAVENOUS; SUBCUTANEOUS at 07:05

## 2018-02-06 RX ADMIN — OXYCODONE HYDROCHLORIDE AND ACETAMINOPHEN SCH MG: 500 TABLET ORAL at 10:43

## 2018-02-06 RX ADMIN — SPIRONOLACTONE SCH MG: 25 TABLET, FILM COATED ORAL at 10:39

## 2018-02-06 RX ADMIN — TORSEMIDE SCH MG: 20 TABLET ORAL at 22:37

## 2018-02-06 NOTE — PN
Progress Note (short form)





- Note


Progress Note: 








Chief Complaint: 





cough


History of Present Illness: 





cough has improved. not started on steroids by pulm yesterday.  slightly sleep 

today, confused?.  Patient states he feels at his baseline with no sob, 

worsening leg edema, or abdominal distension.  + fatigue worse than baseline. 


no sob.


no leg swelling.


no cp, palpitations








 Current Medications





Albuterol Sulfate (Ventolin 0.083% Nebulizer Soln -)  1 amp NEB Q6H PRN


   PRN Reason: SHORT OF BREATH/WHEEZING


Allopurinol (Zyloprim -)  100 mg PO DAILY [facility]


   Last Admin: 02/06/18 10:45 Dose:  100 mg


Ascorbic Acid (Vitamin C -)  1,000 mg PO DAILY [facility]


   Last Admin: 02/06/18 10:43 Dose:  1,000 mg


Aspirin (Ecotrin -)  81 mg PO DAILY [facility]


   Last Admin: 02/06/18 10:43 Dose:  81 mg


Atorvastatin Calcium (Lipitor -)  40 mg PO HS [facility]


   Last Admin: 02/05/18 21:40 Dose:  40 mg


Budesonide/Formoterol Fumarate (Symbicort 160/4.5mcg -)  1 puff IH BID [facility]


   Last Admin: 02/06/18 10:43 Dose:  1 puff


Bupropion HCl (Wellbutrin -)  75 mg PO DAILY [facility]


   Last Admin: 02/06/18 10:44 Dose:  75 mg


Carvedilol (Coreg -)  6.25 mg PO BID [facility]


   Last Admin: 02/06/18 10:42 Dose:  6.25 mg


Cholecalciferol (Vitamin D3 -)  1,000 unit PO DAILY [facility]


   Last Admin: 02/06/18 10:44 Dose:  1,000 unit


Docusate Sodium (Colace -)  100 mg PO BID [facility]


   Last Admin: 02/06/18 10:41 Dose:  100 mg


Ferrous Sulfate (Feosol -)  325 mg PO Q48H [facility]


   Last Admin: 02/05/18 15:19 Dose:  325 mg


Azithromycin 250 mg/ Dextrose  250 mls @ 250 mls/hr IVPB DAILY [facility]


   Last Admin: 02/06/18 11:19 Dose:  250 mls/hr


CEFTRIAXONE 1 G/50 ML PREMIX (Ceftriaxone 1 Gm-D5w Bag)  50 mls @ 100 mls/hr 

IVPB DAILY [facility]


   Last Admin: 02/06/18 10:41 Dose:  100 mls/hr


Milrinone Lactate/Dextrose (Milrinone 20mg/100ml Ivpb -)  20,000 mcg in 100 mls 

@ 12.723 mls/hr IVPB TITR [facility]; 0.5 MCG/KG/MIN


   PRN Reason: Protocol


   Last Admin: 02/06/18 12:25 Dose:  0.5 mcg/kg/min, 12.723 mls/hr


Insulin Aspart (Novolog Vial Sliding Scale -)  1 vial SQ ACHS [facility]


   PRN Reason: Protocol


   Last Admin: 02/06/18 07:05 Dose:  4 units


Multivitamins/Minerals/Vitamin C (Tab-A-Vit -)  1 tab PO DAILY [facility]


   Last Admin: 02/06/18 10:43 Dose:  1 tab


Ranitidine HCl (Zantac -)  75 mg PO DAILY [facility]


   Last Admin: 02/06/18 10:44 Dose:  75 mg


Spironolactone (Aldactone -)  25 mg PO DAILY [facility]


   Last Admin: 02/06/18 10:39 Dose:  25 mg


Torsemide (Demadex -)  20 mg PO BID [facility]


   Last Admin: 02/06/18 10:42 Dose:  20 mg








- Objective


Vital Signs: 


 


 Vital Signs - 24 hr











  02/05/18 02/05/18 02/05/18





  14:36 18:00 20:00


 


Temperature 97.3 F L 97.1 F L 97.3 F L


 


Pulse Rate 75 75 75


 


Respiratory 22 22 20





Rate   


 


Blood Pressure 95/60 105/67 118/70


 


O2 Sat by Pulse   





Oximetry (%)   














  02/05/18 02/06/18 02/06/18





  21:00 02:00 06:00


 


Temperature  98 F 97.6 F


 


Pulse Rate  75 76


 


Respiratory  20 20





Rate   


 


Blood Pressure  109/62 115/53


 


O2 Sat by Pulse 94 L  





Oximetry (%)   











 Intake & Output











 02/04/18 02/05/18 02/06/18 02/07/18





 07:59 07:59 07:59 07:59


 


Intake Total 500 798 689.4 


 


Output Total   950 


 


Balance 500 798 -260.6 


 


Weight 185 lb 8 oz 185 lb 185 lb 12.8 oz 














Constitutional: Yes: Well Nourished, No Distress, Calm


Cardiovascular: Yes: Regular Rate and Rhythm, JVD, S1, S2.  No: Gallop, Murmur


Respiratory: Yes: Regular, Rales (L base), Wheezes (faint bilat).  No: 

Accessory Muscle Use


Extremities: No: Cold


Edema: Yes (trace ankles)


Neurological: Yes: Alert, Oriented


Psychiatric: No: Agitated


Labs: 


 


 CBC, BMP





 02/06/18 06:05 





 02/06/18 06:05 





 Laboratory Tests











  02/06/18





  06:05


 


INR  3.63 H














- ....Imaging


EKG: Other (tele: v-paced)





Assessment/Plan





CXR (images reviewed): trace-small bilat effusions; vasc redistribution 

pattern. alveolar and interstitial infiltrates bilat low/mid lung fields. no 

signif change vs 7/3 prior





ECG: NSR, bi-V paced





Echo 10/2016: sev lve, global hk, sev dec lvef, mild rve, nl rv fcn, patty, mild-

mod mr, mv ring, tv ring, mild tr, mild pr, mild phtn, ivc mild dil

















a/p:  76 year old man with PMH of IDDM, COPD (O2 dependant) HTN, HLD, Afib (on 

coumadin), CAD s/p CABG 2013, end stage CHF with ICD placement (on milrinone 

pump), PVD, BPH here with sob.





URI sx's (cough, rhinorrhea), h/o copd:


- flu swab negative


- ? if a.e. copd at present


- per hospitalist





chronic systolic CHF, end stage systolic heart failure s/p ICD, on home 

milrinone infusion:


- pt had cardiomems PAP monitor implant with dr de la rosa ([facility]) and has been 

managed by him since last admit here.


- on destination therapy home milrinone infusion, ? if dr de la rosa increased the 

dose.


- baseline renal fxn has worsened on recent labs (high 2's range) 


- wt 202 on discharge 7/17 following diuresis with lasix 100 iv bid, metolazone 

5mg. currently 185


- BNP 2K (prior range 2K-7K), low GFR confounds


- 2/4: ER labs appear drier than recent baseline, but bun/creat today back down 

in his recent baseline range. CXR appears unchanged vs 7/17. suspect he is not 

decompensated despite JVD (chronic in him)--no periph edema which is 

consistently CHF finding in him, and no increase in wt.


- please confirm home torsemide regimen with wife.


- receiving torsemide 20 bid here. low threshold to hold diuretic if renal fxn 

worsens further.


- 2/5: wt stable, creat stable, bun up slightly. remains well-compensated with 

likely no excess volume (chronic JVD here). 


- 2/6: cr continues to improve on home torsemide, con't. routine weekly labs f/

u as outpt as already scheduled (has home labs done by chf specialist dr de la rosa)








HARPREET on CKD, h/o cardiorenal syndrome:


- recent outpt creat ranging 2.6-2.9 range


- bun/creat above baseline here





afib


- rate controlled on coreg


- cont coumadin per INR (target 2-3)





VT:


- intermittent brief runs of NSVT on tele, chronic finding on prior admits


- has ICD


- on max tolerated coreg dose


- replete lytes prn





CAD s/p MI with PCI (and ? TV repair at that time):


- No anginal symptoms, no signs acs.


- Con't home ac, asa, atorvastatin, coreg





PVD


- con't AC, statin.





HTN: controlled on coreg





HLD: con't statin. 





D/C TELEMETRY.


STABLE FOR D/C HOME FROM CV P.O.V.

## 2018-02-06 NOTE — PN
Progress Note (short form)





- Note


Progress Note: 





Renal follow up for HARPREET on CKD





Pt seen and examined at the bedside


+ cough


no fever, chills


denies any overt sob


 Vital Signs











Temperature  98 F   02/06/18 14:00


 


Pulse Rate  77   02/06/18 14:00


 


Respiratory Rate  20   02/06/18 14:00


 


Blood Pressure  92/67   02/06/18 14:00


 


O2 Sat by Pulse Oximetry (%)  97   02/06/18 09:00








 Intake & Output











 02/03/18 02/04/18 02/05/18 02/06/18





 23:59 23:59 23:59 23:59


 


Intake Total 250 940 797.4 


 


Output Total   950 


 


Balance 250 940 -152.6 


 


Weight 84.141 kg 84.141 kg 83.915 kg 84.277 kg











NAD


Dec BS lung bases


Trace edema in LE





 


 CBC, BMP





 02/06/18 06:05 





 02/06/18 06:05 





 Current Medications





Albuterol Sulfate (Ventolin 0.083% Nebulizer Soln -)  1 amp NEB Q6H PRN


   PRN Reason: SHORT OF BREATH/WHEEZING


Allopurinol (Zyloprim -)  100 mg PO DAILY [facility]


   Last Admin: 02/06/18 10:45 Dose:  100 mg


Ascorbic Acid (Vitamin C -)  1,000 mg PO DAILY [facility]


   Last Admin: 02/06/18 10:43 Dose:  1,000 mg


Aspirin (Ecotrin -)  81 mg PO DAILY [facility]


   Last Admin: 02/06/18 10:43 Dose:  81 mg


Atorvastatin Calcium (Lipitor -)  40 mg PO HS [facility]


   Last Admin: 02/05/18 21:40 Dose:  40 mg


Budesonide/Formoterol Fumarate (Symbicort 160/4.5mcg -)  1 puff IH BID [facility]


   Last Admin: 02/06/18 10:43 Dose:  1 puff


Bupropion HCl (Wellbutrin -)  75 mg PO DAILY [facility]


   Last Admin: 02/06/18 10:44 Dose:  75 mg


Carvedilol (Coreg -)  6.25 mg PO BID [facility]


   Last Admin: 02/06/18 10:42 Dose:  6.25 mg


Cholecalciferol (Vitamin D3 -)  1,000 unit PO DAILY [facility]


   Last Admin: 02/06/18 10:44 Dose:  1,000 unit


Docusate Sodium (Colace -)  100 mg PO BID [facility]


   Last Admin: 02/06/18 10:41 Dose:  100 mg


Ferrous Sulfate (Feosol -)  325 mg PO Q48H [facility]


   Last Admin: 02/05/18 15:19 Dose:  325 mg


Azithromycin 250 mg/ Dextrose  250 mls @ 250 mls/hr IVPB DAILY [facility]


   Last Admin: 02/06/18 11:19 Dose:  250 mls/hr


CEFTRIAXONE 1 G/50 ML PREMIX (Ceftriaxone 1 Gm-D5w Bag)  50 mls @ 100 mls/hr 

IVPB DAILY [facility]


   Last Admin: 02/06/18 10:41 Dose:  100 mls/hr


Milrinone Lactate/Dextrose (Milrinone 20mg/100ml Ivpb -)  20,000 mcg in 100 mls 

@ 12.723 mls/hr IVPB TITR [facility]; 0.5 MCG/KG/MIN


   PRN Reason: Protocol


   Last Admin: 02/06/18 12:25 Dose:  0.5 mcg/kg/min, 12.723 mls/hr


Insulin Aspart (Novolog Vial Sliding Scale -)  1 vial SQ ACHS [facility]


   PRN Reason: Protocol


   Last Admin: 02/06/18 12:39 Dose:  4 units


Multivitamins/Minerals/Vitamin C (Tab-A-Vit -)  1 tab PO DAILY [facility]


   Last Admin: 02/06/18 10:43 Dose:  1 tab


Ranitidine HCl (Zantac -)  75 mg PO DAILY [facility]


   Last Admin: 02/06/18 10:44 Dose:  75 mg


Spironolactone (Aldactone -)  25 mg PO DAILY [facility]


   Last Admin: 02/06/18 10:39 Dose:  25 mg


Torsemide (Demadex -)  20 mg PO BID [facility]


   Last Admin: 02/06/18 10:42 Dose:  20 mg











77 year old gentleman with PMhx of End stage HF on Milrinone gtt, AICD, CKD (

baseline CR 1.7-2), CAD, AVR, COPD, Hypertension who presented to the ED with 

SOB and found to have infiltrative and congestive changes on CXR with Cr of 2.8.





#SOB/Cough secondary to PNA +/- acute HF


continue Ceftiaxone and Azithromax as per ID


respiratory status stable at this time 


continue Torsemide 20mg BID for CHF





#HARPREET on CKD


? ATN from NSAIDs vs renal hypoprofusion 


US of kidneys showed no obstruction 


Renal function now improving


advised to avoid further NSAIDs at home


continue torsemide at home dose


Trend BUN/Cr and electrolytes while inpatient





ok for discharge home from renal perspective 





Thank you 


Michael East DO

## 2018-02-06 NOTE — PN
Progress Note, Physician


History of Present Illness: 





pulmonary





alert,tired,-c/o sob,-cp





- Current Medication List


Current Medications: 


Active Medications





Albuterol Sulfate (Ventolin 0.083% Nebulizer Soln -)  1 amp NEB Q6H PRN


   PRN Reason: SHORT OF BREATH/WHEEZING


Allopurinol (Zyloprim -)  100 mg PO DAILY [facility]


   Last Admin: 02/06/18 10:45 Dose:  100 mg


Ascorbic Acid (Vitamin C -)  1,000 mg PO DAILY [facility]


   Last Admin: 02/06/18 10:43 Dose:  1,000 mg


Aspirin (Ecotrin -)  81 mg PO DAILY [facility]


   Last Admin: 02/06/18 10:43 Dose:  81 mg


Atorvastatin Calcium (Lipitor -)  40 mg PO HS [facility]


   Last Admin: 02/05/18 21:40 Dose:  40 mg


Budesonide/Formoterol Fumarate (Symbicort 160/4.5mcg -)  1 puff IH BID [facility]


   Last Admin: 02/06/18 10:43 Dose:  1 puff


Bupropion HCl (Wellbutrin -)  75 mg PO DAILY [facility]


   Last Admin: 02/06/18 10:44 Dose:  75 mg


Carvedilol (Coreg -)  6.25 mg PO BID [facility]


   Last Admin: 02/06/18 10:42 Dose:  6.25 mg


Cholecalciferol (Vitamin D3 -)  1,000 unit PO DAILY [facility]


   Last Admin: 02/06/18 10:44 Dose:  1,000 unit


Docusate Sodium (Colace -)  100 mg PO BID [facility]


   Last Admin: 02/06/18 10:41 Dose:  100 mg


Ferrous Sulfate (Feosol -)  325 mg PO Q48H [facility]


   Last Admin: 02/05/18 15:19 Dose:  325 mg


Azithromycin 250 mg/ Dextrose  250 mls @ 250 mls/hr IVPB DAILY [facility]


   Last Admin: 02/06/18 11:19 Dose:  250 mls/hr


CEFTRIAXONE 1 G/50 ML PREMIX (Ceftriaxone 1 Gm-D5w Bag)  50 mls @ 100 mls/hr 

IVPB DAILY [facility]


   Last Admin: 02/06/18 10:41 Dose:  100 mls/hr


Milrinone Lactate/Dextrose (Milrinone 20mg/100ml Ivpb -)  20,000 mcg in 100 mls 

@ 12.723 mls/hr IVPB TITR [facility]; 0.5 MCG/KG/MIN


   PRN Reason: Protocol


   Last Admin: 02/06/18 03:30 Dose:  0.5 mcg/kg/min, 12.723 mls/hr


Insulin Aspart (Novolog Vial Sliding Scale -)  1 vial SQ ACHS [facility]


   PRN Reason: Protocol


   Last Admin: 02/06/18 07:05 Dose:  4 units


Multivitamins/Minerals/Vitamin C (Tab-A-Vit -)  1 tab PO DAILY [facility]


   Last Admin: 02/06/18 10:43 Dose:  1 tab


Ranitidine HCl (Zantac -)  75 mg PO DAILY [facility]


   Last Admin: 02/06/18 10:44 Dose:  75 mg


Spironolactone (Aldactone -)  25 mg PO DAILY [facility]


   Last Admin: 02/06/18 10:39 Dose:  25 mg


Torsemide (Demadex -)  20 mg PO BID [facility]


   Last Admin: 02/06/18 10:42 Dose:  20 mg











- Objective


Vital Signs: 


 Vital Signs











Temperature  97.6 F   02/06/18 06:00


 


Pulse Rate  76   02/06/18 06:00


 


Respiratory Rate  20   02/06/18 06:00


 


Blood Pressure  115/53   02/06/18 06:00


 


O2 Sat by Pulse Oximetry (%)  94 L  02/05/18 21:00











Constitutional: Yes: Well Nourished, Calm


Eyes: Yes: WNL


HENT: Yes: WNL


Neck: Yes: WNL


Cardiovascular: Yes: Pulse Irregular, S1, S2


Respiratory: Yes: Rales (few bibasilar rales)


Gastrointestinal: Yes: Normal Bowel Sounds, Soft


Extremities: Yes: WNL


Edema: No


Labs: 


 CBC, BMP





 02/06/18 06:05 





 02/06/18 06:05 





 INR, PTT











INR  3.63  (0.82-1.09)  H  02/06/18  06:05    














Problem List





- Problems


(1) AICD (automatic cardioverter/defibrillator) present


Code(s): Z95.810 - PRESENCE OF AUTOMATIC (IMPLANTABLE) CARDIAC DEFIBRILLATOR   





(2) CHF (congestive heart failure)


Code(s): I50.9 - HEART FAILURE, UNSPECIFIED   


Qualifiers: 


   Congestive heart failure type: diastolic   Congestive heart failure 

chronicity: chronic   Qualified Code(s): I50.32 - Chronic diastolic (congestive

) heart failure   





(3) COPD (chronic obstructive pulmonary disease)


Code(s): J44.9 - CHRONIC OBSTRUCTIVE PULMONARY DISEASE, UNSPECIFIED   





(4) Dyspnea on exertion


Code(s): R06.09 - OTHER FORMS OF DYSPNEA   





(5) Pulmonary infiltrates on CXR


Code(s): R91.8 - OTHER NONSPECIFIC ABNORMAL FINDING OF LUNG FIELD   





(6) Shortness of breath


Code(s): R06.02 - SHORTNESS OF BREATH   





(7) Acute and chronic respiratory failure (acute-on-chronic)


Code(s): J96.20 - ACUTE AND CHR RESP FAILURE, UNSP W HYPOXIA OR HYPERCAPNIA   


Qualifiers: 


   Respiratory failure complication: hypoxia   Qualified Code(s): J96.21 - 

Acute and chronic respiratory failure with hypoxia   





(8) Acute bronchitis and bronchiolitis


Code(s): J20.9 - ACUTE BRONCHITIS, UNSPECIFIED; J21.9 - ACUTE BRONCHIOLITIS, 

UNSPECIFIED   





(9) Acute on chronic renal insufficiency


Code(s): N28.9 - DISORDER OF KIDNEY AND URETER, UNSPECIFIED; N18.9 - CHRONIC 

KIDNEY DISEASE, UNSPECIFIED   





(10) Atrial fibrillation


Code(s): I48.91 - UNSPECIFIED ATRIAL FIBRILLATION   


Qualifiers: 


   Atrial fibrillation type: chronic   Qualified Code(s): I48.2 - Chronic 

atrial fibrillation   





(11) CAD (coronary artery disease)


Code(s): I25.10 - ATHSCL HEART DISEASE OF NATIVE CORONARY ARTERY W/O ANG PCTRS 

  





(12) Cardiomyopathy


Code(s): I42.9 - CARDIOMYOPATHY, UNSPECIFIED   





(13) Diabetes


Code(s): E11.9 - TYPE 2 DIABETES MELLITUS WITHOUT COMPLICATIONS   


Qualifiers: 


   Diabetes mellitus type: type 2   Diabetes mellitus complication status: with 

kidney complications   Diabetes mellitus complication detail: with chronic 

kidney disease   Chronic kidney disease stage: stage 4 (severe) 





(14) Presence of permanent cardiac pacemaker


Code(s): Z95.0 - PRESENCE OF CARDIAC PACEMAKER   





Assessment/Plan





Assessment/Plan





URI


Severe LV Systolic Dysfunction s/p ICD


Pulmonary HTN


COPD


Chronic Hypoxic Respiratory Failure


Atrial Fibrillation


Acute on Chronic Renal Failure


HTN





-   antibiotics


-  inhaled bronchodilators


-  O2 to keep SpO2 >90%


-  CXR findings appear unchanged or slightly improved from last, appears 

relatively euvolemic


-  monitor urine output, creatinine


-  rate control


-  anticoagulation


-  milrinone


- abg








DR EASTMAN

## 2018-02-06 NOTE — PN
Progress Note, Physician


Chief Complaint: 


Mr Jerome is very lethargic today. Unable to obtain.





- Current Medication List


Current Medications: 


Active Medications





Albuterol Sulfate (Ventolin 0.083% Nebulizer Soln -)  1 amp NEB Q6H PRN


   PRN Reason: SHORT OF BREATH/WHEEZING


Allopurinol (Zyloprim -)  100 mg PO DAILY [facility]


   Last Admin: 02/06/18 10:45 Dose:  100 mg


Ascorbic Acid (Vitamin C -)  1,000 mg PO DAILY [facility]


   Last Admin: 02/06/18 10:43 Dose:  1,000 mg


Aspirin (Ecotrin -)  81 mg PO DAILY [facility]


   Last Admin: 02/06/18 10:43 Dose:  81 mg


Atorvastatin Calcium (Lipitor -)  40 mg PO HS [facility]


   Last Admin: 02/05/18 21:40 Dose:  40 mg


Budesonide/Formoterol Fumarate (Symbicort 160/4.5mcg -)  1 puff IH BID [facility]


   Last Admin: 02/06/18 10:43 Dose:  1 puff


Bupropion HCl (Wellbutrin -)  75 mg PO DAILY [facility]


   Last Admin: 02/06/18 10:44 Dose:  75 mg


Carvedilol (Coreg -)  6.25 mg PO BID [facility]


   Last Admin: 02/06/18 10:42 Dose:  6.25 mg


Cholecalciferol (Vitamin D3 -)  1,000 unit PO DAILY [facility]


   Last Admin: 02/06/18 10:44 Dose:  1,000 unit


Docusate Sodium (Colace -)  100 mg PO BID [facility]


   Last Admin: 02/06/18 10:41 Dose:  100 mg


Ferrous Sulfate (Feosol -)  325 mg PO Q48H [facility]


   Last Admin: 02/05/18 15:19 Dose:  325 mg


Azithromycin 250 mg/ Dextrose  250 mls @ 250 mls/hr IVPB DAILY [facility]


   Last Admin: 02/06/18 11:19 Dose:  250 mls/hr


CEFTRIAXONE 1 G/50 ML PREMIX (Ceftriaxone 1 Gm-D5w Bag)  50 mls @ 100 mls/hr 

IVPB DAILY [facility]


   Last Admin: 02/06/18 10:41 Dose:  100 mls/hr


Milrinone Lactate/Dextrose (Milrinone 20mg/100ml Ivpb -)  20,000 mcg in 100 mls 

@ 12.723 mls/hr IVPB TITR JARROD; 0.5 MCG/KG/MIN


   PRN Reason: Protocol


   Last Admin: 02/06/18 03:30 Dose:  0.5 mcg/kg/min, 12.723 mls/hr


Insulin Aspart (Novolog Vial Sliding Scale -)  1 vial SQ ACHS [facility]


   PRN Reason: Protocol


   Last Admin: 02/06/18 07:05 Dose:  4 units


Multivitamins/Minerals/Vitamin C (Tab-A-Vit -)  1 tab PO DAILY [facility]


   Last Admin: 02/06/18 10:43 Dose:  1 tab


Ranitidine HCl (Zantac -)  75 mg PO DAILY [facility]


   Last Admin: 02/06/18 10:44 Dose:  75 mg


Spironolactone (Aldactone -)  25 mg PO DAILY [facility]


   Last Admin: 02/06/18 10:39 Dose:  25 mg


Torsemide (Demadex -)  20 mg PO BID [facility]


   Last Admin: 02/06/18 10:42 Dose:  20 mg











- Objective


Vital Signs: 


 Vital Signs











Temperature  36.4 C   02/06/18 06:00


 


Pulse Rate  76   02/06/18 06:00


 


Respiratory Rate  20   02/06/18 06:00


 


Blood Pressure  115/53   02/06/18 06:00


 


O2 Sat by Pulse Oximetry (%)  94 L  02/05/18 21:00











Constitutional: Yes: No Distress, Other (lethargic but arousable)


Cardiovascular: Yes: Regular Rate and Rhythm.  No: Gallop, Murmur, Rub


Respiratory: Yes: Regular, On Nasal O2, Rhonchi.  No: CTA Bilaterally, Rales, 

Wheezes


Gastrointestinal: Yes: Normal Bowel Sounds, Soft.  No: Distention, Tenderness


Extremities: Yes: WNL


Edema: Yes


Edema: LLE: 1+, RLE: 1+


Labs: 


 CBC, BMP





 02/06/18 06:05 





 02/06/18 06:05 





 INR, PTT











INR  3.63  (0.82-1.09)  H  02/06/18  06:05    














Problem List





- Problems


(1) Urinary tract infection


Code(s): N39.0 - URINARY TRACT INFECTION, SITE NOT SPECIFIED   





(2) CHF (congestive heart failure)


Code(s): I50.9 - HEART FAILURE, UNSPECIFIED   


Qualifiers: 


   Congestive heart failure type: diastolic   Congestive heart failure 

chronicity: chronic   Qualified Code(s): I50.32 - Chronic diastolic (congestive

) heart failure   





(3) COPD (chronic obstructive pulmonary disease)


Code(s): J44.9 - CHRONIC OBSTRUCTIVE PULMONARY DISEASE, UNSPECIFIED   





(4) CAD (coronary artery disease)


Code(s): I25.10 - ATHSCL HEART DISEASE OF NATIVE CORONARY ARTERY W/O ANG PCTRS 

  





(5) CKD (chronic kidney disease)


Code(s): N18.9 - CHRONIC KIDNEY DISEASE, UNSPECIFIED   


Qualifiers: 


 





(6) Diabetes


Code(s): E11.9 - TYPE 2 DIABETES MELLITUS WITHOUT COMPLICATIONS   


Qualifiers: 


   Diabetes mellitus type: type 2   Diabetes mellitus complication status: with 

kidney complications   Diabetes mellitus complication detail: with chronic 

kidney disease   Chronic kidney disease stage: stage 4 (severe) 





Assessment/Plan





(1) Urinary tract infection


Assessment/Plan: 


-continue rocephin currently


-awaiting urine culture results


Code(s): N39.0 - URINARY TRACT INFECTION, SITE NOT SPECIFIED   





(2) CHF (congestive heart failure)


Assessment/Plan: 


-end stage on milrinone


-continue current management


-at baseline


Code(s): I50.9 - HEART FAILURE, UNSPECIFIED   


Qualifiers: 


   Congestive heart failure type: diastolic   Congestive heart failure 

chronicity: chronic   Qualified Code(s): I50.32 - Chronic diastolic (congestive

) heart failure   





(3) COPD (chronic obstructive pulmonary disease)


Assessment/Plan: 


-appreciate pulmonary assistance


-continue current management


Code(s): J44.9 - CHRONIC OBSTRUCTIVE PULMONARY DISEASE, UNSPECIFIED   





(4) CAD (coronary artery disease)


Assessment/Plan: 


-continue home regimen


Code(s): I25.10 - ATHSCL HEART DISEASE OF NATIVE CORONARY ARTERY W/O ANG PCTRS 

  





(5) CKD (chronic kidney disease)


Assessment/Plan: 


-at baseline


Code(s): N18.9 - CHRONIC KIDNEY DISEASE, UNSPECIFIED   


Qualifiers: 


 


(6) Diabetes


Assessment/Plan: 


-diabetic diet


-SSI


Code(s): E11.9 - TYPE 2 DIABETES MELLITUS WITHOUT COMPLICATIONS   


Qualifiers: 


   Diabetes mellitus type: type 2   Diabetes mellitus complication status: with 

kidney complications   Diabetes mellitus complication detail: with chronic 

kidney disease   Chronic kidney disease stage: stage 4 (severe) 





(7) AMS


-patient lethargic today


-unclear cause


-ABG reviewed, hypercapneic but compensated


-? hypoxia


-does not appear septic


-monitor for cause and improvement

## 2018-02-07 LAB
ANION GAP SERPL CALC-SCNC: 9 MMOL/L (ref 8–16)
BASOPHILS # BLD: 0.3 % (ref 0–2)
BUN SERPL-MCNC: 87 MG/DL (ref 7–18)
CALCIUM SERPL-MCNC: 8.5 MG/DL (ref 8.5–10.1)
CHLORIDE SERPL-SCNC: 96 MMOL/L (ref 98–107)
CO2 SERPL-SCNC: 31 MMOL/L (ref 21–32)
CREAT SERPL-MCNC: 2.2 MG/DL (ref 0.7–1.3)
DEPRECATED RDW RBC AUTO: 20.7 % (ref 11.9–15.9)
EOSINOPHIL # BLD: 1.9 % (ref 0–4.5)
GLUCOSE SERPL-MCNC: 168 MG/DL (ref 74–106)
HCT VFR BLD CALC: 35.2 % (ref 35.4–49)
HGB BLD-MCNC: 11.2 GM/DL (ref 11.7–16.9)
INR BLD: 3.44 (ref 0.82–1.09)
LYMPHOCYTES # BLD: 20.9 % (ref 8–40)
MAGNESIUM SERPL-MCNC: 2.6 MG/DL (ref 1.8–2.4)
MCH RBC QN AUTO: 26.3 PG (ref 25.7–33.7)
MCHC RBC AUTO-ENTMCNC: 31.8 G/DL (ref 32–35.9)
MCV RBC: 82.8 FL (ref 80–96)
MONOCYTES # BLD AUTO: 15.6 % (ref 3.8–10.2)
NEUTROPHILS # BLD: 61.3 % (ref 42.8–82.8)
PHOSPHATE SERPL-MCNC: 3.3 MG/DL (ref 2.5–4.9)
PLATELET # BLD AUTO: 178 K/MM3 (ref 134–434)
PMV BLD: 8 FL (ref 7.5–11.1)
POTASSIUM SERPLBLD-SCNC: 4.2 MMOL/L (ref 3.5–5.1)
PT PNL PPP: 38.9 SEC (ref 9.98–11.88)
RBC # BLD AUTO: 4.25 M/MM3 (ref 4–5.6)
SODIUM SERPL-SCNC: 136 MMOL/L (ref 136–145)
WBC # BLD AUTO: 6.8 K/MM3 (ref 4–10)

## 2018-02-07 RX ADMIN — SPIRONOLACTONE SCH MG: 25 TABLET, FILM COATED ORAL at 09:52

## 2018-02-07 RX ADMIN — ALLOPURINOL SCH MG: 100 TABLET ORAL at 09:51

## 2018-02-07 RX ADMIN — ATORVASTATIN CALCIUM SCH MG: 40 TABLET, FILM COATED ORAL at 21:33

## 2018-02-07 RX ADMIN — INSULIN ASPART SCH: 100 INJECTION, SOLUTION INTRAVENOUS; SUBCUTANEOUS at 21:32

## 2018-02-07 RX ADMIN — MILRINONE LACTATE SCH MLS/HR: 200 INJECTION, SOLUTION INTRAVENOUS at 06:06

## 2018-02-07 RX ADMIN — ASPIRIN SCH MG: 81 TABLET, COATED ORAL at 09:53

## 2018-02-07 RX ADMIN — VITAMIN D, TAB 1000IU (100/BT) SCH UNIT: 25 TAB at 09:51

## 2018-02-07 RX ADMIN — AZITHROMYCIN DIHYDRATE SCH MLS/HR: 500 INJECTION, POWDER, LYOPHILIZED, FOR SOLUTION INTRAVENOUS at 09:53

## 2018-02-07 RX ADMIN — BUDESONIDE AND FORMOTEROL FUMARATE DIHYDRATE SCH PUFF: 160; 4.5 AEROSOL RESPIRATORY (INHALATION) at 21:33

## 2018-02-07 RX ADMIN — BUDESONIDE AND FORMOTEROL FUMARATE DIHYDRATE SCH PUFF: 160; 4.5 AEROSOL RESPIRATORY (INHALATION) at 09:54

## 2018-02-07 RX ADMIN — DOCUSATE SODIUM SCH MG: 100 CAPSULE, LIQUID FILLED ORAL at 09:52

## 2018-02-07 RX ADMIN — RANITIDINE SCH MG: 150 TABLET ORAL at 09:53

## 2018-02-07 RX ADMIN — MULTIVITAMIN TABLET SCH TAB: TABLET at 09:52

## 2018-02-07 RX ADMIN — DOCUSATE SODIUM SCH MG: 100 CAPSULE, LIQUID FILLED ORAL at 21:33

## 2018-02-07 RX ADMIN — INSULIN ASPART SCH UNITS: 100 INJECTION, SOLUTION INTRAVENOUS; SUBCUTANEOUS at 12:12

## 2018-02-07 RX ADMIN — TORSEMIDE SCH MG: 20 TABLET ORAL at 21:33

## 2018-02-07 RX ADMIN — TORSEMIDE SCH MG: 20 TABLET ORAL at 09:52

## 2018-02-07 RX ADMIN — MILRINONE LACTATE SCH MLS/HR: 200 INJECTION, SOLUTION INTRAVENOUS at 14:54

## 2018-02-07 RX ADMIN — CARVEDILOL SCH MG: 6.25 TABLET, FILM COATED ORAL at 09:51

## 2018-02-07 RX ADMIN — OXYCODONE HYDROCHLORIDE AND ACETAMINOPHEN SCH MG: 500 TABLET ORAL at 09:52

## 2018-02-07 RX ADMIN — INSULIN ASPART SCH UNITS: 100 INJECTION, SOLUTION INTRAVENOUS; SUBCUTANEOUS at 06:06

## 2018-02-07 RX ADMIN — INSULIN ASPART SCH UNITS: 100 INJECTION, SOLUTION INTRAVENOUS; SUBCUTANEOUS at 18:35

## 2018-02-07 RX ADMIN — CARVEDILOL SCH MG: 6.25 TABLET, FILM COATED ORAL at 21:33

## 2018-02-07 RX ADMIN — FERROUS SULFATE TAB EC 324 MG (65 MG FE EQUIVALENT) SCH MG: 324 (65 FE) TABLET DELAYED RESPONSE at 14:53

## 2018-02-07 RX ADMIN — CEFTRIAXONE SCH MLS/HR: 1 INJECTION, SOLUTION INTRAVENOUS at 09:51

## 2018-02-07 NOTE — PN
Progress Note, Physician


History of Present Illness: 





pulmonary





alert,no complaints,-sob,-cp





- Current Medication List


Current Medications: 


Active Medications





Albuterol Sulfate (Ventolin 0.083% Nebulizer Soln -)  1 amp NEB Q6H PRN


   PRN Reason: SHORT OF BREATH/WHEEZING


Allopurinol (Zyloprim -)  100 mg PO DAILY [facility]


   Last Admin: 02/07/18 09:51 Dose:  100 mg


Ascorbic Acid (Vitamin C -)  1,000 mg PO DAILY [facility]


   Last Admin: 02/07/18 09:52 Dose:  1,000 mg


Aspirin (Ecotrin -)  81 mg PO DAILY [facility]


   Last Admin: 02/07/18 09:53 Dose:  81 mg


Atorvastatin Calcium (Lipitor -)  40 mg PO HS [facility]


   Last Admin: 02/06/18 22:37 Dose:  40 mg


Budesonide/Formoterol Fumarate (Symbicort 160/4.5mcg -)  1 puff IH BID [facility]


   Last Admin: 02/07/18 09:54 Dose:  1 puff


Bupropion HCl (Wellbutrin -)  75 mg PO DAILY [facility]


   Last Admin: 02/07/18 09:51 Dose:  75 mg


Carvedilol (Coreg -)  6.25 mg PO BID [facility]


   Last Admin: 02/07/18 09:51 Dose:  6.25 mg


Cholecalciferol (Vitamin D3 -)  1,000 unit PO DAILY [facility]


   Last Admin: 02/07/18 09:51 Dose:  1,000 unit


Docusate Sodium (Colace -)  100 mg PO BID [facility]


   Last Admin: 02/07/18 09:52 Dose:  100 mg


Ferrous Sulfate (Feosol -)  325 mg PO Q48H [facility]


   Last Admin: 02/05/18 15:19 Dose:  325 mg


Azithromycin 250 mg/ Dextrose  250 mls @ 250 mls/hr IVPB DAILY [facility]


   Last Admin: 02/07/18 09:53 Dose:  250 mls/hr


CEFTRIAXONE 1 G/50 ML PREMIX (Ceftriaxone 1 Gm-D5w Bag)  50 mls @ 100 mls/hr 

IVPB DAILY [facility]


   Last Admin: 02/07/18 09:51 Dose:  100 mls/hr


Milrinone Lactate/Dextrose (Milrinone 20mg/100ml Ivpb -)  20,000 mcg in 100 mls 

@ 12.723 mls/hr IVPB TITR JARROD; 0.5 MCG/KG/MIN


   PRN Reason: Protocol


   Last Admin: 02/07/18 06:06 Dose:  0.5 mcg/kg/min, 12.723 mls/hr


Insulin Aspart (Novolog Vial Sliding Scale -)  1 vial SQ ACHS [facility]


   PRN Reason: Protocol


   Last Admin: 02/07/18 06:06 Dose:  2 units


Multivitamins/Minerals/Vitamin C (Tab-A-Vit -)  1 tab PO DAILY [facility]


   Last Admin: 02/07/18 09:52 Dose:  1 tab


Ranitidine HCl (Zantac -)  75 mg PO DAILY [facility]


   Last Admin: 02/07/18 09:53 Dose:  75 mg


Spironolactone (Aldactone -)  25 mg PO DAILY [facility]


   Last Admin: 02/07/18 09:52 Dose:  25 mg


Torsemide (Demadex -)  20 mg PO BID [facility]


   Last Admin: 02/07/18 09:52 Dose:  20 mg











- Objective


Vital Signs: 


 Vital Signs











Temperature  97.2 F L  02/07/18 05:41


 


Pulse Rate  75   02/07/18 05:41


 


Respiratory Rate  20   02/07/18 05:41


 


Blood Pressure  103/60   02/07/18 05:41


 


O2 Sat by Pulse Oximetry (%)  93 L  02/06/18 21:00











Constitutional: Yes: Well Nourished, Calm


Eyes: Yes: WNL


HENT: Yes: WNL


Neck: Yes: WNL


Cardiovascular: Yes: Pulse Irregular, S1, S2


Respiratory: Yes: Rales (biasilar rales)


Gastrointestinal: Yes: Normal Bowel Sounds, Soft


Extremities: Yes: WNL


Edema: No


Labs: 


 CBC, BMP





 02/07/18 07:01 





 02/07/18 07:01 





 INR, PTT











INR  3.44  (0.82-1.09)  H  02/07/18  07:01    








 Laboratory Tests











  02/06/18





  11:31


 


ABG pH  7.40


 


ABG pCO2 at Pt Temp  46.4 H


 


ABG pO2 at Pt Temp  67.8 L


 


ABG HCO3  28.5 H


 


ABG O2 Sat (Measured)  93.8














Problem List





- Problems


(1) AICD (automatic cardioverter/defibrillator) present


Code(s): Z95.810 - PRESENCE OF AUTOMATIC (IMPLANTABLE) CARDIAC DEFIBRILLATOR   





(2) CHF (congestive heart failure)


Code(s): I50.9 - HEART FAILURE, UNSPECIFIED   


Qualifiers: 


   Congestive heart failure type: diastolic   Congestive heart failure 

chronicity: chronic   Qualified Code(s): I50.32 - Chronic diastolic (congestive

) heart failure   





(3) COPD (chronic obstructive pulmonary disease)


Code(s): J44.9 - CHRONIC OBSTRUCTIVE PULMONARY DISEASE, UNSPECIFIED   





(4) Dyspnea on exertion


Code(s): R06.09 - OTHER FORMS OF DYSPNEA   





(5) Pulmonary infiltrates on CXR


Code(s): R91.8 - OTHER NONSPECIFIC ABNORMAL FINDING OF LUNG FIELD   





(6) Shortness of breath


Code(s): R06.02 - SHORTNESS OF BREATH   





(7) Acute and chronic respiratory failure (acute-on-chronic)


Code(s): J96.20 - ACUTE AND CHR RESP FAILURE, UNSP W HYPOXIA OR HYPERCAPNIA   


Qualifiers: 


   Respiratory failure complication: hypoxia   Qualified Code(s): J96.21 - 

Acute and chronic respiratory failure with hypoxia   





(8) Acute bronchitis and bronchiolitis


Code(s): J20.9 - ACUTE BRONCHITIS, UNSPECIFIED; J21.9 - ACUTE BRONCHIOLITIS, 

UNSPECIFIED   





(9) Acute on chronic renal insufficiency


Code(s): N28.9 - DISORDER OF KIDNEY AND URETER, UNSPECIFIED; N18.9 - CHRONIC 

KIDNEY DISEASE, UNSPECIFIED   





(10) Atrial fibrillation


Code(s): I48.91 - UNSPECIFIED ATRIAL FIBRILLATION   


Qualifiers: 


   Atrial fibrillation type: chronic   Qualified Code(s): I48.2 - Chronic 

atrial fibrillation   





(11) CAD (coronary artery disease)


Code(s): I25.10 - ATHSCL HEART DISEASE OF NATIVE CORONARY ARTERY W/O ANG PCTRS 

  





(12) Cardiomyopathy


Code(s): I42.9 - CARDIOMYOPATHY, UNSPECIFIED   





(13) Diabetes


Code(s): E11.9 - TYPE 2 DIABETES MELLITUS WITHOUT COMPLICATIONS   


Qualifiers: 


   Diabetes mellitus type: type 2   Diabetes mellitus complication status: with 

kidney complications   Diabetes mellitus complication detail: with chronic 

kidney disease   Chronic kidney disease stage: stage 4 (severe) 





(14) Presence of permanent cardiac pacemaker


Code(s): Z95.0 - PRESENCE OF CARDIAC PACEMAKER   





Assessment/Plan





Assessment/Plan





URI


Severe LV Systolic Dysfunction s/p ICD


Pulmonary HTN


COPD


Chronic Hypoxic Respiratory Failure


Atrial Fibrillation


Acute on Chronic Renal Failure improving


HTN





-   po antibiotics


-  inhaled bronchodilators


-  O2 to keep SpO2 >90%


-  monitor urine output, creatinine


-  rate control


-  anticoagulation


-  milrinone








DR EASTMAN

## 2018-02-07 NOTE — PN
Progress Note (short form)





- Note


Progress Note: 





s:  feeling at baseline,  no sob palps dizzy cp








 


 Current Medications











Generic Name Dose Route Start Last Admin





  Trade Name Freq  PRN Reason Stop Dose Admin


 


Albuterol Sulfate  1 amp  02/03/18 15:34  





  Ventolin 0.083% Nebulizer Soln -  NEB   





  Q6H PRN   





  SHORT OF BREATH/WHEEZING   


 


Allopurinol  100 mg  02/04/18 10:00  02/07/18 09:51





  Zyloprim -  PO   100 mg





  DAILY JARROD   Administration


 


Ascorbic Acid  1,000 mg  02/04/18 10:00  02/07/18 09:52





  Vitamin C -  PO   1,000 mg





  DAILY JARROD   Administration


 


Aspirin  81 mg  02/04/18 10:00  02/07/18 09:53





  Ecotrin -  PO   81 mg





  DAILY JARROD   Administration


 


Atorvastatin Calcium  40 mg  02/03/18 22:00  02/06/18 22:37





  Lipitor -  PO   40 mg





  HS JARROD   Administration


 


Budesonide/Formoterol Fumarate  1 puff  02/03/18 22:00  02/07/18 09:54





  Symbicort 160/4.5mcg -  IH   1 puff





  BID JARROD   Administration


 


Bupropion HCl  75 mg  02/04/18 10:00  02/07/18 09:51





  Wellbutrin -  PO   75 mg





  DAILY JARROD   Administration


 


Carvedilol  6.25 mg  02/03/18 22:00  02/07/18 09:51





  Coreg -  PO   6.25 mg





  BID JARROD   Administration


 


Cholecalciferol  1,000 unit  02/04/18 12:45  02/07/18 09:51





  Vitamin D3 -  PO   1,000 unit





  DAILY JARROD   Administration


 


Docusate Sodium  100 mg  02/03/18 22:00  02/07/18 09:52





  Colace -  PO   100 mg





  BID JARROD   Administration


 


Ferrous Sulfate  325 mg  02/03/18 15:45  02/05/18 15:19





  Feosol -  PO   325 mg





  Q48H JARROD   Administration


 


Azithromycin 250 mg/ Dextrose  250 mls @ 250 mls/hr  02/04/18 10:00  02/07/18 09

:53





  IVPB   250 mls/hr





  DAILY JARROD   Administration


 


CEFTRIAXONE 1 G/50 ML PREMIX  50 mls @ 100 mls/hr  02/04/18 12:45  02/07/18 09:

51





  Ceftriaxone 1 Gm-D5w Bag  IVPB   100 mls/hr





  DAILY JARROD   Administration


 


Milrinone Lactate/Dextrose  20,000 mcg in 100 mls @ 12.723 mls/hr  02/05/18 22:

15  02/07/18 06:06





  Milrinone 20mg/100ml Ivpb -  IVPB   0.5 mcg/kg/min





  TITR JARROD   12.723 mls/hr





  Protocol   Administration





  0.5 MCG/KG/MIN   


 


Insulin Aspart  1 vial  02/03/18 16:30  02/07/18 06:06





  Novolog Vial Sliding Scale -  SQ   2 units





  [facility] JARROD   Administration





  Protocol   


 


Multivitamins/Minerals/Vitamin C  1 tab  02/04/18 10:00  02/07/18 09:52





  Tab-A-Vit -  PO   1 tab





  DAILY JARROD   Administration


 


Ranitidine HCl  75 mg  02/04/18 10:00  02/07/18 09:53





  Zantac -  PO   75 mg





  DAILY JARROD   Administration


 


Spironolactone  25 mg  02/04/18 10:00  02/07/18 09:52





  Aldactone -  PO   25 mg





  DAILY JARROD   Administration


 


Torsemide  20 mg  02/03/18 22:00  02/07/18 09:52





  Demadex -  PO   20 mg





  BID JARROD   Administration








 Vital Signs











 Period  Temp  Pulse  Resp  BP Sys/Alonso  Pulse Ox


 


 Last 24 Hr  97.2 F-98.2 F  75-77  20-20  /60-67  93














Constitutional: Yes: Well Nourished, No Distress, Calm


Cardiovascular: Yes: Regular Rate and Rhythm, JVD, S1, S2.  No: Gallop, Murmur


Respiratory: Yes: Regular, Rales (L base), Wheezes (faint bilat).  No: 

Accessory Muscle Use


Extremities: No: Cold


Edema: Yes (trace ankles)


Neurological: Yes: Alert, Oriented


Psychiatric: No: Agitated


Labs: 


 


 


 CBC, BMP





 02/07/18 07:01 





 02/07/18 07:01 











- ....Imaging


EKG: Other (tele: v-paced)








CXR (images reviewed): trace-small bilat effusions; vasc redistribution 

pattern. alveolar and interstitial infiltrates bilat low/mid lung fields. no 

signif change vs 7/3 prior





ECG: NSR, bi-V paced





Echo 10/2016: sev lve, global hk, sev dec lvef, mild rve, nl rv fcn, patty, mild-

mod mr, mv ring, tv ring, mild tr, mild pr, mild phtn, ivc mild dil

















a/p:  76 year old man with PMH of IDDM, COPD (O2 dependant) HTN, HLD, Afib (on 

coumadin), CAD s/p CABG 2013, end stage CHF with ICD placement (on milrinone 

pump), PVD, BPH here with sob.





URI sx's (cough, rhinorrhea), h/o copd:


- flu swab negative


- ? if a.e. copd at present


- per hospitalist





chronic systolic CHF, end stage systolic heart failure s/p ICD, on home 

milrinone infusion:


- pt had cardiomems PAP monitor implant with dr de la rosa ([facility]) and has been 

managed by him since last admit here.


- on destination therapy home milrinone infusion, ? if dr de la rosa increased the 

dose.


- baseline renal fxn has worsened on recent labs (high 2's range) 


- wt 202 on discharge 7/17 following diuresis with lasix 100 iv bid, metolazone 

5mg. currently 185


- BNP 2K (prior range 2K-7K), low GFR confounds


- 2/4: ER labs appear drier than recent baseline, but bun/creat today back down 

in his recent baseline range. CXR appears unchanged vs 7/17. suspect he is not 

decompensated despite JVD (chronic in him)--no periph edema which is 

consistently CHF finding in him, and no increase in wt.


- please confirm home torsemide regimen with wife.


- receiving torsemide 20 bid here. low threshold to hold diuretic if renal fxn 

worsens further.


- 2/5: wt stable, creat stable, bun up slightly. remains well-compensated with 

likely no excess volume (chronic JVD here). 


- 2/6-7: cr continues to improve on home torsemide, con't. routine weekly labs f

/u as outpt as already scheduled (has home labs done by chf specialist dr de la rosa

)








HARPREET on CKD, h/o cardiorenal syndrome:


- recent outpt creat ranging 2.6-2.9 range


- bun/creat above baseline here





afib


- rate controlled on coreg


- cont coumadin per INR (target 2-3)





VT:


- intermittent brief runs of NSVT on tele, chronic finding on prior admits


- has ICD


- on max tolerated coreg dose


- replete lytes prn





CAD s/p MI with PCI (and ? TV repair at that time):


- No anginal symptoms, no signs acs.


- Con't home ac, asa, atorvastatin, coreg





PVD


- con't AC, statin.





HTN: controlled on coreg





HLD: con't statin. 





D/C TELEMETRY.


STABLE FOR D/C HOME FROM CV P.O.V.

## 2018-02-07 NOTE — PN
Progress Note, Physician


Chief Complaint: 


Mr Jerome says he is doing well. Eager to go home. Breathing is at baseline. 

No cp or n/v. Says he did not sleep well and that was why he was so lethargic 

yesterday.





- Current Medication List


Current Medications: 


Active Medications





Albuterol Sulfate (Ventolin 0.083% Nebulizer Soln -)  1 amp NEB Q6H PRN


   PRN Reason: SHORT OF BREATH/WHEEZING


Allopurinol (Zyloprim -)  100 mg PO DAILY [facility]


   Last Admin: 02/07/18 09:51 Dose:  100 mg


Ascorbic Acid (Vitamin C -)  1,000 mg PO DAILY [facility]


   Last Admin: 02/07/18 09:52 Dose:  1,000 mg


Aspirin (Ecotrin -)  81 mg PO DAILY [facility]


   Last Admin: 02/07/18 09:53 Dose:  81 mg


Atorvastatin Calcium (Lipitor -)  40 mg PO HS [facility]


   Last Admin: 02/06/18 22:37 Dose:  40 mg


Budesonide/Formoterol Fumarate (Symbicort 160/4.5mcg -)  1 puff IH BID [facility]


   Last Admin: 02/07/18 09:54 Dose:  1 puff


Bupropion HCl (Wellbutrin -)  75 mg PO DAILY [facility]


   Last Admin: 02/07/18 09:51 Dose:  75 mg


Carvedilol (Coreg -)  6.25 mg PO BID [facility]


   Last Admin: 02/07/18 09:51 Dose:  6.25 mg


Cholecalciferol (Vitamin D3 -)  1,000 unit PO DAILY [facility]


   Last Admin: 02/07/18 09:51 Dose:  1,000 unit


Docusate Sodium (Colace -)  100 mg PO BID [facility]


   Last Admin: 02/07/18 09:52 Dose:  100 mg


Ferrous Sulfate (Feosol -)  325 mg PO Q48H [facility]


   Last Admin: 02/05/18 15:19 Dose:  325 mg


Azithromycin 250 mg/ Dextrose  250 mls @ 250 mls/hr IVPB DAILY [facility]


   Last Admin: 02/07/18 09:53 Dose:  250 mls/hr


CEFTRIAXONE 1 G/50 ML PREMIX (Ceftriaxone 1 Gm-D5w Bag)  50 mls @ 100 mls/hr 

IVPB DAILY [facility]


   Last Admin: 02/07/18 09:51 Dose:  100 mls/hr


Milrinone Lactate/Dextrose (Milrinone 20mg/100ml Ivpb -)  20,000 mcg in 100 mls 

@ 12.723 mls/hr IVPB TITR [facility]; 0.5 MCG/KG/MIN


   PRN Reason: Protocol


   Last Admin: 02/07/18 06:06 Dose:  0.5 mcg/kg/min, 12.723 mls/hr


Insulin Aspart (Novolog Vial Sliding Scale -)  1 vial SQ ACHS [facility]


   PRN Reason: Protocol


   Last Admin: 02/07/18 12:12 Dose:  4 units


Multivitamins/Minerals/Vitamin C (Tab-A-Vit -)  1 tab PO DAILY [facility]


   Last Admin: 02/07/18 09:52 Dose:  1 tab


Ranitidine HCl (Zantac -)  75 mg PO DAILY [facility]


   Last Admin: 02/07/18 09:53 Dose:  75 mg


Spironolactone (Aldactone -)  25 mg PO DAILY [facility]


   Last Admin: 02/07/18 09:52 Dose:  25 mg


Torsemide (Demadex -)  20 mg PO BID [facility]


   Last Admin: 02/07/18 09:52 Dose:  20 mg











- Objective


Vital Signs: 


 Vital Signs











Temperature  36.2 C L  02/07/18 05:41


 


Pulse Rate  75   02/07/18 05:41


 


Respiratory Rate  20   02/07/18 05:41


 


Blood Pressure  103/60   02/07/18 05:41


 


O2 Sat by Pulse Oximetry (%)  93 L  02/06/18 21:00











Constitutional: Yes: Well Nourished, No Distress, Calm


Cardiovascular: Yes: Regular Rate and Rhythm.  No: Gallop, Murmur, Rub


Respiratory: Yes: Regular, On Nasal O2, Rhonchi.  No: CTA Bilaterally, Rales, 

Wheezes


Gastrointestinal: Yes: Normal Bowel Sounds, Soft.  No: Distention, Tenderness


Extremities: Yes: WNL


Edema: Yes


Edema: LLE: 1+, RLE: 1+


Labs: 


 CBC, BMP





 02/07/18 07:01 





 02/07/18 07:01 





 INR, PTT











INR  3.44  (0.82-1.09)  H  02/07/18  07:01    














Problem List





- Problems


(1) Urinary tract infection


Code(s): N39.0 - URINARY TRACT INFECTION, SITE NOT SPECIFIED   





(2) CHF (congestive heart failure)


Code(s): I50.9 - HEART FAILURE, UNSPECIFIED   


Qualifiers: 


   Congestive heart failure type: diastolic   Congestive heart failure 

chronicity: chronic   Qualified Code(s): I50.32 - Chronic diastolic (congestive

) heart failure   





(3) COPD (chronic obstructive pulmonary disease)


Code(s): J44.9 - CHRONIC OBSTRUCTIVE PULMONARY DISEASE, UNSPECIFIED   





(4) CAD (coronary artery disease)


Code(s): I25.10 - ATHSCL HEART DISEASE OF NATIVE CORONARY ARTERY W/O ANG PCTRS 

  





(5) CKD (chronic kidney disease)


Code(s): N18.9 - CHRONIC KIDNEY DISEASE, UNSPECIFIED   


Qualifiers: 


 





(6) Diabetes


Code(s): E11.9 - TYPE 2 DIABETES MELLITUS WITHOUT COMPLICATIONS   


Qualifiers: 


   Diabetes mellitus type: type 2   Diabetes mellitus complication status: with 

kidney complications   Diabetes mellitus complication detail: with chronic 

kidney disease   Chronic kidney disease stage: stage 4 (severe) 





Assessment/Plan





(1) Urinary tract infection


Assessment/Plan: 


-continue rocephin currently


-awaiting urine culture results


-urine cultures results c/w partially treated UTI, will await final results for 

proper oral antibiotic


Code(s): N39.0 - URINARY TRACT INFECTION, SITE NOT SPECIFIED   





(2) CHF (congestive heart failure)


Assessment/Plan: 


-end stage on milrinone


-continue current management


-at baseline


Code(s): I50.9 - HEART FAILURE, UNSPECIFIED   


Qualifiers: 


   Congestive heart failure type: diastolic   Congestive heart failure 

chronicity: chronic   Qualified Code(s): I50.32 - Chronic diastolic (congestive

) heart failure   





(3) COPD (chronic obstructive pulmonary disease)


Assessment/Plan: 


-appreciate pulmonary assistance


-continue current management


Code(s): J44.9 - CHRONIC OBSTRUCTIVE PULMONARY DISEASE, UNSPECIFIED   





(4) CAD (coronary artery disease)


Assessment/Plan: 


-continue home regimen


Code(s): I25.10 - ATHSCL HEART DISEASE OF NATIVE CORONARY ARTERY W/O ANG PCTRS 

  





(5) CKD (chronic kidney disease)


Assessment/Plan: 


-at baseline


Code(s): N18.9 - CHRONIC KIDNEY DISEASE, UNSPECIFIED   


Qualifiers: 


 


(6) Diabetes


Assessment/Plan: 


-diabetic diet


-SSI


Code(s): E11.9 - TYPE 2 DIABETES MELLITUS WITHOUT COMPLICATIONS   


Qualifiers: 


   Diabetes mellitus type: type 2   Diabetes mellitus complication status: with 

kidney complications   Diabetes mellitus complication detail: with chronic 

kidney disease   Chronic kidney disease stage: stage 4 (severe) 





(7) AMS


-resolved


-secondary to insomnia





Dispo


-plan for discharge tomorrow

## 2018-02-08 VITALS — DIASTOLIC BLOOD PRESSURE: 68 MMHG | TEMPERATURE: 98 F | SYSTOLIC BLOOD PRESSURE: 106 MMHG | HEART RATE: 76 BPM

## 2018-02-08 LAB
ANION GAP SERPL CALC-SCNC: 8 MMOL/L (ref 8–16)
BUN SERPL-MCNC: 80 MG/DL (ref 7–18)
CALCIUM SERPL-MCNC: 8.5 MG/DL (ref 8.5–10.1)
CHLORIDE SERPL-SCNC: 96 MMOL/L (ref 98–107)
CO2 SERPL-SCNC: 31 MMOL/L (ref 21–32)
CREAT SERPL-MCNC: 2.1 MG/DL (ref 0.7–1.3)
DEPRECATED RDW RBC AUTO: 20 % (ref 11.9–15.9)
GLUCOSE SERPL-MCNC: 164 MG/DL (ref 74–106)
HCT VFR BLD CALC: 35.7 % (ref 35.4–49)
HGB BLD-MCNC: 11.2 GM/DL (ref 11.7–16.9)
INR BLD: 2.45 (ref 0.82–1.09)
MAGNESIUM SERPL-MCNC: 2.4 MG/DL (ref 1.8–2.4)
MCH RBC QN AUTO: 26.2 PG (ref 25.7–33.7)
MCHC RBC AUTO-ENTMCNC: 31.4 G/DL (ref 32–35.9)
MCV RBC: 83.4 FL (ref 80–96)
PHOSPHATE SERPL-MCNC: 2.7 MG/DL (ref 2.5–4.9)
PLATELET # BLD AUTO: 187 K/MM3 (ref 134–434)
PLATELET BLD QL SMEAR: NORMAL
PMV BLD: 8.1 FL (ref 7.5–11.1)
POTASSIUM SERPLBLD-SCNC: 4.2 MMOL/L (ref 3.5–5.1)
PT PNL PPP: 27.7 SEC (ref 9.98–11.88)
RBC # BLD AUTO: 4.28 M/MM3 (ref 4–5.6)
SODIUM SERPL-SCNC: 135 MMOL/L (ref 136–145)
WBC # BLD AUTO: 9.4 K/MM3 (ref 4–10)

## 2018-02-08 RX ADMIN — SPIRONOLACTONE SCH MG: 25 TABLET, FILM COATED ORAL at 09:25

## 2018-02-08 RX ADMIN — DOCUSATE SODIUM SCH MG: 100 CAPSULE, LIQUID FILLED ORAL at 09:25

## 2018-02-08 RX ADMIN — BUDESONIDE AND FORMOTEROL FUMARATE DIHYDRATE SCH PUFF: 160; 4.5 AEROSOL RESPIRATORY (INHALATION) at 09:27

## 2018-02-08 RX ADMIN — INSULIN ASPART SCH UNITS: 100 INJECTION, SOLUTION INTRAVENOUS; SUBCUTANEOUS at 13:22

## 2018-02-08 RX ADMIN — RANITIDINE SCH MG: 150 TABLET ORAL at 09:27

## 2018-02-08 RX ADMIN — ASPIRIN SCH MG: 81 TABLET, COATED ORAL at 09:26

## 2018-02-08 RX ADMIN — CARVEDILOL SCH MG: 6.25 TABLET, FILM COATED ORAL at 09:26

## 2018-02-08 RX ADMIN — VITAMIN D, TAB 1000IU (100/BT) SCH UNIT: 25 TAB at 09:25

## 2018-02-08 RX ADMIN — OXYCODONE HYDROCHLORIDE AND ACETAMINOPHEN SCH MG: 500 TABLET ORAL at 09:25

## 2018-02-08 RX ADMIN — MILRINONE LACTATE SCH MLS/HR: 200 INJECTION, SOLUTION INTRAVENOUS at 00:25

## 2018-02-08 RX ADMIN — MULTIVITAMIN TABLET SCH TAB: TABLET at 09:25

## 2018-02-08 RX ADMIN — INSULIN ASPART SCH UNITS: 100 INJECTION, SOLUTION INTRAVENOUS; SUBCUTANEOUS at 06:26

## 2018-02-08 RX ADMIN — ALLOPURINOL SCH MG: 100 TABLET ORAL at 09:26

## 2018-02-08 RX ADMIN — TORSEMIDE SCH MG: 20 TABLET ORAL at 09:27

## 2018-02-08 RX ADMIN — CEFTRIAXONE SCH MLS/HR: 1 INJECTION, SOLUTION INTRAVENOUS at 09:26

## 2018-02-08 RX ADMIN — AZITHROMYCIN DIHYDRATE SCH MLS/HR: 500 INJECTION, POWDER, LYOPHILIZED, FOR SOLUTION INTRAVENOUS at 09:52

## 2018-02-08 NOTE — DS
Physical Examination


Vital Signs: 


 Vital Signs











Temperature  36.6 C   02/08/18 10:00


 


Pulse Rate  76   02/08/18 10:00


 


Respiratory Rate  22   02/08/18 10:00


 


Blood Pressure  106/68   02/08/18 10:00


 


O2 Sat by Pulse Oximetry (%)  96   02/07/18 21:00











Constitutional: Yes: Well Nourished, No Distress, Calm


Cardiovascular: Yes: Regular Rate and Rhythm.  No: Gallop, Murmur, Rub


Respiratory: Yes: Regular, On Nasal O2, Rhonchi.  No: CTA Bilaterally, Rales, 

Wheezes


Gastrointestinal: Yes: Normal Bowel Sounds, Soft.  No: Distention, Tenderness


Extremities: Yes: WNL


Edema: No


Labs: 


 CBC, BMP





 02/08/18 07:07 





 02/08/18 07:07 











Discharge Summary


Reason For Visit: COPD SOB PULMONARY INFILTRATE ON XRAY


Current Active Problems





AICD (automatic cardioverter/defibrillator) present (Acute)


Acute kidney injury (Acute)


CHF (congestive heart failure) (Acute)


COPD (chronic obstructive pulmonary disease) (Acute)


Dyspnea on exertion (Acute)


Pulmonary infiltrates on CXR (Acute)


Shortness of breath (Acute)








Hospital Course: 





(1) Urinary tract infection


Code(s): N39.0 - URINARY TRACT INFECTION, SITE NOT SPECIFIED   





(2) CHF (congestive heart failure)


Code(s): I50.9 - HEART FAILURE, UNSPECIFIED   


Qualifiers: 


   Congestive heart failure type: diastolic   Congestive heart failure 

chronicity: chronic   Qualified Code(s): I50.32 - Chronic diastolic (congestive

) heart failure   





(3) COPD (chronic obstructive pulmonary disease)


Code(s): J44.9 - CHRONIC OBSTRUCTIVE PULMONARY DISEASE, UNSPECIFIED   





(4) CAD (coronary artery disease)


Code(s): I25.10 - ATHSCL HEART DISEASE OF NATIVE CORONARY ARTERY W/O ANG PCTRS 

  





(5) CKD (chronic kidney disease)


Code(s): N18.9 - CHRONIC KIDNEY DISEASE, UNSPECIFIED   


Qualifiers: 


 





(6) Diabetes


Code(s): E11.9 - TYPE 2 DIABETES MELLITUS WITHOUT COMPLICATIONS   


Qualifiers: 


   Diabetes mellitus type: type 2   Diabetes mellitus complication status: with 

kidney complications   Diabetes mellitus complication detail: with chronic 

kidney disease   Chronic kidney disease stage: stage 4 (severe) 





Mr Jerome is a 77 year old male who came in with concern for pneumonia. He was 

admitted to the hospital and started on rocephin. He was seen by cardiology and 

pulmonary, he improved significantly and it was felt he had a viral pneumonia. 

However he was also noted that patient had a UTI and failed outpatient therapy. 

Urine culture was sent and grew citrobacter. He was on augmentin at home per 

his wife. He was getting rocephin as stated above and this was sensitive, he 

will be transitioned over to vantin. he is safe for discharge home.





33 minutes spent in preparation of this discharge


Condition: Stable





- Instructions


Diet, Activity, Other Instructions: 


resume previous diet and activity


Referrals: 


Sathya Flower MD [Primary Care Provider] - 


Deuce Titus MD [Staff Physician] - 


Radhames Gama MD [Staff Physician] - 


Disposition: VNS/HOME HEALTH CARE





- Home Medications


Comprehensive Discharge Medication List: 


Ambulatory Orders





Allopurinol [Zyloprim -] 100 mg PO DAILY 01/14/16 


Atorvastatin Ca [Lipitor] 40 mg PO HS 01/14/16 


Carvedilol 6.25 mg PO BID 01/14/16 


Docusate Sodium [Colace -] 100 mg PO BID 01/14/16 


Insulin NPL/Insulin Lispro [Humalog Mix 75-25 Vial] 0 unit SQ PRN 01/14/16 


Milrinone Lactate/D5w [Milrinone 0.2 mg/ml in D5w] 0 mg IV DAILY 01/14/16 


Ranitidine [Zantac -] 75 mg PO DAILY 01/14/16 


Albuterol 0.083% Nebulizer Sol [Ventolin 0.083% Nebulizer Soln -] 1 amp NEB Q6H 

PRN #120 amp 01/18/16 


Ferrous Sulfate [Feosol] 325 mg PO Q48H  ud 08/27/16 


Warfarin Na [Coumadin -] 2 mg PO DAILY@1800 01/10/17 


Ascorbic Acid [Vitamin C -] 1,000 mg PO DAILY 03/30/17 


Cholecalciferol (Vitamin D3) [Vitamin D -] 1,000 unit PO DAILY 03/30/17 


Multivitamin [Poly-Vitamin] 1 each PO DAILY 03/30/17 


Budesonide/Formeterol Fumarate [SYMBICORT 160/4.5mcg -] 1 inh PO BID 06/23/17 


Torsemide [Demadex -] 20 mg PO BID 06/23/17 


Aspirin 81 mg PO DAILY 02/03/18 


Cholecalciferol (Vitamin D3) [Vitamin D -] 1,000 unit PO DAILY 02/03/18 


Levothyroxine [Synthroid -] 25 mcg PO DAILY 02/03/18 


Metolazone 2.5 mg PO DAILY 02/03/18 


Spironolactone 25 mg PO DAILY 02/03/18 


Warfarin Na [Coumadin -] 2.5 mg PO ASDIR 02/03/18 


Bupropion HCl [Wellbutrin -] 75 mg PO DAILY #0 tablet 02/05/18 


Aspirin Coated [Ecotrin -] 81 mg PO DAILY  tablet.ec 02/08/18 


Cefpodoxime Proxetil [Vantin (Nf) -] 100 mg PO BID #20 tablet 02/08/18

## 2018-02-08 NOTE — PN
Progress Note (short form)





- Note


Progress Note: 





s:  feeling at baseline,  no sob palps dizzy cp








 


 


 Current Medications











Generic Name Dose Route Start Last Admin





  Trade Name Freq  PRN Reason Stop Dose Admin


 


Albuterol Sulfate  1 amp  02/03/18 15:34  





  Ventolin 0.083% Nebulizer Soln -  NEB   





  Q6H PRN   





  SHORT OF BREATH/WHEEZING   


 


Allopurinol  100 mg  02/04/18 10:00  02/08/18 09:26





  Zyloprim -  PO   100 mg





  DAILY JARROD   Administration


 


Ascorbic Acid  1,000 mg  02/04/18 10:00  02/08/18 09:25





  Vitamin C -  PO   1,000 mg





  DAILY JARROD   Administration


 


Aspirin  81 mg  02/04/18 10:00  02/08/18 09:26





  Ecotrin -  PO   81 mg





  DAILY JARROD   Administration


 


Atorvastatin Calcium  40 mg  02/03/18 22:00  02/07/18 21:33





  Lipitor -  PO   40 mg





  HS JARROD   Administration


 


Budesonide/Formoterol Fumarate  1 puff  02/03/18 22:00  02/08/18 09:27





  Symbicort 160/4.5mcg -  IH   1 puff





  BID JARROD   Administration


 


Bupropion HCl  75 mg  02/04/18 10:00  02/08/18 09:25





  Wellbutrin -  PO   75 mg





  DAILY JARROD   Administration


 


Carvedilol  6.25 mg  02/03/18 22:00  02/08/18 09:26





  Coreg -  PO   6.25 mg





  BID JARROD   Administration


 


Cholecalciferol  1,000 unit  02/04/18 12:45  02/08/18 09:25





  Vitamin D3 -  PO   1,000 unit





  DAILY JARROD   Administration


 


Docusate Sodium  100 mg  02/03/18 22:00  02/08/18 09:25





  Colace -  PO   100 mg





  BID JARROD   Administration


 


Ferrous Sulfate  325 mg  02/03/18 15:45  02/07/18 14:53





  Feosol -  PO   325 mg





  Q48H JARROD   Administration


 


Azithromycin 250 mg/ Dextrose  250 mls @ 250 mls/hr  02/04/18 10:00  02/08/18 09

:52





  IVPB   250 mls/hr





  DAILY JARROD   Administration


 


CEFTRIAXONE 1 G/50 ML PREMIX  50 mls @ 100 mls/hr  02/04/18 12:45  02/08/18 09:

26





  Ceftriaxone 1 Gm-D5w Bag  IVPB   100 mls/hr





  DAILY JARROD   Administration


 


Milrinone Lactate/Dextrose  20,000 mcg in 100 mls @ 12.723 mls/hr  02/05/18 22:

15  02/08/18 00:25





  Milrinone 20mg/100ml Ivpb -  IVPB   0.5 mcg/kg/min





  TITR JARROD   12.723 mls/hr





  Protocol   Administration





  0.5 MCG/KG/MIN   


 


Insulin Aspart  1 vial  02/03/18 16:30  02/08/18 06:26





  Novolog Vial Sliding Scale -  SQ   2 units





  [facility]   Administration





  Protocol   


 


Multivitamins/Minerals/Vitamin C  1 tab  02/04/18 10:00  02/08/18 09:25





  Tab-A-Vit -  PO   1 tab





  DAILY JARROD   Administration


 


Ranitidine HCl  75 mg  02/04/18 10:00  02/08/18 09:27





  Zantac -  PO   75 mg





  DAILY JARROD   Administration


 


Spironolactone  25 mg  02/04/18 10:00  02/08/18 09:25





  Aldactone -  PO   25 mg





  DAILY JARROD   Administration


 


Torsemide  20 mg  02/03/18 22:00  02/08/18 09:27





  Demadex -  PO   20 mg





  BID JARROD   Administration








 Vital Signs











 Period  Temp  Pulse  Resp  BP Sys/Alonso  Pulse Ox


 


 Last 24 Hr  97.2 F-98.8 F  75-94  18-22  /52-68  96

















Constitutional: Yes: Well Nourished, No Distress, Calm


Cardiovascular: Yes: Regular Rate and Rhythm, JVD, S1, S2.  No: Gallop, Murmur


Respiratory: Yes: Regular, Rales (L base), Wheezes (faint bilat).  No: 

Accessory Muscle Use


Extremities: No: Cold


Edema: Yes (trace ankles)


Neurological: Yes: Alert, Oriented


Psychiatric: No: Agitated


Labs: 


 


 


 


 CBC, BMP





 02/08/18 07:07 





 02/08/18 07:07 














- ....Imaging


EKG: Other (tele: v-paced)








CXR (images reviewed): trace-small bilat effusions; vasc redistribution 

pattern. alveolar and interstitial infiltrates bilat low/mid lung fields. no 

signif change vs 7/3 prior





ECG: NSR, bi-V paced





Echo 10/2016: sev lve, global hk, sev dec lvef, mild rve, nl rv fcn, patty, mild-

mod mr, mv ring, tv ring, mild tr, mild pr, mild phtn, ivc mild dil

















a/p:  76 year old man with PMH of IDDM, COPD (O2 dependant) HTN, HLD, Afib (on 

coumadin), CAD s/p CABG 2013, end stage CHF with ICD placement (on milrinone 

pump), PVD, BPH here with sob.





URI sx's (cough, rhinorrhea), h/o copd:


- flu swab negative


- ? if a.e. copd at present


- per hospitalist





chronic systolic CHF, end stage systolic heart failure s/p ICD, on home 

milrinone infusion:


- pt had cardiomems PAP monitor implant with dr de la rosa ([facility]) and has been 

managed by him since last admit here.


- on destination therapy home milrinone infusion, ? if dr de la rosa increased the 

dose.


- baseline renal fxn has worsened on recent labs (high 2's range) 


- wt 202 on discharge 7/17 following diuresis with lasix 100 iv bid, metolazone 

5mg. currently 185


- BNP 2K (prior range 2K-7K), low GFR confounds


- 2/4: ER labs appear drier than recent baseline, but bun/creat today back down 

in his recent baseline range. CXR appears unchanged vs 7/17. suspect he is not 

decompensated despite JVD (chronic in him)--no periph edema which is 

consistently CHF finding in him, and no increase in wt.


- please confirm home torsemide regimen with wife.


- receiving torsemide 20 bid here. low threshold to hold diuretic if renal fxn 

worsens further.


- 2/5: wt stable, creat stable, bun up slightly. remains well-compensated with 

likely no excess volume (chronic JVD here). 


- 2/6-8: cont home torsemide. routine weekly labs f/u as outpt as already 

scheduled (has home labs done by chf specialist dr de la rosa)








HARPREET on CKD, h/o cardiorenal syndrome:


- recent outpt creat ranging 2.6-2.9 range


- bun/creat above baseline here





afib


- rate controlled on coreg


- cont coumadin per INR (target 2-3)





VT:


- intermittent brief runs of NSVT on tele, chronic finding on prior admits


- has ICD


- on max tolerated coreg dose


- replete lytes prn





CAD s/p MI with PCI (and ? TV repair at that time):


- No anginal symptoms, no signs acs.


- Con't home ac, asa, atorvastatin, coreg





PVD


- con't AC, statin.





HTN: controlled on coreg





HLD: con't statin. 





D/C TELEMETRY.


STABLE FOR D/C HOME FROM CV P.O.V.

## 2018-02-08 NOTE — PN
Progress Note (short form)





- Note


Progress Note: 





PULMONARY





Denies shortness of breath, cough or wheezing.





 Last Vital Signs











Temp Pulse Resp BP Pulse Ox


 


 98 F   76   22   106/68   96 


 


 02/08/18 10:00  02/08/18 10:00  02/08/18 10:00  02/08/18 10:00  02/07/18 21:00








Gen:  NAD at rest


Heart: RRR


Lung: basilar rales


Abd: soft, nontender


Ext: no edema





 CBC, BMP





 02/08/18 07:07 





 02/08/18 07:07 





Active Medications





Albuterol Sulfate (Ventolin 0.083% Nebulizer Soln -)  1 amp NEB Q6H PRN


   PRN Reason: SHORT OF BREATH/WHEEZING


Allopurinol (Zyloprim -)  100 mg PO DAILY [facility]


   Last Admin: 02/08/18 09:26 Dose:  100 mg


Ascorbic Acid (Vitamin C -)  1,000 mg PO DAILY [facility]


   Last Admin: 02/08/18 09:25 Dose:  1,000 mg


Aspirin (Ecotrin -)  81 mg PO DAILY [facility]


   Last Admin: 02/08/18 09:26 Dose:  81 mg


Atorvastatin Calcium (Lipitor -)  40 mg PO HS [facility]


   Last Admin: 02/07/18 21:33 Dose:  40 mg


Budesonide/Formoterol Fumarate (Symbicort 160/4.5mcg -)  1 puff IH BID [facility]


   Last Admin: 02/08/18 09:27 Dose:  1 puff


Bupropion HCl (Wellbutrin -)  75 mg PO DAILY [facility]


   Last Admin: 02/08/18 09:25 Dose:  75 mg


Carvedilol (Coreg -)  6.25 mg PO BID [facility]


   Last Admin: 02/08/18 09:26 Dose:  6.25 mg


Cholecalciferol (Vitamin D3 -)  1,000 unit PO DAILY [facility]


   Last Admin: 02/08/18 09:25 Dose:  1,000 unit


Docusate Sodium (Colace -)  100 mg PO BID [facility]


   Last Admin: 02/08/18 09:25 Dose:  100 mg


Ferrous Sulfate (Feosol -)  325 mg PO Q48H [facility]


   Last Admin: 02/07/18 14:53 Dose:  325 mg


Azithromycin 250 mg/ Dextrose  250 mls @ 250 mls/hr IVPB DAILY [facility]


   Last Admin: 02/08/18 09:52 Dose:  250 mls/hr


CEFTRIAXONE 1 G/50 ML PREMIX (Ceftriaxone 1 Gm-D5w Bag)  50 mls @ 100 mls/hr 

IVPB DAILY J[facility]


   Last Admin: 02/08/18 09:26 Dose:  100 mls/hr


Milrinone Lactate/Dextrose (Milrinone 20mg/100ml Ivpb -)  20,000 mcg in 100 mls 

@ 12.723 mls/hr IVPB TITR JARROD; 0.5 MCG/KG/MIN


   PRN Reason: Protocol


   Last Admin: 02/08/18 00:25 Dose:  0.5 mcg/kg/min, 12.723 mls/hr


Insulin Aspart (Novolog Vial Sliding Scale -)  1 vial SQ ACHS J[facility]


   PRN Reason: Protocol


   Last Admin: 02/08/18 06:26 Dose:  2 units


Multivitamins/Minerals/Vitamin C (Tab-A-Vit -)  1 tab PO DAILY [facility]


   Last Admin: 02/08/18 09:25 Dose:  1 tab


Ranitidine HCl (Zantac -)  75 mg PO DAILY [facility]


   Last Admin: 02/08/18 09:27 Dose:  75 mg


Spironolactone (Aldactone -)  25 mg PO DAILY [facility]


   Last Admin: 02/08/18 09:25 Dose:  25 mg


Torsemide (Demadex -)  20 mg PO BID [facility]


   Last Admin: 02/08/18 09:27 Dose:  20 mg





A/P


URI


UTI


Severe LV Systolic Dysfunction s/p ICD


Pulmonary HTN


COPD


Chronic Hypoxic Respiratory Failure


Atrial Fibrillation


Acute on Chronic Renal Failure


HTN





-  continue antibiotics


-  f/u cultures


-  inhaled bronchodilators


-  can monitor off systemic steroids at this time


-  O2 to keep SpO2 >90%


-  CXR findings appear unchanged or slightly improved from last, appears 

relatively euvolemic


-  continue torsemide, aldactone


-  monitor urine output, creatinine


-  rate control


-  continue anticoagulation


-  continue home milrinone

## 2018-08-05 ENCOUNTER — HOSPITAL ENCOUNTER (INPATIENT)
Dept: HOSPITAL 74 - JER | Age: 78
LOS: 5 days | Discharge: HOME HEALTH | DRG: 683 | End: 2018-08-10
Attending: INTERNAL MEDICINE | Admitting: INTERNAL MEDICINE
Payer: COMMERCIAL

## 2018-08-05 VITALS — BODY MASS INDEX: 25.6 KG/M2

## 2018-08-05 DIAGNOSIS — Z99.81: ICD-10-CM

## 2018-08-05 DIAGNOSIS — I50.22: ICD-10-CM

## 2018-08-05 DIAGNOSIS — N18.4: ICD-10-CM

## 2018-08-05 DIAGNOSIS — I13.0: ICD-10-CM

## 2018-08-05 DIAGNOSIS — E78.5: ICD-10-CM

## 2018-08-05 DIAGNOSIS — D64.9: ICD-10-CM

## 2018-08-05 DIAGNOSIS — E11.22: ICD-10-CM

## 2018-08-05 DIAGNOSIS — Z79.01: ICD-10-CM

## 2018-08-05 DIAGNOSIS — Z87.891: ICD-10-CM

## 2018-08-05 DIAGNOSIS — I73.9: ICD-10-CM

## 2018-08-05 DIAGNOSIS — I27.20: ICD-10-CM

## 2018-08-05 DIAGNOSIS — N13.8: ICD-10-CM

## 2018-08-05 DIAGNOSIS — I25.10: ICD-10-CM

## 2018-08-05 DIAGNOSIS — Z79.4: ICD-10-CM

## 2018-08-05 DIAGNOSIS — K72.10: ICD-10-CM

## 2018-08-05 DIAGNOSIS — I48.2: ICD-10-CM

## 2018-08-05 DIAGNOSIS — I25.2: ICD-10-CM

## 2018-08-05 DIAGNOSIS — I25.5: ICD-10-CM

## 2018-08-05 DIAGNOSIS — I50.84: ICD-10-CM

## 2018-08-05 DIAGNOSIS — N17.9: Primary | ICD-10-CM

## 2018-08-05 DIAGNOSIS — R31.0: ICD-10-CM

## 2018-08-05 DIAGNOSIS — Z95.810: ICD-10-CM

## 2018-08-05 DIAGNOSIS — J44.9: ICD-10-CM

## 2018-08-05 DIAGNOSIS — N40.1: ICD-10-CM

## 2018-08-05 LAB
ARTERIAL BLOOD GAS PCO2: 38.9 MMHG (ref 35–45)
BASE EXCESS BLDA CALC-SCNC: 5.3 MEQ/L (ref -2–2)
BASOPHILS # BLD: 0 % (ref 0–2)
DEPRECATED RDW RBC AUTO: 16.8 % (ref 11.9–15.9)
EOSINOPHIL # BLD: 0.7 % (ref 0–4.5)
HCT VFR BLD CALC: 36.5 % (ref 35.4–49)
HGB BLD-MCNC: 11.8 GM/DL (ref 11.7–16.9)
LYMPHOCYTES # BLD: 12.8 % (ref 8–40)
MCH RBC QN AUTO: 28.2 PG (ref 25.7–33.7)
MCHC RBC AUTO-ENTMCNC: 32.4 G/DL (ref 32–35.9)
MCV RBC: 87 FL (ref 80–96)
MONOCYTES # BLD AUTO: 31.1 % (ref 3.8–10.2)
NEUTROPHILS # BLD: 55.4 % (ref 42.8–82.8)
PLATELET # BLD AUTO: 241 K/MM3 (ref 134–434)
PMV BLD: 8.7 FL (ref 7.5–11.1)
PO2 BLDA: 69.5 MMHG (ref 70–100)
RBC # BLD AUTO: 4.2 M/MM3 (ref 4–5.6)
SAO2 % BLDA: 95.7 % (ref 90–98.9)
WBC # BLD AUTO: 8.6 K/MM3 (ref 4–10)

## 2018-08-05 NOTE — PDOC
History of Present Illness





- General


Chief Complaint: Shortness of Breath


Stated Complaint: TROUBLE BREATHING


Time Seen by Provider: 08/05/18 22:22


History Source: Patient, Family


Exam Limitations: No Limitations





- History of Present Illness


Initial Comments: 





08/05/18 22:41


77 year old male with PMH CHF, ICD, Atrial fibrillation on Coumadin, COPD, 

anemia, CAD, HTN, HLD, on continuos milrinone infusion presents to ED for 

increasing SOB since yesterday. He admits to CARTAGENA, lightheadedness and leg 

swelling. He denies chest pain, cough, palpitations, fever, chills, nausea, 

vomiting, diarrhea, abdominal pain, weakness, numbness. Pt states he has been 

getting SOB while walking on flat ground, not at rest. Pt uses 2L O2 at home, 

has had to increase it to 2.5L. 





PCP - Dr. Flower


Cardiologist - Dr. Ron


Allergies - NKDA








Past History





- Past Medical History


Allergies/Adverse Reactions: 


 Allergies











Allergy/AdvReac Type Severity Reaction Status Date / Time


 


No Known Drug Allergies Allergy   Verified 02/03/18 10:37


 


shellfish derived Allergy   Verified 02/03/18 10:37











Home Medications: 


Ambulatory Orders





Allopurinol [Zyloprim -] 100 mg PO DAILY 01/14/16 


Atorvastatin Ca [Lipitor] 40 mg PO HS 01/14/16 


Carvedilol 6.25 mg PO BID 01/14/16 


Docusate Sodium [Colace -] 100 mg PO BID 01/14/16 


Insulin NPL/Insulin Lispro [Humalog Mix 75-25 Vial] 0 unit SQ PRN 01/14/16 


Milrinone Lactate/D5w [Milrinone 0.2 mg/ml in D5w] 0 mg IV DAILY 01/14/16 


Ranitidine [Zantac -] 75 mg PO DAILY 01/14/16 


Albuterol 0.083% Nebulizer Sol [Ventolin 0.083% Nebulizer Soln -] 1 amp NEB Q6H 

PRN #120 amp 01/18/16 


Ferrous Sulfate [Feosol] 325 mg PO Q48H  ud 08/27/16 


Warfarin Na [Coumadin -] 2 mg PO DAILY@1800 01/10/17 


Ascorbic Acid [Vitamin C -] 1,000 mg PO DAILY 03/30/17 


Cholecalciferol (Vitamin D3) [Vitamin D -] 1,000 unit PO DAILY 03/30/17 


Multivitamin [Poly-Vitamin] 1 each PO DAILY 03/30/17 


Budesonide/Formeterol Fumarate [SYMBICORT 160/4.5mcg -] 1 inh PO BID 06/23/17 


Torsemide [Demadex -] 20 mg PO BID 06/23/17 


Aspirin 81 mg PO DAILY 02/03/18 


Cholecalciferol (Vitamin D3) [Vitamin D -] 1,000 unit PO DAILY 02/03/18 


Levothyroxine [Synthroid -] 25 mcg PO DAILY 02/03/18 


Metolazone 2.5 mg PO DAILY 02/03/18 


Spironolactone 25 mg PO DAILY 02/03/18 


Warfarin Na [Coumadin -] 2.5 mg PO ASDIR 02/03/18 


Bupropion HCl [Wellbutrin -] 75 mg PO DAILY #0 tablet 02/05/18 


Aspirin Coated [Ecotrin -] 81 mg PO DAILY  tablet.ec 02/08/18 


Cefpodoxime Proxetil [Vantin (Nf) -] 100 mg PO BID #20 tablet 02/08/18 








Anemia: Yes


Asthma: No


Cancer: No


Cardiac Disorders: Yes (CHF, MI 2011,defibrilator placement,PACEMAKER)


CVA: No


COPD: Yes


CHF: Yes


Dementia: No


Diabetes: Yes


GI Disorders: No


 Disorders: Yes (bph)


HTN: Yes


Hypercholesterolemia: Yes


Liver Disease: No


Seizures: No


Thyroid Disease: No





- Surgical History


Abdominal Surgery: Yes (HERNIA REPAIR 2003)


Appendectomy: No


Cardiac Surgery: Yes (2 OPEN HEART SURGERIES; TRACH PLACEMENT,PACEMAKER PLACED 7 /19/2013)


Cholecystectomy: Yes


Lung Surgery: No


Neurologic Surgery: No


Orthopedic Surgery: No





- Immunization History


Td Vaccination: Yes


TDAP Vaccination: Yes


Immunization Up to Date: Yes





- Suicide/Smoking/Psychosocial Hx


Smoking Status: No


Smoking History: Former smoker


Years of Tobacco Use: 40


Have you smoked in the past 12 months: No


Number of Cigarettes Smoked Daily: 40


If you are a former smoker, when did you quit?: 2011


Cigars Per Day: 0


Information on smoking cessation initiated: No


'Breaking Loose' booklet given: 06/11/12


Hx Alcohol Use: No


Drug/Substance Use Hx: No


Substance Use Type: None


Hx Substance Use Treatment: No





**Review of Systems





- Review of Systems


Able to Perform ROS?: Yes


Comments:: 





08/05/18 22:43


General: denies fever, chills, night sweats, generalized weakness.


HEENT: denies sore throat, rhinorrhea, ear pain.


Heart: admits to lower extremity swelling, CARTAGENA and lightheadedness. denies 

chest pain, palpitations, syncope, diaphoresis.


Respiratory: admits to shortness of breath. denies cough, sputum production, 

hematemesis.


Abdomen: denies abdominal pain, nausea, vomiting, diarrhea, constipation, blood 

in stool.


: denies dysuria, increased urinary frequency, hematuria, urinary incontinence

, flank pain.


Back: denies back pain, flank pain. 


Musculoskeletal: denies joint pain, muscle pain, joint swelling.


Neurological: denies headache, dizziness, numbness, tingling, weakness. 


Skin: denies rash, laceration, abrasion.











*Physical Exam





- Vital Signs


 Last Vital Signs











Temp Pulse Resp BP Pulse Ox


 


 97.8 F   77   28 H  121/64   93 L


 


 08/05/18 21:22  08/05/18 21:22  08/05/18 21:22  08/05/18 21:22  08/05/18 21:22














- Physical Exam


Comments: 





08/05/18 22:50


Appearance: pt is sitting up in bed, leaning forward. 


HEENT: head is normocephalic, atraumatic. EOMI. PERRLA. 


Neck: supple. Full ROM.


Heart: regular rhythm. no murmurs. No pericardial friction rub. 


Lungs: crackles auscultated at left lung base. right lung clear to 

auscultation. no wheezing, no stridor. voice is hoarse. 


Abdomen: soft, nontender. normal bowel sounds. no rebound, guarding, masses. 


Extremities: Peripheral pulses intact and equal. No lower extremity edema.


Neurological: Alert. Oriented x3. CN 2-12 grossly intact. Moves all four 

extremities. 








ED Treatment Course





- LABORATORY


CBC & Chemistry Diagram: 


 08/05/18 23:25





 08/05/18 23:25





Medical Decision Making





- Medical Decision Making





08/05/18 22:53


77 year old male with PMH CHF, ICD, Atrial fibrillation on Coumadin, COPD, 

anemia, CAD, HTN, HLD, on continuous milrinone infusion presenting for SOB, CARTAGENA

, lower extremity swelling since yesterday. 


Crackles at left lung base, no pedal edema, requiring 2L O2. 





 Initial Vital Signs











Temp Pulse Resp BP Pulse Ox


 


 97.8 F   77   28 H  121/64   93 L


 


 08/05/18 21:22  08/05/18 21:22  08/05/18 21:22  08/05/18 21:22  08/05/18 21:22








Afebrile. Tachypnea. No hypotension. 





POC glucose 60. Pt given oranges. 





Pt is satting 94% on 2L at bedside. 





Pending labs, EKG, CXR. 





08/06/18 00:50


Elevated Cr


Elevated BNP


Normal CBC, no elevated white count, no left shift. 


CXR reveals congestive changes. 





08/06/18 01:30


Pt complaining of urinary retention, abdominal distension. 


Alexander catheter ordered. 





Pt will be admitted under Dr. Jefferson's care. I have spoken with Dr. Jefferson and 

discussed the case with him. 





*DC/Admit/Observation/Transfer


Diagnosis at time of Disposition: 


 CHF exacerbation








- Discharge Dispostion


Condition at time of disposition: Stable


Decision to Admit order: Yes





- Referrals


Referrals: 


Sathya Flower MD [Primary Care Provider] - 





- Patient Instructions





- Post Discharge Activity

## 2018-08-06 LAB
ALBUMIN SERPL-MCNC: 3.2 G/DL (ref 3.4–5)
ALBUMIN SERPL-MCNC: 3.4 G/DL (ref 3.4–5)
ALP SERPL-CCNC: 144 U/L (ref 45–117)
ALP SERPL-CCNC: 148 U/L (ref 45–117)
ALT SERPL-CCNC: 14 U/L (ref 12–78)
ALT SERPL-CCNC: 16 U/L (ref 12–78)
ANION GAP SERPL CALC-SCNC: 8 MMOL/L (ref 8–16)
ANION GAP SERPL CALC-SCNC: 9 MMOL/L (ref 8–16)
APPEARANCE UR: (no result)
AST SERPL-CCNC: 19 U/L (ref 15–37)
AST SERPL-CCNC: 20 U/L (ref 15–37)
BACTERIA #/AREA URNS HPF: (no result) /HPF
BASOPHILS # BLD: 0.6 % (ref 0–2)
BILIRUB SERPL-MCNC: 0.3 MG/DL (ref 0.2–1)
BILIRUB SERPL-MCNC: 0.4 MG/DL (ref 0.2–1)
BILIRUB UR STRIP.AUTO-MCNC: NEGATIVE MG/DL
BUN SERPL-MCNC: 85 MG/DL (ref 7–18)
BUN SERPL-MCNC: 86 MG/DL (ref 7–18)
CALCIUM SERPL-MCNC: 8.9 MG/DL (ref 8.5–10.1)
CALCIUM SERPL-MCNC: 8.9 MG/DL (ref 8.5–10.1)
CHLORIDE SERPL-SCNC: 97 MMOL/L (ref 98–107)
CHLORIDE SERPL-SCNC: 98 MMOL/L (ref 98–107)
CO2 SERPL-SCNC: 30 MMOL/L (ref 21–32)
CO2 SERPL-SCNC: 31 MMOL/L (ref 21–32)
COLOR UR: (no result)
CREAT SERPL-MCNC: 3.1 MG/DL (ref 0.7–1.3)
CREAT SERPL-MCNC: 3.3 MG/DL (ref 0.7–1.3)
DEPRECATED RDW RBC AUTO: 16.4 % (ref 11.9–15.9)
EOSINOPHIL # BLD: 2.3 % (ref 0–4.5)
GLUCOSE SERPL-MCNC: 117 MG/DL (ref 74–106)
GLUCOSE SERPL-MCNC: 74 MG/DL (ref 74–106)
HCT VFR BLD CALC: 34.3 % (ref 35.4–49)
HGB BLD-MCNC: 11.5 GM/DL (ref 11.7–16.9)
INR BLD: 3.07 (ref 0.83–1.09)
KETONES UR QL STRIP: NEGATIVE
LEUKOCYTE ESTERASE UR QL STRIP.AUTO: (no result)
LYMPHOCYTES # BLD: 28.2 % (ref 8–40)
MAGNESIUM SERPL-MCNC: 2.8 MG/DL (ref 1.8–2.4)
MCH RBC QN AUTO: 28.8 PG (ref 25.7–33.7)
MCHC RBC AUTO-ENTMCNC: 33.4 G/DL (ref 32–35.9)
MCV RBC: 86.1 FL (ref 80–96)
MONOCYTES # BLD AUTO: 15.8 % (ref 3.8–10.2)
NEUTROPHILS # BLD: 53.1 % (ref 42.8–82.8)
NITRITE UR QL STRIP: NEGATIVE
PH UR: 6 [PH] (ref 5–8)
PLATELET # BLD AUTO: 209 K/MM3 (ref 134–434)
PLATELET BLD QL SMEAR: ADEQUATE
PMV BLD: 8.8 FL (ref 7.5–11.1)
POTASSIUM SERPLBLD-SCNC: 3.8 MMOL/L (ref 3.5–5.1)
POTASSIUM SERPLBLD-SCNC: 4.4 MMOL/L (ref 3.5–5.1)
PROT SERPL-MCNC: 7.2 G/DL (ref 6.4–8.2)
PROT SERPL-MCNC: 7.6 G/DL (ref 6.4–8.2)
PROT UR QL STRIP: (no result)
PROT UR QL STRIP: NEGATIVE
PT PNL PPP: 34.7 SEC (ref 9.7–13)
RBC # BLD AUTO: 3.99 M/MM3 (ref 4–5.6)
SODIUM SERPL-SCNC: 136 MMOL/L (ref 136–145)
SODIUM SERPL-SCNC: 137 MMOL/L (ref 136–145)
SP GR UR: 1.01 (ref 1–1.03)
UROBILINOGEN UR STRIP-MCNC: NEGATIVE MG/DL (ref 0.2–1)
WBC # BLD AUTO: 6.4 K/MM3 (ref 4–10)

## 2018-08-06 PROCEDURE — 0T9B70Z DRAINAGE OF BLADDER WITH DRAINAGE DEVICE, VIA NATURAL OR ARTIFICIAL OPENING: ICD-10-PCS | Performed by: EMERGENCY MEDICINE

## 2018-08-06 RX ADMIN — LEVOTHYROXINE SODIUM SCH MCG: 25 TABLET ORAL at 08:35

## 2018-08-06 RX ADMIN — BUDESONIDE AND FORMOTEROL FUMARATE DIHYDRATE SCH PUFF: 160; 4.5 AEROSOL RESPIRATORY (INHALATION) at 23:00

## 2018-08-06 RX ADMIN — VITAMIN D, TAB 1000IU (100/BT) SCH UNIT: 25 TAB at 10:19

## 2018-08-06 RX ADMIN — ALLOPURINOL SCH MG: 100 TABLET ORAL at 10:19

## 2018-08-06 RX ADMIN — MULTIVITAMIN TABLET SCH TAB: TABLET at 10:18

## 2018-08-06 RX ADMIN — BUDESONIDE AND FORMOTEROL FUMARATE DIHYDRATE SCH: 160; 4.5 AEROSOL RESPIRATORY (INHALATION) at 10:50

## 2018-08-06 RX ADMIN — BUDESONIDE AND FORMOTEROL FUMARATE DIHYDRATE SCH PUFF: 160; 4.5 AEROSOL RESPIRATORY (INHALATION) at 03:00

## 2018-08-06 RX ADMIN — CEFTRIAXONE SCH MLS/HR: 1 INJECTION, POWDER, FOR SOLUTION INTRAMUSCULAR; INTRAVENOUS at 12:18

## 2018-08-06 RX ADMIN — TORSEMIDE SCH MG: 20 TABLET ORAL at 16:19

## 2018-08-06 RX ADMIN — DOCUSATE SODIUM SCH MG: 100 CAPSULE, LIQUID FILLED ORAL at 10:17

## 2018-08-06 RX ADMIN — CARVEDILOL SCH MG: 6.25 TABLET, FILM COATED ORAL at 10:17

## 2018-08-06 RX ADMIN — ASPIRIN 81 MG SCH MG: 81 TABLET ORAL at 10:17

## 2018-08-06 RX ADMIN — DOCUSATE SODIUM SCH MG: 100 CAPSULE, LIQUID FILLED ORAL at 23:00

## 2018-08-06 RX ADMIN — MILRINONE LACTATE SCH MLS/HR: 200 INJECTION, SOLUTION INTRAVENOUS at 05:50

## 2018-08-06 RX ADMIN — WARFARIN SCH MG: 2 TABLET ORAL at 03:00

## 2018-08-06 RX ADMIN — RANITIDINE SCH MG: 150 TABLET ORAL at 10:19

## 2018-08-06 RX ADMIN — CARVEDILOL SCH MG: 6.25 TABLET, FILM COATED ORAL at 23:00

## 2018-08-06 RX ADMIN — DOCUSATE SODIUM SCH MG: 100 CAPSULE, LIQUID FILLED ORAL at 03:00

## 2018-08-06 RX ADMIN — OXYCODONE HYDROCHLORIDE AND ACETAMINOPHEN SCH MG: 500 TABLET ORAL at 10:18

## 2018-08-06 RX ADMIN — FERROUS SULFATE TAB EC 324 MG (65 MG FE EQUIVALENT) SCH MG: 324 (65 FE) TABLET DELAYED RESPONSE at 10:17

## 2018-08-06 RX ADMIN — METOLAZONE SCH MG: 2.5 TABLET ORAL at 10:19

## 2018-08-06 RX ADMIN — WARFARIN SCH: 2 TABLET ORAL at 18:43

## 2018-08-06 RX ADMIN — ATORVASTATIN CALCIUM SCH MG: 40 TABLET, FILM COATED ORAL at 23:00

## 2018-08-06 RX ADMIN — CARVEDILOL SCH MG: 6.25 TABLET, FILM COATED ORAL at 04:15

## 2018-08-06 RX ADMIN — ATORVASTATIN CALCIUM SCH MG: 40 TABLET, FILM COATED ORAL at 03:00

## 2018-08-06 NOTE — PDOC
Attending Attestation





- Resident


Resident Name: Keiry Uribe





- ED Attending Attestation


I have performed the following: I have examined & evaluated the patient, The 

case was reviewed & discussed with the resident, I agree w/resident's findings 

& plan





- Medical Decision Making





08/06/18 03:29


Pt will be admitted for CHF exacerbation.





<Elisa Peña - Last Filed: 08/06/18 03:29>





- HPI


HPI: 


08/06/18 03:32


The patient is a 77-year-old male with past medical history of CHF, CAD, AFib (

on Coumadin), MI (2011), COPD, DM, BPH, HTN, HLD, Defibrillator, and pacemaker 

presents to the emergency department with shortness of breath. The patient 

presents with SOB for the past 1 day accompanied with dyspnea on exertion and 

bilateral lower extremity swelling. Denies fever, chills, cough or a headache. 

Denies chest pain. Denies nausea or vomiting. Denies diarrhea or abdominal 

pain. At the ER, the patients wife suggested the patient looked hypoglycemic; 

patient glucose level was noted to be 60. 





Allergies: NKDA. Shellfish. 


Social history: Former smoker. No reported use of alcohol or recreational 

drugs. 


Surgical history: Open heart surgery (2x), Trach placement, Pacemaker placed (07 /19/2013), Cholecystectomy and Hernia repair (2003). 


PCP: Sathya Connolly MD


Cardiologist - Dr. Titus





- Physicial Exam


PE: 





08/06/18 03:32


GENERAL: Awake, alert, and fully oriented, in no acute distress


HEAD: No signs of trauma


EYES: PERRLA, EOMI, sclera anicteric, conjunctiva clear


ENT: Auricles normal inspection, hearing grossly normal, nares patent, 

oropharynx clear without exudates. Moist mucosa


NECK: Normal ROM, supple, no lymphadenopathy, JVD, or masses


LUNGS: Breath sounds equal, clear to auscultation bilaterally.  No wheezes, and 

no crackles. No rales. 


HEART: Regular rate and rhythm, normal S1 and S2, no murmurs, rubs or gallops


GI/: Soft. Nontender. Normoactive bowel sounds. No guarding or rebound. No 

masses. Alexander cath draining cloudy urine. 


EXTREMITIES: B/l pitting edema to the legs, greater in the Left than Right. 


Normal range of motion, no edema.  No clubbing or cyanosis. No cords, erythema, 

or tenderness


NEUROLOGICAL: Cranial nerves II through XII grossly intact.  Normal speech, 

normal gait


SKIN: Stage 2 sacral ulcer with no surrounding erythema. Warm, Dry, normal 

turgor. 





- Medical Decision Making








08/06/18 03:37





Documentation prepared by Dana Ba, acting as medical scribe for Elisa Peña MD. 





<Dana Ba - Last Filed: 08/06/18 04:38>

## 2018-08-06 NOTE — CON.CARD
Consult


Consult Specialty:: cardio





- History of Present Illness


Chief Complaint: sob


History of Present Illness: 





77 M here for increasing SOB x2d.











for about 5 days last week his urine stream was not flowing normally.


[place] [facility] [place]


when got home began feeling sob with little activity.


wt that day stable at longstanding baseline (191 lbs).


"a little" bit leg swelling.


wheezing "a little"--thinks more than baseline.


no cough, sore throat, fever/chills.


no chest pain.


had to incr O2 flow rate at home.





had connolly placed in ER last night.





SOB IS MUCH BETTER TODAY








PMH:


syst chf


copd


AF


CAD


CKD





- Past Medical History


Cardio/Vascular: Yes: AFIB, CAD, CHF, HTN, Hyperlipdemia, MI, Pulmonary 

Hypertension, Other (AICD placed)


Pulmonary: Yes: COPD, O2 Dependent


Renal/: Yes: Renal Inusuff, BPH


Endocrine: Yes: Diabetes Mellitus





- Past Surgical History


Past Surgical History: Yes: AICD, CABG, Hernia Repair





- Alcohol/Substance Use


Hx Alcohol Use: No


History of Substance Use: reports: None





- Smoking History


Smoking history: Former smoker


Have you smoked in the past 12 months: No


Aproximately how many cigarettes per day: 40


If you are a former smoker, when did you quit?: 2011





- Social History


Usual Living Arrangement: With Spouse


ADL: Independent


History of Recent Travel: No





Home Medications





- Allergies


Allergies/Adverse Reactions: 


 Allergies











Allergy/AdvReac Type Severity Reaction Status Date / Time


 


No Known Drug Allergies Allergy   Verified 02/03/18 10:37


 


shellfish derived Allergy   Verified 02/03/18 10:37














- Home Medications


Home Medications: 


Ambulatory Orders





Allopurinol [Zyloprim -] 100 mg PO DAILY 01/14/16 


Atorvastatin Ca [Lipitor] 40 mg PO HS 01/14/16 


Carvedilol 6.25 mg PO BID 01/14/16 


Docusate Sodium [Colace -] 100 mg PO BID 01/14/16 


Milrinone Lactate/D5w [Milrinone 0.2 mg/ml in D5w] 0.5 mg IV DAILY 01/14/16 


Ranitidine [Zantac -] 75 mg PO DAILY 01/14/16 


Albuterol 0.083% Nebulizer Sol [Ventolin 0.083% Nebulizer Soln -] 1 amp NEB Q6H 

PRN #120 amp 01/18/16 


Ferrous Sulfate [Feosol] 325 mg PO Q48H  ud 08/27/16 


Ascorbic Acid [Vitamin C -] 1,000 mg PO DAILY 03/30/17 


Cholecalciferol (Vitamin D3) [Vitamin D -] 1,000 unit PO DAILY 03/30/17 


Multivitamin [Poly-Vitamin] 1 each PO DAILY 03/30/17 


Budesonide/Formeterol Fumarate [SYMBICORT 160/4.5mcg -] 1 inh PO BID 06/23/17 


Torsemide [Demadex -] 20 mg PO BID 06/23/17 


Aspirin 81 mg PO DAILY 02/03/18 


Cholecalciferol (Vitamin D3) [Vitamin D -] 1,000 unit PO DAILY 02/03/18 


Levothyroxine [Synthroid -] 25 mcg PO DAILY 02/03/18 


Warfarin Na [Coumadin -] 4 mg PO ASDIR 02/03/18 


Bupropion HCl [Wellbutrin -] 75 mg PO DAILY #0 tablet 02/05/18 


Aspirin Coated [Ecotrin -] 81 mg PO DAILY  tablet.ec 02/08/18 











Family Disease History





- Family Disease History


Family Disease History: Diabetes: Father, Heart Disease: Father





Review of Systems





- Review of Systems


Constitutional: denies: Chills, Fever


Eyes: denies: Eye Pain


HENT: denies: Nasal Congestion


Neck: denies: Stiffness


Cardiovascular: denies: Palpitations


Respiratory: denies: Orthopnea, PND


Gastrointestinal: denies: Diarrhea, Rectal Bleeding


Genitourinary: denies: Burning, Hematuria


Musculoskeletal: denies: Muscle Pain


Integumentary: denies: Rash


Neurological: denies: Numbness, Seizure, Syncope


Endocrine: denies: Excessive Sweating


Hematology/Lymphatic: denies: Excessive Bleeding


Vital Signs: 


 Vital Signs











Temperature  97.8 F   08/05/18 21:22


 


Pulse Rate  73   08/06/18 06:36


 


Respiratory Rate  20   08/06/18 06:36


 


Blood Pressure  142/87   08/06/18 06:36


 


O2 Sat by Pulse Oximetry (%)  97   08/06/18 06:36











Constitutional: Yes: Well Nourished, No Distress


Eyes: No: Sclera Icterus


HENT: No: Nasal Congestion


Neck: No: Decreased ROM


Respiratory: Yes: CTA Bilaterally, Rales (R base).  No: Accessory Muscle Use


Gastrointestinal: Yes: Normal Bowel Sounds.  No: Distention, Hepatomegaly, 

Palpable Mass, Tenderness


Cardiovascular: Yes: Regular Rate and Rhythm


JVD: Yes


Carotid Bruit: No


PMI: Non-Displaced


Heart Sounds: Yes: S1, S2.  No: Gallop


Murmur: No: Systolic Murmur, Diastolic Murmur


Musculoskeletal: Yes: Other (No kyphosis)


Extremities: No: Cool, Cyanosis


Edema: No


Peripheral Pulses: 2+ Left Carotid, 2+ Right Carotid, 2+ Left Doralis Pedis, 2+ 

Right Dorsalis Pedis


Integumentary: No: Jaundice


Neurological: Yes: Alert, Oriented (x3)


Psychiatric: No: Agitated





- Other Data


Labs, Other Data: 


 CBC, BMP





 08/06/18 06:00 





 08/06/18 06:00 





 Troponin, BNP











  08/05/18 08/05/18





  23:25 23:25


 


Troponin I  0.04 


 


B-Natriuretic Peptide   4377.29 H








 Troponin, BNP











  08/05/18 08/05/18





  23:25 23:25


 


Troponin I  0.04 


 


B-Natriuretic Peptide   4377.29 H








 Laboratory Tests











  02/08/18 08/05/18 08/05/18





  07:07 23:25 23:25


 


WBC   


 


Hgb   


 


Plt Count   


 


Sodium   


 


Potassium   


 


Carbon Dioxide   


 


BUN  80 H  


 


Creatinine  2.1 H  


 


Creat Clearance w eGFR   


 


AST   


 


ALT   


 


Troponin I   0.04 


 


B-Natriuretic Peptide    4377.29 H














  08/06/18 08/06/18





  06:00 06:00


 


WBC  6.4 


 


Hgb  11.5 L 


 


Plt Count  209 


 


Sodium   137


 


Potassium   3.8


 


Carbon Dioxide   30


 


BUN   86 H


 


Creatinine   3.1 H


 


Creat Clearance w eGFR   19.53


 


AST   19


 


ALT   14


 


Troponin I  


 


B-Natriuretic Peptide  














Assessment/Plan





Echo 10/2016: sev lve, global hk, sev dec lvef, mild rve, nl rv fcn, patty, mild-

mod mr, mv ring, tv ring, mild tr, mild pr, mild phtn, ivc mild dil





CXR: interstitial and alveolar opacities lydia-hilar predominance, + 

cephalization, no effusions--no change vs 2/18





ECG: V-paced








a/p:  76 year old man with PMH of IDDM, COPD (O2 dependant) HTN, HLD, Afib (on 

coumadin), CAD s/p CABG 2013, end stage CHF with ICD placement (on milrinone 

pump), PVD, BPH here with increased sob/leg swelling.








chronic systolic CHF stage D, s/p ICD, on home milrinone infusion:


- pt had cardiomems PAP monitor implant with dr de la rosa ([facility]) and has been 

managed by him since last admit here.


- on destination therapy home milrinone infusion


- wt 185 when here 2/18. has been steady at 191 at home including recently.


- BNP 4K (prior range 2K-7K), low GFR confounds


- CXR no change vs prior.


- no signif edema on exam (+ LE edema when decompensated CHF is his pattern. 

always has chronic JVD likely due to severe TR)


- do not think he is in CHF at present. suspect sob sec to transient urine 

output obstruction


- given HARPREET likely due to  obstruction, and connolly placed last night, will 

observe creat trend and continue home diuretic dose (PO torsemide)--*PLEASE 

CONFIRM HOME TORSEMIDE DOSE WITH PT'S WIFE (listed dose in ER meds list is not 

accurate)





HARPREET on CKD, h/o cardiorenal syndrome:


- recent outpt creat ranging 2.6-2.9 range


- bun/creat above baseline here--? sec to obstruction for 5 days at home


- rec renal sono for hydro, +/-  consult per pmd





COPD, 02 dependent


- no temp, normal WBC, no new infiltrates on CXR





afib


- rate controlled on coreg


- cont coumadin per INR (target 2-3)





CAD s/p MI with PCI (and ? TV repair at that time):


- No anginal symptoms, no signs acs.


- Con't home ac PLUS asa, atorvastatin, coreg





PVD


- con't AC, statin.





HTN: 


-controlled on coreg

## 2018-08-06 NOTE — EKG
Test Reason : 

Blood Pressure : ***/*** mmHG

Vent. Rate : 075 BPM     Atrial Rate : 068 BPM

   P-R Int : 000 ms          QRS Dur : 168 ms

    QT Int : 506 ms       P-R-T Axes : 000 214 064 degrees

   QTc Int : 565 ms

 

Ventricular-paced rhythm

Biventricular pacemaker detected

ABNORMAL ECG

WHEN COMPARED WITH ECG OF 03-FEB-2018 11:44,

NO SIGNIFICANT CHANGE WAS FOUND

Confirmed by GRACIA TEAGUE MD ([id number]) on 8/6/2018 11:42:38 AM

 

Referred By:             Confirmed By:GRACIA TEAGUE MD

## 2018-08-06 NOTE — HP
Admitting History and Physical





- Primary Care Physician


PCP: Sathya Flower





- Admission


Chief Complaint: Worsening SOB


History of Present Illness: 





77 yrs old man multiple Co-morbidities H/O CAD, Ischemic Cradiomyopathy , Stage 

D systolic HF with low EF  on Home Milrinone infusion, s/p AICD, AVR, Afib on 

Coumadin, COPD CKD stage 3, present with 5 days H/O worsening  SOB, CARTAGENA, LE 

swelling and decrease urinary output with, no c/o fever, chest pain, sick 

contact at base line, Patient laso c/o urinary discomfort in the Ed intubated 

for urinary retention, UA shows multiple WBCs





- Past Medical History


Cardiovascular: Yes: AFIB, CAD, CHF, HTN, Hyperlipdemia, MI, Pulmonary 

Hypertension, Other (AICD placed)


Pulmonary: Yes: COPD, O2 Dependent


Renal/: Yes: Renal Inusuff, BPH


Endocrine: Yes: Diabetes Mellitus





- Past Surgical History


Past Surgical History: Yes: AICD, CABG, Hernia Repair





- Smoking History


Smoking history: Former smoker


Have you smoked in the past 12 months: No


Aproximately how many cigarettes per day: 40


If you are a former smoker, when did you quit?: 2011





- Alcohol/Substance Use


Hx Alcohol Use: No


History of Substance Use: reports: None





- Social History


ADL: Independent


History of Recent Travel: No





Home Medications





- Allergies


Allergies/Adverse Reactions: 


 Allergies











Allergy/AdvReac Type Severity Reaction Status Date / Time


 


No Known Drug Allergies Allergy   Verified 02/03/18 10:37


 


shellfish derived Allergy   Verified 02/03/18 10:37














- Home Medications


Home Medications: 


Ambulatory Orders





Allopurinol [Zyloprim -] 100 mg PO DAILY 01/14/16 


Atorvastatin Ca [Lipitor] 40 mg PO HS 01/14/16 


Carvedilol 6.25 mg PO BID 01/14/16 


Docusate Sodium [Colace -] 100 mg PO BID 01/14/16 


Milrinone Lactate/D5w [Milrinone 0.2 mg/ml in D5w] 0.5 mg IV DAILY 01/14/16 


Ranitidine [Zantac -] 75 mg PO DAILY 01/14/16 


Albuterol 0.083% Nebulizer Sol [Ventolin 0.083% Nebulizer Soln -] 1 amp NEB Q6H 

PRN #120 amp 01/18/16 


Ferrous Sulfate [Feosol] 325 mg PO Q48H  ud 08/27/16 


Ascorbic Acid [Vitamin C -] 1,000 mg PO DAILY 03/30/17 


Cholecalciferol (Vitamin D3) [Vitamin D -] 1,000 unit PO DAILY 03/30/17 


Multivitamin [Poly-Vitamin] 1 each PO DAILY 03/30/17 


Budesonide/Formeterol Fumarate [SYMBICORT 160/4.5mcg -] 1 inh PO BID 06/23/17 


Torsemide [Demadex -] 20 mg PO BID 06/23/17 


Aspirin 81 mg PO DAILY 02/03/18 


Cholecalciferol (Vitamin D3) [Vitamin D -] 1,000 unit PO DAILY 02/03/18 


Levothyroxine [Synthroid -] 25 mcg PO DAILY 02/03/18 


Warfarin Na [Coumadin -] 4 mg PO ASDIR 02/03/18 


Bupropion HCl [Wellbutrin -] 75 mg PO DAILY #0 tablet 02/05/18 


Aspirin Coated [Ecotrin -] 81 mg PO DAILY  tablet.ec 02/08/18 











Family Disease History





- Family Disease History


Family Disease History: Diabetes: Father, Heart Disease: Father





Review of Systems





- Review of Systems


Constitutional: reports: Chills.  denies: Diaphoresis


Eyes: denies: Blurred Vision, Double Vision


Neck: denies: Decreased ROM, Lumps, Pain on Movement


Cardiovascular: reports: Edema, Shortness of Breath.  denies: Chest Pain, 

Palpitations


Respiratory: reports: Exercise Intolerance, Orthopnea.  denies: Hemoptysis


Gastrointestinal: denies: Abdominal Pain, Bloating, Constipation


Genitourinary: reports: Burning, Dysuria


Musculoskeletal: reports: Back Pain.  denies: Crepitus, Decreased ROM


Neurological: denies: Change in LOC, Change in Speech


Endocrine: reports: No Symptoms.  denies: Excessive Sweating





Physical Examination


Vital Signs: 


 Vital Signs











Temperature  97.8 F   08/05/18 21:22


 


Pulse Rate  73   08/06/18 06:36


 


Respiratory Rate  20   08/06/18 06:36


 


Blood Pressure  142/87   08/06/18 06:36


 


O2 Sat by Pulse Oximetry (%)  97   08/06/18 06:36











Elderly man not in distress





HEENT: Mm moist, anemia, +





NECK; JVD +





CHEST:  B/L Crepts





CVS: S1S2 SM +





ABD: no distention non tender, BS + 





EXT: Edema feet, Pulses +





CNS; AOX3 non focal


Labs: 


 CBC, BMP





 08/06/18 06:00 





 08/06/18 06:00 











Imaging





- Results


Chest X-ray: Report Reviewed (Pulmonary congestion)


EKG: Report Reviewed (75 paced rhythm)





Problem List





- Problems


(1) Heart failure, systolic, with acute decompensation


Assessment/Plan: 


Patient has acute decompensation of chronic sHF on LMilrinone s/p AICD  as per 

wife lately patient was taking Toresamide 20 mg and 40 mg on alternate day , 

his previous dose was 20 mg in am and 20 mg in after noon will resume home dose 

of Diuretics cont B Blockers, Stain will F/u Cardiology input.


Code(s): I50.23 - ACUTE ON CHRONIC SYSTOLIC (CONGESTIVE) HEART FAILURE   





(2) Acute on chronic renal insufficiency


Assessment/Plan: 


A  sase line SKD stage 3B  Creat 2.6 to 2.8 present with acute rise in 

creatnine possible Cardio renal or obstructive uropathy  F/U Renal 

recommendations


Code(s): N28.9 - DISORDER OF KIDNEY AND URETER, UNSPECIFIED; N18.9 - CHRONIC 

KIDNEY DISEASE, UNSPECIFIED   





(3) COPD (chronic obstructive pulmonary disease)


Assessment/Plan: 


Cont Nebs treatment


Code(s): J44.9 - CHRONIC OBSTRUCTIVE PULMONARY DISEASE, UNSPECIFIED   





(4) CAD (coronary artery disease)


Assessment/Plan: 


S/P MI and PCI cont all homeno active issue at present


Code(s): I25.10 - ATHSCL HEART DISEASE OF NATIVE CORONARY ARTERY W/O ANG PCTRS 

  





(5) Atrial fibrillation


Assessment/Plan: 


S/P 


Code(s): I48.91 - UNSPECIFIED ATRIAL FIBRILLATION   


Qualifiers: 


   Atrial fibrillation type: chronic   Qualified Code(s): I48.2 - Chronic 

atrial fibrillation   





(6) Urinary tract infection


Assessment/Plan: 


Multiple WBC in urine  Iv Ceftriaxone F/U Urine and blood culture  


Code(s): N39.0 - URINARY TRACT INFECTION, SITE NOT SPECIFIED   





(7) Obstructive uropathy


Assessment/Plan: 


urinary obstruction s/p foleys will F/U  and renal ultrasound


Code(s): N13.9 - OBSTRUCTIVE AND REFLUX UROPATHY, UNSPECIFIED

## 2018-08-06 NOTE — CONSULT
Consult - text type





- Consultation


Consultation Note: 





Renal Consult for HARPREET on CKD 





This is a 77 year old gentleman with PMhx of CKD stage 4, End-stage HF on home 

milrinone infusion, Afib on Coumadin, CAD, COPD who presented with SOB and 

difficulty voiding and found to have acute HF with HARPREET in setting of 

obstruction. Pt reports difficultly voiding x 1 week.  Hx of BPH with TURP in 

the past. Denies any blood in urine. No fever or chills. No NSAID use or 

contrast exposure. No skin rash. Feel better s/p connolly placement. Currently in 

US. 





PMhx: as above


Allergies: NKDA


Family Hx: NC


Social hx : No T/A/D


ROS: as per HPI, all other pertinent ros negative


 Home Medications











 Medication  Instructions  Recorded


 


Allopurinol [Zyloprim -] 100 mg PO DAILY 01/14/16


 


Atorvastatin Ca [Lipitor] 40 mg PO HS 01/14/16


 


Carvedilol 6.25 mg PO BID 01/14/16


 


Docusate Sodium [Colace -] 100 mg PO BID 01/14/16


 


Milrinone Lactate/D5w [Milrinone 0.5 mg IV DAILY 01/14/16





0.2 mg/ml in D5w]  


 


Ranitidine [Zantac -] 75 mg PO DAILY 01/14/16


 


Albuterol 0.083% Nebulizer Sol 1 amp NEB Q6H PRN #120 amp 01/18/16





[Ventolin 0.083% Nebulizer Soln -]  


 


Ferrous Sulfate [Feosol] 325 mg PO Q48H  ud 08/27/16


 


Ascorbic Acid [Vitamin C -] 1,000 mg PO DAILY 03/30/17


 


Cholecalciferol (Vitamin D3) 1,000 unit PO DAILY 03/30/17





[Vitamin D -]  


 


Multivitamin [Poly-Vitamin] 1 each PO DAILY 03/30/17


 


Budesonide/Formeterol Fumarate 1 inh PO BID 06/23/17





[SYMBICORT 160/4.5mcg -]  


 


Torsemide [Demadex -] 20 mg PO BID 06/23/17


 


Aspirin 81 mg PO DAILY 02/03/18


 


Cholecalciferol (Vitamin D3) 1,000 unit PO DAILY 02/03/18





[Vitamin D -]  


 


Levothyroxine [Synthroid -] 25 mcg PO DAILY 02/03/18


 


Warfarin Na [Coumadin -] 4 mg PO ASDIR 02/03/18


 


Bupropion HCl [Wellbutrin -] 75 mg PO DAILY #0 tablet 02/05/18


 


Aspirin Coated [Ecotrin -] 81 mg PO DAILY  tablet.ec 02/08/18








 Vital Signs











Temperature  97.8 F   08/05/18 21:22


 


Pulse Rate  73   08/06/18 06:36


 


Respiratory Rate  20   08/06/18 06:36


 


Blood Pressure  142/87   08/06/18 06:36


 


O2 Sat by Pulse Oximetry (%)  97   08/06/18 06:36








 Intake & Output











 08/03/18 08/04/18 08/05/18 08/06/18





 23:59 23:59 23:59 23:59


 


Weight   86.636 kg 








NAD on NC O2


Neck supple, No JVD


MMM


irregular HR, no M/R


Dec BS at lung bases


distended abd, + mild ascities


no bladder distension


No LE edema, clubbing or cyanosis





 CBC, BMP





 08/06/18 06:00 





 08/06/18 06:00 





 Current Medications





Albuterol Sulfate (Ventolin 0.083% Nebulizer Soln -)  1 amp NEB Q6H PRN


   PRN Reason: SHORT OF BREATH/WHEEZING


Allopurinol (Zyloprim -)  100 mg PO DAILY [facility]


   Last Admin: 08/06/18 10:19 Dose:  100 mg


Ascorbic Acid (Vitamin C -)  1,000 mg PO DAILY [facility]


   Last Admin: 08/06/18 10:18 Dose:  1,000 mg


Aspirin (Asa -)  81 mg PO DAILY [facility]


   Last Admin: 08/06/18 10:17 Dose:  81 mg


Atorvastatin Calcium (Lipitor -)  40 mg PO HS [facility]


   Last Admin: 08/06/18 03:00 Dose:  40 mg


Budesonide/Formoterol Fumarate (Symbicort 160/4.5mcg -)  1 puff IH BID [facility]


   Last Admin: 08/06/18 03:00 Dose:  1 puff


Bupropion HCl (Wellbutrin -)  75 mg PO DAILY [facility]


   Last Admin: 08/06/18 10:19 Dose:  75 mg


Carvedilol (Coreg -)  6.25 mg PO BID [facility]


   Last Admin: 08/06/18 10:17 Dose:  6.25 mg


Cholecalciferol (Vitamin D3 -)  1,000 unit PO DAILY [facility]


   Last Admin: 08/06/18 10:19 Dose:  1,000 unit


Docusate Sodium (Colace -)  100 mg PO BID [facility]


   Last Admin: 08/06/18 10:17 Dose:  100 mg


Ferrous Sulfate (Feosol -)  325 mg PO Q2D@1000 [facility]


   Last Admin: 08/06/18 10:17 Dose:  325 mg


Milrinone Lactate/Dextrose (Milrinone 20mg/100ml Ivpb -)  20,000 mcg in 100 mls 

@ 12.995 mls/hr IVPB TITR [facility]; Protocol


   Last Admin: 08/06/18 05:50 Dose:  0.5 mcg/kg/min, 12.995 mls/hr


Ceftriaxone Sodium 1 gm/ (Dextrose)  50 mls @ 100 mls/hr IVPB DAILY [facility]


Levothyroxine Sodium (Synthroid -)  25 mcg PO DAILY@0700 [facility]


   Last Admin: 08/06/18 08:35 Dose:  25 mcg


Metolazone (Zaroxolyn -)  2.5 mg PO DAILY [facility]


   Last Admin: 08/06/18 10:19 Dose:  2.5 mg


Multivitamins/Minerals/Vitamin C (Tab-A-Vit -)  1 tab PO DAILY [facility]


   Last Admin: 08/06/18 10:18 Dose:  1 tab


Ranitidine HCl (Zantac -)  75 mg PO DAILY [facility]


   Last Admin: 08/06/18 10:19 Dose:  75 mg


Warfarin Sodium (Coumadin -)  4 mg PO DAILY@1800 [facility]


   Last Admin: 08/06/18 03:00 Dose:  4 mg





 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 

infusion, Afib on Coumadin, CAD, COPD who presented with SOB and difficulty 

voiding and found to have acute HF with HARPREET in setting of obstruction.





#HARPREET secondary to bladder outlet obstruction


#CKD stage 4


#Chronic Endstage LV failure


#Anemia


#Pyuira r/o UTI/Cystitis 





foely in place and pt is making urine


US of kidneys being performed


Urology consult for evalulation of bladder outlet obstruction 


Continue Torsemide at home dose


Continue Zaroxyolyn 


Trend renal function and electrolytes


continue Milrinone gtt 


Check iron studies 





Thank you 


Will follow





Michael East DO

## 2018-08-07 LAB
ALBUMIN SERPL-MCNC: 3.1 G/DL (ref 3.4–5)
ALP SERPL-CCNC: 134 U/L (ref 45–117)
ALT SERPL-CCNC: 13 U/L (ref 12–78)
ANION GAP SERPL CALC-SCNC: 8 MMOL/L (ref 8–16)
AST SERPL-CCNC: 18 U/L (ref 15–37)
BASOPHILS # BLD: 0.3 % (ref 0–2)
BILIRUB SERPL-MCNC: 0.3 MG/DL (ref 0.2–1)
BUN SERPL-MCNC: 72 MG/DL (ref 7–18)
CALCIUM SERPL-MCNC: 8.7 MG/DL (ref 8.5–10.1)
CHLORIDE SERPL-SCNC: 102 MMOL/L (ref 98–107)
CO2 SERPL-SCNC: 32 MMOL/L (ref 21–32)
CREAT SERPL-MCNC: 2.7 MG/DL (ref 0.7–1.3)
DEPRECATED RDW RBC AUTO: 16.6 % (ref 11.9–15.9)
EOSINOPHIL # BLD: 2.2 % (ref 0–4.5)
GLUCOSE SERPL-MCNC: 203 MG/DL (ref 74–106)
HCT VFR BLD CALC: 32.8 % (ref 35.4–49)
HGB BLD-MCNC: 11.1 GM/DL (ref 11.7–16.9)
INR BLD: 4.22 (ref 0.83–1.09)
LYMPHOCYTES # BLD: 19.4 % (ref 8–40)
MCH RBC QN AUTO: 29.2 PG (ref 25.7–33.7)
MCHC RBC AUTO-ENTMCNC: 33.8 G/DL (ref 32–35.9)
MCV RBC: 86.4 FL (ref 80–96)
MONOCYTES # BLD AUTO: 12.5 % (ref 3.8–10.2)
NEUTROPHILS # BLD: 65.6 % (ref 42.8–82.8)
PLATELET # BLD AUTO: 200 K/MM3 (ref 134–434)
PMV BLD: 8.6 FL (ref 7.5–11.1)
POTASSIUM SERPLBLD-SCNC: 4.1 MMOL/L (ref 3.5–5.1)
PROT SERPL-MCNC: 6.8 G/DL (ref 6.4–8.2)
PT PNL PPP: 47.7 SEC (ref 9.7–13)
RBC # BLD AUTO: 3.8 M/MM3 (ref 4–5.6)
SODIUM SERPL-SCNC: 142 MMOL/L (ref 136–145)
WBC # BLD AUTO: 6.7 K/MM3 (ref 4–10)

## 2018-08-07 RX ADMIN — MILRINONE LACTATE SCH MLS/HR: 200 INJECTION, SOLUTION INTRAVENOUS at 22:24

## 2018-08-07 RX ADMIN — BUDESONIDE AND FORMOTEROL FUMARATE DIHYDRATE SCH PUFF: 160; 4.5 AEROSOL RESPIRATORY (INHALATION) at 09:58

## 2018-08-07 RX ADMIN — CEFTRIAXONE SCH MLS/HR: 1 INJECTION, POWDER, FOR SOLUTION INTRAMUSCULAR; INTRAVENOUS at 13:51

## 2018-08-07 RX ADMIN — CARVEDILOL SCH MG: 6.25 TABLET, FILM COATED ORAL at 09:57

## 2018-08-07 RX ADMIN — DOCUSATE SODIUM SCH MG: 100 CAPSULE, LIQUID FILLED ORAL at 09:57

## 2018-08-07 RX ADMIN — ALBUTEROL SULFATE PRN AMP: 2.5 SOLUTION RESPIRATORY (INHALATION) at 21:05

## 2018-08-07 RX ADMIN — BUDESONIDE AND FORMOTEROL FUMARATE DIHYDRATE SCH PUFF: 160; 4.5 AEROSOL RESPIRATORY (INHALATION) at 22:23

## 2018-08-07 RX ADMIN — MILRINONE LACTATE SCH MLS/HR: 200 INJECTION, SOLUTION INTRAVENOUS at 09:56

## 2018-08-07 RX ADMIN — ASPIRIN 81 MG SCH MG: 81 TABLET ORAL at 09:58

## 2018-08-07 RX ADMIN — MILRINONE LACTATE SCH: 200 INJECTION, SOLUTION INTRAVENOUS at 03:58

## 2018-08-07 RX ADMIN — OXYCODONE HYDROCHLORIDE AND ACETAMINOPHEN SCH MG: 500 TABLET ORAL at 09:57

## 2018-08-07 RX ADMIN — TORSEMIDE SCH MG: 20 TABLET ORAL at 13:50

## 2018-08-07 RX ADMIN — LEVOTHYROXINE SODIUM SCH MCG: 25 TABLET ORAL at 06:07

## 2018-08-07 RX ADMIN — MULTIVITAMIN TABLET SCH TAB: TABLET at 09:57

## 2018-08-07 RX ADMIN — MILRINONE LACTATE SCH MLS/HR: 200 INJECTION, SOLUTION INTRAVENOUS at 15:14

## 2018-08-07 RX ADMIN — CARVEDILOL SCH MG: 6.25 TABLET, FILM COATED ORAL at 22:24

## 2018-08-07 RX ADMIN — ATORVASTATIN CALCIUM SCH MG: 40 TABLET, FILM COATED ORAL at 22:24

## 2018-08-07 RX ADMIN — METOLAZONE SCH MG: 2.5 TABLET ORAL at 09:57

## 2018-08-07 RX ADMIN — TORSEMIDE SCH MG: 20 TABLET ORAL at 06:06

## 2018-08-07 RX ADMIN — ALLOPURINOL SCH MG: 100 TABLET ORAL at 09:58

## 2018-08-07 RX ADMIN — ALBUTEROL SULFATE PRN AMP: 2.5 SOLUTION RESPIRATORY (INHALATION) at 03:32

## 2018-08-07 RX ADMIN — RANITIDINE SCH MG: 150 TABLET ORAL at 09:57

## 2018-08-07 RX ADMIN — VITAMIN D, TAB 1000IU (100/BT) SCH UNIT: 25 TAB at 09:57

## 2018-08-07 RX ADMIN — CEFTRIAXONE SCH: 1 INJECTION, POWDER, FOR SOLUTION INTRAMUSCULAR; INTRAVENOUS at 09:58

## 2018-08-07 RX ADMIN — DOCUSATE SODIUM SCH MG: 100 CAPSULE, LIQUID FILLED ORAL at 22:24

## 2018-08-07 NOTE — PN
Progress Note, Physician


Chief Complaint: 





Feels improved no c/o SOB





- Current Medication List


Current Medications: 


Active Medications





Albuterol Sulfate (Ventolin 0.083% Nebulizer Soln -)  1 amp NEB Q6H PRN


   PRN Reason: SHORT OF BREATH/WHEEZING


   Last Admin: 08/07/18 03:32 Dose:  1 amp


Allopurinol (Zyloprim -)  [facility]ILY [facility]


   Last Admin: 08/07/18 09:58 Dose:  100 mg


Ascorbic Acid (Vitamin C -)  1,000 mg PO DAILY [facility]


   Last Admin: 08/07/18 09:57 Dose:  1,000 mg


Aspirin (Asa -)  81 mg PO DAILY [facility]


   Last Admin: 08/07/18 09:58 Dose:  81 mg


Atorvastatin Calcium (Lipitor -)  40 mg PO HS [facility]


   Last Admin: 08/06/18 23:00 Dose:  40 mg


Budesonide/Formoterol Fumarate (Symbicort 160/4.5mcg -)  1 puff IH BID [facility]


   Last Admin: 08/07/18 09:58 Dose:  1 puff


Bupropion HCl (Wellbutrin -)  75 mg PO DAILY [facility]


   Last Admin: 08/07/18 09:58 Dose:  75 mg


Carvedilol (Coreg -)  6.25 mg PO BID [facility]


   Last Admin: 08/07/18 09:57 Dose:  6.25 mg


Cholecalciferol (Vitamin D3 -)  1,000 unit PO DAILY [facility]


   Last Admin: 08/07/18 09:57 Dose:  1,000 unit


Docusate Sodium (Colace -)  100 mg PO BID [facility]


   Last Admin: 08/07/18 09:57 Dose:  100 mg


Ferrous Sulfate (Feosol -)  325 mg PO Q2D@1000 [facility]


   Last Admin: 08/06/18 10:17 Dose:  325 mg


Milrinone Lactate/Dextrose (Milrinone 20mg/100ml Ivpb -)  20,000 mcg in 100 mls 

@ 12.995 mls/hr IVPB TITR [facility]; Protocol


   Last Admin: 08/07/18 09:56 Dose:  0.5 mcg/kg/min, 12.995 mls/hr


Ceftriaxone Sodium 1 gm/ (Dextrose)  50 mls @ 100 mls/hr IVPB DAILY [facility]


   Last Admin: 08/07/18 09:58 Dose:  Not Given


Levothyroxine Sodium (Synthroid -)  25 mcg PO DAILY@0700 [facility]


   Last Admin: 08/07/18 06:07 Dose:  25 mcg


Metolazone (Zaroxolyn -)  2.5 mg PO DAILY [facility]


   Last Admin: 08/07/18 09:57 Dose:  2.5 mg


Multivitamins/Minerals/Vitamin C (Tab-A-Vit -)  1 tab PO DAILY [facility]


   Last Admin: 08/07/18 09:57 Dose:  1 tab


Ranitidine HCl (Zantac -)  75 mg PO DAILY [facility]


   Last Admin: 08/07/18 09:57 Dose:  75 mg


Torsemide (Demadex -)  20 mg PO BIDLASIX [facility]


   Last Admin: 08/07/18 06:06 Dose:  20 mg


Warfarin Sodium (Coumadin -)  4 mg PO DAILY@1800 [facility]


   Last Admin: 08/06/18 18:43 Dose:  Not Given











- Objective


Vital Signs: 


 Vital Signs











Temperature  97.5 F L  08/07/18 05:41


 


Pulse Rate  75   08/07/18 05:41


 


Respiratory Rate  20   08/07/18 05:41


 


Blood Pressure  105/60   08/07/18 05:41


 


O2 Sat by Pulse Oximetry (%)  92 L  08/07/18 02:50

















Elderly man not in distress





HEENT: Mm moist, anemia, +





NECK; JVD +, no Bruit





CHEST:  B/L Crepts





CVS: S1S2 SM + irr





ABD: no distention non tender, BS + 





EXT: Edema feet improved , Pulses +





CNS; AOX3 non focal











Labs: 


 CBC, BMP





 08/07/18 05:30 





 08/07/18 05:30 





 INR, PTT











INR  3.07  (0.83-1.09)  H*  08/06/18  11:05    














Problem List





- Problems


(1) Heart failure, systolic, with acute decompensation


Assessment/Plan: 


Patient has acute decompensation of chronic sHF on Milrinone s/p AICD  as per 

wife since ast visit to [facility] CHF clinic around 1 wk ago, CHF team  prescribed 

Toresamide 20 mg daily and 20 mg BID on alternate day , his previous dosse was 

20mg BID, will resume home dose of Diuretics cont B Blockers, Stain will F/u 

Cardiology input.


Code(s): I50.23 - ACUTE ON CHRONIC SYSTOLIC (CONGESTIVE) HEART FAILURE   





(2) Acute on chronic renal insufficiency


Assessment/Plan: 


trended 2/7   base line CKD stage 3B  Creat 2.6 to 2.8 present with acute rise 

in creatnine possible Cardio renal or obstructive uropathy  F/U Renal 

recommendations


Code(s): N28.9 - DISORDER OF KIDNEY AND URETER, UNSPECIFIED; N18.9 - CHRONIC 

KIDNEY DISEASE, UNSPECIFIED   





(3) COPD (chronic obstructive pulmonary disease)


Assessment/Plan: 


Cont Nebs treatment


Code(s): J44.9 - CHRONIC OBSTRUCTIVE PULMONARY DISEASE, UNSPECIFIED   





(4) CAD (coronary artery disease)


Assessment/Plan: 


S/P MI and PCI cont all home meds no  active issue at present


Code(s): I25.10 - ATHSCL HEART DISEASE OF NATIVE CORONARY ARTERY W/O ANG PCTRS 

  





(5) Atrial fibrillation


Assessment/Plan: 


Rtae controlled on AC  F/U INR in am


Code(s): I48.91 - UNSPECIFIED ATRIAL FIBRILLATION   


Qualifiers: 


   Atrial fibrillation type: chronic   Qualified Code(s): I48.2 - Chronic 

atrial fibrillation   





(6) Urinary tract infection


Assessment/Plan: 


as per wife dysuria for 1 wk on Friday patient was put on Bactrim but remained 

symptomatic so came to ED , afebrile, voided cpody urine Multiple WBC in urine  

Iv Ceftriaxone F/U Urine and blood culture  


Code(s): N39.0 - URINARY TRACT INFECTION, SITE NOT SPECIFIED   





(7) Obstructive uropathy


Assessment/Plan: 


Will call urology urinary obstruction s/p foleys ,  renal ultrasound no 

hydronephrosis


Code(s): N13.9 - OBSTRUCTIVE AND REFLUX UROPATHY, UNSPECIFIED

## 2018-08-07 NOTE — PN
Progress Note (short form)





- Note


Progress Note: 


 


cc: SOB





S: sob improved. no cp dizzy palps edema


 Current Medications





Albuterol Sulfate (Ventolin 0.083% Nebulizer Soln -)  1 amp NEB Q6H PRN


   PRN Reason: SHORT OF BREATH/WHEEZING


   Last Admin: 08/07/18 03:32 Dose:  1 amp


Allopurinol (Zyloprim -)  100 mg PO DAILY [facility]


   Last Admin: 08/07/18 09:58 Dose:  100 mg


Ascorbic Acid (Vitamin C -)  1,000 mg PO DAILY [facility]


   Last Admin: 08/07/18 09:57 Dose:  1,000 mg


Aspirin (Asa -)  81 mg PO DAILY [facility]


   Last Admin: 08/07/18 09:58 Dose:  81 mg


Atorvastatin Calcium (Lipitor -)  40 mg PO HS [facility]


   Last Admin: 08/06/18 23:00 Dose:  40 mg


Budesonide/Formoterol Fumarate (Symbicort 160/4.5mcg -)  1 puff IH BID [facility]


   Last Admin: 08/07/18 09:58 Dose:  1 puff


Bupropion HCl (Wellbutrin -)  75 mg PO DAILY [facility]


   Last Admin: 08/07/18 09:58 Dose:  75 mg


Carvedilol (Coreg -)  6.25 mg PO BID [facility]


   Last Admin: 08/07/18 09:57 Dose:  6.25 mg


Cholecalciferol (Vitamin D3 -)  1,000 unit PO DAILY [facility]


   Last Admin: 08/07/18 09:57 Dose:  1,000 unit


Docusate Sodium (Colace -)  100 mg PO BID [facility]


   Last Admin: 08/07/18 09:57 Dose:  100 mg


Ferrous Sulfate (Feosol -)  325 mg PO Q2D@1000 [facility]


   Last Admin: 08/06/18 10:17 Dose:  325 mg


Milrinone Lactate/Dextrose (Milrinone 20mg/100ml Ivpb -)  20,000 mcg in 100 mls 

@ 12.995 mls/hr IVPB TITR [facility]; Protocol


   Last Admin: 08/07/18 09:56 Dose:  0.5 mcg/kg/min, 12.995 mls/hr


Ceftriaxone Sodium 1 gm/ (Dextrose)  50 mls @ 100 mls/hr IVPB DAILY [facility]


   Last Admin: 08/07/18 09:58 Dose:  Not Given


Levothyroxine Sodium (Synthroid -)  25 mcg PO DAILY@0700 [facility]


   Last Admin: 08/07/18 06:07 Dose:  25 mcg


Metolazone (Zaroxolyn -)  2.5 mg PO DAILY [facility]


   Last Admin: 08/07/18 09:57 Dose:  2.5 mg


Multivitamins/Minerals/Vitamin C (Tab-A-Vit -)  1 tab PO DAILY [facility]


   Last Admin: 08/07/18 09:57 Dose:  1 tab


Ranitidine HCl (Zantac -)  75 mg PO DAILY [facility]


   Last Admin: 08/07/18 09:57 Dose:  75 mg


Torsemide (Demadex -)  20 mg PO BIDLASIX [facility]


   Last Admin: 08/07/18 06:06 Dose:  20 mg


Warfarin Sodium (Coumadin -)  4 mg PO DAILY@1800 [facility]


   Last Admin: 08/06/18 18:43 Dose:  Not Given








Vital Signs: 


  Vital Signs











 Period  Temp  Pulse  Resp  BP Sys/Alonso  Pulse Ox


 


 Last 24 Hr  95.7 F-98.1 F  75-77  18-21  105-119/55-66  92-97











Constitutional: Yes: Well Nourished, No Distress


Eyes: No: Sclera Icterus


HENT: No: Nasal Congestion


Neck: No: Decreased ROM


Respiratory: Yes: CTA Bilaterally, Rales (R base).  No: Accessory Muscle Use


Gastrointestinal: Yes: Normal Bowel Sounds.  No: Distention, Hepatomegaly, 

Palpable Mass, Tenderness


Cardiovascular: Yes: Regular Rate and Rhythm


JVD: Yes


Carotid Bruit: No


PMI: Non-Displaced


Heart Sounds: Yes: S1, S2.  No: Gallop


Murmur: No: Systolic Murmur, Diastolic Murmur


Musculoskeletal: Yes: Other (No kyphosis)


Extremities: No: Cool, Cyanosis


Edema: No


Peripheral Pulses: 2+ Left Carotid, 2+ Right Carotid, 2+ Left Doralis Pedis, 2+ 

Right Dorsalis Pedis


Integumentary: No: Jaundice


Neurological: Yes: Alert, Oriented (x3)


Psychiatric: No: Agitated








Assessment/Plan





Echo 10/2016: sev lve, global hk, sev dec lvef, mild rve, nl rv fcn, patty, mild-

mod mr, mv ring, tv ring, mild tr, mild pr, mild phtn, ivc mild dil





CXR: interstitial and alveolar opacities lydia-hilar predominance, + 

cephalization, no effusions--no change vs 2/18





ECG: V-paced








a/p:  76 year old man with PMH of IDDM, COPD (O2 dependant) HTN, HLD, Afib (on 

coumadin), CAD s/p CABG 2013, end stage CHF with ICD placement (on milrinone 

pump), PVD, BPH here with increased sob/leg swelling.








chronic systolic CHF stage D, s/p ICD, on home milrinone infusion:


- pt had cardiomems PAP monitor implant with dr de la rosa ([facility]) and has been 

managed by him since last admit here.


- on destination therapy home milrinone infusion


- weight stable, BNP stable, CXR unchanged, euvolemic on exam, unlikely CHF 

exacerbation


- Continue home dose of torsemide, clarified to be 20 mg daily alternating with 

20 mg BID, urine output now improving after connolly placed





HARPREET on CKD, h/o cardiorenal syndrome:


- recent outpt creat ranging 2.6-2.9 range


- BUN/Cr improving after connolly placement, no acute process on renal ultrasound


- Nephrology and urology consulted





COPD, 02 dependent


- no temp, normal WBC, no new infiltrates on CXR





afib


- rate controlled on coreg


- cont coumadin per INR (target 2-3)





CAD s/p MI with PCI (and ? TV repair at that time):


- No anginal symptoms, no signs acs.


- Con't home ac PLUS asa, atorvastatin, coreg





PVD


- con't AC, statin.





HTN: 


-controlled on coreg

## 2018-08-07 NOTE — PN
Progress Note (short form)





- Note


Progress Note: 





Renal follow up for HARPREET on CKD





Pt seen and examined at the bedside


awake and alert


no sob, chest pain, abd pain


making urine via catheter 





 Vital Signs











Temperature  97.4 F L  08/07/18 14:35


 


Pulse Rate  74   08/07/18 14:35


 


Respiratory Rate  18   08/07/18 14:35


 


Blood Pressure  115/69   08/07/18 14:35


 


O2 Sat by Pulse Oximetry (%)  92 L  08/07/18 02:50








 Intake & Output











 08/04/18 08/05/18 08/06/18 08/07/18





 23:59 23:59 23:59 23:59


 


Intake Total   50 141.6


 


Output Total    2400


 


Balance   50 -2258.4


 


Weight  86.636 kg  86.183 kg








NAD


RRR, No M/R


Dec BS at lung bases, no rales


soft NT/ND


+ edema





 CBC, BMP





 08/07/18 05:30 





 08/07/18 05:30 





 Current Medications





Albuterol Sulfate (Ventolin 0.083% Nebulizer Soln -)  1 amp NEB Q6H PRN


   PRN Reason: SHORT OF BREATH/WHEEZING


   Last Admin: 08/07/18 03:32 Dose:  1 amp


Allopurinol (Zyloprim -)  100 mg PO DAILY [facility]


   Last Admin: 08/07/18 09:58 Dose:  100 mg


Ascorbic Acid (Vitamin C -)  1,000 mg PO DAILY [facility]


   Last Admin: 08/07/18 09:57 Dose:  1,000 mg


Aspirin (Asa -)  81 mg PO DAILY [facility]


   Last Admin: 08/07/18 09:58 Dose:  81 mg


Atorvastatin Calcium (Lipitor -)  40 mg PO HS [facility]


   Last Admin: 08/06/18 23:00 Dose:  40 mg


Budesonide/Formoterol Fumarate (Symbicort 160/4.5mcg -)  1 puff IH BID [facility]


   Last Admin: 08/07/18 09:58 Dose:  1 puff


Bupropion HCl (Wellbutrin -)  75 mg PO DAILY [facility]


   Last Admin: 08/07/18 09:58 Dose:  75 mg


Carvedilol (Coreg -)  6.25 mg PO BID [facility]


   Last Admin: 08/07/18 09:57 Dose:  6.25 mg


Cholecalciferol (Vitamin D3 -)  1,000 unit PO DAILY [facility]


   Last Admin: 08/07/18 09:57 Dose:  1,000 unit


Docusate Sodium (Colace -)  100 mg PO BID [facility]


   Last Admin: 08/07/18 09:57 Dose:  100 mg


Ferrous Sulfate (Feosol -)  325 mg PO Q2D@1000 [facility]


   Last Admin: 08/06/18 10:17 Dose:  325 mg


Ceftriaxone Sodium 1 gm/ (Dextrose)  50 mls @ 100 mls/hr IVPB DAILY [facility]


   Last Admin: 08/07/18 13:51 Dose:  100 mls/hr


Milrinone Lactate/Dextrose (Milrinone 20mg/100ml Ivpb -)  20,000 mcg in 100 mls 

@ 12.927 mls/hr IVPB TITR [facility]


   Last Admin: 08/07/18 15:14 Dose:  0.5 mcg/kg/min, 12.927 mls/hr


Levothyroxine Sodium (Synthroid -)  25 mcg PO DAILY@0700 [facility]


   Last Admin: 08/07/18 06:07 Dose:  25 mcg


Multivitamins/Minerals/Vitamin C (Tab-A-Vit -)  1 tab PO DAILY [facility]


   Last Admin: 08/07/18 09:57 Dose:  1 tab


Ranitidine HCl (Zantac -)  75 mg PO DAILY [facility]


   Last Admin: 08/07/18 09:57 Dose:  75 mg


Torsemide (Demadex -)  20 mg PO BIDLASIX [facility]


   Last Admin: 08/07/18 13:50 Dose:  20 mg





 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 

infusion, Afib on Coumadin, CAD, COPD who presented with SOB and difficulty 

voiding and found to have acute HF with HARPREET in setting of obstruction.





#HARPREET secondary to bladder outlet obstruction


#CKD stage 4


#Chronic Endstage LV failure


#Anemia


#Pyuira r/o UTI/Cystitis 





Renal function improved and pt is non-oliguric s/p connolly placement


Urology evalulation pending


unclear if pt is a candidate for flomax/proscar given advanced heart failure


will discuss with cardiology


continue torsemide/milrione as per cardiology





Michael East DO

## 2018-08-08 LAB
ANION GAP SERPL CALC-SCNC: 5 MMOL/L (ref 8–16)
BASOPHILS # BLD: 0.4 % (ref 0–2)
BUN SERPL-MCNC: 62 MG/DL (ref 7–18)
CALCIUM SERPL-MCNC: 8.8 MG/DL (ref 8.5–10.1)
CHLORIDE SERPL-SCNC: 94 MMOL/L (ref 98–107)
CO2 SERPL-SCNC: 36 MMOL/L (ref 21–32)
CREAT SERPL-MCNC: 2.5 MG/DL (ref 0.7–1.3)
DEPRECATED RDW RBC AUTO: 16.9 % (ref 11.9–15.9)
EOSINOPHIL # BLD: 1.3 % (ref 0–4.5)
GLUCOSE SERPL-MCNC: 287 MG/DL (ref 74–106)
HCT VFR BLD CALC: 34.2 % (ref 35.4–49)
HGB BLD-MCNC: 11.4 GM/DL (ref 11.7–16.9)
INR BLD: 4.35 (ref 0.83–1.09)
LYMPHOCYTES # BLD: 10.3 % (ref 8–40)
MCH RBC QN AUTO: 28.8 PG (ref 25.7–33.7)
MCHC RBC AUTO-ENTMCNC: 33.4 G/DL (ref 32–35.9)
MCV RBC: 86.2 FL (ref 80–96)
MONOCYTES # BLD AUTO: 9.8 % (ref 3.8–10.2)
NEUTROPHILS # BLD: 78.2 % (ref 42.8–82.8)
PLATELET # BLD AUTO: 168 K/MM3 (ref 134–434)
PMV BLD: 7.6 FL (ref 7.5–11.1)
POTASSIUM SERPLBLD-SCNC: 4.2 MMOL/L (ref 3.5–5.1)
PT PNL PPP: 49.2 SEC (ref 9.7–13)
RBC # BLD AUTO: 3.96 M/MM3 (ref 4–5.6)
SODIUM SERPL-SCNC: 135 MMOL/L (ref 136–145)
WBC # BLD AUTO: 8.2 K/MM3 (ref 4–10)

## 2018-08-08 RX ADMIN — ATORVASTATIN CALCIUM SCH MG: 40 TABLET, FILM COATED ORAL at 22:06

## 2018-08-08 RX ADMIN — ALLOPURINOL SCH MG: 100 TABLET ORAL at 09:16

## 2018-08-08 RX ADMIN — ASPIRIN 81 MG SCH MG: 81 TABLET ORAL at 09:16

## 2018-08-08 RX ADMIN — LEVOTHYROXINE SODIUM SCH MCG: 25 TABLET ORAL at 06:07

## 2018-08-08 RX ADMIN — DOCUSATE SODIUM SCH MG: 100 CAPSULE, LIQUID FILLED ORAL at 09:16

## 2018-08-08 RX ADMIN — TORSEMIDE SCH MG: 20 TABLET ORAL at 06:07

## 2018-08-08 RX ADMIN — BUDESONIDE AND FORMOTEROL FUMARATE DIHYDRATE SCH PUFF: 160; 4.5 AEROSOL RESPIRATORY (INHALATION) at 22:06

## 2018-08-08 RX ADMIN — TORSEMIDE SCH MG: 20 TABLET ORAL at 13:00

## 2018-08-08 RX ADMIN — FERROUS SULFATE TAB EC 324 MG (65 MG FE EQUIVALENT) SCH MG: 324 (65 FE) TABLET DELAYED RESPONSE at 09:16

## 2018-08-08 RX ADMIN — CARVEDILOL SCH MG: 6.25 TABLET, FILM COATED ORAL at 22:06

## 2018-08-08 RX ADMIN — CARVEDILOL SCH MG: 6.25 TABLET, FILM COATED ORAL at 09:16

## 2018-08-08 RX ADMIN — CEFTRIAXONE SCH MLS/HR: 1 INJECTION, POWDER, FOR SOLUTION INTRAMUSCULAR; INTRAVENOUS at 09:16

## 2018-08-08 RX ADMIN — MULTIVITAMIN TABLET SCH TAB: TABLET at 09:17

## 2018-08-08 RX ADMIN — VITAMIN D, TAB 1000IU (100/BT) SCH UNIT: 25 TAB at 09:16

## 2018-08-08 RX ADMIN — BUDESONIDE AND FORMOTEROL FUMARATE DIHYDRATE SCH PUFF: 160; 4.5 AEROSOL RESPIRATORY (INHALATION) at 09:17

## 2018-08-08 RX ADMIN — MILRINONE LACTATE SCH MLS/HR: 200 INJECTION, SOLUTION INTRAVENOUS at 06:28

## 2018-08-08 RX ADMIN — OXYCODONE HYDROCHLORIDE AND ACETAMINOPHEN SCH MG: 500 TABLET ORAL at 09:16

## 2018-08-08 RX ADMIN — RANITIDINE SCH MG: 150 TABLET ORAL at 09:17

## 2018-08-08 RX ADMIN — DOCUSATE SODIUM SCH MG: 100 CAPSULE, LIQUID FILLED ORAL at 22:06

## 2018-08-08 RX ADMIN — ALBUTEROL SULFATE PRN AMP: 2.5 SOLUTION RESPIRATORY (INHALATION) at 08:42

## 2018-08-08 NOTE — PN
Progress Note (short form)





- Note


Progress Note: 


 


cc: SOB





S:  no sob cp dizzy palps edema


 


 Current Medications





Albuterol Sulfate (Ventolin 0.083% Nebulizer Soln -)  1 amp NEB Q6H PRN


   PRN Reason: SHORT OF BREATH/WHEEZING


   Last Admin: 08/08/18 08:42 Dose:  1 amp


Allopurinol (Zyloprim -)  100 mg PO DAILY [facility]


   Last Admin: 08/08/18 09:16 Dose:  100 mg


Ascorbic Acid (Vitamin C -)  1,000 mg PO DAILY [facility]


   Last Admin: 08/08/18 09:16 Dose:  1,000 mg


Aspirin (Asa -)  81 mg PO DAILY [facility]


   Last Admin: 08/08/18 09:16 Dose:  81 mg


Atorvastatin Calcium (Lipitor -)  40 mg PO HS [facility]


   Last Admin: 08/07/18 22:24 Dose:  40 mg


Budesonide/Formoterol Fumarate (Symbicort 160/4.5mcg -)  1 puff IH BID [facility]


   Last Admin: 08/08/18 09:17 Dose:  1 puff


Bupropion HCl (Wellbutrin -)  75 mg PO DAILY [facility]


   Last Admin: 08/08/18 09:16 Dose:  75 mg


Carvedilol (Coreg -)  6.25 mg PO BID [facility]


   Last Admin: 08/08/18 09:16 Dose:  6.25 mg


Cholecalciferol (Vitamin D3 -)  1,000 unit PO DAILY [facility]


   Last Admin: 08/08/18 09:16 Dose:  1,000 unit


Docusate Sodium (Colace -)  100 mg PO BID [facility]


   Last Admin: 08/08/18 09:16 Dose:  100 mg


Ferrous Sulfate (Feosol -)  325 mg PO Q2D@1000 [facility]


   Last Admin: 08/08/18 09:16 Dose:  325 mg


Ceftriaxone Sodium 1 gm/ (Dextrose)  50 mls @ 100 mls/hr IVPB DAILY [facility]


   Last Admin: 08/08/18 09:16 Dose:  100 mls/hr


Milrinone Lactate/Dextrose (Milrinone 20mg/100ml Ivpb -)  20,000 mcg in 100 mls 

@ 12.927 mls/hr IVPB TITR [facility]


   Last Admin: 08/08/18 06:28 Dose:  0.5 mcg/kg/min, 12.927 mls/hr


Levothyroxine Sodium (Synthroid -)  25 mcg PO DAILY@0700 [facility]


   Last Admin: 08/08/18 06:07 Dose:  25 mcg


Multivitamins/Minerals/Vitamin C (Tab-A-Vit -)  1 tab PO DAILY [facility]


   Last Admin: 08/08/18 09:17 Dose:  1 tab


Ranitidine HCl (Zantac -)  75 mg PO DAILY [facility]


   Last Admin: 08/08/18 09:17 Dose:  75 mg


Torsemide (Demadex -)  20 mg PO BIDLASIX [facility]


   Last Admin: 08/08/18 06:07 Dose:  20 mg














Vital Signs: 


 Vital Signs











 Period  Temp  Pulse  Resp  BP Sys/Alonso  Pulse Ox


 


 Last 24 Hr  97.4 F-98.2 F  62-74  18-18  111-115/61-69  98











Constitutional: Yes: Well Nourished, No Distress


Eyes: No: Sclera Icterus


HENT: No: Nasal Congestion


Neck: No: Decreased ROM


Respiratory: Yes: CTA Bilaterally, Rales (R base).  No: Accessory Muscle Use


Gastrointestinal: Yes: Normal Bowel Sounds.  No: Distention, Hepatomegaly, 

Palpable Mass, Tenderness


Cardiovascular: Yes: Regular Rate and Rhythm


JVD: Yes


Carotid Bruit: No


PMI: Non-Displaced


Heart Sounds: Yes: S1, S2.  No: Gallop


Murmur: No: Systolic Murmur, Diastolic Murmur


Musculoskeletal: Yes: Other (No kyphosis)


Extremities: No: Cool, Cyanosis


Edema: No


Peripheral Pulses: 2+ Left Carotid, 2+ Right Carotid, 2+ Left Doralis Pedis, 2+ 

Right Dorsalis Pedis


Integumentary: No: Jaundice


Neurological: Yes: Alert, Oriented (x3)


Psychiatric: No: Agitated








Assessment/Plan





Echo 10/2016: sev lve, global hk, sev dec lvef, mild rve, nl rv fcn, patty, mild-

mod mr, mv ring, tv ring, mild tr, mild pr, mild phtn, ivc mild dil





CXR: interstitial and alveolar opacities lydia-hilar predominance, + 

cephalization, no effusions--no change vs 2/18





ECG: V-paced








a/p:  76 year old man with PMH of IDDM, COPD (O2 dependant) HTN, HLD, Afib (on 

coumadin), CAD s/p CABG 2013, end stage CHF with ICD placement (on milrinone 

pump), PVD, BPH here with increased sob/leg swelling.








chronic systolic CHF stage D, s/p ICD, on home milrinone infusion:


- pt had cardiomems PAP monitor implant with dr de la rosa ([facility]) and has been 

managed by him since last admit here.


- on destination therapy home milrinone infusion, continue milrinone 0.5 mcg/kg/

min


- weight stable, BNP stable, CXR unchanged, euvolemic on exam, unlikely CHF 

exacerbation


- currently on torsemide 20 mg BID.  discussed with wife, torsemide dose 

frequently changed due to renal function, was on 40 mg daily the week prior to 

last and last week was on 20 mg daily alternating with 20 mg BID


- Cr downtrending, continue current dose of torsemide 60 mg daily


- monitor daily standing weights, I/O, Cr 





HARPREET on CKD, h/o cardiorenal syndrome:


- recent outpt creat ranging 2.6-2.9 range


- BUN/Cr improving after connolly placement, no acute process on renal ultrasound


- Nephrology and urology consulted, appreciate recs


- no absolute contraindication for flomax or proscar, however with flomax would 

need to monitor closely for hypotension or arrhythmia








COPD, 02 dependent


- no temp, normal WBC, no new infiltrates on CXR





afib


- rate controlled on coreg


- cont coumadin per INR (target 2-3)





CAD s/p MI with PCI (and ? TV repair at that time):


- No anginal symptoms, no signs acs


- Con't home ac PLUS asa, atorvastatin, coreg





PVD


- con't AC, statin.





HTN: 


-controlled on coreg

## 2018-08-08 NOTE — PN
Progress Note, Physician


Chief Complaint: 


Mr Jerome says he is feeling well today. Denies cp, sob, n/v. States he does 

not want to be here long.





- Current Medication List


Current Medications: 


Active Medications





Albuterol Sulfate (Ventolin 0.083% Nebulizer Soln -)  1 amp NEB Q6H PRN


   PRN Reason: SHORT OF BREATH/WHEEZING


   Last Admin: 08/08/18 08:42 Dose:  1 amp


Allopurinol (Zyloprim -)  100 mg PO DAILY [facility]


   Last Admin: 08/08/18 09:16 Dose:  100 mg


Ascorbic Acid (Vitamin C -)  1,000 mg PO DAILY [facility]


   Last Admin: 08/08/18 09:16 Dose:  1,000 mg


Aspirin (Asa -)  81 mg PO DAILY [facility]


   Last Admin: 08/08/18 09:16 Dose:  81 mg


Atorvastatin Calcium (Lipitor -)  40 mg PO HS [facility]


   Last Admin: 08/07/18 22:24 Dose:  40 mg


Budesonide/Formoterol Fumarate (Symbicort 160/4.5mcg -)  1 puff IH BID [facility]


   Last Admin: 08/08/18 09:17 Dose:  1 puff


Bupropion HCl (Wellbutrin -)  75 mg PO DAILY [facility]


   Last Admin: 08/08/18 09:16 Dose:  75 mg


Carvedilol (Coreg -)  6.25 mg PO BID [facility]


   Last Admin: 08/08/18 09:16 Dose:  6.25 mg


Cholecalciferol (Vitamin D3 -)  1,000 unit PO DAILY [facility]


   Last Admin: 08/08/18 09:16 Dose:  1,000 unit


Docusate Sodium (Colace -)  100 mg PO BID [facility]


   Last Admin: 08/08/18 09:16 Dose:  100 mg


Ferrous Sulfate (Feosol -)  325 mg PO Q2D@1000 [facility]


   Last Admin: 08/08/18 09:16 Dose:  325 mg


Ceftriaxone Sodium 1 gm/ (Dextrose)  50 mls @ 100 mls/hr IVPB DAILY [facility]


   Last Admin: 08/08/18 09:16 Dose:  100 mls/hr


Milrinone Lactate/Dextrose (Milrinone 20mg/100ml Ivpb -)  20,000 mcg in 100 mls 

@ 12.927 mls/hr IVPB TITR [facility]


   Last Admin: 08/08/18 06:28 Dose:  0.5 mcg/kg/min, 12.927 mls/hr


Levothyroxine Sodium (Synthroid -)  25 mcg PO DAILY@0700 [facility]


   Last Admin: 08/08/18 06:07 Dose:  25 mcg


Multivitamins/Minerals/Vitamin C (Tab-A-Vit -)  1 tab PO DAILY [facility]


   Last Admin: 08/08/18 09:17 Dose:  1 tab


Ranitidine HCl (Zantac -)  75 mg PO DAILY [facility]


   Last Admin: 08/08/18 09:17 Dose:  75 mg


Torsemide (Demadex -)  20 mg PO BIDLASIX [facility]


   Last Admin: 08/08/18 13:00 Dose:  20 mg











- Objective


Vital Signs: 


 Vital Signs











Temperature  36.7 C   08/08/18 10:17


 


Pulse Rate  80   08/08/18 10:17


 


Respiratory Rate  18   08/08/18 10:17


 


Blood Pressure  121/67   08/08/18 10:17


 


O2 Sat by Pulse Oximetry (%)  98   08/08/18 09:00











Constitutional: Yes: Well Nourished, No Distress, Calm


Cardiovascular: Yes: Regular Rate and Rhythm.  No: Gallop, Murmur, Rub


Respiratory: Yes: Regular, Rales (bibasilar).  No: CTA Bilaterally, Rhonchi, 

Wheezes


Gastrointestinal: Yes: Normal Bowel Sounds, Soft.  No: Distention, Tenderness


Extremities: Yes: WNL


Edema: No


Labs: 


 CBC, BMP





 08/08/18 05:30 





 08/08/18 05:30 





 INR, PTT











INR  4.35  (0.83-1.09)  H*  08/08/18  05:30    














Problem List





- Problems


(1) Urinary tract infection


Assessment/Plan: 


-patient with strep viridans UTI


-continue rocephin


-change to oral regimen when stable for discharge


Code(s): N39.0 - URINARY TRACT INFECTION, SITE NOT SPECIFIED   


Qualifiers: 


   Urinary tract infection type: acute cystitis   Hematuria presence: without 

hematuria   Qualified Code(s): N30.00 - Acute cystitis without hematuria   





(2) Obstructive uropathy


Assessment/Plan: 


-connolly placed and draining urine


-exacerbated by UTI


-urology consulted and awaiting recommendations


Code(s): N13.9 - OBSTRUCTIVE AND REFLUX UROPATHY, UNSPECIFIED   





(3) CHF (congestive heart failure)


Assessment/Plan: 


-case d/w cardiology


-continue milrinone gtt and torsemide


Code(s): I50.9 - HEART FAILURE, UNSPECIFIED   


Qualifiers: 


   Qualified Code(s): I50.32 - Chronic diastolic (congestive) heart failure   





(4) Acute kidney injury


Assessment/Plan: 


-improving with connolly placement


-appreciate nephrology assistance


-monitor


Code(s): N17.9 - ACUTE KIDNEY FAILURE, UNSPECIFIED   





(5) CKD (chronic kidney disease)


Assessment/Plan: 


-awaiting normalization


Code(s): N18.9 - CHRONIC KIDNEY DISEASE, UNSPECIFIED   


Qualifiers: 


   Chronic kidney disease stage: stage 3 (moderate)   Qualified Code(s): N18.3 

- Chronic kidney disease, stage 3 (moderate)   





(6) Atrial fibrillation


Assessment/Plan: 


-rate controlled


-hold coumadin secondary to elevated INR


Code(s): I48.91 - UNSPECIFIED ATRIAL FIBRILLATION   


Qualifiers: 


   Atrial fibrillation type: chronic   Qualified Code(s): I48.2 - Chronic 

atrial fibrillation   





(7) CAD (coronary artery disease)


Assessment/Plan: 


-continue medical management


Code(s): I25.10 - ATHSCL HEART DISEASE OF NATIVE CORONARY ARTERY W/O ANG PCTRS 

  





(8) Diabetes


Assessment/Plan: 


-place on diabetic diet


Code(s): E11.9 - TYPE 2 DIABETES MELLITUS WITHOUT COMPLICATIONS   


Qualifiers: 


   Diabetes mellitus type: type 2   Diabetes mellitus complication status: with 

kidney complications   Diabetes mellitus complication detail: with chronic 

kidney disease   Chronic kidney disease stage: stage 4 (severe)

## 2018-08-08 NOTE — CON.GU
Consult


Consult Specialty:: 


Referred by:: medicine


Reason for Consultation:: urinary retention.





- History of Present Illness


Chief Complaint: urinary retention.


History of Present Illness: 





77 year old male with a history of BPH and bladder outlet obstruction.  He came 

in HARPREET and urinary retention. Connolly cath was placed with initally large output 

and has had some gross hematuria.  THe hematuria has resolved and his 

creatinine is trending down. 





- History Source


History Provided By: Medical Record





- Past Medical History


Cardio/Vascular: Yes: AFIB, CAD, CHF, HTN, Hyperlipdemia, MI, Pulmonary 

Hypertension, Other (AICD placed)


Pulmonary: Yes: COPD, O2 Dependent


Renal/: Yes: Renal Inusuff, BPH


Endocrine: Yes: Diabetes Mellitus





- Past Surgical History


Past Surgical History: Yes: AICD, CABG, Hernia Repair





- Alcohol/Substance Use


Hx Alcohol Use: No


History of Substance Use: reports: None





- Smoking History


Smoking history: Former smoker


Have you smoked in the past 12 months: No


Aproximately how many cigarettes per day: 40


If you are a former smoker, when did you quit?: 2011





- Social History


Usual Living Arrangement: With Spouse


ADL: Independent


History of Recent Travel: No





Home Medications





- Allergies


Allergies/Adverse Reactions: 


 Allergies











Allergy/AdvReac Type Severity Reaction Status Date / Time


 


No Known Drug Allergies Allergy   Verified 02/03/18 10:37


 


shellfish derived Allergy   Verified 02/03/18 10:37














- Home Medications


Home Medications: 


Ambulatory Orders





Allopurinol [Zyloprim -] 100 mg PO DAILY 01/14/16 


Atorvastatin Ca [Lipitor] 40 mg PO HS 01/14/16 


Carvedilol 6.25 mg PO BID 01/14/16 


Docusate Sodium [Colace -] 100 mg PO BID 01/14/16 


Milrinone Lactate/D5w [Milrinone 0.2 mg/ml in D5w] 0.5 mg IV DAILY 01/14/16 


Ranitidine [Zantac -] 75 mg PO DAILY 01/14/16 


Albuterol 0.083% Nebulizer Sol [Ventolin 0.083% Nebulizer Soln -] 1 amp NEB Q6H 

PRN #120 amp 01/18/16 


Ferrous Sulfate [Feosol] 325 mg PO Q48H  ud 08/27/16 


Ascorbic Acid [Vitamin C -] 1,000 mg PO DAILY 03/30/17 


Cholecalciferol (Vitamin D3) [Vitamin D -] 1,000 unit PO DAILY 03/30/17 


Multivitamin [Poly-Vitamin] 1 each PO DAILY 03/30/17 


Budesonide/Formeterol Fumarate [SYMBICORT 160/4.5mcg -] 1 inh PO BID 06/23/17 


Torsemide [Demadex -] 20 mg PO BID 06/23/17 


Aspirin 81 mg PO DAILY 02/03/18 


Cholecalciferol (Vitamin D3) [Vitamin D -] 1,000 unit PO DAILY 02/03/18 


Levothyroxine [Synthroid -] 25 mcg PO DAILY 02/03/18 


Warfarin Na [Coumadin -] 4 mg PO ASDIR 02/03/18 


Bupropion HCl [Wellbutrin -] 75 mg PO DAILY #0 tablet 02/05/18 


Aspirin Coated [Ecotrin -] 81 mg PO DAILY  tablet.ec 02/08/18 











Family Disease History





- Family Disease History


Family Disease History: Diabetes: Father, Heart Disease: Father





Review of Systems





- Review of Systems


Genitourinary: reports: Frequency





Physical Exam-


Vital Signs: 


 Vital Signs











Temperature  98.0 F   08/08/18 10:17


 


Pulse Rate  80   08/08/18 10:17


 


Respiratory Rate  18   08/08/18 10:17


 


Blood Pressure  121/67   08/08/18 10:17


 


O2 Sat by Pulse Oximetry (%)  98   08/08/18 09:00











Renal/: Yes: Connolly Present.  No: Bladder Distention, CVA Tenderness - Left, 

CVA Tenderness - Right, Hematuria


Labs: 


 CBC, BMP





 08/08/18 05:30 





 08/08/18 05:30 











Problem List





- Problems


(1) Benign localized hyperplasia of prostate with urinary retention


Assessment/Plan: 


BPH long standing with bladder diverticulum and retention with renal failure 

Keep connolly until creatinine nadirs.  flomax and proscar and can atttempt trial 

of void in two weeks.  


Code(s): N40.1 - BENIGN PROSTATIC HYPERPLASIA WITH LOWER URINARY TRACT SYMP

## 2018-08-09 LAB
ANION GAP SERPL CALC-SCNC: 10 MMOL/L (ref 8–16)
BASOPHILS # BLD: 0.4 % (ref 0–2)
BUN SERPL-MCNC: 57 MG/DL (ref 7–18)
CALCIUM SERPL-MCNC: 8.7 MG/DL (ref 8.5–10.1)
CHLORIDE SERPL-SCNC: 91 MMOL/L (ref 98–107)
CO2 SERPL-SCNC: 34 MMOL/L (ref 21–32)
CREAT SERPL-MCNC: 2.4 MG/DL (ref 0.7–1.3)
DEPRECATED RDW RBC AUTO: 16.4 % (ref 11.9–15.9)
EOSINOPHIL # BLD: 1.1 % (ref 0–4.5)
GLUCOSE SERPL-MCNC: 401 MG/DL (ref 74–106)
HCT VFR BLD CALC: 35.4 % (ref 35.4–49)
HGB BLD-MCNC: 11.9 GM/DL (ref 11.7–16.9)
INR BLD: 3.12 (ref 0.83–1.09)
LYMPHOCYTES # BLD: 13.8 % (ref 8–40)
MAGNESIUM SERPL-MCNC: 2.4 MG/DL (ref 1.8–2.4)
MCH RBC QN AUTO: 28.9 PG (ref 25.7–33.7)
MCHC RBC AUTO-ENTMCNC: 33.5 G/DL (ref 32–35.9)
MCV RBC: 86.3 FL (ref 80–96)
MONOCYTES # BLD AUTO: 11.9 % (ref 3.8–10.2)
NEUTROPHILS # BLD: 72.8 % (ref 42.8–82.8)
PHOSPHATE SERPL-MCNC: 2.5 MG/DL (ref 2.5–4.9)
PLATELET # BLD AUTO: 189 K/MM3 (ref 134–434)
PMV BLD: 8.5 FL (ref 7.5–11.1)
POTASSIUM SERPLBLD-SCNC: 3.9 MMOL/L (ref 3.5–5.1)
PT PNL PPP: 35.3 SEC (ref 9.7–13)
RBC # BLD AUTO: 4.1 M/MM3 (ref 4–5.6)
SODIUM SERPL-SCNC: 135 MMOL/L (ref 136–145)
WBC # BLD AUTO: 7 K/MM3 (ref 4–10)

## 2018-08-09 RX ADMIN — INSULIN ASPART SCH UNITS: 100 INJECTION, SOLUTION INTRAVENOUS; SUBCUTANEOUS at 17:12

## 2018-08-09 RX ADMIN — CEFTRIAXONE SCH MLS/HR: 1 INJECTION, POWDER, FOR SOLUTION INTRAMUSCULAR; INTRAVENOUS at 09:17

## 2018-08-09 RX ADMIN — OXYCODONE HYDROCHLORIDE AND ACETAMINOPHEN SCH MG: 500 TABLET ORAL at 09:13

## 2018-08-09 RX ADMIN — INSULIN ASPART SCH UNITS: 100 INJECTION, SOLUTION INTRAVENOUS; SUBCUTANEOUS at 21:25

## 2018-08-09 RX ADMIN — ALLOPURINOL SCH MG: 100 TABLET ORAL at 09:16

## 2018-08-09 RX ADMIN — ASPIRIN 81 MG SCH MG: 81 TABLET ORAL at 09:13

## 2018-08-09 RX ADMIN — CARVEDILOL SCH MG: 6.25 TABLET, FILM COATED ORAL at 09:13

## 2018-08-09 RX ADMIN — BUDESONIDE AND FORMOTEROL FUMARATE DIHYDRATE SCH PUFF: 160; 4.5 AEROSOL RESPIRATORY (INHALATION) at 09:17

## 2018-08-09 RX ADMIN — VITAMIN D, TAB 1000IU (100/BT) SCH UNIT: 25 TAB at 09:16

## 2018-08-09 RX ADMIN — LEVOTHYROXINE SODIUM SCH MCG: 25 TABLET ORAL at 06:38

## 2018-08-09 RX ADMIN — DOCUSATE SODIUM SCH MG: 100 CAPSULE, LIQUID FILLED ORAL at 09:14

## 2018-08-09 RX ADMIN — CARVEDILOL SCH MG: 6.25 TABLET, FILM COATED ORAL at 21:25

## 2018-08-09 RX ADMIN — DOCUSATE SODIUM SCH MG: 100 CAPSULE, LIQUID FILLED ORAL at 21:25

## 2018-08-09 RX ADMIN — TORSEMIDE SCH MG: 20 TABLET ORAL at 06:38

## 2018-08-09 RX ADMIN — MILRINONE LACTATE SCH MLS/HR: 200 INJECTION, SOLUTION INTRAVENOUS at 18:36

## 2018-08-09 RX ADMIN — FINASTERIDE SCH MG: 5 TABLET, FILM COATED ORAL at 09:14

## 2018-08-09 RX ADMIN — TORSEMIDE SCH MG: 20 TABLET ORAL at 13:23

## 2018-08-09 RX ADMIN — MULTIVITAMIN TABLET SCH TAB: TABLET at 09:14

## 2018-08-09 RX ADMIN — MILRINONE LACTATE SCH MLS/HR: 200 INJECTION, SOLUTION INTRAVENOUS at 09:18

## 2018-08-09 RX ADMIN — ATORVASTATIN CALCIUM SCH MG: 40 TABLET, FILM COATED ORAL at 21:25

## 2018-08-09 RX ADMIN — ALBUTEROL SULFATE PRN AMP: 2.5 SOLUTION RESPIRATORY (INHALATION) at 21:00

## 2018-08-09 RX ADMIN — MILRINONE LACTATE SCH MLS/HR: 200 INJECTION, SOLUTION INTRAVENOUS at 00:47

## 2018-08-09 RX ADMIN — BUDESONIDE AND FORMOTEROL FUMARATE DIHYDRATE SCH PUFF: 160; 4.5 AEROSOL RESPIRATORY (INHALATION) at 21:26

## 2018-08-09 RX ADMIN — RANITIDINE SCH MG: 150 TABLET ORAL at 09:14

## 2018-08-09 RX ADMIN — TAMSULOSIN HYDROCHLORIDE SCH MG: 0.4 CAPSULE ORAL at 09:12

## 2018-08-09 NOTE — PN
Progress Note (short form)





- Note


Progress Note: 





Renal follow up for HARPREET on CKD





Pt seen and examined at the bedside


awake and alert


no acute complaints


making urine via connolly 


 Vital Signs











Temperature  98.5 F   08/09/18 06:00


 


Pulse Rate  75   08/09/18 06:00


 


Respiratory Rate  18   08/09/18 06:00


 


Blood Pressure  127/67   08/09/18 06:00


 


O2 Sat by Pulse Oximetry (%)  98   08/08/18 21:00








 Intake & Output











 08/06/18 08/07/18 08/08/18 08/09/18





 23:59 23:59 23:59 23:59


 


Intake Total 50 501.6 894.8 674.8


 


Output Total  3900 4500 


 


Balance 50 -3398.4 -3605.2 674.8


 


Weight  86.183 kg 85.899 kg 85.729 kg











NAD


RRR, No M/R


Dec BS at lung bases, no rales


soft NT/ND


+ edema


connolly in place 





 CBC, BMP





 08/09/18 05:30 





 08/09/18 05:30 





 Current Medications





Albuterol Sulfate (Ventolin 0.083% Nebulizer Soln -)  1 amp NEB Q6H PRN


   PRN Reason: SHORT OF BREATH/WHEEZING


   Last Admin: 08/08/18 08:42 Dose:  1 amp


Allopurinol (Zyloprim -)  100 mg PO DAILY [facility]


   Last Admin: 08/09/18 09:16 Dose:  100 mg


Ascorbic Acid (Vitamin C -)  1,000 mg PO DAILY [facility]


   Last Admin: 08/09/18 09:13 Dose:  1,000 mg


Aspirin (Asa -)  81 mg PO DAILY [facility]


   Last Admin: 08/09/18 09:13 Dose:  81 mg


Atorvastatin Calcium (Lipitor -)  40 mg PO HS [facility]


   Last Admin: 08/08/18 22:06 Dose:  40 mg


Budesonide/Formoterol Fumarate (Symbicort 160/4.5mcg -)  1 puff IH BID [facility]


   Last Admin: 08/09/18 09:17 Dose:  1 puff


Bupropion HCl (Wellbutrin -)  75 mg PO DAILY [facility]


   Last Admin: 08/09/18 09:14 Dose:  75 mg


Carvedilol (Coreg -)  6.25 mg PO BID [facility]


   Last Admin: 08/09/18 09:13 Dose:  6.25 mg


Cholecalciferol (Vitamin D3 -)  1,000 unit PO DAILY [facility]


   Last Admin: 08/09/18 09:16 Dose:  1,000 unit


Docusate Sodium (Colace -)  100 mg PO BID [facility]


   Last Admin: 08/09/18 09:14 Dose:  100 mg


Ferrous Sulfate (Feosol -)  325 mg PO Q2D@1000 [facility]


   Last Admin: 08/08/18 09:16 Dose:  325 mg


Finasteride (Proscar -)  5 mg PO DAILY [facility]


   Last Admin: 08/09/18 09:14 Dose:  5 mg


Ceftriaxone Sodium 1 gm/ (Dextrose)  50 mls @ 100 mls/hr IVPB DAILY [facility]


   Last Admin: 08/09/18 09:17 Dose:  100 mls/hr


Milrinone Lactate/Dextrose (Milrinone 20mg/100ml Ivpb -)  20,000 mcg in 100 mls 

@ 12.927 mls/hr IVPB TITR [facility]


   Last Admin: 08/09/18 09:18 Dose:  0.5 mcg/kg/min, 12.927 mls/hr


Insulin Detemir (Levemir Vial)  10 units SQ [facility]


Levothyroxine Sodium (Synthroid -)  25 mcg PO DAILY@0700 [facility]


   Last Admin: 08/09/18 06:38 Dose:  25 mcg


Multivitamins/Minerals/Vitamin C (Tab-A-Vit -)  1 tab PO DAILY [facility]


   Last Admin: 08/09/18 09:14 Dose:  1 tab


Ranitidine HCl (Zantac -)  75 mg PO DAILY [facility]


   Last Admin: 08/09/18 09:14 Dose:  75 mg


Tamsulosin HCl (Flomax -)  0.4 mg PO DAILY@0830 [facility]


   Last Admin: 08/09/18 09:12 Dose:  0.4 mg


Torsemide (Demadex -)  20 mg PO BIDLASIX [facility]


   Last Admin: 08/09/18 13:23 Dose:  20 mg








 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 

infusion, Afib on Coumadin, CAD, COPD who presented with SOB and difficulty 

voiding and found to have acute HF with HARPREET in setting of obstruction.





#HARPREET secondary to bladder outlet obstruction


#CKD stage 4


#Chronic Endstage LV failure


#Anemia





Renal function stable at this time


continue torsemide BID and trend daily weights


started on proscar/flomax 


maintain Connolly until outpatient follow up


urology follow up as outpatient





Michael East DO

## 2018-08-09 NOTE — PN
Progress Note (short form)





- Note


Progress Note: 


cc: SOB





S:  no sob cp dizzy palps edema


 


  Current Medications











Generic Name Dose Route Start Last Admin





  Trade Name Freq  PRN Reason Stop Dose Admin


 


Albuterol Sulfate  1 amp  08/06/18 01:12  08/08/18 08:42





  Ventolin 0.083% Nebulizer Soln -  NEB   1 amp





  Q6H PRN   Administration





  SHORT OF BREATH/WHEEZING   





     





     





     


 


Allopurinol  100 mg  08/06/18 10:00  08/09/18 09:16





  Zyloprim -  PO   100 mg





  DAILY JARROD   Administration





     





     





     





     


 


Ascorbic Acid  1,000 mg  08/06/18 10:00  08/09/18 09:13





  Vitamin C -  PO   1,000 mg





  DAILY JARROD   Administration





     





     





     





     


 


Aspirin  81 mg  08/06/18 10:00  08/09/18 09:13





  Asa -  PO   81 mg





  DAILY JARROD   Administration





     





     





     





     


 


Atorvastatin Calcium  40 mg  08/06/18 02:15  08/08/18 22:06





  Lipitor -  PO   40 mg





  HS JARROD   Administration





     





     





     





     


 


Budesonide/Formoterol Fumarate  1 puff  08/06/18 02:15  08/09/18 09:17





  Symbicort 160/4.5mcg -  IH   1 puff





  BID JARROD   Administration





     





     





     





     


 


Bupropion HCl  75 mg  08/06/18 10:00  08/09/18 09:14





  Wellbutrin -  PO   75 mg





  DAILY JARROD   Administration





     





     





     





     


 


Carvedilol  6.25 mg  08/06/18 02:15  08/09/18 09:13





  Coreg -  PO   6.25 mg





  BID JARROD   Administration





     





     





     





     


 


Cholecalciferol  1,000 unit  08/06/18 10:00  08/09/18 09:16





  Vitamin D3 -  PO   1,000 unit





  DAILY JARROD   Administration





     





     





     





     


 


Docusate Sodium  100 mg  08/06/18 02:15  08/09/18 09:14





  Colace -  PO   100 mg





  BID JARROD   Administration





     





     





     





     


 


Ferrous Sulfate  325 mg  08/06/18 10:00  08/08/18 09:16





  Feosol -  PO   325 mg





  Q2D@1000 JARROD   Administration





     





     





     





     


 


Finasteride  5 mg  08/09/18 10:00  08/09/18 09:14





  Proscar -  PO   5 mg





  DAILY JARROD   Administration





     





     





     





     


 


Ceftriaxone Sodium 1 gm/  50 mls @ 100 mls/hr  08/06/18 10:45  08/09/18 09:17





  Dextrose  IVPB   100 mls/hr





  DAILY JARROD   Administration





     





     





     





     


 


Milrinone Lactate/Dextrose  20,000 mcg in 100 mls @ 12.927 mls/hr  08/07/18 15:

00  08/09/18 09:18





  Milrinone 20mg/100ml Ivpb -  IVPB   0.5 mcg/kg/min





  TITR JARROD   12.927 mls/hr





     Administration





     





     





  0.5 MCG/KG/MIN   


 


Insulin Detemir  10 units  08/09/18 22:00  





  Levemir Vial  SQ   





  [facility]   





     





     





     





     


 


Levothyroxine Sodium  25 mcg  08/06/18 07:00  08/09/18 06:38





  Synthroid -  PO   25 mcg





  DAILY@0700 JARROD   Administration





     





     





     





     


 


Multivitamins/Minerals/Vitamin C  1 tab  08/06/18 10:00  08/09/18 09:14





  Tab-A-Vit -  PO   1 tab





  DAILY JARROD   Administration





     





     





     





     


 


Ranitidine HCl  75 mg  08/06/18 10:00  08/09/18 09:14





  Zantac -  PO   75 mg





  DAILY JARROD   Administration





     





     





     





     


 


Tamsulosin HCl  0.4 mg  08/09/18 08:30  08/09/18 09:12





  Flomax -  PO   0.4 mg





  DAILY@0830 JARROD   Administration





     





     





     





     


 


Torsemide  20 mg  08/06/18 15:15  08/09/18 06:38





  Demadex -  PO   20 mg





  BIDLASIX JARROD   Administration





     





     





     





     























Vital Signs: 


 Vital Signs











 Period  Temp  Pulse  Resp  BP Sys/Alonso  Pulse Ox


 


 Last 24 Hr  98.0 F-98.7 F  75-80  18-18  108-127/60-76  98











Constitutional: Yes: Well Nourished, No Distress


Eyes: No: Sclera Icterus


HENT: No: Nasal Congestion


Respiratory: Yes: CTA Bilaterally, Rales (R base).  No: Accessory Muscle Use


Gastrointestinal: Yes: Normal Bowel Sounds.  No: Distention, Hepatomegaly, 

Palpable Mass, Tenderness


Cardiovascular: Yes: Regular Rate and Rhythm


JVD: Yes


Heart Sounds: Yes: S1, S2.  No: Gallop


Murmur: No: Systolic Murmur, Diastolic Murmur


Musculoskeletal: Yes: Other (No kyphosis)


Extremities: No: Cool, Cyanosis


Edema: No


Integumentary: No: Jaundice


Neurological: Yes: Alert, Oriented (x3)


Psychiatric: No: Agitated





 CBC, BMP





 08/09/18 05:30 





 08/09/18 05:30 











Assessment/Plan





Echo 10/2016: sev lve, global hk, sev dec lvef, mild rve, nl rv fcn, patty, mild-

mod mr, mv ring, tv ring, mild tr, mild pr, mild phtn, ivc mild dil





CXR: interstitial and alveolar opacities lydia-hilar predominance, + 

cephalization, no effusions--no change vs 2/18





ECG: V-paced





tele: 





a/p:  76 year old man with PMH of IDDM, COPD (O2 dependant) HTN, HLD, Afib (on 

coumadin), CAD s/p CABG 2013, end stage CHF with ICD placement (on milrinone 

pump), PVD, BPH here with increased sob/leg swelling.








chronic systolic CHF stage D, s/p ICD, on home milrinone infusion:


- pt had cardiomems PAP monitor implant with dr de la rosa ([facility]) and has been 

managed by him since last admit here.


- on destination therapy home milrinone infusion, continue milrinone 0.5 mcg/kg/

min


- weight stable, BNP stable, CXR unchanged, euvolemic on exam, unlikely CHF 

exacerbation


- currently on torsemide 20 mg BID.  discussed with wife, torsemide dose 

frequently changed due to renal function, was on 40 mg daily the week prior to 

last and last week was on 20 mg daily alternating with 20 mg BID


- Cr downtrending, continue current dose of torsemide 60 mg daily 





HARPREET on CKD, h/o cardiorenal syndrome:


- recent outpt creat ranging 2.6-2.9 range


- BUN/Cr improving after connolly placement, no acute process on renal ultrasound


- Nephrology and urology consulted, appreciate recs


- no absolute contraindication for flomax or proscar, however with flomax would 

need to monitor closely for hypotension or arrhythmia








COPD, 02 dependent


- no temp, normal WBC, no new infiltrates on CXR





afib


- rate controlled on coreg


- cont coumadin per INR (target 2-3)





CAD s/p MI with PCI (and ? TV repair at that time):


- No anginal symptoms, no signs acs


- Con't home ac PLUS asa, atorvastatin, coreg





PVD


- con't AC, statin.





HTN: 


-controlled on coreg

## 2018-08-09 NOTE — PN
Progress Note, Physician


Chief Complaint: 


Mr Jerome says he is feeling well today. Denies cp, sob, n/v.





- Current Medication List


Current Medications: 


Active Medications





Albuterol Sulfate (Ventolin 0.083% Nebulizer Soln -)  1 amp NEB Q6H PRN


   PRN Reason: SHORT OF BREATH/WHEEZING


   Last Admin: 08/08/18 08:42 Dose:  1 amp


Allopurinol (Zyloprim -)  100 mg PO DAILY [facility]


   Last Admin: 08/09/18 09:16 Dose:  100 mg


Ascorbic Acid (Vitamin C -)  1,000 mg PO DAILY [facility]


   Last Admin: 08/09/18 09:13 Dose:  1,000 mg


Aspirin (Asa -)  81 mg PO DAILY [facility]


   Last Admin: 08/09/18 09:13 Dose:  81 mg


Atorvastatin Calcium (Lipitor -)  40 mg PO HS [facility]


   Last Admin: 08/08/18 22:06 Dose:  40 mg


Budesonide/Formoterol Fumarate (Symbicort 160/4.5mcg -)  1 puff IH BID [facility]


   Last Admin: 08/09/18 09:17 Dose:  1 puff


Bupropion HCl (Wellbutrin -)  75 mg PO DAILY [facility]


   Last Admin: 08/09/18 09:14 Dose:  75 mg


Carvedilol (Coreg -)  6.25 mg PO BID [facility]


   Last Admin: 08/09/18 09:13 Dose:  6.25 mg


Cephalexin HCl (Keflex -)  500 mg PO BID [facility]


   Stop: 08/20/18 09:59


Cholecalciferol (Vitamin D3 -)  1,000 unit PO DAILY [facility]


   Last Admin: 08/09/18 09:16 Dose:  1,000 unit


Docusate Sodium (Colace -)  100 mg PO BID [facility]


   Last Admin: 08/09/18 09:14 Dose:  100 mg


Ferrous Sulfate (Feosol -)  325 mg PO Q2D@1000 [facility]


   Last Admin: 08/08/18 09:16 Dose:  325 mg


Finasteride (Proscar -)  5 mg PO DAILY [facility]


   Last Admin: 08/09/18 09:14 Dose:  5 mg


Milrinone Lactate/Dextrose (Milrinone 20mg/100ml Ivpb -)  20,000 mcg in 100 mls 

@ 12.927 mls/hr IVPB TITR [facility]


   Last Admin: 08/09/18 09:18 Dose:  0.5 mcg/kg/min, 12.927 mls/hr


Insulin Aspart (Novolog Vial Sliding Scale -)  1 vial SQ [facility]CHS [facility]; Protocol


Insulin Detemir (Levemir Vial)  10 units SQ [facility]


Levothyroxine Sodium (Synthroid -)  25 mcg PO DAILY@0700 [facility]


   Last Admin: 08/09/18 06:38 Dose:  25 mcg


Multivitamins/Minerals/Vitamin C (Tab-A-Vit -)  1 tab PO DAILY [facility]


   Last Admin: 08/09/18 09:14 Dose:  1 tab


Ranitidine HCl (Zantac -)  75 mg PO DAILY [facility]


   Last Admin: 08/09/18 09:14 Dose:  75 mg


Tamsulosin HCl (Flomax -)  0.4 mg PO DAILY@0830 [facility]


   Last Admin: 08/09/18 09:12 Dose:  0.4 mg


Torsemide (Demadex -)  20 mg PO BIDLASIX [facility]


   Last Admin: 08/09/18 13:23 Dose:  20 mg











- Objective


Vital Signs: 


 Vital Signs











Temperature  36.9 C   08/09/18 06:00


 


Pulse Rate  75   08/09/18 06:00


 


Respiratory Rate  18   08/09/18 06:00


 


Blood Pressure  127/67   08/09/18 06:00


 


O2 Sat by Pulse Oximetry (%)  98   08/08/18 21:00











Constitutional: Yes: Well Nourished, No Distress, Calm


Cardiovascular: Yes: Pulse Irregular.  No: Tachycardia, Gallop, Murmur, Rub


Respiratory: Yes: Regular, CTA Bilaterally.  No: Rales, Rhonchi, Wheezes


Gastrointestinal: Yes: Normal Bowel Sounds, Soft.  No: Distention, Tenderness


Extremities: Yes: WNL


Edema: No


Labs: 


 CBC, BMP





 08/09/18 05:30 





 08/09/18 05:30 





 INR, PTT











INR  3.12  (0.83-1.09)  H*  08/09/18  05:30    














Problem List





- Problems


(1) Urinary tract infection


Code(s): N39.0 - URINARY TRACT INFECTION, SITE NOT SPECIFIED   


Qualifiers: 


   Urinary tract infection type: acute cystitis   Hematuria presence: without 

hematuria   Qualified Code(s): N30.00 - Acute cystitis without hematuria   





(2) Obstructive uropathy


Code(s): N13.9 - OBSTRUCTIVE AND REFLUX UROPATHY, UNSPECIFIED   





(3) Acute kidney injury


Code(s): N17.9 - ACUTE KIDNEY FAILURE, UNSPECIFIED   





(4) CKD (chronic kidney disease)


Code(s): N18.9 - CHRONIC KIDNEY DISEASE, UNSPECIFIED   


Qualifiers: 


   Chronic kidney disease stage: stage 3 (moderate)   Qualified Code(s): N18.3 

- Chronic kidney disease, stage 3 (moderate)   





(5) Atrial fibrillation


Code(s): I48.91 - UNSPECIFIED ATRIAL FIBRILLATION   


Qualifiers: 


   Atrial fibrillation type: chronic   Qualified Code(s): I48.2 - Chronic 

atrial fibrillation   





(6) CAD (coronary artery disease)


Code(s): I25.10 - ATHSCL HEART DISEASE OF NATIVE CORONARY ARTERY W/O ANG PCTRS 

  





(7) Diabetes


Code(s): E11.9 - TYPE 2 DIABETES MELLITUS WITHOUT COMPLICATIONS   


Qualifiers: 


   Diabetes mellitus type: type 2   Diabetes mellitus complication status: with 

kidney complications   Diabetes mellitus complication detail: with chronic 

kidney disease   Chronic kidney disease stage: stage 4 (severe) 





(8) CHF (congestive heart failure)


Code(s): I50.9 - HEART FAILURE, UNSPECIFIED   


Qualifiers: 


   Heart failure type: systolic   Heart failure chronicity: chronic   Qualified 

Code(s): I50.22 - Chronic systolic (congestive) heart failure   





Assessment/Plan





(1) Urinary tract infection


Assessment/Plan: 


-will change to keflex tomorrow


Code(s): N39.0 - URINARY TRACT INFECTION, SITE NOT SPECIFIED   


Qualifiers: 


   Urinary tract infection type: acute cystitis   Hematuria presence: without 

hematuria   Qualified Code(s): N30.00 - Acute cystitis without hematuria   





(2) Obstructive uropathy


Assessment/Plan: 


-appreciate urology assistance


-connolly to remain in place for 2 weeks


Code(s): N13.9 - OBSTRUCTIVE AND REFLUX UROPATHY, UNSPECIFIED   





(3) CHF (congestive heart failure)


Assessment/Plan: 


-case d/w cardiology


-continue milrinone gtt and torsemide


Code(s): I50.9 - HEART FAILURE, UNSPECIFIED   


Qualifiers: 


   Qualified Code(s):  - Chronic systolic CHF  





(4) Acute kidney injury


Assessment/Plan: 


-connolly in place


-approaching baseline


-recheck tomorrow


Code(s): N17.9 - ACUTE KIDNEY FAILURE, UNSPECIFIED   





(5) CKD (chronic kidney disease)


Assessment/Plan: 


-awaiting normalization


Code(s): N18.9 - CHRONIC KIDNEY DISEASE, UNSPECIFIED   


Qualifiers: 


   Chronic kidney disease stage: stage 3 (moderate)   Qualified Code(s): N18.3 

- Chronic kidney disease, stage 3 (moderate)   





(6) Atrial fibrillation


Assessment/Plan: 


-rate controlled


-hold coumadin secondary to elevated INR


-recheck tomorrow


Code(s): I48.91 - UNSPECIFIED ATRIAL FIBRILLATION   


Qualifiers: 


   Atrial fibrillation type: chronic   Qualified Code(s): I48.2 - Chronic 

atrial fibrillation   





(7) CAD (coronary artery disease)


Assessment/Plan: 


-continue medical management


Code(s): I25.10 - ATHSCL HEART DISEASE OF NATIVE CORONARY ARTERY W/O ANG PCTRS 

  





(8) Diabetes


Assessment/Plan: 


-elevated


-start levemir


-diabetic diet


-FSBS and SSI


-will stop rocephin as in D5W


Code(s): E11.9 - TYPE 2 DIABETES MELLITUS WITHOUT COMPLICATIONS   


Qualifiers: 


   Diabetes mellitus type: type 2   Diabetes mellitus complication status: with 

kidney complications   Diabetes mellitus complication detail: with chronic 

kidney disease   Chronic kidney disease stage: stage 4 (severe)

## 2018-08-10 VITALS — DIASTOLIC BLOOD PRESSURE: 63 MMHG | SYSTOLIC BLOOD PRESSURE: 119 MMHG | TEMPERATURE: 98.1 F

## 2018-08-10 VITALS — HEART RATE: 75 BPM

## 2018-08-10 LAB
ANION GAP SERPL CALC-SCNC: 9 MMOL/L (ref 8–16)
BASOPHILS # BLD: 0.3 % (ref 0–2)
BUN SERPL-MCNC: 65 MG/DL (ref 7–18)
CALCIUM SERPL-MCNC: 8.7 MG/DL (ref 8.5–10.1)
CHLORIDE SERPL-SCNC: 93 MMOL/L (ref 98–107)
CO2 SERPL-SCNC: 33 MMOL/L (ref 21–32)
CREAT SERPL-MCNC: 2.4 MG/DL (ref 0.7–1.3)
DEPRECATED RDW RBC AUTO: 16.1 % (ref 11.9–15.9)
EOSINOPHIL # BLD: 1.3 % (ref 0–4.5)
GLUCOSE SERPL-MCNC: 182 MG/DL (ref 74–106)
HCT VFR BLD CALC: 33.1 % (ref 35.4–49)
HGB BLD-MCNC: 11.3 GM/DL (ref 11.7–16.9)
INR BLD: 2.32 (ref 0.83–1.09)
LYMPHOCYTES # BLD: 14.3 % (ref 8–40)
MAGNESIUM SERPL-MCNC: 2.5 MG/DL (ref 1.8–2.4)
MCH RBC QN AUTO: 29 PG (ref 25.7–33.7)
MCHC RBC AUTO-ENTMCNC: 34.2 G/DL (ref 32–35.9)
MCV RBC: 84.8 FL (ref 80–96)
MONOCYTES # BLD AUTO: 9.7 % (ref 3.8–10.2)
NEUTROPHILS # BLD: 74.4 % (ref 42.8–82.8)
PHOSPHATE SERPL-MCNC: 2.6 MG/DL (ref 2.5–4.9)
PLATELET # BLD AUTO: 192 K/MM3 (ref 134–434)
PMV BLD: 8.5 FL (ref 7.5–11.1)
POTASSIUM SERPLBLD-SCNC: 4 MMOL/L (ref 3.5–5.1)
PT PNL PPP: 26.2 SEC (ref 9.7–13)
RBC # BLD AUTO: 3.91 M/MM3 (ref 4–5.6)
SODIUM SERPL-SCNC: 135 MMOL/L (ref 136–145)
WBC # BLD AUTO: 12 K/MM3 (ref 4–10)

## 2018-08-10 RX ADMIN — ALBUTEROL SULFATE PRN AMP: 2.5 SOLUTION RESPIRATORY (INHALATION) at 07:48

## 2018-08-10 RX ADMIN — INSULIN ASPART SCH UNITS: 100 INJECTION, SOLUTION INTRAVENOUS; SUBCUTANEOUS at 06:15

## 2018-08-10 RX ADMIN — VITAMIN D, TAB 1000IU (100/BT) SCH UNIT: 25 TAB at 09:28

## 2018-08-10 RX ADMIN — OXYCODONE HYDROCHLORIDE AND ACETAMINOPHEN SCH MG: 500 TABLET ORAL at 09:29

## 2018-08-10 RX ADMIN — RANITIDINE SCH MG: 150 TABLET ORAL at 09:28

## 2018-08-10 RX ADMIN — BUDESONIDE AND FORMOTEROL FUMARATE DIHYDRATE SCH PUFF: 160; 4.5 AEROSOL RESPIRATORY (INHALATION) at 09:29

## 2018-08-10 RX ADMIN — FERROUS SULFATE TAB EC 324 MG (65 MG FE EQUIVALENT) SCH MG: 324 (65 FE) TABLET DELAYED RESPONSE at 09:28

## 2018-08-10 RX ADMIN — LEVOTHYROXINE SODIUM SCH MCG: 25 TABLET ORAL at 06:15

## 2018-08-10 RX ADMIN — ASPIRIN 81 MG SCH MG: 81 TABLET ORAL at 09:28

## 2018-08-10 RX ADMIN — MULTIVITAMIN TABLET SCH TAB: TABLET at 09:29

## 2018-08-10 RX ADMIN — FINASTERIDE SCH MG: 5 TABLET, FILM COATED ORAL at 09:27

## 2018-08-10 RX ADMIN — TAMSULOSIN HYDROCHLORIDE SCH MG: 0.4 CAPSULE ORAL at 08:02

## 2018-08-10 RX ADMIN — CARVEDILOL SCH MG: 6.25 TABLET, FILM COATED ORAL at 09:28

## 2018-08-10 RX ADMIN — INSULIN ASPART SCH UNITS: 100 INJECTION, SOLUTION INTRAVENOUS; SUBCUTANEOUS at 11:39

## 2018-08-10 RX ADMIN — MILRINONE LACTATE SCH MLS/HR: 200 INJECTION, SOLUTION INTRAVENOUS at 01:47

## 2018-08-10 RX ADMIN — DOCUSATE SODIUM SCH MG: 100 CAPSULE, LIQUID FILLED ORAL at 09:27

## 2018-08-10 RX ADMIN — ALLOPURINOL SCH MG: 100 TABLET ORAL at 09:28

## 2018-08-10 RX ADMIN — TORSEMIDE SCH MG: 20 TABLET ORAL at 06:15

## 2018-08-10 RX ADMIN — TORSEMIDE SCH MG: 20 TABLET ORAL at 13:37

## 2018-08-10 NOTE — PN
Progress Note (short form)





- Note


Progress Note: 


cc: SOB





S:  no sob cp dizzy palps edema


 


  Vital Signs











 Period  Temp  Pulse  Resp  BP Sys/Alonso  Pulse Ox


 


 Last 24 Hr  97.6 F-99.2 F  75-77  16-20  100-119/54-70  95














Constitutional: Yes: Well Nourished, No Distress


Eyes: No: Sclera Icterus


HENT: No: Nasal Congestion


Respiratory: Yes: CTA Bilaterally, Rales (R base).  No: Accessory Muscle Use


Gastrointestinal: Yes: Normal Bowel Sounds.  No: Distention, Hepatomegaly, 

Palpable Mass, Tenderness


Cardiovascular: Yes: Regular Rate and Rhythm


JVD: Yes


Heart Sounds: Yes: S1, S2.  No: Gallop


Murmur: No: Systolic Murmur, Diastolic Murmur


Extremities: No: Cool, Cyanosis


Edema: No


Integumentary: No: Jaundice


Neurological: Yes: Alert, Oriented (x3)


Psychiatric: No: Agitated





  Current Medications











Generic Name Dose Route Start Last Admin





  Trade Name Freq  PRN Reason Stop Dose Admin


 


Albuterol Sulfate  1 amp  08/06/18 01:12  08/10/18 07:48





  Ventolin 0.083% Nebulizer Soln -  NEB   1 amp





  Q6H PRN   Administration





  SHORT OF BREATH/WHEEZING   





     





     





     


 


Allopurinol  100 mg  08/06/18 10:00  08/10/18 09:28





  Zyloprim -  PO   100 mg





  DAILY JARROD   Administration





     





     





     





     


 


Ascorbic Acid  1,000 mg  08/06/18 10:00  08/10/18 09:29





  Vitamin C -  PO   1,000 mg





  DAILY JARROD   Administration





     





     





     





     


 


Aspirin  81 mg  08/06/18 10:00  08/10/18 09:28





  Asa -  PO   81 mg





  DAILY JARROD   Administration





     





     





     





     


 


Atorvastatin Calcium  40 mg  08/06/18 02:15  08/09/18 21:25





  Lipitor -  PO   40 mg





  HS JARROD   Administration





     





     





     





     


 


Budesonide/Formoterol Fumarate  1 puff  08/06/18 02:15  08/10/18 09:29





  Symbicort 160/4.5mcg -  IH   1 puff





  BID JARROD   Administration





     





     





     





     


 


Bupropion HCl  75 mg  08/06/18 10:00  08/10/18 09:29





  Wellbutrin -  PO   75 mg





  DAILY JARROD   Administration





     





     





     





     


 


Carvedilol  6.25 mg  08/06/18 02:15  08/10/18 09:28





  Coreg -  PO   6.25 mg





  BID JARROD   Administration





     





     





     





     


 


Cephalexin HCl  500 mg  08/10/18 10:00  08/10/18 09:27





  Keflex -  PO  08/20/18 09:59  500 mg





  BID JARROD   Administration





     





     





     





     


 


Cholecalciferol  1,000 unit  08/06/18 10:00  08/10/18 09:28





  Vitamin D3 -  PO   1,000 unit





  DAILY JARROD   Administration





     





     





     





     


 


Docusate Sodium  100 mg  08/06/18 02:15  08/10/18 09:27





  Colace -  PO   100 mg





  BID JARROD   Administration





     





     





     





     


 


Ferrous Sulfate  325 mg  08/06/18 10:00  08/10/18 09:28





  Feosol -  PO   325 mg





  Q2D@1000 JARROD   Administration





     





     





     





     


 


Finasteride  5 mg  08/09/18 10:00  08/10/18 09:27





  Proscar -  PO   5 mg





  DAILY JARROD   Administration





     





     





     





     


 


Milrinone Lactate/Dextrose  20,000 mcg in 100 mls @ 12.927 mls/hr  08/07/18 15:

00  08/10/18 01:47





  Milrinone 20mg/100ml Ivpb -  IVPB   0.5 mcg/kg/min





  TITR JARROD   12.927 mls/hr





     Administration





     





     





  0.5 MCG/KG/MIN   


 


Insulin Aspart  1 vial  08/09/18 16:30  08/10/18 06:15





  Novolog Vial Sliding Scale -  SQ   4 units





  [facility]   Administration





     





     





  Protocol   





     


 


Insulin Detemir  10 units  08/09/18 22:00  08/09/18 21:25





  Levemir Vial  SQ   10 units





  HS JARROD   Administration





     





     





     





     


 


Levothyroxine Sodium  25 mcg  08/06/18 07:00  08/10/18 06:15





  Synthroid -  PO   25 mcg





  DAILY@0700 JARROD   Administration





     





     





     





     


 


Multivitamins/Minerals/Vitamin C  1 tab  08/06/18 10:00  08/10/18 09:29





  Tab-A-Vit -  PO   1 tab





  DAILY JARROD   Administration





     





     





     





     


 


Ranitidine HCl  75 mg  08/06/18 10:00  08/10/18 09:28





  Zantac -  PO   75 mg





  DAILY JARROD   Administration





     





     





     





     


 


Tamsulosin HCl  0.4 mg  08/09/18 08:30  08/10/18 08:02





  Flomax -  PO   0.4 mg





  DAILY@0830 JARROD   Administration





     





     





     





     


 


Torsemide  20 mg  08/06/18 15:15  08/10/18 06:15





  Demadex -  PO   20 mg





  BIDLASIX JARROD   Administration





     





     





     





     








 CBC, BMP





 08/10/18 05:30 





 08/10/18 05:30 














Assessment/Plan





Echo 10/2016: sev lve, global hk, sev dec lvef, mild rve, nl rv fcn, patty, mild-

mod mr, mv ring, tv ring, mild tr, mild pr, mild phtn, ivc mild dil





CXR: interstitial and alveolar opacities lydia-hilar predominance, + 

cephalization, no effusions--no change vs 2/18





ECG: V-paced





tele: 





a/p:  76 year old man with PMH of IDDM, COPD (O2 dependant) HTN, HLD, Afib (on 

coumadin), CAD s/p CABG 2013, end stage CHF with ICD placement (on milrinone 

pump), PVD, BPH here with increased sob/leg swelling.








chronic systolic CHF stage D, s/p ICD, on home milrinone infusion:


- pt had cardiomems PAP monitor implant with dr de la rosa ([facility]) and has been 

managed by him since last admit here.


- on destination therapy home milrinone infusion, continue milrinone 0.5 mcg/kg/

min


- weight stable, BNP stable, CXR unchanged, euvolemic on exam, unlikely CHF 

exacerbation


- currently on torsemide 20 mg BID.  discussed with wife, torsemide dose 

frequently changed due to renal function, was on 40 mg daily the week prior to 

last and last week was on 20 mg daily alternating with 20 mg BID


- Cr stable, continue current dose of torsemide 60 mg daily 





HARPREET on CKD, h/o cardiorenal syndrome:


- recent outpt creat ranging 2.6-2.9 range


- BUN/Cr improving after connolly placement, no acute process on renal ultrasound


- Nephrology and urology consulted, appreciate recs


- no absolute contraindication for flomax or proscar, however with flomax would 

need to monitor closely for hypotension or arrhythmia





afib


- rate controlled on coreg


- cont coumadin per INR (target 2-3)





CAD s/p MI with PCI (and ? TV repair at that time):


- No anginal symptoms, no signs acs


- Con't home ac PLUS asa, atorvastatin, coreg





PVD


- con't AC, statin.





HTN: 


-controlled on coreg








cardiac wise stable for dc

## 2018-08-10 NOTE — PN
Progress Note, Physician


Chief Complaint: 





The patient seen in his room. Sitting on his bed. No new complaints. No chest 

pain.


Maintains good urine output. 





Alexander catheter in place. 


History of Present Illness: 





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 

infusion, Afib on Coumadin, CAD, COPD who presented with SOB and difficulty 

voiding and found to have acute HF with HARPREET in setting of obstruction.











- Current Medication List


Current Medications: 


Active Medications





Albuterol Sulfate (Ventolin 0.083% Nebulizer Soln -)  1 amp NEB Q6H PRN


   PRN Reason: SHORT OF BREATH/WHEEZING


   Last Admin: 08/10/18 07:48 Dose:  1 amp


Allopurinol (Zyloprim -)  100 mg PO DAILY [facility]


   Last Admin: 08/10/18 09:28 Dose:  100 mg


Ascorbic Acid (Vitamin C -)  1,000 mg PO DAILY [facility]


   Last Admin: 08/10/18 09:29 Dose:  1,000 mg


Aspirin (Asa -)  81 mg PO DAILY [facility]


   Last Admin: 08/10/18 09:28 Dose:  81 mg


Atorvastatin Calcium (Lipitor -)  40 mg PO HS [facility]


   Last Admin: 08/09/18 21:25 Dose:  40 mg


Budesonide/Formoterol Fumarate (Symbicort 160/4.5mcg -)  1 puff IH BID [facility]


   Last Admin: 08/10/18 09:29 Dose:  1 puff


Bupropion HCl (Wellbutrin -)  75 mg PO DAILY [facility]


   Last Admin: 08/10/18 09:29 Dose:  75 mg


Carvedilol (Coreg -)  6.25 mg PO BID [facility]


   Last Admin: 08/10/18 09:28 Dose:  6.25 mg


Cephalexin HCl (Keflex -)  500 mg PO BID [facility]


   Stop: 08/20/18 09:59


   Last Admin: 08/10/18 09:27 Dose:  500 mg


Cholecalciferol (Vitamin D3 -)  1,000 unit PO DAILY [facility]


   Last Admin: 08/10/18 09:28 Dose:  1,000 unit


Docusate Sodium (Colace -)  100 mg PO BID [facility]


   Last Admin: 08/10/18 09:27 Dose:  100 mg


Ferrous Sulfate (Feosol -)  325 mg PO Q2D@1000 [facility]


   Last Admin: 08/10/18 09:28 Dose:  325 mg


Finasteride (Proscar -)  5 mg PO DAILY [facility]


   Last Admin: 08/10/18 09:27 Dose:  5 mg


Milrinone Lactate/Dextrose (Milrinone 20mg/100ml Ivpb -)  20,000 mcg in 100 mls 

@ 12.927 mls/hr IVPB TITR [facility]


   Last Admin: 08/10/18 01:47 Dose:  0.5 mcg/kg/min, 12.927 mls/hr


Insulin Aspart (Novolog Vial Sliding Scale -)  1 vial SQ ACHS [facility]; Protocol


   Last Admin: 08/10/18 11:39 Dose:  8 units


Insulin Detemir (Levemir Vial)  10 units SQ HS [facility]


   Last Admin: 08/09/18 21:25 Dose:  10 units


Levothyroxine Sodium (Synthroid -)  25 mcg PO DAILY@0700 [facility]


   Last Admin: 08/10/18 06:15 Dose:  25 mcg


Multivitamins/Minerals/Vitamin C (Tab-A-Vit -)  1 tab PO DAILY [facility]


   Last Admin: 08/10/18 09:29 Dose:  1 tab


Ranitidine HCl (Zantac -)  75 mg PO DAILY [facility]


   Last Admin: 08/10/18 09:28 Dose:  75 mg


Tamsulosin HCl (Flomax -)  0.4 mg PO DAILY@0830 [facility]


   Last Admin: 08/10/18 08:02 Dose:  0.4 mg


Torsemide (Demadex -)  20 mg PO BIDLASIX [facility]


   Last Admin: 08/10/18 06:15 Dose:  20 mg











- Objective


Vital Signs: 


 Vital Signs











Temperature  98.1 F   08/10/18 09:26


 


Pulse Rate  75   08/10/18 09:26


 


Respiratory Rate  16   08/10/18 09:26


 


Blood Pressure  119/63   08/10/18 09:26


 


O2 Sat by Pulse Oximetry (%)  95   08/10/18 09:00











Constitutional: Yes: No Distress


Eyes: Yes: Conjunctiva Clear


HENT: Yes: Normocephalic


Neck: Yes: Trachea Midline


Cardiovascular: Yes: S1, S2


Respiratory: Yes: CTA Bilaterally, Diminished


Gastrointestinal: Yes: Normal Bowel Sounds, Soft


Edema: Yes


Edema: LLE: 1+, RLE: 1+


Neurological: Yes: Alert, Oriented


Labs: 


 CBC, BMP





 08/10/18 05:30 





 08/10/18 05:30 





 INR, PTT











INR  2.32  (0.83-1.09)  H  08/10/18  05:30    














Problem List





- Problems


(1) Chronic kidney disease, stage 4 (severe)


Code(s): N18.4 - CHRONIC KIDNEY DISEASE, STAGE 4 (SEVERE)   





(2) Benign localized hyperplasia of prostate with urinary retention


Code(s): N40.1 - BENIGN PROSTATIC HYPERPLASIA WITH LOWER URINARY TRACT SYMP   





(3) CHF (congestive heart failure)


Code(s): I50.9 - HEART FAILURE, UNSPECIFIED   


Qualifiers: 


   Heart failure type: systolic   Heart failure chronicity: chronic   Qualified 

Code(s): I50.22 - Chronic systolic (congestive) heart failure   





(4) Heart failure, systolic, with acute decompensation


Code(s): I50.23 - ACUTE ON CHRONIC SYSTOLIC (CONGESTIVE) HEART FAILURE   





(5) Obstructive uropathy


Code(s): N13.9 - OBSTRUCTIVE AND REFLUX UROPATHY, UNSPECIFIED   





(6) Acute and chronic respiratory failure (acute-on-chronic)


Code(s): J96.20 - ACUTE AND CHR RESP FAILURE, UNSP W HYPOXIA OR HYPERCAPNIA   


Qualifiers: 


   Respiratory failure complication: hypoxia   Qualified Code(s): J96.21 - 

Acute and chronic respiratory failure with hypoxia   





(7) Acute kidney injury


Code(s): N17.9 - ACUTE KIDNEY FAILURE, UNSPECIFIED   





(8) Atrial fibrillation


Code(s): I48.91 - UNSPECIFIED ATRIAL FIBRILLATION   


Qualifiers: 


   Atrial fibrillation type: chronic   Qualified Code(s): I48.2 - Chronic 

atrial fibrillation   





(9) CAD (coronary artery disease)


Code(s): I25.10 - ATHSCL HEART DISEASE OF NATIVE CORONARY ARTERY W/O ANG PCTRS 

  





(10) CHF (congestive heart failure)


Code(s): I50.9 - HEART FAILURE, UNSPECIFIED   


Qualifiers: 


   Qualified Code(s): I50.32 - Chronic diastolic (congestive) heart failure   





(11) CKD (chronic kidney disease)


Code(s): N18.9 - CHRONIC KIDNEY DISEASE, UNSPECIFIED   


Qualifiers: 


   Chronic kidney disease stage: stage 3 (moderate)   Qualified Code(s): N18.3 

- Chronic kidney disease, stage 3 (moderate)   





(12) COPD (chronic obstructive pulmonary disease)


Code(s): J44.9 - CHRONIC OBSTRUCTIVE PULMONARY DISEASE, UNSPECIFIED   





(13) Diabetes


Code(s): E11.9 - TYPE 2 DIABETES MELLITUS WITHOUT COMPLICATIONS   


Qualifiers: 


   Diabetes mellitus type: type 2   Diabetes mellitus complication status: with 

kidney complications   Diabetes mellitus complication detail: with chronic 

kidney disease   Chronic kidney disease stage: stage 4 (severe) 





Assessment/Plan





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 

infusion, Afib on Coumadin, CAD, COPD who presented with SOB and difficulty 

voiding and found to have acute HF with HARPREET in setting of obstruction.





#HARPREET secondary to bladder outlet obstruction


#CKD stage 4


#Chronic Endstage LV failure


#Anemia





Renal function stable at this time


Will continue torsemide BID and trend daily weights


Will continue Proscar/Flomax 


Maintain indwelling Alexander until outpatient follow up


urology follow up as outpatient





Thanks again,








Leanna Tatum MD

## 2018-08-10 NOTE — DS
Physical Examination


Vital Signs: 


 Vital Signs











Temperature  36.7 C   08/10/18 09:26


 


Pulse Rate  75   08/10/18 09:26


 


Respiratory Rate  16   08/10/18 09:26


 


Blood Pressure  119/63   08/10/18 09:26


 


O2 Sat by Pulse Oximetry (%)  95   08/09/18 21:00











Constitutional: Yes: Well Nourished, No Distress, Calm


Cardiovascular: Yes: Regular Rate and Rhythm.  No: Gallop, Murmur, Rub


Respiratory: Yes: Regular, CTA Bilaterally.  No: Rales, Rhonchi, Wheezes


Gastrointestinal: Yes: Normal Bowel Sounds, Soft.  No: Distention, Tenderness


Extremities: Yes: WNL


Edema: No


Labs: 


 CBC, BMP





 08/10/18 05:30 





 08/10/18 05:30 











Discharge Summary


Reason For Visit: ACUTE ON CHRONIC CONGESTIVE HEART FAILURE


Current Active Problems





Benign localized hyperplasia of prostate with urinary retention (Acute)


CHF (congestive heart failure) (Acute)


CHF exacerbation (Acute)


Heart failure, systolic, with acute decompensation (Acute)


Obstructive uropathy (Acute)








Hospital Course: 





(1) Urinary tract infection


Code(s): N39.0 - URINARY TRACT INFECTION, SITE NOT SPECIFIED   


Qualifiers: 


   Urinary tract infection type: acute cystitis   Hematuria presence: without 

hematuria   Qualified Code(s): N30.00 - Acute cystitis without hematuria   





(2) Obstructive uropathy


Code(s): N13.9 - OBSTRUCTIVE AND REFLUX UROPATHY, UNSPECIFIED   





(3) Acute kidney injury


Code(s): N17.9 - ACUTE KIDNEY FAILURE, UNSPECIFIED   





(4) CKD (chronic kidney disease)


Code(s): N18.9 - CHRONIC KIDNEY DISEASE, UNSPECIFIED   


Qualifiers: 


   Chronic kidney disease stage: stage 3 (moderate)   Qualified Code(s): N18.3 

- Chronic kidney disease, stage 3 (moderate)   





(5) Atrial fibrillation


Code(s): I48.91 - UNSPECIFIED ATRIAL FIBRILLATION   


Qualifiers: 


   Atrial fibrillation type: chronic   Qualified Code(s): I48.2 - Chronic 

atrial fibrillation   





(6) CAD (coronary artery disease)


Code(s): I25.10 - ATHSCL HEART DISEASE OF NATIVE CORONARY ARTERY W/O ANG PCTRS 

  





(7) Diabetes


Code(s): E11.9 - TYPE 2 DIABETES MELLITUS WITHOUT COMPLICATIONS   


Qualifiers: 


   Diabetes mellitus type: type 2   Diabetes mellitus complication status: with 

kidney complications   Diabetes mellitus complication detail: with chronic 

kidney disease   Chronic kidney disease stage: stage 4 (severe) 





(8) CHF (congestive heart failure)


Code(s): I50.9 - HEART FAILURE, UNSPECIFIED   


Qualifiers: 


   Heart failure type: systolic   Heart failure chronicity: chronic   Qualified 

Code(s): I50.22 - Chronic systolic (congestive) heart failure   





Mr Jerome is a 77 year old male who came in with obstructive uropathy and UTI. 

He was admitted to the hospital and a connolly was placed. He was started on 

rocephin and urine cultures were sent. They came back positive for strep 

viridans, which is sensitive to cephalosporins. Urology saw the patient and he 

was started on both tamsulosin and finasteride. He will be discharged with the 

connolly and follow up in two weeks for possible removal. He had HARPREET on CKD and 

was seen by nephrology. Relief of obstruction caused a return to baseline. He 

was put on levemir here and SSI secondary to rocephin being in D5W and causing 

a rise in his glucose. He can resume his home regimen. His milrinone was 

continued and he was monitored by cardiology. Currently he is doing well and 

stable for discharge on oral cephalosporins. 





37 minutes spent in preparation of this discharge


Condition: Stable





- Instructions


Diet, Activity, Other Instructions: 


resume previous diet and activity. discharge with connolly in place, to follow up 

with urology in 2 weeks for trial of void.


Referrals: 


Daniel Nayak MD [Staff Physician] - 


Nick Jaquez MD [Staff Physician] - 


Sathya Flower MD [Primary Care Provider] - 1 Week


Disposition: VNS/HOME HEALTH CARE





- Home Medications


Comprehensive Discharge Medication List: 


Ambulatory Orders





Allopurinol [Zyloprim -] 100 mg PO DAILY 01/14/16 


Atorvastatin Ca [Lipitor] 40 mg PO HS 01/14/16 


Carvedilol 6.25 mg PO BID 01/14/16 


Docusate Sodium [Colace -] 100 mg PO BID 01/14/16 


Milrinone Lactate/D5w [Milrinone 0.2 mg/ml in D5w] 0.5 mg IV DAILY 01/14/16 


Ranitidine [Zantac -] 75 mg PO DAILY 01/14/16 


Albuterol 0.083% Nebulizer Sol [Ventolin 0.083% Nebulizer Soln -] 1 amp NEB Q6H 

PRN #120 amp 01/18/16 


Ferrous Sulfate [Feosol] 325 mg PO Q48H  ud 08/27/16 


Ascorbic Acid [Vitamin C -] 1,000 mg PO DAILY 03/30/17 


Cholecalciferol (Vitamin D3) [Vitamin D -] 1,000 unit PO DAILY 03/30/17 


Multivitamin [Poly-Vitamin] 1 each PO DAILY 03/30/17 


Budesonide/Formeterol Fumarate [SYMBICORT 160/4.5mcg -] 1 inh PO BID 06/23/17 


Torsemide [Demadex -] 20 mg PO BID 06/23/17 


Aspirin 81 mg PO DAILY 02/03/18 


Cholecalciferol (Vitamin D3) [Vitamin D -] 1,000 unit PO DAILY 02/03/18 


Levothyroxine [Synthroid -] 25 mcg PO DAILY 02/03/18 


Warfarin Na [Coumadin -] 4 mg PO ASDIR 02/03/18 


Bupropion HCl [Wellbutrin -] 75 mg PO DAILY #0 tablet 02/05/18 


Aspirin Coated [Ecotrin -] 81 mg PO DAILY  tablet.ec 02/08/18 


Cephalexin Monohydrate [Keflex -] 500 mg PO BID #20 capsule 08/10/18 


Finasteride [Proscar -] 5 mg PO DAILY #30 tablet 08/10/18 


Tamsulosin HCl [Flomax -] 0.4 mg PO DAILY@0830 #30 cap.er.24h 08/10/18

## 2022-03-28 NOTE — PN
Progress Note (short form)





- Note


Progress Note: 


Progress Note: 


Chief Complaint: 


sob


History of Present Illness: 


still with wright but improving, not yet at baseline.  still with le edema.  no cp/

palps/dizzy/loc





ex cigs





 


 


 Current Medications











Generic Name Dose Route Start Last Admin





  Trade Name Freq  PRN Reason Stop Dose Admin


 


Acetaminophen  650 mg 05/19/17 10:09 05/22/17 01:58





  Tylenol -  PO   650 mg





  Q4H PRN   Administration





  FEVER OR PAIN   


 


Albuterol Sulfate  1 amp 05/19/17 10:02 05/23/17 02:20





  Ventolin 0.083% Nebulizer Soln -  NEB   1 amp





  Q6H PRN   Administration





  SHORT OF BREATH/WHEEZING   


 


Albuterol/Ipratropium  1 amp 05/19/17 22:00 05/24/17 06:28





  Duoneb -  NEB   1 amp





  TIDR JARROD   Administration


 


Allopurinol  100 mg 05/20/17 10:00 05/24/17 09:54





  Zyloprim -  PO   100 mg





  DAILY JARROD   Administration


 


Ascorbic Acid  1,000 mg 05/20/17 10:00 05/24/17 09:53





  Vitamin C -  PO   1,000 mg





  DAILY JARROD   Administration


 


Atorvastatin Calcium  40 mg 05/19/17 22:00 05/23/17 21:03





  Lipitor -  PO   40 mg





  HS JARROD   Administration


 


Azithromycin  250 mg 05/21/17 10:45 05/24/17 09:53





  Zithromax -  PO   250 mg





  DAILY JARROD   Administration


 


Bupropion HCl  75 mg 05/21/17 10:00 05/24/17 09:52





  Wellbutrin -  PO   75 mg





  DAILY JARROD   Administration


 


Carvedilol  6.25 mg 05/19/17 22:00 05/24/17 09:54





  Coreg -  PO   6.25 mg





  BID JARROD   Administration


 


Cholecalciferol  1,000 unit 05/20/17 10:00 05/24/17 09:55





  Vitamin D3 -  PO   1,000 unit





  DAILY JARROD   Administration


 


Clopidogrel Bisulfate  75 mg 05/20/17 10:00 05/24/17 09:53





  Plavix -  PO   75 mg





  DAILY JARROD   Administration


 


Docusate Sodium  100 mg 05/19/17 22:00 05/24/17 09:53





  Colace -  PO   100 mg





  BID JARROD   Administration


 


Ferrous Sulfate  325 mg 05/19/17 10:15 05/23/17 10:30





  Feosol -  PO   325 mg





  Q2D JARROD   Administration


 


Furosemide  80 mg 05/22/17 14:45 05/24/17 06:09





  Lasix Injection -  IVPB   80 mg





  BID@0600,1400 JARROD   Administration


 


Milrinone Lactate/Dextrose  100 mls @ 9.573 mls/hr 05/19/17 15:50 05/24/17 00:56





  Milrinone 20mg/100ml Ivpb -  IVPB   9.641 mls/hr





  TITR JARROD   Administration





  0.35 MCG/KG/MIN   


 


Insulin Aspart  1 vial 05/19/17 11:00 05/24/17 06:10





  Novolog Vial Sliding Scale -  SQ   3 units





  ACHS JARROD   Administration





  Protocol   


 


Multivitamins/Minerals/Vitamin C  1 tab 05/21/17 10:00 05/24/17 09:54





  Tab-A-Vit -  PO   1 tab





  DAILY JARROD   Administration


 


Potassium Chloride  20 meq 05/19/17 14:00 05/24/17 06:09





  K-Dur -  PO   20 meq





  TID JARROD   Administration


 


Ranitidine HCl  75 mg 05/20/17 10:00 05/24/17 09:54





  Zantac -  PO   75 mg





  DAILY JARROD   Administration


 


Warfarin Sodium  4 mg 05/19/17 18:00 05/23/17 18:00





  Coumadin -  PO   4 mg





  DAILY@1800 JARROD   Administration








 Vital Signs











Temp  97.9 F   05/24/17 09:57


 


Pulse  75   05/24/17 09:57


 


Resp  20   05/24/17 09:57


 


BP  106/64   05/24/17 09:57


 


Pulse Ox  96   05/23/17 21:00








 Intake & Output











 05/23/17 05/23/17 05/24/17





 11:59 23:59 11:59


 


Intake Total 264 572.5 357.9


 


Output Total 1350 1910 1200


 


Balance -1086 -1337.5 -842.1


 


Weight 208 lb 6 oz  210 lb 2 oz


 


Intake:   


 


   172.5 67.9


 


    Milrinone 20Mg/100Ml Ivpb 114 162.5 67.9





    - 100 ml @ 0.35 MCG/KG/   





    MIN 9.573 mls/hr IVPB   





    TITR JARROD Rx#:[id number]   


 


    RH 22 05/23/2017  10 


 


  IVPB 50 100 50


 


  Oral 100 300 240


 


Output:   


 


  Urine 1350 1910 1200


 


    Void 1350 1910 1200


 


Other:   


 


  Voiding Method Urinal Urinal Urinal


 


  Bowel Movement Yes  Yes


 


  Weight Measurement Method Standing Scale  Standing Scale











Constitutional: Yes: No Distress, Calm


Eyes: No: Sclera Icterus


HENT: No: Nasal Congestion


Cardiovascular: Yes: Regular Rate and Rhythm, JVD (++), S1, S2, Other (PMI non 

diplaced).  No: Gallop, Murmur


Respiratory: Yes: Wheezes.  No: Accessory Muscle Use, Rales


Gastrointestinal: Yes: Normal Bowel Sounds, Soft.  No: Tenderness


Musculoskeletal: Yes: Other (No kyphosis)


Extremities: No: Cold


Edema: 1+ bl le edema


Integumentary: No: Jaundice diaphoresis


Neurological: Yes: Alert, Oriented (x3)


Psychiatric: No: Agitated


Labs: 


  


 


 Laboratory Last Values











WBC  8.5 K/mm3 (4.0-10.0)   05/23/17  05:35    


 


RBC  4.48 M/mm3 (4.00-5.60)   05/23/17  05:35    


 


Hgb  11.7 GM/dL (11.7-16.9)   05/23/17  05:35    


 


Hct  36.7 % (35.4-49)   05/23/17  05:35    


 


MCV  81.8 fl (80-96)   05/23/17  05:35    


 


MCHC  32.0 g/dl (32.0-35.9)   05/23/17  05:35    


 


RDW  18.6 % (11.9-15.9)  H  05/23/17  05:35    


 


Plt Count  163 K/MM3 (134-434)   05/23/17  05:35    


 


MPV  8.8 fl (7.5-11.1)   05/23/17  05:35    


 


Neutrophils %  71.1 % (42.8-82.8)   05/23/17  05:35    


 


Lymphocytes %  14.3 % (8-40)  D 05/23/17  05:35    


 


Monocytes %  11.9 % (3.8-10.2)  H  05/23/17  05:35    


 


Eosinophils %  2.3 % (0-4.5)  D 05/23/17  05:35    


 


Basophils %  0.4 % (0-2.0)   05/23/17  05:35    


 


Platelet Estimate  Adequate  (NORMAL)   05/21/17  06:00    


 


Platelet Comment  No clumping noted   05/21/17  06:00    


 


INR  2.39  (0.82-1.09)  H  05/24/17  05:38    


 


Sodium  139 mmol/L (136-145)   05/24/17  05:38    


 


Potassium  4.5 mmol/L (3.5-5.1)   05/24/17  05:38    


 


Chloride  99 mmol/L ()   05/24/17  05:38    


 


Carbon Dioxide  30 mmol/L (21-32)   05/24/17  05:38    


 


Anion Gap  10  (8-16)   05/24/17  05:38    


 


BUN  59 mg/dL (7-18)  H  05/24/17  05:38    


 


Creatinine  1.8 mg/dL (0.7-1.3)  H  05/24/17  05:38    


 


Creat Clearance w eGFR  36.67  (>60)   05/24/17  05:38    


 


POC Glucometer  247 UNITS (())   05/24/17  05:32    


 


Random Glucose  236 mg/dL ()  H D 05/24/17  05:38    


 


Calcium  8.5 mg/dL (8.5-10.1)   05/24/17  05:38    


 


Phosphorus  3.1 mg/dL (2.5-4.9)   05/23/17  05:35    


 


Magnesium  2.8 mg/dL (1.8-2.4)  H  05/24/17  05:38    


 


Total Bilirubin  0.7 mg/dL (0.2-1.0)  D 05/24/17  05:38    


 


AST  21 U/L (15-37)   05/24/17  05:38    


 


ALT  21 U/L (12-78)   05/24/17  05:38    


 


Alkaline Phosphatase  156 U/L ()  H  05/24/17  05:38    


 


Creatine Kinase  51 IU/L ()   05/22/17  11:13    


 


Troponin I  0.03 ng/ml (0.00-0.05)   05/22/17  11:13    


 


B-Natriuretic Peptide  2012.19 pg/ml (5-450)  H  05/19/17  01:36    


 


Total Protein  7.1 g/dl (6.4-8.2)   05/24/17  05:38    


 


Albumin  3.2 g/dl (3.4-5.0)  L  05/24/17  05:38    


 


Urine Color  Ltyellow   05/19/17  20:15    


 


Urine Appearance  Clear   05/19/17  20:15    


 


Urine pH  6.0  (5.0-8.0)   05/19/17  20:15    


 


Ur Specific Gravity  <= 1.005  (1.005-1.025)   05/19/17  20:15    


 


Urine Protein  Negative  (NEGATIVE)   05/19/17  20:15    


 


Urine Glucose (UA)  Negative  (NEGATIVE)   05/19/17  20:15    


 


Urine Ketones  Negative  (NEGATIVE)   05/19/17  20:15    


 


Urine Blood  Negative  (NEGATIVE)   05/19/17  20:15    


 


Urine Nitrite  Negative  (NEGATIVE)   05/19/17  20:15    


 


Urine Bilirubin  Negative  (NEGATIVE)   05/19/17  20:15    


 


Urine Urobilinogen  Negative E.U./dl (0.2-1.0)   05/19/17  20:15    


 


Ur Leukocyte Esterase  Trace  (NEGATIVE)  H D 05/19/17  20:15    


 


Urine RBC  <1 /hpf (0-3)   05/19/17  20:15    


 


Urine WBC  7 /hpf (3-5)   05/19/17  20:15    


 


Ur Epithelial Cells  Rare /hpf (FEW)   05/19/17  20:15    


 


Hyaline Casts  9 /lpf  05/19/17  20:15    


 


Urine Mucus  Rare   05/19/17  20:15    














- ....Imaging


EKG: Other (tele: NSR, V-paced)





cxr 5/22: inc bridgette changes. 





Assessment/Plan


a/p:  76 year old man with PMH of IDDM, COPD (O2 dependant) HTN, HLD, Afib (on 

coumadin), CAD s/p PCIs, end stage CHF with ICD placement (on milrinone pump), 

PVD, BPH here with sob, cp, HA.








acute on chronic end stage systolic heart failure s/p ICD, on home milrinone 

infusion:


- vol status seems stable (as above), home regimen = torsemide 40 po bid


- worsened sob over baseline = progressive copd vs chf


- will give 2 wks empiric trial of incr'd torsemide at home: 80mg am, 40mg pm


- should have repeat bun/creat later after 3-5 days on this regimen


- should see dr merchant 2 wks after discharge


- cont milrinone at patient's home maintenance dose (on chronic infusion 

destination therapy)--0.35 mcg/kg/min.  


- cont home coreg (doubt causing bronchospasm--will d/w dr degroot ? rpt pft's)


- recent office icd check with stable function


- 5/22: discharge delayed due to persistent wright.   CXR  noted to have increased 

bridgette changes.  weight slightly up.  started on lasix 80 mg IV bid.


- 5/23-24: Still not feeling at his baseline but improving.  Cr stable.  con't 

milrinone, bid IV lasix with kcl supplements, coreg.





copd:


-per pulm





cp:


-atypical features ("like a swirl" sensation in center of chest, no radiation)


-resolved and no recurrence here


-trop WNL x 1 on DOA--rpt 5/22 ordered negative


-ECG BiV-paced


-this cp does not require inpatient evaluation--will consider outpt stress 

testing if his chronic sx (incr'd sob over baseline) does not respond to 

changes in inhalers for copd or incr diuretics





afib


- currently rate controlled on coreg


- cont coumadin per INR





PVD


- con't AC, plavix, statin.





CAD s/p PCIs


- No anginal symptoms, no signs acs.  Con't home ac, plavix, atorvastatin, coreg





HTN: controlled on coreg





HLD: con't statin. 





CKD


- at baseline renal fxn 7